# Patient Record
Sex: FEMALE | Race: BLACK OR AFRICAN AMERICAN | NOT HISPANIC OR LATINO | Employment: FULL TIME | URBAN - METROPOLITAN AREA
[De-identification: names, ages, dates, MRNs, and addresses within clinical notes are randomized per-mention and may not be internally consistent; named-entity substitution may affect disease eponyms.]

---

## 2017-04-20 DIAGNOSIS — J40 BRONCHITIS: ICD-10-CM

## 2017-04-21 RX ORDER — CEPHALEXIN 250 MG/1
CAPSULE ORAL
Qty: 60 EACH | Refills: 11 | Status: SHIPPED | OUTPATIENT
Start: 2017-04-21 | End: 2017-09-11

## 2017-09-09 DIAGNOSIS — J40 BRONCHITIS: ICD-10-CM

## 2017-09-11 RX ORDER — CEPHALEXIN 250 MG/1
CAPSULE ORAL
Qty: 60 EACH | Refills: 5 | Status: SHIPPED | OUTPATIENT
Start: 2017-09-11 | End: 2018-11-05 | Stop reason: SDUPTHER

## 2017-09-13 DIAGNOSIS — I10 ESSENTIAL HYPERTENSION: ICD-10-CM

## 2017-09-14 ENCOUNTER — OFFICE VISIT (OUTPATIENT)
Dept: FAMILY MEDICINE | Facility: CLINIC | Age: 57
End: 2017-09-14
Payer: OTHER GOVERNMENT

## 2017-09-14 VITALS
SYSTOLIC BLOOD PRESSURE: 128 MMHG | OXYGEN SATURATION: 97 % | HEART RATE: 84 BPM | HEIGHT: 66 IN | TEMPERATURE: 99 F | DIASTOLIC BLOOD PRESSURE: 70 MMHG | WEIGHT: 266.19 LBS | BODY MASS INDEX: 42.78 KG/M2

## 2017-09-14 DIAGNOSIS — G89.29 CHRONIC PAIN OF LEFT KNEE: ICD-10-CM

## 2017-09-14 DIAGNOSIS — H10.10 ALLERGIC CONJUNCTIVITIS, UNSPECIFIED LATERALITY: ICD-10-CM

## 2017-09-14 DIAGNOSIS — M25.562 CHRONIC PAIN OF LEFT KNEE: ICD-10-CM

## 2017-09-14 DIAGNOSIS — J40 BRONCHITIS: ICD-10-CM

## 2017-09-14 DIAGNOSIS — J45.909 ASTHMA, CHRONIC, UNSPECIFIED ASTHMA SEVERITY, UNCOMPLICATED: ICD-10-CM

## 2017-09-14 DIAGNOSIS — J32.9 SINUSITIS, UNSPECIFIED CHRONICITY, UNSPECIFIED LOCATION: Primary | ICD-10-CM

## 2017-09-14 DIAGNOSIS — I10 ESSENTIAL HYPERTENSION: ICD-10-CM

## 2017-09-14 DIAGNOSIS — J30.9 ALLERGIC RHINITIS, UNSPECIFIED CHRONICITY, UNSPECIFIED SEASONALITY, UNSPECIFIED TRIGGER: ICD-10-CM

## 2017-09-14 DIAGNOSIS — J45.21 MILD INTERMITTENT ASTHMA WITH ACUTE EXACERBATION: Chronic | ICD-10-CM

## 2017-09-14 PROCEDURE — 99213 OFFICE O/P EST LOW 20 MIN: CPT | Mod: PBBFAC,PO | Performed by: NURSE PRACTITIONER

## 2017-09-14 PROCEDURE — 3078F DIAST BP <80 MM HG: CPT | Mod: ,,, | Performed by: NURSE PRACTITIONER

## 2017-09-14 PROCEDURE — 99214 OFFICE O/P EST MOD 30 MIN: CPT | Mod: S$PBB,,, | Performed by: NURSE PRACTITIONER

## 2017-09-14 PROCEDURE — 3008F BODY MASS INDEX DOCD: CPT | Mod: ,,, | Performed by: NURSE PRACTITIONER

## 2017-09-14 PROCEDURE — 99999 PR PBB SHADOW E&M-EST. PATIENT-LVL III: CPT | Mod: PBBFAC,,, | Performed by: NURSE PRACTITIONER

## 2017-09-14 PROCEDURE — 3074F SYST BP LT 130 MM HG: CPT | Mod: ,,, | Performed by: NURSE PRACTITIONER

## 2017-09-14 RX ORDER — LISINOPRIL 20 MG/1
20 TABLET ORAL DAILY
Qty: 90 TABLET | Refills: 1 | Status: SHIPPED | OUTPATIENT
Start: 2017-09-14 | End: 2018-11-05 | Stop reason: SDUPTHER

## 2017-09-14 RX ORDER — IPRATROPIUM BROMIDE AND ALBUTEROL SULFATE 2.5; .5 MG/3ML; MG/3ML
3 SOLUTION RESPIRATORY (INHALATION) EVERY 6 HOURS PRN
Qty: 90 VIAL | Refills: 5 | Status: SHIPPED | OUTPATIENT
Start: 2017-09-14 | End: 2018-06-26 | Stop reason: SDUPTHER

## 2017-09-14 RX ORDER — CELECOXIB 200 MG/1
200 CAPSULE ORAL 2 TIMES DAILY
Qty: 180 CAPSULE | Refills: 1 | Status: SHIPPED | OUTPATIENT
Start: 2017-09-14 | End: 2018-03-23 | Stop reason: SDUPTHER

## 2017-09-14 RX ORDER — SPIRONOLACTONE 50 MG/1
50 TABLET, FILM COATED ORAL 2 TIMES DAILY
Qty: 180 TABLET | Refills: 1 | Status: SHIPPED | OUTPATIENT
Start: 2017-09-14 | End: 2018-11-05 | Stop reason: SDUPTHER

## 2017-09-14 RX ORDER — LEVOCETIRIZINE DIHYDROCHLORIDE 5 MG/1
5 TABLET, FILM COATED ORAL NIGHTLY
Qty: 30 TABLET | Refills: 1 | Status: SHIPPED | OUTPATIENT
Start: 2017-09-14 | End: 2018-07-03 | Stop reason: SDUPTHER

## 2017-09-14 RX ORDER — AMOXICILLIN 875 MG/1
875 TABLET, FILM COATED ORAL 2 TIMES DAILY
Qty: 20 TABLET | Refills: 0 | Status: SHIPPED | OUTPATIENT
Start: 2017-09-14 | End: 2017-09-24

## 2017-09-14 RX ORDER — LISINOPRIL 20 MG/1
TABLET ORAL
Qty: 30 TABLET | Refills: 1 | Status: SHIPPED | OUTPATIENT
Start: 2017-09-14 | End: 2017-09-14 | Stop reason: SDUPTHER

## 2017-09-14 RX ORDER — AMLODIPINE BESYLATE 10 MG/1
10 TABLET ORAL DAILY
Qty: 90 TABLET | Refills: 1 | Status: SHIPPED | OUTPATIENT
Start: 2017-09-14 | End: 2017-12-07

## 2017-09-14 RX ORDER — FLUTICASONE PROPIONATE 50 MCG
1 SPRAY, SUSPENSION (ML) NASAL DAILY
Qty: 3 BOTTLE | Refills: 1 | Status: SHIPPED | OUTPATIENT
Start: 2017-09-14 | End: 2019-07-08 | Stop reason: SDUPTHER

## 2017-09-14 NOTE — TELEPHONE ENCOUNTER
Patient contacted to schedule appointment for BP check. Reports having tchy throat, ear pressure, eyes watering for the last two days.  OV scheduled for today @ 2:00.

## 2017-09-14 NOTE — PROGRESS NOTES
Subjective:       Patient ID: Brandi Freitas is a 56 y.o. female.    Chief Complaint: Hypertension (F/U); Sore Throat (5 days); Nasal Congestion (5 days); and Otalgia (Right)    56-year-old female presents to the clinic today with complaint of slight chills, sore throat, sinus congestion, right ear pain, dry cough, and itchy runny eyes for the past 5 days.  She denies any known fever, shortness of breath, wheezing, abdominal pain, nausea, vomiting, or diarrhea.  She denies any headaches, dizziness, blurred vision.  She denies any cardiac chest pain, heart palpitations, shortness or breath or swelling to lower extremities.  She's been taking Benadryl and using cough drops without much relief.        Past Medical History:   Diagnosis Date    Anxiety     Arthritis     knee    Asthma     Bursitis, shoulder     Dental bridge present     upper right    Dental crown present     Depression     Hypertension     Pes planus     Wears glasses     for reading     Past Surgical History:   Procedure Laterality Date    arthroscopy ankle right      bilateral inguinal hernia repair      left torn rotator cuff repair  09/2014    MYOMECTOMY      rt salphigectomy      SYNOVECTOMY      UMBILICAL HERNIA REPAIR        reports that she has never smoked. She has never used smokeless tobacco. She reports that she drinks alcohol. She reports that she does not use drugs.  Review of Systems   Constitutional: Positive for chills. Negative for fever.   HENT: Positive for congestion, ear pain, sinus pressure and sore throat. Negative for postnasal drip and rhinorrhea.    Eyes: Positive for itching. Negative for pain and discharge.   Respiratory: Positive for cough. Negative for shortness of breath and wheezing.    Cardiovascular: Negative for chest pain, palpitations and leg swelling.   Gastrointestinal: Negative for abdominal pain, diarrhea, nausea and vomiting.   Musculoskeletal: Negative for gait problem.   Neurological:  Negative for dizziness, light-headedness and headaches.       Objective:      Physical Exam   Constitutional: She is oriented to person, place, and time. She appears well-developed and well-nourished. No distress.   HENT:   Head: Normocephalic and atraumatic.   Right Ear: External ear normal.   Left Ear: External ear normal.   Mouth/Throat: Oropharynx is clear and moist. No oropharyngeal exudate.   Both nares red and inflamed with tenderness over both maxillary sinuses    Eyes: Conjunctivae and EOM are normal. Pupils are equal, round, and reactive to light. Right eye exhibits discharge. Left eye exhibits no discharge. No scleral icterus.   Clear drainage noted to both eyes    Neck: Normal range of motion. Neck supple.   Cardiovascular: Normal rate, regular rhythm and normal heart sounds.  Exam reveals no gallop and no friction rub.    No murmur heard.  Pulmonary/Chest: Effort normal and breath sounds normal. No respiratory distress. She has no wheezes. She has no rales.   Abdominal: Soft. Bowel sounds are normal. There is no tenderness.   Musculoskeletal: Normal range of motion. She exhibits no edema.   Lymphadenopathy:     She has cervical adenopathy.   Neurological: She is alert and oriented to person, place, and time.   Skin: Skin is warm and dry. No rash noted. She is not diaphoretic.   Psychiatric: She has a normal mood and affect.       Assessment:       1. Sinusitis, unspecified chronicity, unspecified location    2. Essential hypertension    3. Asthma, chronic, unspecified asthma severity, uncomplicated    4. Bronchitis    5. Allergic conjunctivitis, unspecified laterality    6. Mild intermittent asthma with acute exacerbation    7. Allergic rhinitis, unspecified chronicity, unspecified seasonality, unspecified trigger    8. Chronic pain of left knee        Plan:         Sinusitis, unspecified chronicity, unspecified location  -     amoxicillin (AMOXIL) 875 MG tablet; Take 1 tablet (875 mg total) by mouth 2  (two) times daily.  Dispense: 20 tablet; Refill: 0    Essential hypertension  -     amlodipine (NORVASC) 10 MG tablet; Take 1 tablet (10 mg total) by mouth once daily.  Dispense: 90 tablet; Refill: 1  -     lisinopril (PRINIVIL,ZESTRIL) 20 MG tablet; Take 1 tablet (20 mg total) by mouth once daily.  Dispense: 90 tablet; Refill: 1  -     spironolactone (ALDACTONE) 50 MG tablet; Take 1 tablet (50 mg total) by mouth 2 (two) times daily.  Dispense: 180 tablet; Refill: 1  - The current medical regimen is effective;  continue present plan and medications.    Asthma, chronic, unspecified asthma severity, uncomplicated  -     albuterol-ipratropium 2.5mg-0.5mg/3mL (DUO-NEB) 0.5 mg-3 mg(2.5 mg base)/3 mL nebulizer solution; Take 3 mLs by nebulization every 6 (six) hours as needed for Wheezing or Shortness of Breath.  Dispense: 90 vial; Refill: 5  - The current medical regimen is effective;  continue present plan and medications.    Bronchitis  - Robitussin DM as directed    Allergic conjunctivitis, unspecified laterality  - Zaditor or Alaway as needed     Allergic rhinitis, unspecified chronicity, unspecified seasonality, unspecified trigger  -     fluticasone (FLONASE) 50 mcg/actuation nasal spray; 1 spray by Each Nare route once daily.  Dispense: 3 Bottle; Refill: 1  -     levocetirizine (XYZAL) 5 MG tablet; Take 1 tablet (5 mg total) by mouth every evening.  Dispense: 30 tablet; Refill: 1    Chronic pain of left knee  -     celecoxib (CELEBREX) 200 MG capsule; Take 1 capsule (200 mg total) by mouth 2 (two) times daily.  Dispense: 180 capsule; Refill: 1

## 2017-09-14 NOTE — PATIENT INSTRUCTIONS
Robitussin DM as directed   Zaditor or Alaway for eyes running  Drink lots of water   Use Flonase

## 2017-11-29 ENCOUNTER — LAB VISIT (OUTPATIENT)
Dept: LAB | Facility: HOSPITAL | Age: 57
End: 2017-11-29
Attending: FAMILY MEDICINE
Payer: OTHER GOVERNMENT

## 2017-11-29 ENCOUNTER — OFFICE VISIT (OUTPATIENT)
Dept: FAMILY MEDICINE | Facility: CLINIC | Age: 57
End: 2017-11-29
Payer: OTHER GOVERNMENT

## 2017-11-29 VITALS
SYSTOLIC BLOOD PRESSURE: 148 MMHG | WEIGHT: 268.94 LBS | HEIGHT: 67 IN | HEART RATE: 80 BPM | TEMPERATURE: 99 F | DIASTOLIC BLOOD PRESSURE: 82 MMHG | BODY MASS INDEX: 42.21 KG/M2 | OXYGEN SATURATION: 95 %

## 2017-11-29 DIAGNOSIS — Z11.3 SCREENING EXAMINATION FOR STD (SEXUALLY TRANSMITTED DISEASE): ICD-10-CM

## 2017-11-29 DIAGNOSIS — Z12.31 ENCOUNTER FOR SCREENING MAMMOGRAM FOR BREAST CANCER: ICD-10-CM

## 2017-11-29 DIAGNOSIS — Z12.11 SCREENING FOR COLON CANCER: ICD-10-CM

## 2017-11-29 DIAGNOSIS — E66.01 MORBID OBESITY WITH BMI OF 40.0-44.9, ADULT: ICD-10-CM

## 2017-11-29 DIAGNOSIS — Z00.00 ROUTINE GENERAL MEDICAL EXAMINATION AT A HEALTH CARE FACILITY: ICD-10-CM

## 2017-11-29 DIAGNOSIS — Z00.00 ROUTINE GENERAL MEDICAL EXAMINATION AT A HEALTH CARE FACILITY: Primary | ICD-10-CM

## 2017-11-29 DIAGNOSIS — I10 ESSENTIAL HYPERTENSION: ICD-10-CM

## 2017-11-29 LAB
ALBUMIN SERPL BCP-MCNC: 3.5 G/DL
ALP SERPL-CCNC: 103 U/L
ALT SERPL W/O P-5'-P-CCNC: 16 U/L
ANION GAP SERPL CALC-SCNC: 6 MMOL/L
AST SERPL-CCNC: 19 U/L
BASOPHILS # BLD AUTO: 0.06 K/UL
BASOPHILS NFR BLD: 0.8 %
BILIRUB SERPL-MCNC: 0.3 MG/DL
BUN SERPL-MCNC: 11 MG/DL
CALCIUM SERPL-MCNC: 9.7 MG/DL
CHLORIDE SERPL-SCNC: 107 MMOL/L
CHOLEST SERPL-MCNC: 154 MG/DL
CHOLEST/HDLC SERPL: 3.1 {RATIO}
CO2 SERPL-SCNC: 28 MMOL/L
CREAT SERPL-MCNC: 0.7 MG/DL
DIFFERENTIAL METHOD: ABNORMAL
EOSINOPHIL # BLD AUTO: 0.3 K/UL
EOSINOPHIL NFR BLD: 3.8 %
ERYTHROCYTE [DISTWIDTH] IN BLOOD BY AUTOMATED COUNT: 15.6 %
EST. GFR  (AFRICAN AMERICAN): >60 ML/MIN/1.73 M^2
EST. GFR  (NON AFRICAN AMERICAN): >60 ML/MIN/1.73 M^2
ESTIMATED AVG GLUCOSE: 111 MG/DL
GLUCOSE SERPL-MCNC: 95 MG/DL
HBA1C MFR BLD HPLC: 5.5 %
HCT VFR BLD AUTO: 38.5 %
HDLC SERPL-MCNC: 49 MG/DL
HDLC SERPL: 31.8 %
HGB BLD-MCNC: 11.6 G/DL
IMM GRANULOCYTES # BLD AUTO: 0.02 K/UL
IMM GRANULOCYTES NFR BLD AUTO: 0.3 %
LDLC SERPL CALC-MCNC: 99 MG/DL
LYMPHOCYTES # BLD AUTO: 3 K/UL
LYMPHOCYTES NFR BLD: 42 %
MCH RBC QN AUTO: 25.8 PG
MCHC RBC AUTO-ENTMCNC: 30.1 G/DL
MCV RBC AUTO: 86 FL
MONOCYTES # BLD AUTO: 0.8 K/UL
MONOCYTES NFR BLD: 10.9 %
NEUTROPHILS # BLD AUTO: 3 K/UL
NEUTROPHILS NFR BLD: 42.2 %
NONHDLC SERPL-MCNC: 105 MG/DL
NRBC BLD-RTO: 0 /100 WBC
PLATELET # BLD AUTO: 197 K/UL
PMV BLD AUTO: 13.5 FL
POTASSIUM SERPL-SCNC: 4.3 MMOL/L
PROT SERPL-MCNC: 7.6 G/DL
RBC # BLD AUTO: 4.49 M/UL
SODIUM SERPL-SCNC: 141 MMOL/L
TRIGL SERPL-MCNC: 30 MG/DL
WBC # BLD AUTO: 7.14 K/UL

## 2017-11-29 PROCEDURE — 80061 LIPID PANEL: CPT

## 2017-11-29 PROCEDURE — 87660 TRICHOMONAS VAGIN DIR PROBE: CPT

## 2017-11-29 PROCEDURE — 99999 PR PBB SHADOW E&M-EST. PATIENT-LVL III: CPT | Mod: PBBFAC,,, | Performed by: FAMILY MEDICINE

## 2017-11-29 PROCEDURE — 99213 OFFICE O/P EST LOW 20 MIN: CPT | Mod: PBBFAC,PO | Performed by: FAMILY MEDICINE

## 2017-11-29 PROCEDURE — 36415 COLL VENOUS BLD VENIPUNCTURE: CPT | Mod: PO

## 2017-11-29 PROCEDURE — 83036 HEMOGLOBIN GLYCOSYLATED A1C: CPT

## 2017-11-29 PROCEDURE — 88142 CYTOPATH C/V THIN LAYER: CPT

## 2017-11-29 PROCEDURE — 87480 CANDIDA DNA DIR PROBE: CPT

## 2017-11-29 PROCEDURE — 99396 PREV VISIT EST AGE 40-64: CPT | Mod: S$PBB,,, | Performed by: FAMILY MEDICINE

## 2017-11-29 PROCEDURE — 86592 SYPHILIS TEST NON-TREP QUAL: CPT

## 2017-11-29 PROCEDURE — 80074 ACUTE HEPATITIS PANEL: CPT

## 2017-11-29 PROCEDURE — 87591 N.GONORRHOEAE DNA AMP PROB: CPT

## 2017-11-29 PROCEDURE — 85025 COMPLETE CBC W/AUTO DIFF WBC: CPT

## 2017-11-29 PROCEDURE — 86703 HIV-1/HIV-2 1 RESULT ANTBDY: CPT

## 2017-11-29 PROCEDURE — 80053 COMPREHEN METABOLIC PANEL: CPT

## 2017-11-29 NOTE — PROGRESS NOTES
Chief Complaint   Patient presents with    Annual Exam    Gynecologic Exam       HPI  Brandi Freitas is a 57 y.o. female with multiple medical diagnoses as listed in the medical history and problem list that presents for annual exam . She has asthma but it has been controlled. She has been having some bleeding with hard stools, worse last week. Now she is regular.     PAST MEDICAL HISTORY:  Past Medical History:   Diagnosis Date    Anxiety     Arthritis     knee    Asthma     Bursitis, shoulder     Dental bridge present     upper right    Dental crown present     Depression     Hypertension     Pes planus     Wears glasses     for reading       PAST SURGICAL HISTORY:  Past Surgical History:   Procedure Laterality Date    arthroscopy ankle right      bilateral inguinal hernia repair      left torn rotator cuff repair  09/2014    MYOMECTOMY      rt salphigectomy      SYNOVECTOMY      UMBILICAL HERNIA REPAIR         SOCIAL HISTORY:  Social History     Social History    Marital status:      Spouse name: N/A    Number of children: N/A    Years of education: N/A     Occupational History    Not on file.     Social History Main Topics    Smoking status: Never Smoker    Smokeless tobacco: Never Used    Alcohol use Yes      Comment: social     Drug use: No    Sexual activity: Yes      Comment: occasional     Other Topics Concern    Not on file     Social History Narrative    No narrative on file       FAMILY HISTORY:  Family History   Problem Relation Age of Onset    Cancer Mother      breast    Diabetes Mother     Hypertension Mother     Cancer Brother      liver cancer       ALLERGIES AND MEDICATIONS: updated and reviewed.  Review of patient's allergies indicates:  No Known Allergies  Current Outpatient Prescriptions   Medication Sig Dispense Refill    ADVAIR DISKUS 100-50 mcg/dose diskus inhaler INHALE ONE DOSE BY MOUTH TWICE DAILY 60 each 5    albuterol 90 mcg/actuation  inhaler Inhale 2 puffs into the lungs every 6 (six) hours as needed for Wheezing or Shortness of Breath. 18 g 5    albuterol-ipratropium 2.5mg-0.5mg/3mL (DUO-NEB) 0.5 mg-3 mg(2.5 mg base)/3 mL nebulizer solution Take 3 mLs by nebulization every 6 (six) hours as needed for Wheezing or Shortness of Breath. 90 vial 5    amlodipine (NORVASC) 10 MG tablet Take 1 tablet (10 mg total) by mouth once daily. 90 tablet 1    celecoxib (CELEBREX) 200 MG capsule Take 1 capsule (200 mg total) by mouth 2 (two) times daily. 180 capsule 1    fluticasone (FLONASE) 50 mcg/actuation nasal spray 1 spray by Each Nare route once daily. 3 Bottle 1    levocetirizine (XYZAL) 5 MG tablet Take 1 tablet (5 mg total) by mouth every evening. 30 tablet 1    lisinopril (PRINIVIL,ZESTRIL) 20 MG tablet Take 1 tablet (20 mg total) by mouth once daily. 90 tablet 1    spironolactone (ALDACTONE) 50 MG tablet Take 1 tablet (50 mg total) by mouth 2 (two) times daily. 180 tablet 1     No current facility-administered medications for this visit.        ROS  Review of Systems   Constitutional: Negative for chills, diaphoresis, fatigue, fever and unexpected weight change.   HENT: Negative for rhinorrhea, sinus pressure, sore throat and tinnitus.    Eyes: Negative for photophobia and visual disturbance.   Respiratory: Negative for cough, shortness of breath and wheezing.    Cardiovascular: Negative for chest pain and palpitations.   Gastrointestinal: Negative for abdominal pain, blood in stool, constipation, diarrhea, nausea and vomiting.   Genitourinary: Negative for dysuria, flank pain, frequency and vaginal discharge.   Musculoskeletal: Negative for arthralgias and joint swelling.   Skin: Negative for rash.   Neurological: Negative for speech difficulty, weakness, light-headedness and headaches.   Psychiatric/Behavioral: Negative for behavioral problems and dysphoric mood.       Physical Exam  Vitals:    11/29/17 1421   BP: (!) 148/82   Pulse: 80  "  Temp: 98.6 °F (37 °C)   SpO2: 95%   Weight: 122 kg (268 lb 15.4 oz)   Height: 5' 7" (1.702 m)    Body mass index is 42.13 kg/m².  Weight: 122 kg (268 lb 15.4 oz)   Height: 5' 7" (170.2 cm)     Physical Exam   Constitutional: She is oriented to person, place, and time. She appears well-developed and well-nourished. No distress.   HENT:   Head: Normocephalic and atraumatic.   Right Ear: Tympanic membrane normal.   Left Ear: Tympanic membrane normal.   Nose: Nose normal.   Mouth/Throat: No oropharyngeal exudate.   Eyes: EOM are normal.   Neck: Neck supple. No thyromegaly present.   Cardiovascular: Normal rate and regular rhythm.  Exam reveals no gallop and no friction rub.    No murmur heard.  Pulmonary/Chest: Effort normal and breath sounds normal. No respiratory distress. She has no wheezes. She has no rales.   Abdominal: Soft. Bowel sounds are normal. She exhibits no distension and no mass. There is no tenderness. There is no rebound and no guarding.   Genitourinary:   Genitourinary Comments:   GYN Exam    Medical assistant was present in room and assisted with procedure. Pap smear was performed, specimen collected with cytobrush. No external lesions noted. Normal vaginal mucosa.  Cervix normal appearing cervix without discharge or lesions,no vaginal discharge.    Bimanual exam performed:  no cervical motion tenderness noted, no uterine or ovarian masses palpated.      Lymphadenopathy:     She has no cervical adenopathy.   Neurological: She is alert and oriented to person, place, and time.   Skin: Skin is warm and dry. No rash noted.   Psychiatric: She has a normal mood and affect. Her behavior is normal.   Nursing note and vitals reviewed.      Health Maintenance       Date Due Completion Date    Influenza Vaccine 08/01/2017 11/23/2016 (Declined)    Override on 11/23/2016: Declined    Override on 2/2/2016: Declined    Override on 11/13/2014: Declined    Override on 12/24/2013: Declined    Mammogram 11/22/2017 " 11/22/2016    Colonoscopy 12/14/2017 12/14/2012    Pap Smear with HPV Cotest 11/23/2019 11/23/2016    Lipid Panel 11/23/2021 11/23/2016    TETANUS VACCINE 05/19/2026 5/19/2016            ASSESSMENT     1. Routine general medical examination at a health care facility    2. Essential hypertension    3. Morbid obesity with BMI of 40.0-44.9, adult    4. Screening for colon cancer    5. Encounter for screening mammogram for breast cancer    6. Screening examination for STD (sexually transmitted disease)        PLAN:     Problem List Items Addressed This Visit        Cardiac/Vascular    Hypertension  -Bp recheck, nurse visit if elevated       Endocrine    Morbid obesity with BMI of 40.0-44.9, adult    Relevant Orders    Hemoglobin A1c      Other Visit Diagnoses     Routine general medical examination at a health care facility    -  Primary  --discussed healthy lifestyle modification with exercise and healthy diet, reviewed age appropriate screening and healthy maintenance      Relevant Orders    CBC auto differential    Comprehensive metabolic panel    Lipid panel    Screening for colon cancer        Relevant Orders    Case request GI: COLONOSCOPY (Completed)    Encounter for screening mammogram for breast cancer        Relevant Orders    Mammo Digital Screening Bilateral With CAD    Screening examination for STD (sexually transmitted disease)        Relevant Orders    C. trachomatis/N. gonorrhoeae by AMP DNA Urine    HIV-1 and HIV-2 antibodies    RPR    Hepatitis panel, acute    Liquid-based pap smear, screening    HPV DNA probe, amplified    Vaginosis Screen by DNA Probe    C. trachomatis/N. gonorrhoeae by AMP DNA Vagina            Cherelle Stover MD  11/29/2017 2:31 PM        Return in about 1 year (around 11/29/2018) for annual exam.

## 2017-11-30 LAB
CANDIDA RRNA VAG QL PROBE: NEGATIVE
G VAGINALIS RRNA GENITAL QL PROBE: NEGATIVE
HAV IGM SERPL QL IA: NEGATIVE
HBV CORE IGM SERPL QL IA: NEGATIVE
HBV SURFACE AG SERPL QL IA: NEGATIVE
HCV AB SERPL QL IA: NEGATIVE
HIV 1+2 AB+HIV1 P24 AG SERPL QL IA: NEGATIVE
RPR SER QL: NORMAL
T VAGINALIS RRNA GENITAL QL PROBE: NEGATIVE

## 2017-12-01 LAB
C TRACH DNA SPEC QL NAA+PROBE: NOT DETECTED
N GONORRHOEA DNA SPEC QL NAA+PROBE: NOT DETECTED

## 2017-12-06 ENCOUNTER — HOSPITAL ENCOUNTER (OUTPATIENT)
Dept: RADIOLOGY | Facility: HOSPITAL | Age: 57
Discharge: HOME | End: 2017-12-06
Attending: FAMILY MEDICINE
Payer: OTHER GOVERNMENT

## 2017-12-06 DIAGNOSIS — Z12.31 ENCOUNTER FOR SCREENING MAMMOGRAM FOR BREAST CANCER: ICD-10-CM

## 2017-12-06 PROCEDURE — 77063 BREAST TOMOSYNTHESIS BI: CPT | Mod: 26,,, | Performed by: RADIOLOGY

## 2017-12-06 PROCEDURE — 77067 SCR MAMMO BI INCL CAD: CPT | Mod: 26,,, | Performed by: RADIOLOGY

## 2017-12-06 PROCEDURE — 77067 SCR MAMMO BI INCL CAD: CPT | Mod: TC,PO

## 2017-12-07 DIAGNOSIS — I10 ESSENTIAL HYPERTENSION: ICD-10-CM

## 2017-12-07 RX ORDER — AMLODIPINE BESYLATE 10 MG/1
TABLET ORAL
Qty: 30 TABLET | Refills: 5 | Status: SHIPPED | OUTPATIENT
Start: 2017-12-07 | End: 2018-12-27 | Stop reason: SDUPTHER

## 2017-12-13 ENCOUNTER — CLINICAL SUPPORT (OUTPATIENT)
Dept: FAMILY MEDICINE | Facility: CLINIC | Age: 57
End: 2017-12-13
Payer: OTHER GOVERNMENT

## 2017-12-13 VITALS — SYSTOLIC BLOOD PRESSURE: 138 MMHG | DIASTOLIC BLOOD PRESSURE: 86 MMHG | HEART RATE: 93 BPM | OXYGEN SATURATION: 98 %

## 2017-12-13 DIAGNOSIS — I10 ESSENTIAL HYPERTENSION: Primary | ICD-10-CM

## 2017-12-13 PROCEDURE — 99499 UNLISTED E&M SERVICE: CPT | Mod: S$PBB,,, | Performed by: FAMILY MEDICINE

## 2017-12-13 PROCEDURE — 99213 OFFICE O/P EST LOW 20 MIN: CPT | Mod: PBBFAC,PO

## 2017-12-13 PROCEDURE — 99999 PR PBB SHADOW E&M-EST. PATIENT-LVL III: CPT | Mod: PBBFAC,,,

## 2017-12-13 NOTE — PROGRESS NOTES
Brandi Gray michela 57 y.o. female is here today for Blood Pressure check.   History of HTN yes.    Review of patient's allergies indicates:  No Known Allergies  Creatinine   Date Value Ref Range Status   11/29/2017 0.7 0.5 - 1.4 mg/dL Final     Sodium   Date Value Ref Range Status   11/29/2017 141 136 - 145 mmol/L Final     Potassium   Date Value Ref Range Status   11/29/2017 4.3 3.5 - 5.1 mmol/L Final   ]  Patient verifies taking blood pressure medications on a regular basis at the same time of the day.     Current Outpatient Prescriptions:     ADVAIR DISKUS 100-50 mcg/dose diskus inhaler, INHALE ONE DOSE BY MOUTH TWICE DAILY, Disp: 60 each, Rfl: 5    albuterol 90 mcg/actuation inhaler, Inhale 2 puffs into the lungs every 6 (six) hours as needed for Wheezing or Shortness of Breath., Disp: 18 g, Rfl: 5    albuterol-ipratropium 2.5mg-0.5mg/3mL (DUO-NEB) 0.5 mg-3 mg(2.5 mg base)/3 mL nebulizer solution, Take 3 mLs by nebulization every 6 (six) hours as needed for Wheezing or Shortness of Breath., Disp: 90 vial, Rfl: 5    amLODIPine (NORVASC) 10 MG tablet, TAKE ONE TABLET BY MOUTH ONCE DAILY, Disp: 30 tablet, Rfl: 5    celecoxib (CELEBREX) 200 MG capsule, Take 1 capsule (200 mg total) by mouth 2 (two) times daily., Disp: 180 capsule, Rfl: 1    fluticasone (FLONASE) 50 mcg/actuation nasal spray, 1 spray by Each Nare route once daily., Disp: 3 Bottle, Rfl: 1    levocetirizine (XYZAL) 5 MG tablet, Take 1 tablet (5 mg total) by mouth every evening., Disp: 30 tablet, Rfl: 1    lisinopril (PRINIVIL,ZESTRIL) 20 MG tablet, Take 1 tablet (20 mg total) by mouth once daily., Disp: 90 tablet, Rfl: 1    spironolactone (ALDACTONE) 50 MG tablet, Take 1 tablet (50 mg total) by mouth 2 (two) times daily., Disp: 180 tablet, Rfl: 1  Does patient have record of home blood pressure readings no.   Last dose of blood pressure medication was taken at approx 8:40 pm last night.  Patient is asymptomatic.   Complains of none.    FYI:  Patient had a coughing fit while in room for blood pressure check. She took two puffs of her rescue inhaler but stated that she would be fine once she got out in the fresh air. Cough was dry and hacky.     Vitals:    12/13/17 1520   BP: 138/86   BP Location: Right arm   Patient Position: Sitting   BP Method: Large (Manual)   Pulse: 93   SpO2: 98%         Dr. Stover notified.

## 2018-01-09 DIAGNOSIS — Z12.11 SCREEN FOR COLON CANCER: Primary | ICD-10-CM

## 2018-01-11 ENCOUNTER — ANESTHESIA (OUTPATIENT)
Dept: ENDOSCOPY | Facility: HOSPITAL | Age: 58
End: 2018-01-11
Payer: OTHER GOVERNMENT

## 2018-01-11 ENCOUNTER — HOSPITAL ENCOUNTER (OUTPATIENT)
Facility: HOSPITAL | Age: 58
Discharge: HOME | End: 2018-01-11
Attending: INTERNAL MEDICINE | Admitting: INTERNAL MEDICINE
Payer: OTHER GOVERNMENT

## 2018-01-11 ENCOUNTER — ANESTHESIA EVENT (OUTPATIENT)
Dept: ENDOSCOPY | Facility: HOSPITAL | Age: 58
End: 2018-01-11
Payer: OTHER GOVERNMENT

## 2018-01-11 ENCOUNTER — SURGERY (OUTPATIENT)
Age: 58
End: 2018-01-11

## 2018-01-11 VITALS
HEIGHT: 67 IN | OXYGEN SATURATION: 98 % | TEMPERATURE: 97 F | SYSTOLIC BLOOD PRESSURE: 148 MMHG | DIASTOLIC BLOOD PRESSURE: 72 MMHG | WEIGHT: 268 LBS | BODY MASS INDEX: 42.06 KG/M2 | HEART RATE: 70 BPM | RESPIRATION RATE: 18 BRPM

## 2018-01-11 DIAGNOSIS — Z12.11 COLON CANCER SCREENING: ICD-10-CM

## 2018-01-11 PROCEDURE — 94761 N-INVAS EAR/PLS OXIMETRY MLT: CPT

## 2018-01-11 PROCEDURE — 94640 AIRWAY INHALATION TREATMENT: CPT | Mod: 59

## 2018-01-11 PROCEDURE — 63600175 PHARM REV CODE 636 W HCPCS: Performed by: NURSE ANESTHETIST, CERTIFIED REGISTERED

## 2018-01-11 PROCEDURE — 88305 TISSUE EXAM BY PATHOLOGIST: CPT | Mod: 59

## 2018-01-11 PROCEDURE — 27201012 HC FORCEPS, HOT/COLD, DISP: Performed by: INTERNAL MEDICINE

## 2018-01-11 PROCEDURE — 25000242 PHARM REV CODE 250 ALT 637 W/ HCPCS

## 2018-01-11 PROCEDURE — 37000008 HC ANESTHESIA 1ST 15 MINUTES: Performed by: INTERNAL MEDICINE

## 2018-01-11 PROCEDURE — D9220A PRA ANESTHESIA: Mod: ANES,,, | Performed by: ANESTHESIOLOGY

## 2018-01-11 PROCEDURE — 25000003 PHARM REV CODE 250: Performed by: INTERNAL MEDICINE

## 2018-01-11 PROCEDURE — 27201089 HC SNARE, DISP (ANY): Performed by: INTERNAL MEDICINE

## 2018-01-11 PROCEDURE — 45385 COLONOSCOPY W/LESION REMOVAL: CPT | Performed by: INTERNAL MEDICINE

## 2018-01-11 PROCEDURE — D9220A PRA ANESTHESIA: Mod: CRNA,,, | Performed by: NURSE ANESTHETIST, CERTIFIED REGISTERED

## 2018-01-11 PROCEDURE — 25000003 PHARM REV CODE 250: Performed by: NURSE ANESTHETIST, CERTIFIED REGISTERED

## 2018-01-11 PROCEDURE — 88305 TISSUE EXAM BY PATHOLOGIST: CPT | Mod: 26,,,

## 2018-01-11 PROCEDURE — 25000242 PHARM REV CODE 250 ALT 637 W/ HCPCS: Performed by: NURSE ANESTHETIST, CERTIFIED REGISTERED

## 2018-01-11 PROCEDURE — 45380 COLONOSCOPY AND BIOPSY: CPT | Performed by: INTERNAL MEDICINE

## 2018-01-11 PROCEDURE — 37000009 HC ANESTHESIA EA ADD 15 MINS: Performed by: INTERNAL MEDICINE

## 2018-01-11 RX ORDER — PROPOFOL 10 MG/ML
VIAL (ML) INTRAVENOUS
Status: COMPLETED
Start: 2018-01-11 | End: 2018-01-11

## 2018-01-11 RX ORDER — SODIUM CHLORIDE 9 MG/ML
INJECTION, SOLUTION INTRAVENOUS CONTINUOUS PRN
Status: DISCONTINUED | OUTPATIENT
Start: 2018-01-11 | End: 2018-01-11

## 2018-01-11 RX ORDER — IPRATROPIUM BROMIDE AND ALBUTEROL SULFATE 2.5; .5 MG/3ML; MG/3ML
SOLUTION RESPIRATORY (INHALATION)
Status: COMPLETED
Start: 2018-01-11 | End: 2018-01-11

## 2018-01-11 RX ORDER — LIDOCAINE HYDROCHLORIDE 20 MG/ML
INJECTION, SOLUTION EPIDURAL; INFILTRATION; INTRACAUDAL; PERINEURAL
Status: DISCONTINUED
Start: 2018-01-11 | End: 2018-01-11 | Stop reason: HOSPADM

## 2018-01-11 RX ORDER — PROPOFOL 10 MG/ML
VIAL (ML) INTRAVENOUS CONTINUOUS PRN
Status: DISCONTINUED | OUTPATIENT
Start: 2018-01-11 | End: 2018-01-11

## 2018-01-11 RX ORDER — IPRATROPIUM BROMIDE AND ALBUTEROL SULFATE 2.5; .5 MG/3ML; MG/3ML
3 SOLUTION RESPIRATORY (INHALATION) ONCE
Status: COMPLETED | OUTPATIENT
Start: 2018-01-11 | End: 2018-01-11

## 2018-01-11 RX ORDER — SODIUM CHLORIDE 9 MG/ML
INJECTION, SOLUTION INTRAVENOUS ONCE
Status: COMPLETED | OUTPATIENT
Start: 2018-01-11 | End: 2018-01-11

## 2018-01-11 RX ORDER — LIDOCAINE HCL/PF 100 MG/5ML
SYRINGE (ML) INTRAVENOUS
Status: DISCONTINUED | OUTPATIENT
Start: 2018-01-11 | End: 2018-01-11

## 2018-01-11 RX ORDER — ALBUTEROL SULFATE 90 UG/1
AEROSOL, METERED RESPIRATORY (INHALATION)
Status: DISCONTINUED
Start: 2018-01-11 | End: 2018-01-11 | Stop reason: HOSPADM

## 2018-01-11 RX ORDER — ALBUTEROL SULFATE 90 UG/1
AEROSOL, METERED RESPIRATORY (INHALATION)
Status: DISCONTINUED | OUTPATIENT
Start: 2018-01-11 | End: 2018-01-11

## 2018-01-11 RX ORDER — PROPOFOL 10 MG/ML
VIAL (ML) INTRAVENOUS
Status: DISCONTINUED | OUTPATIENT
Start: 2018-01-11 | End: 2018-01-11

## 2018-01-11 RX ADMIN — IPRATROPIUM BROMIDE AND ALBUTEROL SULFATE 3 ML: .5; 3 SOLUTION RESPIRATORY (INHALATION) at 09:01

## 2018-01-11 RX ADMIN — PROPOFOL 140 MCG/KG/MIN: 10 INJECTION, EMULSION INTRAVENOUS at 08:01

## 2018-01-11 RX ADMIN — IPRATROPIUM BROMIDE AND ALBUTEROL SULFATE 3 ML: 2.5; .5 SOLUTION RESPIRATORY (INHALATION) at 09:01

## 2018-01-11 RX ADMIN — PROPOFOL 60 MG: 10 INJECTION, EMULSION INTRAVENOUS at 08:01

## 2018-01-11 RX ADMIN — SODIUM CHLORIDE: 0.9 INJECTION, SOLUTION INTRAVENOUS at 08:01

## 2018-01-11 RX ADMIN — LIDOCAINE HYDROCHLORIDE 100 MG: 20 INJECTION, SOLUTION INTRAVENOUS at 08:01

## 2018-01-11 RX ADMIN — ALBUTEROL SULFATE 2 PUFF: 90 AEROSOL, METERED RESPIRATORY (INHALATION) at 09:01

## 2018-01-11 NOTE — H&P
Pre-Procedure H&P:  Reason for Procedure: Colon Ca screening    HPI:  Pt is a 57 y.o. female here for colonoscopy.  Pt. Had hyperplastic polyps found on C-scope in 2012.  No current GI complaints.  No known family Hx of Colon Ca.      Past Medical History:   Diagnosis Date    Anxiety     Arthritis     knee    Asthma     Bursitis, shoulder     Dental bridge present     upper right    Dental crown present     Depression     Hypertension     Pes planus     Wears glasses     for reading       Past Surgical History:   Procedure Laterality Date    arthroscopy ankle right      bilateral inguinal hernia repair      left torn rotator cuff repair  09/2014    MYOMECTOMY      rt salphigectomy      SYNOVECTOMY      UMBILICAL HERNIA REPAIR         Family History   Problem Relation Age of Onset    Cancer Mother      breast    Diabetes Mother     Hypertension Mother     Breast cancer Mother     Cancer Brother      liver cancer    Breast cancer Maternal Aunt        Social History     Social History    Marital status:      Spouse name: N/A    Number of children: N/A    Years of education: N/A     Occupational History    Not on file.     Social History Main Topics    Smoking status: Never Smoker    Smokeless tobacco: Never Used    Alcohol use Yes      Comment: social     Drug use: No    Sexual activity: Yes      Comment: occasional     Other Topics Concern    Not on file     Social History Narrative    No narrative on file       Endoscopic History:  C-scope, 2012    Review of patient's allergies indicates:  No Known Allergies    No current facility-administered medications on file prior to encounter.      Current Outpatient Prescriptions on File Prior to Encounter   Medication Sig Dispense Refill    ADVAIR DISKUS 100-50 mcg/dose diskus inhaler INHALE ONE DOSE BY MOUTH TWICE DAILY 60 each 5    albuterol 90 mcg/actuation inhaler Inhale 2 puffs into the lungs every 6 (six) hours as needed for  "Wheezing or Shortness of Breath. 18 g 5    celecoxib (CELEBREX) 200 MG capsule Take 1 capsule (200 mg total) by mouth 2 (two) times daily. 180 capsule 1    levocetirizine (XYZAL) 5 MG tablet Take 1 tablet (5 mg total) by mouth every evening. 30 tablet 1    lisinopril (PRINIVIL,ZESTRIL) 20 MG tablet Take 1 tablet (20 mg total) by mouth once daily. 90 tablet 1    spironolactone (ALDACTONE) 50 MG tablet Take 1 tablet (50 mg total) by mouth 2 (two) times daily. 180 tablet 1    albuterol-ipratropium 2.5mg-0.5mg/3mL (DUO-NEB) 0.5 mg-3 mg(2.5 mg base)/3 mL nebulizer solution Take 3 mLs by nebulization every 6 (six) hours as needed for Wheezing or Shortness of Breath. 90 vial 5    fluticasone (FLONASE) 50 mcg/actuation nasal spray 1 spray by Each Nare route once daily. 3 Bottle 1       Current Facility-Administered Medications:     lidocaine  20 mg/mL (2%) 20 mg/mL (2 %) injection, , , ,     propofol (DIPRIVAN) 10 mg/mL IVP injection, , , ,     ROS: Negative x 10    Patient Vitals for the past 24 hrs:   BP Temp Temp src Pulse Resp SpO2 Height Weight   01/11/18 0759 (!) 164/75 99 °F (37.2 °C) Oral 68 - 100 % 5' 7" (1.702 m) 121.6 kg (268 lb)   01/11/18 0751 (!) 164/75 99.2 °F (37.3 °C) Oral 77 18 - - -       Gen: Well developed, well nourished, no apparent distress  HEENT: Anicteric, PERRLA  CV: S1, S2, no murmers/rubs, non-displaced PMI  Lungs: CTA-B, normal excursion  Abd: Soft, NT, ND, normal BS's, no HSM  Ext: No c/c/e, 1+ DP pulses to BLE's  Neuro: CN II-XII grossly intact, no asterixis.  Skin: No rashes/lesions.  Psych: AA&O x 4    Assessment:  Pt. Is a 57 y.o. female with:  1. Colon Ca Screening    Recommendations:  1. Colonoscopy  2. F/U with PCP      I would like to take this opportunity to thank you for this consult.  If you have any questions or concerns, please do not hesitate to contact me.         "

## 2018-01-11 NOTE — DISCHARGE INSTRUCTIONS

## 2018-01-11 NOTE — TRANSFER OF CARE
"Anesthesia Transfer of Care Note    Patient: Brandi Freitas    Procedure(s) Performed: Procedure(s) (LRB):  COLONOSCOPY (N/A)    Patient location: GI    Anesthesia Type: general    Transport from OR: Transported from OR on room air with adequate spontaneous ventilation    Post pain: adequate analgesia    Post assessment: no apparent anesthetic complications    Post vital signs: stable    Level of consciousness: awake, alert and oriented    Nausea/Vomiting: no nausea/vomiting    Complications: none    Transfer of care protocol was followed      Last vitals:   Visit Vitals  BP (!) 145/91   Pulse 84   Temp 36 °C (96.8 °F)   Resp 16   Ht 5' 7" (1.702 m)   Wt 121.6 kg (268 lb)   LMP 07/18/2012   SpO2 100%   Breastfeeding? No   BMI 41.97 kg/m²     "

## 2018-01-15 NOTE — ANESTHESIA POSTPROCEDURE EVALUATION
"Anesthesia Post Evaluation    Patient: Brandi Freitas    Procedure(s) Performed: Procedure(s) (LRB):  COLONOSCOPY (N/A)    Final Anesthesia Type: general  Patient location during evaluation: GI PACU  Patient participation: Yes- Able to Participate  Level of consciousness: awake and alert and oriented  Post-procedure vital signs: reviewed and stable  Pain management: adequate  Airway patency: patent  PONV status at discharge: No PONV  Anesthetic complications: no      Cardiovascular status: blood pressure returned to baseline and hemodynamically stable  Respiratory status: spontaneous ventilation and unassisted  Follow-up not needed.        Visit Vitals  BP (!) 148/72   Pulse 70   Temp 36 °C (96.8 °F)   Resp 18   Ht 5' 7" (1.702 m)   Wt 121.6 kg (268 lb)   LMP 07/18/2012   SpO2 98%   Breastfeeding? No   BMI 41.97 kg/m²       Pain/Jory Score: No Data Recorded      "

## 2018-01-15 NOTE — ANESTHESIA PREPROCEDURE EVALUATION
01/14/2018  Brandi Freitas is a 57 y.o., female.    Anesthesia Evaluation     I have reviewed the Nursing Notes.      Review of Systems  Anesthesia Hx:  No problems with previous Anesthesia   Social:  Non-Smoker    EENT/Dental:   chronic allergic rhinitis   Cardiovascular:   Denies Pacemaker. Hypertension  Denies Valvular problems/Murmurs.  Denies MI.  Denies CAD.    Denies CABG/stent.  Denies Dysrhythmias.   Denies Angina.             denies PVD no hyperlipidemia    Pulmonary:   Denies Pneumonia Denies COPD. Asthma  Denies Shortness of breath.  Denies Recent URI.    Renal/:  Renal/ Normal     Hepatic/GI:   Bowel Prep. Denies PUD. Denies Hiatal Hernia.  Denies GERD. Denies Liver Disease.  Denies Hepatitis.    Musculoskeletal:   Arthritis     Neurological:  Neurology Normal    Endocrine:  Endocrine Normal    Psych:   Psychiatric History          Physical Exam  General:  Morbid Obesity    Airway/Jaw/Neck:  AIRWAY FINDINGS: Normal           Mental Status:  Mental Status Findings: Normal        Anesthesia Plan  Type of Anesthesia, risks & benefits discussed:  Anesthesia Type:  general  Patient's Preference:   Intra-op Monitoring Plan: standard ASA monitors  Intra-op Monitoring Plan Comments:   Post Op Pain Control Plan:   Post Op Pain Control Plan Comments:   Induction:   IV  Beta Blocker:  Patient is not currently on a Beta-Blocker (No further documentation required).       Informed Consent: Patient understands risks and agrees with Anesthesia plan.  Questions answered. Anesthesia consent signed with patient.  ASA Score: 3     Day of Surgery Review of History & Physical:    H&P update referred to the surgeon.         Ready For Surgery From Anesthesia Perspective.

## 2018-03-23 ENCOUNTER — OFFICE VISIT (OUTPATIENT)
Dept: FAMILY MEDICINE | Facility: CLINIC | Age: 58
End: 2018-03-23
Payer: COMMERCIAL

## 2018-03-23 VITALS
TEMPERATURE: 99 F | HEIGHT: 67 IN | HEART RATE: 73 BPM | SYSTOLIC BLOOD PRESSURE: 126 MMHG | DIASTOLIC BLOOD PRESSURE: 80 MMHG | OXYGEN SATURATION: 99 % | WEIGHT: 260 LBS | BODY MASS INDEX: 40.81 KG/M2

## 2018-03-23 DIAGNOSIS — M25.572 CHRONIC ANKLE PAIN, BILATERAL: ICD-10-CM

## 2018-03-23 DIAGNOSIS — G89.29 CHRONIC PAIN OF BOTH KNEES: Primary | ICD-10-CM

## 2018-03-23 DIAGNOSIS — G89.29 CHRONIC ANKLE PAIN, BILATERAL: ICD-10-CM

## 2018-03-23 DIAGNOSIS — M72.2 PLANTAR FASCIITIS: ICD-10-CM

## 2018-03-23 DIAGNOSIS — M25.562 CHRONIC PAIN OF LEFT KNEE: ICD-10-CM

## 2018-03-23 DIAGNOSIS — M25.571 CHRONIC ANKLE PAIN, BILATERAL: ICD-10-CM

## 2018-03-23 DIAGNOSIS — M25.561 CHRONIC PAIN OF BOTH KNEES: Primary | ICD-10-CM

## 2018-03-23 DIAGNOSIS — M25.562 CHRONIC PAIN OF BOTH KNEES: Primary | ICD-10-CM

## 2018-03-23 DIAGNOSIS — G89.29 CHRONIC PAIN OF LEFT KNEE: ICD-10-CM

## 2018-03-23 PROCEDURE — 99999 PR PBB SHADOW E&M-EST. PATIENT-LVL V: CPT | Mod: PBBFAC,,, | Performed by: NURSE PRACTITIONER

## 2018-03-23 PROCEDURE — 99214 OFFICE O/P EST MOD 30 MIN: CPT | Mod: S$GLB,,, | Performed by: NURSE PRACTITIONER

## 2018-03-23 PROCEDURE — 99215 OFFICE O/P EST HI 40 MIN: CPT | Mod: PBBFAC,PO | Performed by: NURSE PRACTITIONER

## 2018-03-23 RX ORDER — CELECOXIB 200 MG/1
200 CAPSULE ORAL 2 TIMES DAILY
Qty: 180 CAPSULE | Refills: 1 | Status: SHIPPED | OUTPATIENT
Start: 2018-03-23 | End: 2019-01-09

## 2018-03-23 NOTE — PROGRESS NOTES
This dictation has been generated using Dragon Dictation some phonetic errors may occur.     Problem List Items Addressed This Visit     Knee pain, left    Relevant Medications    celecoxib (CELEBREX) 200 MG capsule      Other Visit Diagnoses     Chronic pain of both knees    -  Primary    Relevant Medications    celecoxib (CELEBREX) 200 MG capsule    Other Relevant Orders    Ambulatory Referral to Physical/Occupational Therapy    Chronic ankle pain, bilateral        Relevant Medications    celecoxib (CELEBREX) 200 MG capsule    Other Relevant Orders    Ambulatory Referral to Physical/Occupational Therapy    Ambulatory referral to Podiatry    Plantar fasciitis        Relevant Medications    celecoxib (CELEBREX) 200 MG capsule    Other Relevant Orders    Ambulatory Referral to Physical/Occupational Therapy    Ambulatory referral to Podiatry          Orders Placed This Encounter    Ambulatory Referral to Physical/Occupational Therapy    Ambulatory referral to Podiatry    celecoxib (CELEBREX) 200 MG capsule     Knee pain and ankle pain plantar fasciitis and heel spurs treat with Celebrex as above.  Refer to physical therapy for therapeutic exercises.  Discussed stretches at today's visit and demonstrated to the patient appropriate plantar fasciitis stretching.  Refer to podiatry for evaluation and consideration of injections.  I may consider some oral or intramuscular steroid use given multiple joints involved.  We will see how she responds to the Celebrex.    Follow-up if symptoms worsen or fail to improve.    ________________________________________________________________  ________________________________________________________________      Chief Complaint   Patient presents with    Foot Pain     History of present illness  This 57 y.o. presents today for complaint of foot pain.  Patient notes foot knee and ankle pain.  She has been diagnosed with plantar fasciitis in the past.  Patient notes that she's been  evaluated for knee pain and ankle pain in the past.  She does work in stands on her feet.  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  Patient notes pain with standing.  The first few minutes of walking is painful.  She notes that it feels like she is walking on glass on the heels.  She has had diagnosis of heel spur and plantar fasciitis in the past.  She did have a shot with podiatry previously.  She has 1 the custom orthotics and ankle braces and attempt to help.  States pain is a 10 on a scale of 1-10.  We discussed her weight at today's visit.  This is certainly contributing to the knee and ankle and foot pains.  Discussed utilization of a bike for exercise to avoid knee and heel pounding on the pavement.  Discussed proper stretching.  We also discussed follow-up with physical therapy and the benefits of that.  She needs to lose weight to decrease the strain on the knee and ankle and foot.  Review of systems  No fever or chills  Patient denies a rash  No low back pain  No chest pain or shortness of breath        Past Medical History:   Diagnosis Date    Anxiety     Arthritis     knee    Asthma     Bursitis, shoulder     Dental bridge present     upper right    Dental crown present     Depression     Hypertension     Pes planus     Wears glasses     for reading       Past Surgical History:   Procedure Laterality Date    arthroscopy ankle right      bilateral inguinal hernia repair      COLONOSCOPY N/A 1/11/2018    Procedure: COLONOSCOPY;  Surgeon: Christoph Nolasco MD;  Location: [facility];  Service: Endoscopy;  Laterality: N/A;    left torn rotator cuff repair  09/2014    MYOMECTOMY      rt salphigectomy      SYNOVECTOMY      UMBILICAL HERNIA REPAIR         Family History   Problem Relation Age of Onset    Cancer Mother      breast    Diabetes Mother     Hypertension Mother     Breast cancer Mother     Cancer Brother      liver cancer    Breast cancer Maternal Aunt         Social History     Social History    Marital status:      Spouse name: N/A    Number of children: N/A    Years of education: N/A     Social History Main Topics    Smoking status: Never Smoker    Smokeless tobacco: Never Used    Alcohol use Yes      Comment: social     Drug use: No    Sexual activity: Yes      Comment: occasional     Other Topics Concern    None     Social History Narrative    None       Current Outpatient Prescriptions   Medication Sig Dispense Refill    ADVAIR DISKUS 100-50 mcg/dose diskus inhaler INHALE ONE DOSE BY MOUTH TWICE DAILY 60 each 5    albuterol 90 mcg/actuation inhaler Inhale 2 puffs into the lungs every 6 (six) hours as needed for Wheezing or Shortness of Breath. 18 g 5    albuterol-ipratropium 2.5mg-0.5mg/3mL (DUO-NEB) 0.5 mg-3 mg(2.5 mg base)/3 mL nebulizer solution Take 3 mLs by nebulization every 6 (six) hours as needed for Wheezing or Shortness of Breath. 90 vial 5    amLODIPine (NORVASC) 10 MG tablet TAKE ONE TABLET BY MOUTH ONCE DAILY 30 tablet 5    celecoxib (CELEBREX) 200 MG capsule Take 1 capsule (200 mg total) by mouth 2 (two) times daily. 180 capsule 1    fluticasone (FLONASE) 50 mcg/actuation nasal spray 1 spray by Each Nare route once daily. 3 Bottle 1    levocetirizine (XYZAL) 5 MG tablet Take 1 tablet (5 mg total) by mouth every evening. 30 tablet 1    lisinopril (PRINIVIL,ZESTRIL) 20 MG tablet Take 1 tablet (20 mg total) by mouth once daily. 90 tablet 1    spironolactone (ALDACTONE) 50 MG tablet Take 1 tablet (50 mg total) by mouth 2 (two) times daily. 180 tablet 1     No current facility-administered medications for this visit.        Review of patient's allergies indicates:  No Known Allergies    Physical examination  Vitals Reviewed  Gen. Well-dressed well-nourished no apparent distress  Skin warm dry and intact.  No rashes noted.  Chest.  Respirations are even unlabored.  Lungs are clear to auscultation.  Cardiac regular rate and  rhythm.  No chest wall adenopathy noted.  Neuro. Awake alert oriented x4.  Normal judgment and cognition noted.  Extremities no clubbing cyanosis or edema noted.  Knee exam bilateral is stable.  She does have some swelling noted.  Ankle exam bilateral some tenderness with inversion of the ankle.  Trace swelling noted around the ankle.  No localized warmth or erythematous changes  Bilateral foot exam she does have tenderness at the heel.  Left foot is more painful and tender than the right.  Positive plantar fasciitis signs.    Call or return to clinic prn if these symptoms worsen or fail to improve as anticipated.

## 2018-04-11 ENCOUNTER — OFFICE VISIT (OUTPATIENT)
Dept: PODIATRY | Facility: CLINIC | Age: 58
End: 2018-04-11
Payer: OTHER GOVERNMENT

## 2018-04-11 VITALS
HEART RATE: 72 BPM | BODY MASS INDEX: 39.4 KG/M2 | SYSTOLIC BLOOD PRESSURE: 143 MMHG | WEIGHT: 260 LBS | HEIGHT: 68 IN | DIASTOLIC BLOOD PRESSURE: 73 MMHG

## 2018-04-11 DIAGNOSIS — M72.2 PLANTAR FASCIITIS: ICD-10-CM

## 2018-04-11 DIAGNOSIS — M25.571 CHRONIC PAIN OF RIGHT ANKLE: Primary | ICD-10-CM

## 2018-04-11 DIAGNOSIS — G89.29 CHRONIC PAIN OF RIGHT ANKLE: Primary | ICD-10-CM

## 2018-04-11 PROCEDURE — 99999 PR PBB SHADOW E&M-EST. PATIENT-LVL III: CPT | Mod: PBBFAC,,, | Performed by: PODIATRIST

## 2018-04-11 PROCEDURE — 99213 OFFICE O/P EST LOW 20 MIN: CPT | Mod: PBBFAC | Performed by: PODIATRIST

## 2018-04-11 PROCEDURE — 99203 OFFICE O/P NEW LOW 30 MIN: CPT | Mod: S$PBB,,, | Performed by: PODIATRIST

## 2018-04-11 NOTE — LETTER
April 11, 2018      Ashok Lopez, NP  [address]  [place] [address]           [facility] - Podiatry  [address]  [place] [address]  Phone: [phone]          Patient: Brandi Freitas   MR Number: [id number]   YOB: 1960   Date of Visit: 4/11/2018       Dear Ashok Lopez:    Thank you for referring Brandi Freitas to me for evaluation. Attached you will find relevant portions of my assessment and plan of care.    If you have questions, please do not hesitate to call me. I look forward to following Brandi Freitas along with you.    Sincerely,    Yu Valdez DPM    Enclosure  CC:  No Recipients    If you would like to receive this communication electronically, please contact [email or web address] or [phone] to request more information on [employer] Link access.    For providers and/or their staff who would like to refer a patient to [facility], please contact us through our one-stop-shop provider referral line, [facility], at [phone].    If you feel you have received this communication in error or would no longer like to receive these types of communications, please e-mail [email or web address]

## 2018-04-11 NOTE — PROGRESS NOTES
Chief Complaint   Patient presents with    Foot Pain     ankle pain/ bilateral plantar fasciitis             HPI:   Brandi Freitas is a 57 y.o. female who presents to clinic with complaints of pain to right ankle, present for many years. No acute trauma reported.  She also complains of bilateral plantar fasciitis right > left .  She has history of ankle scope over 10 years ago, right ankle.  She wears a compressive ankle sleeve and boots for work.         Past Medical History:   Diagnosis Date    Anxiety     Arthritis     knee    Asthma     Bursitis, shoulder     Dental bridge present     upper right    Dental crown present     Depression     Hypertension     Pes planus     Wears glasses     for reading         Current Outpatient Prescriptions on File Prior to Visit   Medication Sig Dispense Refill    ADVAIR DISKUS 100-50 mcg/dose diskus inhaler INHALE ONE DOSE BY MOUTH TWICE DAILY 60 each 5    albuterol 90 mcg/actuation inhaler Inhale 2 puffs into the lungs every 6 (six) hours as needed for Wheezing or Shortness of Breath. 18 g 5    albuterol-ipratropium 2.5mg-0.5mg/3mL (DUO-NEB) 0.5 mg-3 mg(2.5 mg base)/3 mL nebulizer solution Take 3 mLs by nebulization every 6 (six) hours as needed for Wheezing or Shortness of Breath. 90 vial 5    amLODIPine (NORVASC) 10 MG tablet TAKE ONE TABLET BY MOUTH ONCE DAILY 30 tablet 5    celecoxib (CELEBREX) 200 MG capsule Take 1 capsule (200 mg total) by mouth 2 (two) times daily. 180 capsule 1    fluticasone (FLONASE) 50 mcg/actuation nasal spray 1 spray by Each Nare route once daily. 3 Bottle 1    levocetirizine (XYZAL) 5 MG tablet Take 1 tablet (5 mg total) by mouth every evening. 30 tablet 1    lisinopril (PRINIVIL,ZESTRIL) 20 MG tablet Take 1 tablet (20 mg total) by mouth once daily. 90 tablet 1    spironolactone (ALDACTONE) 50 MG tablet Take 1 tablet (50 mg total) by mouth 2 (two) times daily. 180 tablet 1     No current facility-administered medications on  "file prior to visit.          ALLG:  Review of patient's allergies indicates:  No Known Allergies      Social History     Social History    Marital status:      Spouse name: N/A    Number of children: N/A    Years of education: N/A     Occupational History    Not on file.     Social History Main Topics    Smoking status: Never Smoker    Smokeless tobacco: Never Used    Alcohol use Yes      Comment: social     Drug use: No    Sexual activity: Yes      Comment: occasional     Other Topics Concern    Not on file     Social History Narrative    No narrative on file             ROS:   General ROS: negative for chills, fatigue or fever  Cardiovascular ROS: no chest pain or dyspnea on exertion  Musculoskeletal ROS: positive for  joint pain or joint stiffness.  Negative for loss of strength  Neuro ROS: Negative for syncope, numbness, or muscle weakness  Skin ROS: Negative for rash, itching or hair changes.          EXAM:  Vitals:    04/11/18 0800   BP: (!) 143/73   Pulse: 72   Weight: 117.9 kg (260 lb)   Height: 5' 8.4" (1.737 m)      General:   Alert and oriented x 3 and in no apparent distress.   Lower extremity exam:   Vasc: Palpable pedal pulses audi with feet appropriately warm to touch.  Neuro: No focal deficits, with epicritic sensation intact.   Derm: No open lesions, macerations, rashes, or other skin changes. Edema to right ankle.  MSK: Pain to palpation right anterior ankle. Pain with ankle joint ROM. No pain to ankle gutters, no pain with midfoot or forefoot joint ROM. No pain to medial malleolus.  +tenderness to medial plantar heel.    Anterior drawer test: negative      MRI pending.         Assessment:    Chronic pain of right ankle  -     MRI Ankle Without Contrast Right; Future; Expected date: 04/11/2018    Plantar fasciitis          Plan:  Rest, ice, compression, elevation (RICE) therapy.  Continue compressive ankle sleeve.  Boots  avoidance of offending activity  Discussed cortisone " injection, patient defers today.  MRI ordered.  follow up after MRI done to discuss results.

## 2018-04-13 ENCOUNTER — HOSPITAL ENCOUNTER (OUTPATIENT)
Dept: RADIOLOGY | Facility: HOSPITAL | Age: 58
Discharge: HOME | End: 2018-04-13
Attending: PODIATRIST
Payer: OTHER GOVERNMENT

## 2018-04-13 DIAGNOSIS — G89.29 CHRONIC PAIN OF RIGHT ANKLE: ICD-10-CM

## 2018-04-13 DIAGNOSIS — M25.571 CHRONIC PAIN OF RIGHT ANKLE: ICD-10-CM

## 2018-04-13 PROCEDURE — 73721 MRI JNT OF LWR EXTRE W/O DYE: CPT | Mod: 26,RT,, | Performed by: RADIOLOGY

## 2018-04-13 PROCEDURE — 73721 MRI JNT OF LWR EXTRE W/O DYE: CPT | Mod: TC,RT

## 2018-04-25 ENCOUNTER — OFFICE VISIT (OUTPATIENT)
Dept: PODIATRY | Facility: CLINIC | Age: 58
End: 2018-04-25
Payer: COMMERCIAL

## 2018-04-25 VITALS
HEIGHT: 68 IN | HEART RATE: 84 BPM | DIASTOLIC BLOOD PRESSURE: 97 MMHG | SYSTOLIC BLOOD PRESSURE: 168 MMHG | WEIGHT: 260 LBS | BODY MASS INDEX: 39.4 KG/M2

## 2018-04-25 DIAGNOSIS — M72.2 PLANTAR FASCIITIS: ICD-10-CM

## 2018-04-25 DIAGNOSIS — M25.571 CHRONIC PAIN OF RIGHT ANKLE: Primary | ICD-10-CM

## 2018-04-25 DIAGNOSIS — G89.29 CHRONIC PAIN OF RIGHT ANKLE: Primary | ICD-10-CM

## 2018-04-25 DIAGNOSIS — M95.8 OSTEOCHONDRAL DEFECT OF ANKLE: ICD-10-CM

## 2018-04-25 PROCEDURE — 3080F DIAST BP >= 90 MM HG: CPT | Mod: CPTII,S$GLB,, | Performed by: PODIATRIST

## 2018-04-25 PROCEDURE — 99213 OFFICE O/P EST LOW 20 MIN: CPT | Mod: PBBFAC | Performed by: PODIATRIST

## 2018-04-25 PROCEDURE — 99213 OFFICE O/P EST LOW 20 MIN: CPT | Mod: 25,S$GLB,, | Performed by: PODIATRIST

## 2018-04-25 PROCEDURE — 20605 DRAIN/INJ JOINT/BURSA W/O US: CPT | Mod: RT,S$GLB,, | Performed by: PODIATRIST

## 2018-04-25 PROCEDURE — 3077F SYST BP >= 140 MM HG: CPT | Mod: CPTII,S$GLB,, | Performed by: PODIATRIST

## 2018-04-25 RX ORDER — DEXAMETHASONE SODIUM PHOSPHATE 4 MG/ML
4 INJECTION, SOLUTION INTRA-ARTICULAR; INTRALESIONAL; INTRAMUSCULAR; INTRAVENOUS; SOFT TISSUE ONCE
Status: COMPLETED | OUTPATIENT
Start: 2018-04-25 | End: 2018-04-25

## 2018-04-25 RX ORDER — TRIAMCINOLONE ACETONIDE 40 MG/ML
40 INJECTION, SUSPENSION INTRA-ARTICULAR; INTRAMUSCULAR ONCE
Status: COMPLETED | OUTPATIENT
Start: 2018-04-25 | End: 2018-04-25

## 2018-04-25 RX ORDER — DICLOFENAC SODIUM 10 MG/G
2 GEL TOPICAL 4 TIMES DAILY PRN
Qty: 500 G | Refills: 5 | Status: SHIPPED | OUTPATIENT
Start: 2018-04-25 | End: 2019-02-06

## 2018-04-25 RX ADMIN — DEXAMETHASONE SODIUM PHOSPHATE 4 MG: 4 INJECTION, SOLUTION INTRA-ARTICULAR; INTRALESIONAL; INTRAMUSCULAR; INTRAVENOUS; SOFT TISSUE at 01:04

## 2018-04-25 RX ADMIN — TRIAMCINOLONE ACETONIDE 40 MG: 40 INJECTION, SUSPENSION INTRA-ARTICULAR; INTRAMUSCULAR at 01:04

## 2018-04-25 NOTE — LETTER
April 25, 2018      Ashok Lopez, NP  [address]  [place] [address]           [facility] - Podiatry  [address]  [place] [address]  Phone: [phone]          Patient: Brandi Freitas   MR Number: [id number]   YOB: 1960   Date of Visit: 4/25/2018       Dear Ashok Lopez:    Thank you for referring Brandi Freitas to me for evaluation. Attached you will find relevant portions of my assessment and plan of care.    If you have questions, please do not hesitate to call me. I look forward to following Brandi Freitas along with you.    Sincerely,    Yu Valdez DPM    Enclosure  CC:  No Recipients    If you would like to receive this communication electronically, please contact [email or web address] or [phone] to request more information on [employer] Link access.    For providers and/or their staff who would like to refer a patient to [facility], please contact us through our one-stop-shop provider referral line, [facility], at [phone].    If you feel you have received this communication in error or would no longer like to receive these types of communications, please e-mail [email or web address]

## 2018-04-25 NOTE — PROGRESS NOTES
Chief Complaint   Patient presents with    Chronic pain of right ankle     right ankle              HPI:   Brandi Frietas is a 57 y.o. female who presents to clinic with complaints of pain to right ankle, present for many years. No acute trauma reported.  She also complains of bilateral plantar fasciitis right > left .  She has history of ankle scope over 10 years ago, right ankle.  She wears a compressive ankle sleeve and boots for work.       4/25/18  follow up right ankle pain.  MRI done.  Here for results.        Past Medical History:   Diagnosis Date    Anxiety     Arthritis     knee    Asthma     Bursitis, shoulder     Dental bridge present     upper right    Dental crown present     Depression     Hypertension     Pes planus     Wears glasses     for reading         Current Outpatient Prescriptions on File Prior to Visit   Medication Sig Dispense Refill    ADVAIR DISKUS 100-50 mcg/dose diskus inhaler INHALE ONE DOSE BY MOUTH TWICE DAILY 60 each 5    albuterol 90 mcg/actuation inhaler Inhale 2 puffs into the lungs every 6 (six) hours as needed for Wheezing or Shortness of Breath. 18 g 5    albuterol-ipratropium 2.5mg-0.5mg/3mL (DUO-NEB) 0.5 mg-3 mg(2.5 mg base)/3 mL nebulizer solution Take 3 mLs by nebulization every 6 (six) hours as needed for Wheezing or Shortness of Breath. 90 vial 5    amLODIPine (NORVASC) 10 MG tablet TAKE ONE TABLET BY MOUTH ONCE DAILY 30 tablet 5    celecoxib (CELEBREX) 200 MG capsule Take 1 capsule (200 mg total) by mouth 2 (two) times daily. 180 capsule 1    fluticasone (FLONASE) 50 mcg/actuation nasal spray 1 spray by Each Nare route once daily. 3 Bottle 1    levocetirizine (XYZAL) 5 MG tablet Take 1 tablet (5 mg total) by mouth every evening. 30 tablet 1    lisinopril (PRINIVIL,ZESTRIL) 20 MG tablet Take 1 tablet (20 mg total) by mouth once daily. 90 tablet 1    spironolactone (ALDACTONE) 50 MG tablet Take 1 tablet (50 mg total) by mouth 2 (two) times daily. 180  "tablet 1     No current facility-administered medications on file prior to visit.          ALLG:  Review of patient's allergies indicates:  No Known Allergies      Social History     Social History    Marital status:      Spouse name: N/A    Number of children: N/A    Years of education: N/A     Occupational History    Not on file.     Social History Main Topics    Smoking status: Never Smoker    Smokeless tobacco: Never Used    Alcohol use Yes      Comment: social     Drug use: No    Sexual activity: Yes      Comment: occasional     Other Topics Concern    Not on file     Social History Narrative    No narrative on file             ROS:   General ROS: negative for chills, fatigue or fever  Cardiovascular ROS: no chest pain or dyspnea on exertion  Musculoskeletal ROS: positive for  joint pain or joint stiffness.  Negative for loss of strength  Neuro ROS: Negative for syncope, numbness, or muscle weakness  Skin ROS: Negative for rash, itching or hair changes.          EXAM:  Vitals:    04/25/18 1319   BP: (!) 168/97   Pulse: 84   Weight: 117.9 kg (260 lb)   Height: 5' 8" (1.727 m)      General:   Alert and oriented x 3 and in no apparent distress.   Lower extremity exam:   Vasc: Palpable pedal pulses audi with feet appropriately warm to touch.  Neuro: No focal deficits, with epicritic sensation intact.   Derm: No open lesions, macerations, rashes, or other skin changes. Edema to right ankle.  MSK: Pain to palpation right anterior ankle. Pain with ankle joint ROM. No pain to ankle gutters, no pain with midfoot or forefoot joint ROM. No pain to medial malleolus.  +tenderness to medial plantar heel.    Anterior drawer test: negative      MRI :    1. Tibiotalar cartilage loss with subchondral cystic changes of the posterior medial tibia.  2. Pes planus deformity of the foot with subtalar and calcaneocuboid cartilage loss/osteoarthritis.  3. Insertional Achilles tendinosis with mild plantar " fasciitis.        Assessment:    Chronic pain of right ankle  -     diclofenac sodium (VOLTAREN) 1 % Gel; Apply 2 g topically 4 (four) times daily as needed. To painful area on foot/ankle  Dispense: 500 g; Refill: 5  -     dexamethasone injection 4 mg; Inject 1 mL (4 mg total) into the muscle once.  -     triamcinolone acetonide injection 40 mg; Inject 1 mL (40 mg total) into the muscle once.    Plantar fasciitis  -     diclofenac sodium (VOLTAREN) 1 % Gel; Apply 2 g topically 4 (four) times daily as needed. To painful area on foot/ankle  Dispense: 500 g; Refill: 5    Osteochondral defect of ankle  -     diclofenac sodium (VOLTAREN) 1 % Gel; Apply 2 g topically 4 (four) times daily as needed. To painful area on foot/ankle  Dispense: 500 g; Refill: 5  -     dexamethasone injection 4 mg; Inject 1 mL (4 mg total) into the muscle once.  -     triamcinolone acetonide injection 40 mg; Inject 1 mL (40 mg total) into the muscle once.          Plan:  Rest, ice, compression, elevation (RICE) therapy.  Continue compressive ankle sleeve.  Boots  avoidance of offending activity  Discussed cortisone injection, patient would like to try today:  With patient's permission,  a cortisone injection was performed at the Right subtalar joint, using 3ccs of mixture of (1cc 1% plain Lidocaine : 1cc 0.5% bupivicaine : 0.5cc kenalog-40 : 0.5cc dexamethasone).  Patient tolerated this without immediate complication.    Call or return to clinic prn if these symptoms worsen or fail to improve as anticipated.

## 2018-05-16 ENCOUNTER — OFFICE VISIT (OUTPATIENT)
Dept: PODIATRY | Facility: CLINIC | Age: 58
End: 2018-05-16
Payer: COMMERCIAL

## 2018-05-16 VITALS
SYSTOLIC BLOOD PRESSURE: 145 MMHG | WEIGHT: 260 LBS | BODY MASS INDEX: 39.4 KG/M2 | HEIGHT: 68 IN | DIASTOLIC BLOOD PRESSURE: 86 MMHG | HEART RATE: 85 BPM

## 2018-05-16 DIAGNOSIS — M95.8 OSTEOCHONDRAL DEFECT OF ANKLE: ICD-10-CM

## 2018-05-16 DIAGNOSIS — M25.571 CHRONIC PAIN OF RIGHT ANKLE: Primary | ICD-10-CM

## 2018-05-16 DIAGNOSIS — G89.29 CHRONIC PAIN OF RIGHT ANKLE: Primary | ICD-10-CM

## 2018-05-16 PROCEDURE — 99999 PR PBB SHADOW E&M-EST. PATIENT-LVL III: CPT | Mod: PBBFAC,,, | Performed by: PODIATRIST

## 2018-05-16 PROCEDURE — 3008F BODY MASS INDEX DOCD: CPT | Mod: CPTII,S$GLB,, | Performed by: PODIATRIST

## 2018-05-16 PROCEDURE — 3077F SYST BP >= 140 MM HG: CPT | Mod: CPTII,S$GLB,, | Performed by: PODIATRIST

## 2018-05-16 PROCEDURE — 99214 OFFICE O/P EST MOD 30 MIN: CPT | Mod: S$GLB,,, | Performed by: PODIATRIST

## 2018-05-16 PROCEDURE — 3079F DIAST BP 80-89 MM HG: CPT | Mod: CPTII,S$GLB,, | Performed by: PODIATRIST

## 2018-05-16 RX ORDER — IBUPROFEN 800 MG/1
800 TABLET ORAL 3 TIMES DAILY
Qty: 60 TABLET | Refills: 1 | Status: SHIPPED | OUTPATIENT
Start: 2018-05-16 | End: 2019-01-09 | Stop reason: SDUPTHER

## 2018-05-16 NOTE — PROGRESS NOTES
Chief Complaint   Patient presents with    Foot Pain     better now              HPI:   Brandi Freitas is a 57 y.o. female who presents to clinic with complaints of pain to right ankle, present for many years. No acute trauma reported.  She also complains of bilateral plantar fasciitis right > left .  She has history of ankle scope over 10 years ago, right ankle.  She wears a compressive ankle sleeve and boots for work.       4/25/18  follow up right ankle pain.  MRI done.  Here for results.    5/16/18  follow up right ankle pain s/p injection with some improvement.         Past Medical History:   Diagnosis Date    Anxiety     Arthritis     knee    Asthma     Bursitis, shoulder     Dental bridge present     upper right    Dental crown present     Depression     Hypertension     Pes planus     Wears glasses     for reading         Current Outpatient Prescriptions on File Prior to Visit   Medication Sig Dispense Refill    ADVAIR DISKUS 100-50 mcg/dose diskus inhaler INHALE ONE DOSE BY MOUTH TWICE DAILY 60 each 5    albuterol 90 mcg/actuation inhaler Inhale 2 puffs into the lungs every 6 (six) hours as needed for Wheezing or Shortness of Breath. 18 g 5    albuterol-ipratropium 2.5mg-0.5mg/3mL (DUO-NEB) 0.5 mg-3 mg(2.5 mg base)/3 mL nebulizer solution Take 3 mLs by nebulization every 6 (six) hours as needed for Wheezing or Shortness of Breath. 90 vial 5    amLODIPine (NORVASC) 10 MG tablet TAKE ONE TABLET BY MOUTH ONCE DAILY 30 tablet 5    celecoxib (CELEBREX) 200 MG capsule Take 1 capsule (200 mg total) by mouth 2 (two) times daily. 180 capsule 1    diclofenac sodium (VOLTAREN) 1 % Gel Apply 2 g topically 4 (four) times daily as needed. To painful area on foot/ankle 500 g 5    fluticasone (FLONASE) 50 mcg/actuation nasal spray 1 spray by Each Nare route once daily. 3 Bottle 1    levocetirizine (XYZAL) 5 MG tablet Take 1 tablet (5 mg total) by mouth every evening. 30 tablet 1    lisinopril  "(PRINIVIL,ZESTRIL) 20 MG tablet Take 1 tablet (20 mg total) by mouth once daily. 90 tablet 1    spironolactone (ALDACTONE) 50 MG tablet Take 1 tablet (50 mg total) by mouth 2 (two) times daily. 180 tablet 1     No current facility-administered medications on file prior to visit.          ALLG:  Review of patient's allergies indicates:  No Known Allergies      Social History     Social History    Marital status:      Spouse name: N/A    Number of children: N/A    Years of education: N/A     Occupational History    Not on file.     Social History Main Topics    Smoking status: Never Smoker    Smokeless tobacco: Never Used    Alcohol use Yes      Comment: social     Drug use: No    Sexual activity: Yes      Comment: occasional     Other Topics Concern    Not on file     Social History Narrative    No narrative on file             ROS:   General ROS: negative for chills, fatigue or fever  Cardiovascular ROS: no chest pain or dyspnea on exertion  Musculoskeletal ROS: positive for  joint pain or joint stiffness.  Negative for loss of strength  Neuro ROS: Negative for syncope, numbness, or muscle weakness  Skin ROS: Negative for rash, itching or hair changes.          EXAM:  Vitals:    05/16/18 1508   BP: (!) 145/86   Pulse: 85   Weight: 117.9 kg (260 lb)   Height: 5' 8" (1.727 m)      General:   Alert and oriented x 3 and in no apparent distress.   Lower extremity exam:   Vasc: Palpable pedal pulses audi with feet appropriately warm to touch.  Neuro: No focal deficits, with epicritic sensation intact.   Derm: No open lesions, macerations, rashes, or other skin changes. Edema to right ankle.  MSK: minimal Pain to palpation right anterior ankle. Pain with ankle joint ROM. No pain to ankle gutters, no pain with midfoot or forefoot joint ROM. No pain to medial malleolus.   Anterior drawer test: negative      MRI :    1. Tibiotalar cartilage loss with subchondral cystic changes of the posterior medial " tibia.  2. Pes planus deformity of the foot with subtalar and calcaneocuboid cartilage loss/osteoarthritis.  3. Insertional Achilles tendinosis with mild plantar fasciitis.        Assessment:    Chronic pain of right ankle  -     ibuprofen (ADVIL,MOTRIN) 800 MG tablet; Take 1 tablet (800 mg total) by mouth 3 (three) times daily. With a meal.  Dispense: 60 tablet; Refill: 1    Osteochondral defect of ankle  -     ibuprofen (ADVIL,MOTRIN) 800 MG tablet; Take 1 tablet (800 mg total) by mouth 3 (three) times daily. With a meal.  Dispense: 60 tablet; Refill: 1          Plan:  Rest, ice, compression, elevation (RICE) therapy.  Continue compressive ankle sleeve.  Boots  avoidance of offending activity  Call or return to clinic prn if these symptoms worsen or fail to improve as anticipated.

## 2018-06-07 ENCOUNTER — OFFICE VISIT (OUTPATIENT)
Dept: FAMILY MEDICINE | Facility: CLINIC | Age: 58
End: 2018-06-07
Payer: COMMERCIAL

## 2018-06-07 VITALS
TEMPERATURE: 99 F | WEIGHT: 271.63 LBS | BODY MASS INDEX: 41.17 KG/M2 | HEIGHT: 68 IN | OXYGEN SATURATION: 99 % | HEART RATE: 76 BPM | DIASTOLIC BLOOD PRESSURE: 88 MMHG | SYSTOLIC BLOOD PRESSURE: 148 MMHG

## 2018-06-07 DIAGNOSIS — M17.12 OSTEOARTHRITIS OF LEFT KNEE, UNSPECIFIED OSTEOARTHRITIS TYPE: Primary | ICD-10-CM

## 2018-06-07 PROCEDURE — 3077F SYST BP >= 140 MM HG: CPT | Mod: CPTII,S$GLB,, | Performed by: FAMILY MEDICINE

## 2018-06-07 PROCEDURE — 99999 PR PBB SHADOW E&M-EST. PATIENT-LVL III: CPT | Mod: PBBFAC,,, | Performed by: FAMILY MEDICINE

## 2018-06-07 PROCEDURE — 3008F BODY MASS INDEX DOCD: CPT | Mod: CPTII,S$GLB,, | Performed by: FAMILY MEDICINE

## 2018-06-07 PROCEDURE — 99214 OFFICE O/P EST MOD 30 MIN: CPT | Mod: 25,S$GLB,, | Performed by: FAMILY MEDICINE

## 2018-06-07 PROCEDURE — 3079F DIAST BP 80-89 MM HG: CPT | Mod: CPTII,S$GLB,, | Performed by: FAMILY MEDICINE

## 2018-06-07 PROCEDURE — 20610 DRAIN/INJ JOINT/BURSA W/O US: CPT | Mod: LT,S$GLB,, | Performed by: FAMILY MEDICINE

## 2018-06-07 RX ORDER — TRIAMCINOLONE ACETONIDE 40 MG/ML
40 INJECTION, SUSPENSION INTRA-ARTICULAR; INTRAMUSCULAR
Status: DISCONTINUED | OUTPATIENT
Start: 2018-06-07 | End: 2018-06-07 | Stop reason: HOSPADM

## 2018-06-07 RX ADMIN — TRIAMCINOLONE ACETONIDE 40 MG: 40 INJECTION, SUSPENSION INTRA-ARTICULAR; INTRAMUSCULAR at 12:06

## 2018-06-07 NOTE — PROGRESS NOTES
[facility]  Progress Note    SUBJECTIVE:     Chief Complaint   Patient presents with    Knee Pain       HPI   Brandi Freitas  is a 57 y.o. female here or c/o left knee pain, this has been going on for weeks. Rates pain as moderate. Hurts worst on walking, standing. Has tried otc meds without relief. No falls/trauma. Still able to ambulate with cane.     Review of patient's allergies indicates:  No Known Allergies    Past Medical History:   Diagnosis Date    Anxiety     Arthritis     knee    Asthma     Bursitis, shoulder     Dental bridge present     upper right    Dental crown present     Depression     Hypertension     Pes planus     Wears glasses     for reading     Past Surgical History:   Procedure Laterality Date    arthroscopy ankle right      bilateral inguinal hernia repair      COLONOSCOPY N/A 1/11/2018    Procedure: COLONOSCOPY;  Surgeon: Christoph Nolasco MD;  Location: [facility];  Service: Endoscopy;  Laterality: N/A;    left torn rotator cuff repair  09/2014    MYOMECTOMY      rt salphigectomy      SYNOVECTOMY      UMBILICAL HERNIA REPAIR       Family History   Problem Relation Age of Onset    Cancer Mother         breast    Diabetes Mother     Hypertension Mother     Breast cancer Mother     Cancer Brother         liver cancer    Breast cancer Maternal Aunt      Social History   Substance Use Topics    Smoking status: Never Smoker    Smokeless tobacco: Never Used    Alcohol use Yes      Comment: social         Review of Systems   Constitutional: Negative for chills, diaphoresis, fever and malaise/fatigue.   Musculoskeletal: Positive for joint pain (L knee). Negative for back pain, falls, myalgias and neck pain.   Skin: Negative.    Neurological: Negative for dizziness, tingling, sensory change, focal weakness, seizures and weakness.   All other systems reviewed and are negative.    OBJECTIVE:     Vitals:    06/07/18 1117   BP: (!) 150/96   Pulse: 76   Temp: 98.5 °F  (36.9 °C)     Body mass index is 41.3 kg/m².    Physical Exam   Constitutional: She appears distressed (mild).   HENT:   Head: Normocephalic and atraumatic.   Musculoskeletal: She exhibits tenderness (to palpation of L medial tibial-femoral compartment, decreased joint space. ACL/PCL intact, negative McMurrays sign).   Skin: No rash noted.       Old records were reviewed. Labs and/or images were independently reviewed.    ASSESSMENT     1. Osteoarthritis of left knee, unspecified osteoarthritis type        PLAN:     Osteoarthritis of left knee, unspecified osteoarthritis type  -     Large Joint Aspiration/Injection  -     triamcinolone acetonide injection 40 mg; Inject 40 mg into the articular space.  -     Patient counseled on good posture and stretching exercises. Continue to place ice packs on affected areas 3-4 times daily. Take medications as prescribed. Instructed patient to call MD if symptoms persist or worsen.  -     Injected today without complications.       RTC PRGENET Valdez MD  06/07/2018 12:49 PM

## 2018-06-07 NOTE — PROCEDURES
Large Joint Aspiration/Injection  Date/Time: 6/7/2018 12:50 PM  Performed by: JUNIOR MCDANIEL  Authorized by: JUNIOR MCDANIEL     Consent Done?:  Yes (Verbal)  Indications:  Pain  Procedure site marked: Yes    Timeout: Prior to procedure the correct patient, procedure, and site was verified      Location:  Knee  Site:  L knee  Needle size:  25 G  Medications:  40 mg triamcinolone acetonide 40 mg/mL  Patient tolerance:  Patient tolerated the procedure well with no immediate complications

## 2018-06-26 ENCOUNTER — OFFICE VISIT (OUTPATIENT)
Dept: FAMILY MEDICINE | Facility: CLINIC | Age: 58
End: 2018-06-26
Payer: COMMERCIAL

## 2018-06-26 VITALS
HEART RATE: 91 BPM | SYSTOLIC BLOOD PRESSURE: 158 MMHG | WEIGHT: 267.44 LBS | HEIGHT: 66 IN | BODY MASS INDEX: 42.98 KG/M2 | DIASTOLIC BLOOD PRESSURE: 92 MMHG | OXYGEN SATURATION: 96 % | TEMPERATURE: 100 F

## 2018-06-26 DIAGNOSIS — J01.00 ACUTE MAXILLARY SINUSITIS, RECURRENCE NOT SPECIFIED: Primary | ICD-10-CM

## 2018-06-26 DIAGNOSIS — J45.909 ASTHMA, CHRONIC, UNSPECIFIED ASTHMA SEVERITY, UNCOMPLICATED: ICD-10-CM

## 2018-06-26 DIAGNOSIS — J45.901 EXACERBATION OF ASTHMA, UNSPECIFIED ASTHMA SEVERITY, UNSPECIFIED WHETHER PERSISTENT: ICD-10-CM

## 2018-06-26 PROCEDURE — 99999 PR PBB SHADOW E&M-EST. PATIENT-LVL III: CPT | Mod: PBBFAC,,, | Performed by: INTERNAL MEDICINE

## 2018-06-26 PROCEDURE — 3077F SYST BP >= 140 MM HG: CPT | Mod: CPTII,S$GLB,, | Performed by: INTERNAL MEDICINE

## 2018-06-26 PROCEDURE — 3080F DIAST BP >= 90 MM HG: CPT | Mod: CPTII,S$GLB,, | Performed by: INTERNAL MEDICINE

## 2018-06-26 PROCEDURE — 99214 OFFICE O/P EST MOD 30 MIN: CPT | Mod: S$GLB,,, | Performed by: INTERNAL MEDICINE

## 2018-06-26 PROCEDURE — 3008F BODY MASS INDEX DOCD: CPT | Mod: CPTII,S$GLB,, | Performed by: INTERNAL MEDICINE

## 2018-06-26 RX ORDER — IPRATROPIUM BROMIDE AND ALBUTEROL SULFATE 2.5; .5 MG/3ML; MG/3ML
3 SOLUTION RESPIRATORY (INHALATION) EVERY 6 HOURS PRN
Qty: 90 VIAL | Refills: 5 | Status: SHIPPED | OUTPATIENT
Start: 2018-06-26 | End: 2020-03-17 | Stop reason: SDUPTHER

## 2018-06-26 RX ORDER — PREDNISONE 20 MG/1
40 TABLET ORAL DAILY
Qty: 10 TABLET | Refills: 0 | Status: SHIPPED | OUTPATIENT
Start: 2018-06-26 | End: 2018-07-01

## 2018-06-26 RX ORDER — AZITHROMYCIN 250 MG/1
TABLET, FILM COATED ORAL
Qty: 6 TABLET | Refills: 0 | Status: SHIPPED | OUTPATIENT
Start: 2018-06-26 | End: 2018-07-03

## 2018-06-26 NOTE — PROGRESS NOTES
Chief complaint: Cough and cold, asthma    57-year-old black female new to me who made an appointment and an opening.  She has a history of asthma .  For 1 week she's had upper respiratory symptoms.  Discussed in dry nonproductive cough worse when she goes to bed.  She does work overnight.  She has asthma and continues on her Advair.  She needs refill of her heavy lives in solution which is over-year-old.  She has albuterol inhaler.  She had some temperature today of 100.1.  She's had chills at home but no documented fevers.  She has some head congestion but more so some pain on the left maxillary sinus.  She has tolerated steroids before.    Review of systems: She's had chills at home but no documented fevers, no sore throat, some pain in the left face, some tearing and drainage from the eyes when she coughs, no shortness of breath, some wheezing              Past Medical History:   Diagnosis Date    Anxiety     Arthritis     knee    Asthma     Bursitis, shoulder     Dental bridge present     upper right    Dental crown present     Depression     Hypertension     Pes planus     Wears glasses     for reading     Past Surgical History:   Procedure Laterality Date    arthroscopy ankle right      bilateral inguinal hernia repair      COLONOSCOPY N/A 1/11/2018    Procedure: COLONOSCOPY;  Surgeon: Christoph Nolasco MD;  Location[facility];  Service: Endoscopy;  Laterality: N/A;    left torn rotator cuff repair  09/2014    MYOMECTOMY      rt salphigectomy      SYNOVECTOMY      UMBILICAL HERNIA REPAIR       Social History     Social History    Marital status:      Spouse name: N/A    Number of children: N/A    Years of education: N/A     Occupational History    Not on file.     Social History Main Topics    Smoking status: Never Smoker    Smokeless tobacco: Never Used    Alcohol use Yes      Comment: social     Drug use: No    Sexual activity: Yes      Comment: occasional     Other Topics  Concern    Not on file     Social History Narrative    No narrative on file     family history includes Breast cancer in her maternal aunt and mother; Cancer in her brother and mother; Diabetes in her mother; Hypertension in her mother.      Gen: no distress, frequent dry coughing  EYES: conjunctiva clear, non-icteric, PERRL  ENT: nose congested, nasal mucosa red, clear tears, oropharynx slightly red and moist, teeth good  NECK:supple, thyroid non-palpable, no cervical lymph nodes  RESP: effort is good, lungs occasional inspiratory wheezing, some rhonchi elevation  CV: heart RRR w/o murmur, gallops or rubs; no carotid bruits, no edema  GI: abdomen soft, non-distended, non-tender  MS: gait normal, no clubbing or cyanosis of the digits  SKIN: no rashes, warm to touch, no sinus pain to touch    Brandi was seen today for sore throat, eye drainage, cough and fever.    Diagnoses and all orders for this visit:    Acute maxillary sinusitis, recurrence not specified, fever and worsening left maxillary pain 1 week into probable URI indicates need to coverage for maxillary sinusitis.  Z-Iliana, prednisone    Exacerbation of asthma, unspecified asthma severity, unspecified whether persistent, continue Advair, medium dose prednisone for a short duration of 5 days, cough is also related to some upper secretions will have her use Chlor-Trimeton before bed and Delsym    Asthma, chronic, unspecified asthma severity, uncomplicated  -     albuterol-ipratropium (DUO-NEB) 2.5 mg-0.5 mg/3 mL nebulizer solution; Take 3 mLs by nebulization every 6 (six) hours as needed for Wheezing or Shortness of Breath.    Other orders  -     azithromycin (ZITHROMAX Z-ILIANA) 250 MG tablet; 2 pills on day 1, then 1 pill a day  -     predniSONE (DELTASONE) 20 MG tablet; Take 2 tablets (40 mg total) by mouth once daily. for 5 days

## 2018-07-03 ENCOUNTER — OFFICE VISIT (OUTPATIENT)
Dept: FAMILY MEDICINE | Facility: CLINIC | Age: 58
End: 2018-07-03
Payer: COMMERCIAL

## 2018-07-03 VITALS
HEART RATE: 90 BPM | SYSTOLIC BLOOD PRESSURE: 128 MMHG | HEIGHT: 66 IN | OXYGEN SATURATION: 97 % | WEIGHT: 257 LBS | BODY MASS INDEX: 41.3 KG/M2 | TEMPERATURE: 99 F | DIASTOLIC BLOOD PRESSURE: 82 MMHG

## 2018-07-03 DIAGNOSIS — J01.90 ACUTE SINUSITIS WITH SYMPTOMS GREATER THAN 10 DAYS: Primary | ICD-10-CM

## 2018-07-03 DIAGNOSIS — J45.21 MILD INTERMITTENT ASTHMA WITH ACUTE EXACERBATION: Chronic | ICD-10-CM

## 2018-07-03 DIAGNOSIS — J30.9 ALLERGIC RHINITIS, UNSPECIFIED SEASONALITY, UNSPECIFIED TRIGGER: ICD-10-CM

## 2018-07-03 PROCEDURE — 3008F BODY MASS INDEX DOCD: CPT | Mod: CPTII,S$GLB,, | Performed by: NURSE PRACTITIONER

## 2018-07-03 PROCEDURE — 3074F SYST BP LT 130 MM HG: CPT | Mod: CPTII,S$GLB,, | Performed by: NURSE PRACTITIONER

## 2018-07-03 PROCEDURE — 99214 OFFICE O/P EST MOD 30 MIN: CPT | Mod: S$GLB,,, | Performed by: NURSE PRACTITIONER

## 2018-07-03 PROCEDURE — 3079F DIAST BP 80-89 MM HG: CPT | Mod: CPTII,S$GLB,, | Performed by: NURSE PRACTITIONER

## 2018-07-03 PROCEDURE — 99999 PR PBB SHADOW E&M-EST. PATIENT-LVL IV: CPT | Mod: PBBFAC,,, | Performed by: NURSE PRACTITIONER

## 2018-07-03 RX ORDER — LEVOCETIRIZINE DIHYDROCHLORIDE 5 MG/1
5 TABLET, FILM COATED ORAL NIGHTLY
Qty: 30 TABLET | Refills: 1 | Status: SHIPPED | OUTPATIENT
Start: 2018-07-03 | End: 2019-02-06

## 2018-07-03 RX ORDER — AMOXICILLIN AND CLAVULANATE POTASSIUM 875; 125 MG/1; MG/1
1 TABLET, FILM COATED ORAL 2 TIMES DAILY
Qty: 20 TABLET | Refills: 0 | Status: SHIPPED | OUTPATIENT
Start: 2018-07-03 | End: 2018-07-13

## 2018-07-03 RX ORDER — PROMETHAZINE HYDROCHLORIDE AND DEXTROMETHORPHAN HYDROBROMIDE 6.25; 15 MG/5ML; MG/5ML
5 SYRUP ORAL NIGHTLY
Qty: 180 ML | Refills: 0 | Status: SHIPPED | OUTPATIENT
Start: 2018-07-03 | End: 2018-07-13

## 2018-07-03 NOTE — PROGRESS NOTES
History of Present Illness   Brandi Freitas is a 57 y.o. woman with medical history as listed below who presents today for evaluation of sinusitis, asthma symptoms for about two weeks. She reports nasal congestion with post nasal drip, hacking cough, and intermittent wheezing. She initially had sinus pressure, ear fullness, and a sore throat that has resolved. She did have fevers but has been fever free recently. She was seen on 6/26/18 for same symptoms and was treated with Azithromycin and Prednisone. She completed these and is still using her nebulizer and rescue inhaler. She reports no relief in symptoms. She has no additional complaints and is otherwise healthy on today's visit.      Past Medical History:   Diagnosis Date    Anxiety     Arthritis     knee    Asthma     Bursitis, shoulder     Dental bridge present     upper right    Dental crown present     Depression     Hypertension     Pes planus     Wears glasses     for reading       Past Surgical History:   Procedure Laterality Date    arthroscopy ankle right      bilateral inguinal hernia repair      COLONOSCOPY N/A 1/11/2018    Procedure: COLONOSCOPY;  Surgeon: Christoph Nolasco MD;  Location: [facility];  Service: Endoscopy;  Laterality: N/A;    left torn rotator cuff repair  09/2014    MYOMECTOMY      rt salphigectomy      SYNOVECTOMY      UMBILICAL HERNIA REPAIR         Social History     Social History    Marital status:      Spouse name: N/A    Number of children: N/A    Years of education: N/A     Social History Main Topics    Smoking status: Never Smoker    Smokeless tobacco: Never Used    Alcohol use Yes      Comment: social     Drug use: No    Sexual activity: Yes      Comment: occasional     Other Topics Concern    None     Social History Narrative    None       Family History   Problem Relation Age of Onset    Cancer Mother         breast    Diabetes Mother     Hypertension Mother     Breast cancer Mother   "   Cancer Brother         liver cancer    Breast cancer Maternal Aunt        Review of Systems  Review of Systems   Constitutional: Positive for malaise/fatigue. Negative for chills and fever.   HENT: Positive for congestion, sinus pain and sore throat. Negative for ear discharge and ear pain.    Eyes: Negative for discharge and redness.   Respiratory: Positive for cough, sputum production and wheezing. Negative for shortness of breath.    Cardiovascular: Negative for chest pain.   Gastrointestinal: Negative for nausea and vomiting.     A complete review of systems was otherwise negative.    Physical Exam  /82 (BP Location: Right arm, Patient Position: Sitting, BP Method: X-Large (Manual))   Pulse 90   Temp 98.6 °F (37 °C) (Oral)   Ht 5' 6" (1.676 m)   Wt 116.6 kg (257 lb)   LMP 07/18/2012   SpO2 97%   BMI 41.48 kg/m²   General appearance: alert, appears stated age, cooperative and no distress  Eyes: negative findings: lids and lashes normal and conjunctivae and sclerae normal  Ears: normal TM's and external ear canals both ears and bilateral middle ear effusion  Nose: green discharge, moderate congestion, turbinates red, swollen, grade 3 out of 4, no sinus tenderness  Throat: lips, mucosa, and tongue normal; teeth and gums normal and moderate oropharyngeal erythema and edema with clear post nasal drainage  Lungs: expiratory wheeze to bilateral upper lobe, normal effort and rate with no accessory muscle use  Heart: regular rate and rhythm, S1, S2 normal, no murmur, click, rub or gallop  Lymph nodes: Cervical, supraclavicular, and axillary nodes normal.  Neurologic: Grossly normal    Assessment/Plan  Acute sinusitis with symptoms greater than 10 days  Resume Xyzal and Flonase daily with PRN nebulizer treatment.  Augmentin, take until complete.  Nighttime cough syrup PRN.  Drink plenty of fluids.  Avoid further steroids if possible, if no improvement in 48 hours consider a second taper.  RTC PRN.  ER " precautions discussed.  -     amoxicillin-clavulanate 875-125mg (AUGMENTIN) 875-125 mg per tablet; Take 1 tablet by mouth 2 (two) times daily. for 10 days  Dispense: 20 tablet; Refill: 0  -     promethazine-dextromethorphan (PROMETHAZINE-DM) 6.25-15 mg/5 mL Syrp; Take 5 mLs by mouth every evening. for 10 days  Dispense: 180 mL; Refill: 0  -     levocetirizine (XYZAL) 5 MG tablet; Take 1 tablet (5 mg total) by mouth every evening.  Dispense: 30 tablet; Refill: 1    Mild intermittent asthma with acute exacerbation  The current medical regimen is effective;  continue present plan and medications.    Allergic rhinitis, unspecified seasonality, unspecified trigger  Resume Xyzal and Flonase daily.    She has verbalized understanding and is in agreement with plan of care.    Follow-up if symptoms worsen or fail to improve.

## 2018-07-03 NOTE — PATIENT INSTRUCTIONS

## 2018-08-10 ENCOUNTER — TELEPHONE (OUTPATIENT)
Dept: FAMILY MEDICINE | Facility: CLINIC | Age: 58
End: 2018-08-10

## 2018-08-10 NOTE — TELEPHONE ENCOUNTER
----- Message from Keke Lindsey sent at 8/10/2018  4:13 PM CDT -----  Contact: pt            Name of Who is Calling: pt      What is the request in detail: pt has right foot knot and it is painful. Trying to get an appt for moncathy 8/13/18. Nothing available until 8/20/18. Call pt      Can the clinic reply by [facility]: no      What Number to Call Back if not in [facility]: [phone]

## 2018-08-21 ENCOUNTER — OFFICE VISIT (OUTPATIENT)
Dept: PODIATRY | Facility: CLINIC | Age: 58
End: 2018-08-21
Payer: COMMERCIAL

## 2018-08-21 VITALS
HEIGHT: 66 IN | BODY MASS INDEX: 44.03 KG/M2 | SYSTOLIC BLOOD PRESSURE: 169 MMHG | WEIGHT: 274 LBS | HEART RATE: 84 BPM | DIASTOLIC BLOOD PRESSURE: 87 MMHG

## 2018-08-21 DIAGNOSIS — M79.671 FOOT PAIN, RIGHT: ICD-10-CM

## 2018-08-21 DIAGNOSIS — Q82.8 POROKERATOSIS: Primary | ICD-10-CM

## 2018-08-21 PROCEDURE — 3079F DIAST BP 80-89 MM HG: CPT | Mod: CPTII,S$GLB,, | Performed by: PODIATRIST

## 2018-08-21 PROCEDURE — 99999 PR PBB SHADOW E&M-EST. PATIENT-LVL III: CPT | Mod: PBBFAC,,, | Performed by: PODIATRIST

## 2018-08-21 PROCEDURE — 3077F SYST BP >= 140 MM HG: CPT | Mod: CPTII,S$GLB,, | Performed by: PODIATRIST

## 2018-08-21 PROCEDURE — 3008F BODY MASS INDEX DOCD: CPT | Mod: CPTII,S$GLB,, | Performed by: PODIATRIST

## 2018-08-21 PROCEDURE — 99213 OFFICE O/P EST LOW 20 MIN: CPT | Mod: S$GLB,,, | Performed by: PODIATRIST

## 2018-08-21 NOTE — PROGRESS NOTES
"Subjective:      Patient ID: Brandi Freitas is a 57 y.o. female.    Chief Complaint: Foot Problem (knot under rt foot)    Brandi is a 57 y.o. female who presents to the podiatry clinic  with complaint of  right foot pain. Onset of the symptoms was several days ago. Precipitating event: none known. Current symptoms include: ability to bear weight, but with some pain and painful spot on the outside bottom of the right foot. . Aggravating factors: any weight bearing and standing. Symptoms have gradually worsened. Patient has had prior foot problems. Evaluation to date: none. Treatment to date: none. Patients rates pain 2/10 on pain scale currently but becomes much much worse when she tries to walk on it.     Vitals:    08/21/18 1421   BP: (!) 169/87   Pulse: 84   Weight: 124.3 kg (274 lb)   Height: 5' 6" (1.676 m)   PainSc:   2     Chief Complaint   Patient presents with    Foot Problem     knot under rt foot       Review of Systems   Constitution: Negative for chills and fever.   Cardiovascular: Positive for leg swelling. Negative for chest pain and claudication.   Respiratory: Negative for cough and shortness of breath.    Skin: Positive for dry skin and suspicious lesions.   Musculoskeletal:        Foot pain R   Gastrointestinal: Negative for nausea and vomiting.   Neurological: Negative for numbness and paresthesias.   Psychiatric/Behavioral: Negative for altered mental status.           Objective:      Physical Exam   Constitutional: She is oriented to person, place, and time. She appears well-developed and well-nourished.   HENT:   Head: Normocephalic.   Cardiovascular: Intact distal pulses.   Pulses:       Dorsalis pedis pulses are 2+ on the right side, and 2+ on the left side.        Posterior tibial pulses are 2+ on the right side, and 2+ on the left side.   CRT < 3 sec to tips of toes. Mild baseline edema noted to b/l LE.     Pulmonary/Chest: No respiratory distress.   Musculoskeletal:   Hypermobility noted to " 1st ray b/l with near complete collapse of medial longitudinal arch b/l with loading.     Severe pain on palpation and lateral squeeze of R plantar lateral skin lesion.    Neurological: She is alert and oriented to person, place, and time. She has normal strength. No sensory deficit.   Light touch, proprioception, and sharp/dull sensation are all intact bilaterally.     Skin: Skin is warm, dry and intact. Lesion noted. No bruising and no ecchymosis noted. No erythema.   No open lesions, lacerations or wounds noted. Nails are normotrophic to R 1-5 and L 1-5. Interdigital spaces clean, dry and intact b/l. No erythema noted to b/l foot. Skin texture normal. Pedal hair normal. Skin temperature normal b/l foot.     Focal pin point hyperkeratotic lesions noted to plantar lateral R midfoot. No spongy white core, no capillary beds, no pinpoint bleeding noted. No fluctuance or signs of underlying abscess, no SOI.    Psychiatric: She has a normal mood and affect. Her behavior is normal. Judgment and thought content normal.   Vitals reviewed.            Assessment:       Encounter Diagnoses   Name Primary?    Porokeratosis Yes    Foot pain, right          Plan:       Brandi was seen today for foot problem.    Diagnoses and all orders for this visit:    Porokeratosis    Foot pain, right      I counseled the patient on her conditions, their implications and medical management.    With patients permission, a local anesthetic block was administered as a V block around the plantar R lateral midfoot skin lesion. Once anesthesia was obtained, a 5mm curette was used to trim the overlying hyperkeratotic skin down to a central deep core. The core was pared to relieve excessive pressure. The patient tolerated the injection and trimming well. Deep porokeratosis, no signs of plantar wart at this time.     Discussed options of routine trimming vs excision of lesion. She would like to return once the lesion starts to bother her again.      Patient is aware that routine trimming of callus/porokeratosi will not be covered service per insurance and she will fall under Proc B if this service is desired. Patient verbalized understanding of this. she will RTC as needed for Proc B or any other pedal complaint.     Discussed regular and routine moisturizer to skin of both feet to help improve dry skin. Advised to apply twice daily until resolution of symptoms. Avoid between toes.     Soft, cushioned shoes, avoid barefoot walking and stiff shoes.     RTC as needed for Proc B, or any other pedal complaint.

## 2018-11-05 DIAGNOSIS — J40 BRONCHITIS: ICD-10-CM

## 2018-11-05 DIAGNOSIS — I10 ESSENTIAL HYPERTENSION: ICD-10-CM

## 2018-11-05 DIAGNOSIS — J45.31 ASTHMA, CHRONIC, MILD PERSISTENT, WITH ACUTE EXACERBATION: ICD-10-CM

## 2018-11-05 RX ORDER — SPIRONOLACTONE 50 MG/1
TABLET, FILM COATED ORAL
Qty: 60 TABLET | Refills: 0 | Status: SHIPPED | OUTPATIENT
Start: 2018-11-05 | End: 2018-12-27 | Stop reason: SDUPTHER

## 2018-11-05 RX ORDER — LISINOPRIL 20 MG/1
TABLET ORAL
Qty: 30 TABLET | Refills: 0 | Status: SHIPPED | OUTPATIENT
Start: 2018-11-05 | End: 2019-01-09 | Stop reason: SDUPTHER

## 2018-11-05 RX ORDER — CEPHALEXIN 250 MG/1
CAPSULE ORAL
Qty: 60 EACH | Refills: 0 | Status: SHIPPED | OUTPATIENT
Start: 2018-11-05 | End: 2021-07-06

## 2018-11-05 RX ORDER — ALBUTEROL SULFATE 90 UG/1
AEROSOL, METERED RESPIRATORY (INHALATION)
Qty: 18 EACH | Refills: 0 | Status: SHIPPED | OUTPATIENT
Start: 2018-11-05 | End: 2019-03-03 | Stop reason: SDUPTHER

## 2018-11-13 DIAGNOSIS — I10 ESSENTIAL HYPERTENSION: ICD-10-CM

## 2018-11-13 RX ORDER — LISINOPRIL 20 MG/1
TABLET ORAL
Qty: 30 TABLET | Refills: 0 | Status: SHIPPED | OUTPATIENT
Start: 2018-11-13 | End: 2018-12-27 | Stop reason: SDUPTHER

## 2018-12-27 DIAGNOSIS — I10 ESSENTIAL HYPERTENSION: ICD-10-CM

## 2018-12-27 RX ORDER — SPIRONOLACTONE 50 MG/1
TABLET, FILM COATED ORAL
Qty: 60 TABLET | Refills: 0 | Status: SHIPPED | OUTPATIENT
Start: 2018-12-27 | End: 2019-07-08 | Stop reason: SDUPTHER

## 2018-12-27 RX ORDER — LISINOPRIL 20 MG/1
TABLET ORAL
Qty: 30 TABLET | Refills: 0 | Status: SHIPPED | OUTPATIENT
Start: 2018-12-27 | End: 2020-12-16

## 2018-12-28 RX ORDER — AMLODIPINE BESYLATE 10 MG/1
TABLET ORAL
Qty: 30 TABLET | Refills: 0 | Status: SHIPPED | OUTPATIENT
Start: 2018-12-28 | End: 2019-03-30 | Stop reason: SDUPTHER

## 2019-01-02 ENCOUNTER — PATIENT OUTREACH (OUTPATIENT)
Dept: ADMINISTRATIVE | Facility: HOSPITAL | Age: 59
End: 2019-01-02

## 2019-01-09 ENCOUNTER — OFFICE VISIT (OUTPATIENT)
Dept: FAMILY MEDICINE | Facility: CLINIC | Age: 59
End: 2019-01-09
Payer: COMMERCIAL

## 2019-01-09 ENCOUNTER — LAB VISIT (OUTPATIENT)
Dept: LAB | Facility: HOSPITAL | Age: 59
End: 2019-01-09
Attending: FAMILY MEDICINE
Payer: COMMERCIAL

## 2019-01-09 VITALS
BODY MASS INDEX: 42.94 KG/M2 | SYSTOLIC BLOOD PRESSURE: 136 MMHG | HEIGHT: 66 IN | DIASTOLIC BLOOD PRESSURE: 74 MMHG | HEART RATE: 87 BPM | TEMPERATURE: 98 F | RESPIRATION RATE: 18 BRPM | WEIGHT: 267.19 LBS | OXYGEN SATURATION: 99 %

## 2019-01-09 DIAGNOSIS — Z00.00 ROUTINE GENERAL MEDICAL EXAMINATION AT A HEALTH CARE FACILITY: Primary | ICD-10-CM

## 2019-01-09 DIAGNOSIS — G89.29 CHRONIC PAIN OF RIGHT ANKLE: ICD-10-CM

## 2019-01-09 DIAGNOSIS — M95.8 OSTEOCHONDRAL DEFECT OF ANKLE: ICD-10-CM

## 2019-01-09 DIAGNOSIS — I10 ESSENTIAL HYPERTENSION: ICD-10-CM

## 2019-01-09 DIAGNOSIS — Z12.31 ENCOUNTER FOR SCREENING MAMMOGRAM FOR BREAST CANCER: ICD-10-CM

## 2019-01-09 DIAGNOSIS — M25.571 CHRONIC PAIN OF RIGHT ANKLE: ICD-10-CM

## 2019-01-09 DIAGNOSIS — E66.01 MORBID OBESITY WITH BMI OF 40.0-44.9, ADULT: ICD-10-CM

## 2019-01-09 DIAGNOSIS — J45.20 MILD INTERMITTENT ASTHMA, UNSPECIFIED WHETHER COMPLICATED: ICD-10-CM

## 2019-01-09 DIAGNOSIS — Z00.00 ROUTINE GENERAL MEDICAL EXAMINATION AT A HEALTH CARE FACILITY: ICD-10-CM

## 2019-01-09 LAB
ALBUMIN SERPL BCP-MCNC: 3.6 G/DL
ALP SERPL-CCNC: 93 U/L
ALT SERPL W/O P-5'-P-CCNC: 22 U/L
ANION GAP SERPL CALC-SCNC: 11 MMOL/L
AST SERPL-CCNC: 24 U/L
BASOPHILS # BLD AUTO: 0.05 K/UL
BASOPHILS NFR BLD: 0.6 %
BILIRUB SERPL-MCNC: 0.3 MG/DL
BUN SERPL-MCNC: 11 MG/DL
CALCIUM SERPL-MCNC: 9.5 MG/DL
CHLORIDE SERPL-SCNC: 104 MMOL/L
CHOLEST SERPL-MCNC: 159 MG/DL
CHOLEST/HDLC SERPL: 3.6 {RATIO}
CO2 SERPL-SCNC: 26 MMOL/L
CREAT SERPL-MCNC: 0.8 MG/DL
DIFFERENTIAL METHOD: ABNORMAL
EOSINOPHIL # BLD AUTO: 0.2 K/UL
EOSINOPHIL NFR BLD: 2.9 %
ERYTHROCYTE [DISTWIDTH] IN BLOOD BY AUTOMATED COUNT: 14.5 %
EST. GFR  (AFRICAN AMERICAN): >60 ML/MIN/1.73 M^2
EST. GFR  (NON AFRICAN AMERICAN): >60 ML/MIN/1.73 M^2
ESTIMATED AVG GLUCOSE: 117 MG/DL
GLUCOSE SERPL-MCNC: 88 MG/DL
HBA1C MFR BLD HPLC: 5.7 %
HCT VFR BLD AUTO: 40.3 %
HDLC SERPL-MCNC: 44 MG/DL
HDLC SERPL: 27.7 %
HGB BLD-MCNC: 12.2 G/DL
IMM GRANULOCYTES # BLD AUTO: 0.01 K/UL
IMM GRANULOCYTES NFR BLD AUTO: 0.1 %
LDLC SERPL CALC-MCNC: 103.6 MG/DL
LYMPHOCYTES # BLD AUTO: 3.2 K/UL
LYMPHOCYTES NFR BLD: 40.8 %
MCH RBC QN AUTO: 26.6 PG
MCHC RBC AUTO-ENTMCNC: 30.3 G/DL
MCV RBC AUTO: 88 FL
MONOCYTES # BLD AUTO: 0.9 K/UL
MONOCYTES NFR BLD: 10.7 %
NEUTROPHILS # BLD AUTO: 3.6 K/UL
NEUTROPHILS NFR BLD: 44.9 %
NONHDLC SERPL-MCNC: 115 MG/DL
NRBC BLD-RTO: 0 /100 WBC
PLATELET # BLD AUTO: 207 K/UL
PMV BLD AUTO: 13.3 FL
POTASSIUM SERPL-SCNC: 3.4 MMOL/L
PROT SERPL-MCNC: 7.5 G/DL
RBC # BLD AUTO: 4.59 M/UL
SODIUM SERPL-SCNC: 141 MMOL/L
TRIGL SERPL-MCNC: 57 MG/DL
WBC # BLD AUTO: 7.94 K/UL

## 2019-01-09 PROCEDURE — 99396 PR PREVENTIVE VISIT,EST,40-64: ICD-10-PCS | Mod: S$GLB,,, | Performed by: FAMILY MEDICINE

## 2019-01-09 PROCEDURE — 99396 PREV VISIT EST AGE 40-64: CPT | Mod: S$GLB,,, | Performed by: FAMILY MEDICINE

## 2019-01-09 PROCEDURE — 88175 CYTOPATH C/V AUTO FLUID REDO: CPT | Performed by: PATHOLOGY

## 2019-01-09 PROCEDURE — 99999 PR PBB SHADOW E&M-EST. PATIENT-LVL IV: CPT | Mod: PBBFAC,,, | Performed by: FAMILY MEDICINE

## 2019-01-09 PROCEDURE — 3075F PR MOST RECENT SYSTOLIC BLOOD PRESS GE 130-139MM HG: ICD-10-PCS | Mod: CPTII,S$GLB,, | Performed by: FAMILY MEDICINE

## 2019-01-09 PROCEDURE — 88141 LIQUID-BASED PAP SMEAR, SCREENING: ICD-10-PCS | Mod: ,,, | Performed by: PATHOLOGY

## 2019-01-09 PROCEDURE — 80053 COMPREHEN METABOLIC PANEL: CPT

## 2019-01-09 PROCEDURE — 83036 HEMOGLOBIN GLYCOSYLATED A1C: CPT

## 2019-01-09 PROCEDURE — 80061 LIPID PANEL: CPT

## 2019-01-09 PROCEDURE — 99999 PR PBB SHADOW E&M-EST. PATIENT-LVL IV: ICD-10-PCS | Mod: PBBFAC,,, | Performed by: FAMILY MEDICINE

## 2019-01-09 PROCEDURE — 3075F SYST BP GE 130 - 139MM HG: CPT | Mod: CPTII,S$GLB,, | Performed by: FAMILY MEDICINE

## 2019-01-09 PROCEDURE — 36415 COLL VENOUS BLD VENIPUNCTURE: CPT | Mod: PO

## 2019-01-09 PROCEDURE — 3078F DIAST BP <80 MM HG: CPT | Mod: CPTII,S$GLB,, | Performed by: FAMILY MEDICINE

## 2019-01-09 PROCEDURE — 85025 COMPLETE CBC W/AUTO DIFF WBC: CPT

## 2019-01-09 PROCEDURE — 3078F PR MOST RECENT DIASTOLIC BLOOD PRESSURE < 80 MM HG: ICD-10-PCS | Mod: CPTII,S$GLB,, | Performed by: FAMILY MEDICINE

## 2019-01-09 PROCEDURE — 88141 CYTOPATH C/V INTERPRET: CPT | Mod: ,,, | Performed by: PATHOLOGY

## 2019-01-09 RX ORDER — IBUPROFEN 800 MG/1
800 TABLET ORAL 3 TIMES DAILY
Qty: 60 TABLET | Refills: 1 | Status: SHIPPED | OUTPATIENT
Start: 2019-01-09 | End: 2019-07-08 | Stop reason: ALTCHOICE

## 2019-01-09 RX ORDER — PHENTERMINE HYDROCHLORIDE 37.5 MG/1
37.5 TABLET ORAL EVERY MORNING
Refills: 0 | COMMUNITY
Start: 2018-12-11 | End: 2019-07-08 | Stop reason: SDUPTHER

## 2019-01-09 NOTE — PROGRESS NOTES
Chief Complaint   Patient presents with    Annual Exam       HPI  Brandi Freitas is a 58 y.o. female with multiple medical diagnoses as listed in the medical history and problem list that presents for annual exam.     She has been having some struggles with depression, she reports that she does not feel she needs medication at this time and is making changes in her life to improve this.    Asthma has been controlled.     PAST MEDICAL HISTORY:  Past Medical History:   Diagnosis Date    Anxiety     Arthritis     knee    Asthma     Bursitis, shoulder     Dental bridge present     upper right    Dental crown present     Depression     Hypertension     Pes planus     Wears glasses     for reading       PAST SURGICAL HISTORY:  Past Surgical History:   Procedure Laterality Date    ACROMIOPLASTY Left 6/25/2014    [facility] at [facility]    arthroscopy ankle right      ARTHROSCOPY-SHOULDER- OPEN REPAIR Left 6/25/2014    [facility] at [facility]    ARTHROTOMY-SHOULDER Left 6/25/2014    [facility] OR    bilateral inguinal hernia repair      COLONOSCOPY N/A 1/11/2018    [facility]formed [facility]co [facility] at [facility] ENDO    COLONOSCOPY N/A 12/14/2012    [facility] by Danilo Telles [facility] at [facility] ENDO    left torn rotator cuff repair  09/2014    MYOMECTOMY      REPAIR-ROTATOR CUFF Left 6/25/2014    [facility] at [facility] OR    rt salphigectomy      SYNOVECTOMY      UMBILICAL HERNIA REPAIR         SOCIAL HISTORY:  Social History     Socioeconomic History    Marital status:      Spouse name: Not on file    Number of children: Not on file    Years of education: Not on file    Highest education level: Not on file   Social Needs    Financial resource strain: Not on file    Food insecurity - worry: Not on file    Food insecurity - inability: Not on file    Transportation needs - medical: Not on file     Transportation needs - non-medical: Not on file   Occupational History    Not on file   Tobacco Use    Smoking status: Never Smoker    Smokeless tobacco: Never Used   Substance and Sexual Activity    Alcohol use: Yes     Comment: social     Drug use: No    Sexual activity: Yes     Comment: occasional   Other Topics Concern    Not on file   Social History Narrative    Not on file       FAMILY HISTORY:  Family History   Problem Relation Age of Onset    Cancer Mother         breast    Diabetes Mother     Hypertension Mother     Breast cancer Mother     Cancer Brother         liver cancer    Breast cancer Maternal Aunt        ALLERGIES AND MEDICATIONS: updated and reviewed.  Review of patient's allergies indicates:  No Known Allergies  Current Outpatient Medications   Medication Sig Dispense Refill    ADVAIR DISKUS 100-50 mcg/dose diskus inhaler INHALE ONE DOSE BY MOUTH TWICE DAILY 60 each 0    albuterol-ipratropium (DUO-NEB) 2.5 mg-0.5 mg/3 mL nebulizer solution Take 3 mLs by nebulization every 6 (six) hours as needed for Wheezing or Shortness of Breath. 90 vial 5    amLODIPine (NORVASC) 10 MG tablet TAKE ONE TABLET BY MOUTH ONCE DAILY 30 tablet 0    diclofenac sodium (VOLTAREN) 1 % Gel Apply 2 g topically 4 (four) times daily as needed. To painful area on foot/ankle 500 g 5    fluticasone (FLONASE) 50 mcg/actuation nasal spray 1 spray by Each Nare route once daily. 3 Bottle 1    ibuprofen (ADVIL,MOTRIN) 800 MG tablet Take 1 tablet (800 mg total) by mouth 3 (three) times daily. With a meal. 60 tablet 1    levocetirizine (XYZAL) 5 MG tablet Take 1 tablet (5 mg total) by mouth every evening. 30 tablet 1    lisinopril (PRINIVIL,ZESTRIL) 20 MG tablet TAKE 1 TABLET BY MOUTH ONCE DAILY 30 tablet 0    spironolactone (ALDACTONE) 50 MG tablet TAKE 1 TABLET BY MOUTH TWICE DAILY 60 tablet 0    VENTOLIN HFA 90 mcg/actuation inhaler INHALE TWO PUFFS BY MOUTH EVERY 6 HOURS AS NEEDED FOR WHEEZING OR   "SHORTNESS  OF  BREATH 18 each 0    phentermine (ADIPEX-P) 37.5 mg tablet Take 37.5 mg by mouth every morning.  0     No current facility-administered medications for this visit.        ROS  Review of Systems   Constitutional: Negative for chills, diaphoresis, fatigue, fever and unexpected weight change.   HENT: Negative for rhinorrhea, sinus pressure, sore throat and tinnitus.    Eyes: Negative for photophobia and visual disturbance.   Respiratory: Negative for cough, shortness of breath and wheezing.    Cardiovascular: Negative for chest pain and palpitations.   Gastrointestinal: Negative for abdominal pain, blood in stool, constipation, diarrhea, nausea and vomiting.   Genitourinary: Negative for dysuria, flank pain, frequency and vaginal discharge.   Musculoskeletal: Negative for arthralgias and joint swelling.   Skin: Negative for rash.   Neurological: Negative for speech difficulty, weakness, light-headedness and headaches.   Psychiatric/Behavioral: Negative for behavioral problems and dysphoric mood.       Physical Exam  Vitals:    01/09/19 0743   BP: 136/74   Pulse: 87   Resp: 18   Temp: 98.2 °F (36.8 °C)   TempSrc: Oral   SpO2: 99%   Weight: 121.2 kg (267 lb 3.2 oz)   Height: 5' 6" (1.676 m)    Body mass index is 43.13 kg/m².  Weight: 121.2 kg (267 lb 3.2 oz)   Height: 5' 6" (167.6 cm)     Physical Exam   Constitutional: She is oriented to person, place, and time. She appears well-developed and well-nourished. No distress.   HENT:   Head: Normocephalic and atraumatic.   Right Ear: Tympanic membrane normal.   Left Ear: Tympanic membrane normal.   Nose: Nose normal.   Mouth/Throat: No oropharyngeal exudate.   Eyes: EOM are normal.   Neck: Neck supple. No thyromegaly present.   Cardiovascular: Normal rate and regular rhythm. Exam reveals no gallop and no friction rub.   No murmur heard.  Pulmonary/Chest: Effort normal and breath sounds normal. No respiratory distress. She has no wheezes. She has no rales. "   Abdominal: Soft. Bowel sounds are normal. She exhibits no distension and no mass. There is no tenderness. There is no rebound and no guarding.   Genitourinary:   Genitourinary Comments:   GYN Exam    Medical assistant was present in room and assisted with procedure. Pap smear was performed, specimen collected with cytobrush. No external lesions noted. Normal vaginal mucosa.  Cervix normal appearing cervix without discharge or lesions,no vaginal discharge.    Bimanual exam performed:  no cervical motion tenderness noted, no uterine or ovarian masses palpated.    Lymphadenopathy:     She has no cervical adenopathy.   Neurological: She is alert and oriented to person, place, and time.   Skin: Skin is warm and dry. No rash noted.   Psychiatric: She has a normal mood and affect. Her behavior is normal.   Nursing note and vitals reviewed.      Health Maintenance       Date Due Completion Date    Influenza Vaccine 08/01/2018 11/23/2016 (Declined)    Override on 11/23/2016: Declined    Override on 2/2/2016: Declined    Override on 11/13/2014: Declined    Override on 12/24/2013: Declined    Mammogram 12/06/2018 12/6/2017    Pap Smear with HPV Cotest 11/29/2020 11/29/2017    Lipid Panel 11/29/2022 11/29/2017    Colonoscopy 01/11/2023 1/11/2018    TETANUS VACCINE 05/19/2026 5/19/2016            ASSESSMENT     1. Routine general medical examination at a health care facility    2. Chronic pain of right ankle    3. Osteochondral defect of ankle    4. Essential hypertension    5. Mild intermittent asthma, unspecified whether complicated    6. Morbid obesity with BMI of 40.0-44.9, adult    7. Encounter for screening mammogram for breast cancer        PLAN:     Problem List Items Addressed This Visit        Pulmonary    Asthma (Chronic)  -stable no signs of acute exacerbation       Cardiac/Vascular    Hypertension  -controlled on current regimen       Endocrine    Morbid obesity with BMI of 40.0-44.9, adult  -she will continue to  work towards reducing this      Other Visit Diagnoses     Routine general medical examination at a health care facility    -  Primary  --discussed healthy lifestyle modification with exercise and healthy diet, reviewed age appropriate screening and healthy maintenance      Relevant Orders    CBC auto differential    Comprehensive metabolic panel    Lipid panel    Hemoglobin A1c    Liquid-based pap smear, screening    Chronic pain of right ankle        Relevant Medications    ibuprofen (ADVIL,MOTRIN) 800 MG tablet    Osteochondral defect of ankle        Relevant Medications    ibuprofen (ADVIL,MOTRIN) 800 MG tablet    Encounter for screening mammogram for breast cancer        Relevant Orders    Mammo Digital Screening Ozzy Stover MD  01/09/2019 8:34 AM        Follow-up in about 1 year (around 1/9/2020) for annual exam.

## 2019-01-21 ENCOUNTER — HOSPITAL ENCOUNTER (OUTPATIENT)
Dept: RADIOLOGY | Facility: HOSPITAL | Age: 59
Discharge: HOME | End: 2019-01-21
Attending: FAMILY MEDICINE
Payer: COMMERCIAL

## 2019-01-21 VITALS — HEIGHT: 66 IN | BODY MASS INDEX: 42.91 KG/M2 | WEIGHT: 267 LBS

## 2019-01-21 DIAGNOSIS — Z12.31 ENCOUNTER FOR SCREENING MAMMOGRAM FOR BREAST CANCER: ICD-10-CM

## 2019-01-21 PROCEDURE — 77067 SCR MAMMO BI INCL CAD: CPT | Mod: TC

## 2019-01-21 PROCEDURE — 77067 SCR MAMMO BI INCL CAD: CPT | Mod: 26,,, | Performed by: RADIOLOGY

## 2019-01-21 PROCEDURE — 77067 MAMMO DIGITAL SCREENING BILAT WITH TOMOSYNTHESIS_CAD: ICD-10-PCS | Mod: 26,,, | Performed by: RADIOLOGY

## 2019-01-21 PROCEDURE — 77063 BREAST TOMOSYNTHESIS BI: CPT | Mod: 26,,, | Performed by: RADIOLOGY

## 2019-01-21 PROCEDURE — 77063 MAMMO DIGITAL SCREENING BILAT WITH TOMOSYNTHESIS_CAD: ICD-10-PCS | Mod: 26,,, | Performed by: RADIOLOGY

## 2019-01-24 ENCOUNTER — PATIENT MESSAGE (OUTPATIENT)
Dept: FAMILY MEDICINE | Facility: CLINIC | Age: 59
End: 2019-01-24

## 2019-02-06 ENCOUNTER — OFFICE VISIT (OUTPATIENT)
Dept: FAMILY MEDICINE | Facility: CLINIC | Age: 59
End: 2019-02-06
Payer: COMMERCIAL

## 2019-02-06 VITALS
BODY MASS INDEX: 42.91 KG/M2 | DIASTOLIC BLOOD PRESSURE: 80 MMHG | HEART RATE: 87 BPM | OXYGEN SATURATION: 98 % | SYSTOLIC BLOOD PRESSURE: 124 MMHG | TEMPERATURE: 98 F | WEIGHT: 267 LBS | HEIGHT: 66 IN

## 2019-02-06 DIAGNOSIS — N30.01 ACUTE CYSTITIS WITH HEMATURIA: Primary | ICD-10-CM

## 2019-02-06 DIAGNOSIS — I10 ESSENTIAL HYPERTENSION: ICD-10-CM

## 2019-02-06 LAB
BILIRUB SERPL-MCNC: NORMAL MG/DL
BLOOD URINE, POC: NORMAL
COLOR, POC UA: NORMAL
GLUCOSE UR QL STRIP: NORMAL
KETONES UR QL STRIP: NORMAL
LEUKOCYTE ESTERASE URINE, POC: NORMAL
NITRITE, POC UA: NORMAL
PH, POC UA: 8
PROTEIN, POC: NORMAL
SPECIFIC GRAVITY, POC UA: 1
UROBILINOGEN, POC UA: NORMAL

## 2019-02-06 PROCEDURE — 99999 PR PBB SHADOW E&M-EST. PATIENT-LVL IV: CPT | Mod: PBBFAC,,, | Performed by: NURSE PRACTITIONER

## 2019-02-06 PROCEDURE — 3079F DIAST BP 80-89 MM HG: CPT | Mod: CPTII,S$GLB,, | Performed by: NURSE PRACTITIONER

## 2019-02-06 PROCEDURE — 87086 URINE CULTURE/COLONY COUNT: CPT

## 2019-02-06 PROCEDURE — 99214 OFFICE O/P EST MOD 30 MIN: CPT | Mod: 25,S$GLB,, | Performed by: NURSE PRACTITIONER

## 2019-02-06 PROCEDURE — 99999 PR PBB SHADOW E&M-EST. PATIENT-LVL IV: ICD-10-PCS | Mod: PBBFAC,,, | Performed by: NURSE PRACTITIONER

## 2019-02-06 PROCEDURE — 3074F SYST BP LT 130 MM HG: CPT | Mod: CPTII,S$GLB,, | Performed by: NURSE PRACTITIONER

## 2019-02-06 PROCEDURE — 81002 POCT URINE DIPSTICK WITHOUT MICROSCOPE: ICD-10-PCS | Mod: S$GLB,,, | Performed by: NURSE PRACTITIONER

## 2019-02-06 PROCEDURE — 3079F PR MOST RECENT DIASTOLIC BLOOD PRESSURE 80-89 MM HG: ICD-10-PCS | Mod: CPTII,S$GLB,, | Performed by: NURSE PRACTITIONER

## 2019-02-06 PROCEDURE — 3008F BODY MASS INDEX DOCD: CPT | Mod: CPTII,S$GLB,, | Performed by: NURSE PRACTITIONER

## 2019-02-06 PROCEDURE — 81002 URINALYSIS NONAUTO W/O SCOPE: CPT | Mod: S$GLB,,, | Performed by: NURSE PRACTITIONER

## 2019-02-06 PROCEDURE — 3074F PR MOST RECENT SYSTOLIC BLOOD PRESSURE < 130 MM HG: ICD-10-PCS | Mod: CPTII,S$GLB,, | Performed by: NURSE PRACTITIONER

## 2019-02-06 PROCEDURE — 99214 PR OFFICE/OUTPT VISIT, EST, LEVL IV, 30-39 MIN: ICD-10-PCS | Mod: 25,S$GLB,, | Performed by: NURSE PRACTITIONER

## 2019-02-06 PROCEDURE — 3008F PR BODY MASS INDEX (BMI) DOCUMENTED: ICD-10-PCS | Mod: CPTII,S$GLB,, | Performed by: NURSE PRACTITIONER

## 2019-02-06 RX ORDER — PHENTERMINE HYDROCHLORIDE 37.5 MG/1
CAPSULE ORAL EVERY MORNING
Refills: 0 | COMMUNITY
Start: 2019-01-17 | End: 2021-06-25 | Stop reason: SDUPTHER

## 2019-02-06 RX ORDER — NITROFURANTOIN 25; 75 MG/1; MG/1
100 CAPSULE ORAL 2 TIMES DAILY
Qty: 10 CAPSULE | Refills: 0 | Status: SHIPPED | OUTPATIENT
Start: 2019-02-06 | End: 2019-02-11

## 2019-02-06 RX ORDER — PHENAZOPYRIDINE HYDROCHLORIDE 100 MG/1
100 TABLET, FILM COATED ORAL
Qty: 30 TABLET | Refills: 0 | Status: SHIPPED | OUTPATIENT
Start: 2019-02-06 | End: 2019-02-16

## 2019-02-06 NOTE — PROGRESS NOTES
History of Present Illness   Brandi Freitas is a 58 y.o. woman with medical history as listed below who presents today for evaluation of possible urinary tract infection x3 days. She reports urinary frequency with strong odor to urine and pain at the end of urination. Today, she noticed bright red blood in her urine. She denies flank pain, nausea, vomiting, abdominal pain, or fevers. She has tried no OTC medication for symptoms. She has no additional complaints and is otherwise healthy on today's visit.    Past Medical History:   Diagnosis Date    Anxiety     Arthritis     knee    Asthma     Bursitis, shoulder     Dental bridge present     upper right    Dental crown present     Depression     Hypertension     Pes planus     Wears glasses     for reading       Past Surgical History:   Procedure Laterality Date    ACROMIOPLASTY Left 6/25/2014    [facility]    arthroscopy ankle right      ARTHROSCOPY-SHOULDER- OPEN REPAIR Left 6/25/2014    [facility]    ARTHROTOMY-SHOULDER Left 6/25/2014    [facility] OR    bilateral inguinal hernia repair      COLONOSCOPY N/A 1/11/2018    [facility] at [facility] ENDO    COLONOSCOPY N/A 12/14/2012    [facility]elles [facility] at [facility] ENDO    left torn rotator cuff repair  09/2014    MYOMECTOMY      REPAIR-ROTATOR CUFF Left 6/25/2014    [facility] OR    rt salphigectomy      SYNOVECTOMY      UMBILICAL HERNIA REPAIR         Social History     Socioeconomic History    Marital status:      Spouse name: None    Number of children: None    Years of education: None    Highest education level: None   Social Needs    Financial resource strain: None    Food insecurity - worry: None    Food insecurity - inability: None    Transportation needs - medical: None    Transportation needs -  "non-medical: None   Occupational History    None   Tobacco Use    Smoking status: Never Smoker    Smokeless tobacco: Never Used   Substance and Sexual Activity    Alcohol use: Yes     Comment: social     Drug use: No    Sexual activity: Yes     Comment: occasional   Other Topics Concern    None   Social History Narrative    None       Family History   Problem Relation Age of Onset    Cancer Mother         breast    Diabetes Mother     Hypertension Mother     Breast cancer Mother     Cancer Brother         liver cancer    Breast cancer Maternal Aunt        Review of Systems  Review of Systems   Constitutional: Negative for chills and fever.   Gastrointestinal: Negative for abdominal pain, nausea and vomiting.   Genitourinary: Positive for dysuria, frequency and urgency. Negative for flank pain and hematuria.     A complete review of systems was otherwise negative.    Physical Exam  /80 (BP Location: Right arm, Patient Position: Sitting, BP Method: X-Large (Manual))   Pulse 87   Temp 98.2 °F (36.8 °C) (Oral)   Ht 5' 6" (1.676 m)   Wt 121.1 kg (267 lb)   LMP 07/18/2012   SpO2 98%   BMI 43.09 kg/m²   General appearance: alert, appears stated age, cooperative and no distress  Back: symmetric, no curvature. ROM normal. No CVA tenderness.  Lungs: clear to auscultation bilaterally  Heart: regular rate and rhythm, S1, S2 normal, no murmur, click, rub or gallop  Abdomen: soft, non-tender; bowel sounds normal; no masses,  no organomegaly  Neurologic: Grossly normal    Assessment/Plan  Acute cystitis with hematuria  Treat with Macrobid BID pending culture report, will change antibiotics if necessary.  Pyridium for pain as needed.  Increase water intake and void regularly.  Handout provided on home management.  ER precautions discussed, no red flags on exam.  RTC PRN.  -     POCT urine dipstick without microscope  -     Urine culture  -     nitrofurantoin, macrocrystal-monohydrate, (MACROBID) 100 MG " capsule; Take 1 capsule (100 mg total) by mouth 2 (two) times daily. for 5 days  Dispense: 10 capsule; Refill: 0  -     phenazopyridine (PYRIDIUM) 100 MG tablet; Take 1 tablet (100 mg total) by mouth 3 (three) times daily with meals. for 10 days  Dispense: 30 tablet; Refill: 0    Essential hypertension  The current medical regimen is effective;  continue present plan and medications.    Patient has verbalized understanding and is in agreement with plan of care.    Follow-up if symptoms worsen or fail to improve.

## 2019-02-06 NOTE — PATIENT INSTRUCTIONS
Anatomy of the Female Urinary Tract  Your urinary tract helps get rid of urine (your bodys liquid waste). The kidneys collect chemicals and water your body doesnt need. This is turned into urine. Urine travels out of the kidneys through the ureters to the bladder. The bladder holds urine until youre ready to release it. The urethra carries urine from the bladder out of the body. The main sphincter muscle circles the mid-urethra.      Front view of female urinary tract.     Date Last Reviewed: 1/1/2017 © 2000-2017 [employer]. [address] [place] [id number]. All rights reserved. This information is not intended as a substitute for professional medical care. Always follow your healthcare professional's instructions.

## 2019-02-09 LAB
BACTERIA UR CULT: NORMAL
BACTERIA UR CULT: NORMAL

## 2019-02-11 ENCOUNTER — OFFICE VISIT (OUTPATIENT)
Dept: FAMILY MEDICINE | Facility: CLINIC | Age: 59
End: 2019-02-11
Payer: COMMERCIAL

## 2019-02-11 VITALS
HEART RATE: 101 BPM | DIASTOLIC BLOOD PRESSURE: 78 MMHG | HEIGHT: 66 IN | BODY MASS INDEX: 42.45 KG/M2 | SYSTOLIC BLOOD PRESSURE: 126 MMHG | OXYGEN SATURATION: 97 % | TEMPERATURE: 100 F | WEIGHT: 264.13 LBS

## 2019-02-11 DIAGNOSIS — I10 ESSENTIAL HYPERTENSION: ICD-10-CM

## 2019-02-11 DIAGNOSIS — R05.9 COUGH: ICD-10-CM

## 2019-02-11 DIAGNOSIS — J45.31 MILD PERSISTENT ASTHMA WITH ACUTE EXACERBATION: Primary | ICD-10-CM

## 2019-02-11 PROCEDURE — 3074F PR MOST RECENT SYSTOLIC BLOOD PRESSURE < 130 MM HG: ICD-10-PCS | Mod: CPTII,S$GLB,, | Performed by: INTERNAL MEDICINE

## 2019-02-11 PROCEDURE — 3008F PR BODY MASS INDEX (BMI) DOCUMENTED: ICD-10-PCS | Mod: CPTII,S$GLB,, | Performed by: INTERNAL MEDICINE

## 2019-02-11 PROCEDURE — 3074F SYST BP LT 130 MM HG: CPT | Mod: CPTII,S$GLB,, | Performed by: INTERNAL MEDICINE

## 2019-02-11 PROCEDURE — 99213 PR OFFICE/OUTPT VISIT, EST, LEVL III, 20-29 MIN: ICD-10-PCS | Mod: S$GLB,,, | Performed by: INTERNAL MEDICINE

## 2019-02-11 PROCEDURE — 3078F DIAST BP <80 MM HG: CPT | Mod: CPTII,S$GLB,, | Performed by: INTERNAL MEDICINE

## 2019-02-11 PROCEDURE — 99999 PR PBB SHADOW E&M-EST. PATIENT-LVL IV: ICD-10-PCS | Mod: PBBFAC,,, | Performed by: INTERNAL MEDICINE

## 2019-02-11 PROCEDURE — 99213 OFFICE O/P EST LOW 20 MIN: CPT | Mod: S$GLB,,, | Performed by: INTERNAL MEDICINE

## 2019-02-11 PROCEDURE — 3008F BODY MASS INDEX DOCD: CPT | Mod: CPTII,S$GLB,, | Performed by: INTERNAL MEDICINE

## 2019-02-11 PROCEDURE — 99999 PR PBB SHADOW E&M-EST. PATIENT-LVL IV: CPT | Mod: PBBFAC,,, | Performed by: INTERNAL MEDICINE

## 2019-02-11 PROCEDURE — 3078F PR MOST RECENT DIASTOLIC BLOOD PRESSURE < 80 MM HG: ICD-10-PCS | Mod: CPTII,S$GLB,, | Performed by: INTERNAL MEDICINE

## 2019-02-11 RX ORDER — PROMETHAZINE HYDROCHLORIDE AND DEXTROMETHORPHAN HYDROBROMIDE 6.25; 15 MG/5ML; MG/5ML
5 SYRUP ORAL NIGHTLY PRN
Qty: 240 ML | Refills: 0 | Status: SHIPPED | OUTPATIENT
Start: 2019-02-11 | End: 2019-02-21

## 2019-02-11 RX ORDER — PREDNISONE 20 MG/1
40 TABLET ORAL DAILY
Qty: 10 TABLET | Refills: 0 | Status: SHIPPED | OUTPATIENT
Start: 2019-02-11 | End: 2019-02-16

## 2019-02-11 NOTE — PROGRESS NOTES
Subjective:     Chief Complaint  Chief Complaint   Patient presents with    Sore Throat    Cough    Fever    Nasal Congestion    Headache       HPI  Brandi Freitas is a 58 y.o. female with medical diagnoses as listed in the medical history and problem list that presents for URI symptoms.  Last clinic visit was 2/6/2019.      Cough/sore throat/fevers/congestion for several days  Has chest congestion/discomfort  Cough is non-productive  Headache is severe   Has tried OTC ibuprofen 800 mg tablets without relief  Not using cough medications/decongestants    Patient Care Team:  Cherelle Stover MD as PCP - General (Internal Medicine)      PAST MEDICAL HISTORY:  Past Medical History:   Diagnosis Date    Anxiety     Arthritis     knee    Asthma     Bursitis, shoulder     Dental bridge present     upper right    Dental crown present     Depression     Hypertension     Pes planus     Wears glasses     for reading       PAST SURGICAL HISTORY:  Past Surgical History:   Procedure Laterality Date    ACROMIOPLASTY Left 6/25/2014    [facility]    arthroscopy ankle right      ARTHROSCOPY-SHOULDER- OPEN REPAIR Left 6/25/2014    Performed [facility] at [facility] OR    ARTHROTOMY-SHOULDER Left 6/25/2014    Performed by [facility] at [facility] OR    bilateral inguinal hernia repair      COLONOSCOPY N/A 1/11/2018    Performed [facility]co [facility] at [facility] ENDO    COLONOSCOPY N/A 12/14/2012    Performed by [facility]ilo Telles [facility] at [facility] ENDO    left torn rotator cuff repair  09/2014    MYOMECTOMY      REPAIR-ROTATOR CUFF Left 6/25/2014    Performed by [facility] at [facility] OR    rt salphigectomy      SYNOVECTOMY      UMBILICAL HERNIA REPAIR         SOCIAL HISTORY:  Social History     Socioeconomic History    Marital status:      Spouse name: Not on file    Number of children: Not on file    Years of education: Not on file     Highest education level: Not on file   Social Needs    Financial resource strain: Not on file    Food insecurity - worry: Not on file    Food insecurity - inability: Not on file    Transportation needs - medical: Not on file    Transportation needs - non-medical: Not on file   Occupational History    Not on file   Tobacco Use    Smoking status: Never Smoker    Smokeless tobacco: Never Used   Substance and Sexual Activity    Alcohol use: Yes     Comment: social     Drug use: No    Sexual activity: Yes     Comment: occasional   Other Topics Concern    Not on file   Social History Narrative    Not on file       FAMILY HISTORY:  Family History   Problem Relation Age of Onset    Cancer Mother         breast    Diabetes Mother     Hypertension Mother     Breast cancer Mother     Cancer Brother         liver cancer    Breast cancer Maternal Aunt        ALLERGIES AND MEDICATIONS: updated and reviewed.  Review of patient's allergies indicates:  No Known Allergies  Current Outpatient Medications   Medication Sig Dispense Refill    ADVAIR DISKUS 100-50 mcg/dose diskus inhaler INHALE ONE DOSE BY MOUTH TWICE DAILY 60 each 0    albuterol-ipratropium (DUO-NEB) 2.5 mg-0.5 mg/3 mL nebulizer solution Take 3 mLs by nebulization every 6 (six) hours as needed for Wheezing or Shortness of Breath. 90 vial 5    amLODIPine (NORVASC) 10 MG tablet TAKE ONE TABLET BY MOUTH ONCE DAILY 30 tablet 0    lisinopril (PRINIVIL,ZESTRIL) 20 MG tablet TAKE 1 TABLET BY MOUTH ONCE DAILY 30 tablet 0    nitrofurantoin, macrocrystal-monohydrate, (MACROBID) 100 MG capsule Take 1 capsule (100 mg total) by mouth 2 (two) times daily. for 5 days 10 capsule 0    phenazopyridine (PYRIDIUM) 100 MG tablet Take 1 tablet (100 mg total) by mouth 3 (three) times daily with meals. for 10 days 30 tablet 0    phentermine (ADIPEX-P) 37.5 mg tablet Take 37.5 mg by mouth every morning.  0    phentermine 37.5 MG capsule Take by mouth every morning.  0  "   spironolactone (ALDACTONE) 50 MG tablet TAKE 1 TABLET BY MOUTH TWICE DAILY 60 tablet 0    VENTOLIN HFA 90 mcg/actuation inhaler INHALE TWO PUFFS BY MOUTH EVERY 6 HOURS AS NEEDED FOR WHEEZING OR  SHORTNESS  OF  BREATH 18 each 0    fluticasone (FLONASE) 50 mcg/actuation nasal spray 1 spray by Each Nare route once daily. 3 Bottle 1    ibuprofen (ADVIL,MOTRIN) 800 MG tablet Take 1 tablet (800 mg total) by mouth 3 (three) times daily. With a meal. 60 tablet 1    predniSONE (DELTASONE) 20 MG tablet Take 2 tablets (40 mg total) by mouth once daily. for 5 days 10 tablet 0    promethazine-dextromethorphan (PROMETHAZINE-DM) 6.25-15 mg/5 mL Syrp Take 5 mLs by mouth nightly as needed. 240 mL 0     No current facility-administered medications for this visit.          ROS:  Review of Systems   Constitutional: Negative for chills, diaphoresis and fever.   HENT: Positive for congestion, rhinorrhea, sinus pressure, sinus pain, sneezing and sore throat. Negative for ear pain.    Eyes: Negative for discharge and redness.   Respiratory: Positive for cough, chest tightness, shortness of breath and wheezing.    Cardiovascular: Negative for chest pain.   Gastrointestinal: Negative for nausea and vomiting.   Musculoskeletal: Negative for arthralgias and myalgias.   Allergic/Immunologic: Negative for environmental allergies.   Neurological: Negative for headaches.       Objective:       Physical Exam  Vitals:    02/11/19 1615   BP: 126/78   Pulse: 101   Temp: 100.3 °F (37.9 °C)   TempSrc: Oral   SpO2: 97%   Weight: 119.8 kg (264 lb 1.8 oz)   Height: 5' 6" (1.676 m)    Body mass index is 42.63 kg/m².  Weight: 119.8 kg (264 lb 1.8 oz)   Height: 5' 6" (167.6 cm)   Physical Exam   Constitutional: She appears well-developed and well-nourished. No distress.   HENT:   Head: Normocephalic and atraumatic.   Mouth/Throat: Oropharynx is clear and moist. No oropharyngeal exudate.   Eyes: EOM are normal. Right eye exhibits no discharge. Left " eye exhibits no discharge.   Neck: Normal range of motion.   Cardiovascular: Normal rate. Exam reveals no gallop and no friction rub.   No murmur heard.  Pulmonary/Chest: Effort normal and breath sounds normal. No stridor. No respiratory distress. She has no wheezes.   Lymphadenopathy:     She has cervical adenopathy.   Neurological: She is alert. No cranial nerve deficit.   Skin: Skin is warm and dry.   Psychiatric: She has a normal mood and affect.           Assessment:     1. Mild persistent asthma with acute exacerbation    2. Essential hypertension    3. Cough      Plan:     Brandi was seen today for sore throat, cough, fever, nasal congestion and headache.    Diagnoses and all orders for this visit:    Mild persistent asthma with acute exacerbation  Cough  Patient experiencing dyspnea, non-productive cough and wheezing  Discussed symptomatology of asthma exacerbation, including education regarding potential triggers, assessment of chronic lung disease control/status, and role of short acting bronchodilator therapy. Rescue steroid prescribed as per orders.   -     predniSONE (DELTASONE) 20 MG tablet; Take 2 tablets (40 mg total) by mouth once daily. for 5 days  -     promethazine-dextromethorphan (PROMETHAZINE-DM) 6.25-15 mg/5 mL Syrp; Take 5 mLs by mouth nightly as needed.    Essential hypertension  BP controlled presently - reviewed anti-hypertensive regimen - continue current therapy          Health Maintenance       Date Due Completion Date    Influenza Vaccine 03/27/2019 (Originally 8/1/2018) 11/23/2016 (Declined)    Override on 11/23/2016: Declined    Override on 2/2/2016: Declined    Override on 11/13/2014: Declined    Override on 12/24/2013: Declined    Mammogram 01/21/2020 1/21/2019    Pap Smear 01/09/2022 1/9/2019    Colonoscopy 01/11/2023 1/11/2018    Lipid Panel 01/09/2024 1/9/2019    TETANUS VACCINE 05/19/2026 5/19/2016            Health Maintenance reviewed and addressed as per orders    No  Follow-up on file.    The patient expressed understanding and no barriers to adherence were identified.     1. The patient indicates understanding of these issues and agrees with the plan. Brief care plan is updated and reviewed with the patient as applicable.     2. The patient is given an After Visit Summary that lists all medications with directions, allergies, orders placed during this encounter and follow-up instructions.     3. I have reviewed the patient's medical information including past medical, family, and social history sections including the medications and allergies.     4. We discussed the patient's current medications. I reconciled the patient's medication list and prepared and supplied needed refills.       Stephen Ruano MD  Internal Medicine-Pediatrics

## 2019-02-11 NOTE — PATIENT INSTRUCTIONS
Asthma Medicine     Get used to using your inhaled corticosteroid medicine before you brush your teeth. That way you will always rinse your mouth afterward.   Medicines play a key role in controlling asthma. Some help control asthma symptoms and prevent flare-ups. Others are used to treat symptoms when they occur. Always take your medicine as prescribed. Know the names of your medicines and how and when to use them. If you have any questions about your medicines, talk with your healthcare provider or pharmacist.  Quick-relief medicine  Quick-relief (also called rescue) medicines work by relaxing the muscles around the airways. This helps ease symptoms such as coughing, wheezing, and shortness of breath. Keep your quick-relief inhaler with you at all times. Quick-relief medicines:  · Are inhaled when needed and only when needed. Use your quick-relief medicine when you first notice your asthma is getting worse.  · Start to open the airways within a few minutes after you use them.  · Can help stop a flare-up once it has begun.  · May be used before exercise as directed by your healthcare provider.  Long-term control medicine  Long-term control (also called maintenance or controller) medicines help reduce swelling and inflammation of the airways. Some keep the muscles around your airways relaxed. This makes the airways less sensitive to triggers and less likely to flare up. Long-term control medicines:  · Are taken on a schedule. For most people, this is every day. They are taken even when you feel fine.  · Help keep asthma under control so youre less likely to have symptoms.  · Will not stop a flare-up once it has begun.     Inhaled corticosteroids  Inhaled corticosteroids are safe for long-term use. They are not the steroids that you hear about athletes abusing. They usually don't cause serious side effects. Thats because theyre inhaled directly into the lungs. The chance of minor side effects can be  lowered even more if you:  · Use a spacer with your inhaler. Ask your healthcare provider or pharmacist about using a spacer if you don't currently use one.  · Rinse your mouth, gargle, and spit out the water after using your inhaled corticosteroid medicine.  · Follow all instructions for cleaning inhalers and spacers.  · Work with your healthcare provider to find the lowest dose that controls your asthma.      Tips for taking medicine  Remembering to take medicine each day can be hard for anyone. It can be even harder to remember when you dont have symptoms. Try these tips:  · Develop a routine. For example, take long-term controllers as part of getting ready for bed, before you brush your teeth in the morning, or both.  · Make sure you understand what long-term controllers do and dont do.  · Refill your prescriptions on time, or even ahead of time, so you dont run out.  · Carry your quick-relief medicine with you. If you can, keep a spare quick-relief inhaler at work, at school, or in your gym bag.  · When you travel, make sure you have enough medicine to last for your entire trip.  · When traveling by air, keep your medicines with you, not packed in your luggage.  · Make sure you know how to tell if your inhaler is empty. Ask your provider or pharmacist, or check the instructions that come with your inhaler.  Working with your healthcare provider  By working with your healthcare provider, you can get the most benefit from your medicine. Talk with your healthcare provider about:  · Getting the right dose. Over time, your healthcare provider may raise or lower the dose of your controller medicine. The goal is to find the amount of medicine to keep asthma in control, without taking more than is needed.  · Finding the right medicines for you. Each person is unique. It may take a few tries to find the right medicine or combination of medicines for you. If one medicine doesnt work well for you, another may work  better.  · Minimizing side effects. If you have side effects, dont just stop taking your medicine. Instead, call your healthcare provider. A new medicine or change in the amount of medicine may solve the problem.  Date Last Reviewed: 10/1/2016  © 6481-4674 [employer]. [address] [place] [id number]. All rights reserved. This information is not intended as a substitute for professional medical care. Always follow your healthcare professional's instructions.

## 2019-02-19 ENCOUNTER — OFFICE VISIT (OUTPATIENT)
Dept: FAMILY MEDICINE | Facility: CLINIC | Age: 59
End: 2019-02-19
Payer: COMMERCIAL

## 2019-02-19 VITALS
SYSTOLIC BLOOD PRESSURE: 110 MMHG | DIASTOLIC BLOOD PRESSURE: 76 MMHG | OXYGEN SATURATION: 98 % | HEIGHT: 66 IN | BODY MASS INDEX: 42.43 KG/M2 | WEIGHT: 264 LBS | HEART RATE: 87 BPM | TEMPERATURE: 99 F

## 2019-02-19 DIAGNOSIS — J45.20 MILD INTERMITTENT ASTHMA, UNSPECIFIED WHETHER COMPLICATED: Chronic | ICD-10-CM

## 2019-02-19 DIAGNOSIS — J98.01 BRONCHOSPASM: ICD-10-CM

## 2019-02-19 DIAGNOSIS — R30.0 DYSURIA: Primary | ICD-10-CM

## 2019-02-19 LAB
BILIRUB SERPL-MCNC: NORMAL MG/DL
BLOOD URINE, POC: NORMAL
COLOR, POC UA: YELLOW
GLUCOSE UR QL STRIP: NORMAL
KETONES UR QL STRIP: NORMAL
LEUKOCYTE ESTERASE URINE, POC: NORMAL
NITRITE, POC UA: NORMAL
PH, POC UA: 6
PROTEIN, POC: NORMAL
SPECIFIC GRAVITY, POC UA: 1.01
UROBILINOGEN, POC UA: NORMAL

## 2019-02-19 PROCEDURE — 81002 URINALYSIS NONAUTO W/O SCOPE: CPT | Mod: S$GLB,,, | Performed by: NURSE PRACTITIONER

## 2019-02-19 PROCEDURE — 3008F BODY MASS INDEX DOCD: CPT | Mod: CPTII,S$GLB,, | Performed by: NURSE PRACTITIONER

## 2019-02-19 PROCEDURE — 3008F PR BODY MASS INDEX (BMI) DOCUMENTED: ICD-10-PCS | Mod: CPTII,S$GLB,, | Performed by: NURSE PRACTITIONER

## 2019-02-19 PROCEDURE — 99214 OFFICE O/P EST MOD 30 MIN: CPT | Mod: 25,S$GLB,, | Performed by: NURSE PRACTITIONER

## 2019-02-19 PROCEDURE — 3074F SYST BP LT 130 MM HG: CPT | Mod: CPTII,S$GLB,, | Performed by: NURSE PRACTITIONER

## 2019-02-19 PROCEDURE — 99999 PR PBB SHADOW E&M-EST. PATIENT-LVL IV: ICD-10-PCS | Mod: PBBFAC,,, | Performed by: NURSE PRACTITIONER

## 2019-02-19 PROCEDURE — 3078F PR MOST RECENT DIASTOLIC BLOOD PRESSURE < 80 MM HG: ICD-10-PCS | Mod: CPTII,S$GLB,, | Performed by: NURSE PRACTITIONER

## 2019-02-19 PROCEDURE — 3078F DIAST BP <80 MM HG: CPT | Mod: CPTII,S$GLB,, | Performed by: NURSE PRACTITIONER

## 2019-02-19 PROCEDURE — 3074F PR MOST RECENT SYSTOLIC BLOOD PRESSURE < 130 MM HG: ICD-10-PCS | Mod: CPTII,S$GLB,, | Performed by: NURSE PRACTITIONER

## 2019-02-19 PROCEDURE — 99999 PR PBB SHADOW E&M-EST. PATIENT-LVL IV: CPT | Mod: PBBFAC,,, | Performed by: NURSE PRACTITIONER

## 2019-02-19 PROCEDURE — 81002 POCT URINE DIPSTICK WITHOUT MICROSCOPE: ICD-10-PCS | Mod: S$GLB,,, | Performed by: NURSE PRACTITIONER

## 2019-02-19 PROCEDURE — 99214 PR OFFICE/OUTPT VISIT, EST, LEVL IV, 30-39 MIN: ICD-10-PCS | Mod: 25,S$GLB,, | Performed by: NURSE PRACTITIONER

## 2019-02-19 RX ORDER — PHENAZOPYRIDINE HYDROCHLORIDE 200 MG/1
200 TABLET, FILM COATED ORAL 3 TIMES DAILY PRN
Qty: 30 TABLET | Refills: 0 | Status: SHIPPED | OUTPATIENT
Start: 2019-02-19 | End: 2019-03-01

## 2019-02-19 RX ORDER — BENZONATATE 200 MG/1
200 CAPSULE ORAL 3 TIMES DAILY PRN
Qty: 30 CAPSULE | Refills: 0 | Status: SHIPPED | OUTPATIENT
Start: 2019-02-19 | End: 2019-03-01

## 2019-02-19 NOTE — PROGRESS NOTES
History of Present Illness   Brandi Freitas is a 58 y.o. woman with medical history as listed below who presents today for evaluation of persistent UTI symptoms. She was seen by myself on 2/6/19 and was treated with Macrobid. Urine culture showed multiple organisms, none in predominance. She states she completed her antibiotics, but she is still experiencing burning at the end of urination and odor to urine. She denies urinary frequency, urgency, or flank pain. She believes she noticed some hematuria today. She denies vaginal discharge, vaginal itching, or other complaints. She is drinking plenty of water.    Of note, she was also seen on 2/11/19 for acute asthma exacerbation. She was treated with oral prednisone and PRN cough medication, rescue inhaler. She completed the prednisone. She reports improvement in wheezing and shortness of breath, but she is still having a persistent cough that is dry and hacking. She has no fevers or chills.    She has no additional complaints and is otherwise healthy on today's visit.      Past Medical History:   Diagnosis Date    Anxiety     Arthritis     knee    Asthma     Bursitis, shoulder     Dental bridge present     upper right    Dental crown present     Depression     Hypertension     Pes planus     Wears glasses     for reading       Past Surgical History:   Procedure Laterality Date    ACROMIOPLASTY Left 6/25/2014    [facility]    arthroscopy ankle right      ARTHROSCOPY-SHOULDER- OPEN REPAIR Left 6/25/2014    [facility]    ARTHROTOMY-SHOULDER Left 6/25/2014    [facility]    bilateral inguinal hernia repair      COLONOSCOPY N/A 1/11/2018    [facility]    COLONOSCOPY N/A 12/14/2012    [facility]    left torn rotator cuff repair  09/2014    MYOMECTOMY       "REPAIR-ROTATOR CUFF Left 6/25/2014    [facility]    rt salphigectomy      SYNOVECTOMY      UMBILICAL HERNIA REPAIR         Social History     Socioeconomic History    Marital status:      Spouse name: None    Number of children: None    Years of education: None    Highest education level: None   Social Needs    Financial resource strain: None    Food insecurity - worry: None    Food insecurity - inability: None    Transportation needs - medical: None    Transportation needs - non-medical: None   Occupational History    None   Tobacco Use    Smoking status: Never Smoker    Smokeless tobacco: Never Used   Substance and Sexual Activity    Alcohol use: Yes     Comment: social     Drug use: No    Sexual activity: Yes     Comment: occasional   Other Topics Concern    None   Social History Narrative    None       Family History   Problem Relation Age of Onset    Cancer Mother         breast    Diabetes Mother     Hypertension Mother     Breast cancer Mother     Cancer Brother         liver cancer    Breast cancer Maternal Aunt        Review of Systems  Review of Systems   Constitutional: Negative for chills and fever.   HENT: Positive for congestion. Negative for ear pain, sinus pain and sore throat.    Respiratory: Positive for cough. Negative for shortness of breath and wheezing.    Gastrointestinal: Negative for abdominal pain, nausea and vomiting.   Genitourinary: Positive for dysuria. Negative for flank pain, frequency, hematuria and urgency.     A complete review of systems was otherwise negative.    Physical Exam  /76 (BP Location: Right arm, Patient Position: Sitting, BP Method: X-Large (Manual))   Pulse 87   Temp 98.8 °F (37.1 °C) (Oral)   Ht 5' 6" (1.676 m)   Wt 119.7 kg (264 lb)   LMP 07/18/2012   SpO2 98%   BMI 42.61 kg/m²   General appearance: alert, appears stated age, cooperative and no distress  Eyes: negative findings: lids " and lashes normal and conjunctivae and sclerae normal  Ears: normal TM's and external ear canals both ears  Nose: clear discharge, mild congestion, turbinates red, swollen, no sinus tenderness  Throat: lips, mucosa, and tongue normal; teeth and gums normal  Back: symmetric, no curvature. ROM normal. No CVA tenderness.  Lungs: clear to auscultation bilaterally  Heart: regular rate and rhythm, S1, S2 normal, no murmur, click, rub or gallop  Extremities: extremities normal, atraumatic, no cyanosis or edema  Pulses: 2+ and symmetric  Skin: Skin color, texture, turgor normal. No rashes or lesions  Lymph nodes: Cervical, supraclavicular, and axillary nodes normal.  Neurologic: Grossly normal    Assessment/Plan  Dysuria  Urine dipstick with no convincing evidence of UTI. We will repeat urine culture given recent culture result with multiple organisms present, none in predominance. We discussed importance of a clean catch specimen. Pyridium for burning with plenty of water. ER Precautions discussed, no red flags on exam. RTC PRN. If symptoms persist with negative culture, consider referral to urology.  -     POCT urine dipstick without microscope  -     Urine culture; Future; Expected date: 02/19/2019  -     phenazopyridine (PYRIDIUM) 200 MG tablet; Take 1 tablet (200 mg total) by mouth 3 (three) times daily as needed for Pain.  Dispense: 30 tablet; Refill: 0    Bronchospasm  Likely contributing to persistent cough.  Overall, she is feeling much better.  Tessalon for daytime cough with nighttime cough PRN.  Continue rescue inhaler with nebulizer as needed.   -     benzonatate (TESSALON) 200 MG capsule; Take 1 capsule (200 mg total) by mouth 3 (three) times daily as needed.  Dispense: 30 capsule; Refill: 0    Mild intermittent asthma, unspecified whether complicated  As above.    Patient has verbalized understanding and is in agreement with plan of care.    Follow-up if symptoms worsen or fail to improve.

## 2019-02-19 NOTE — PATIENT INSTRUCTIONS
"  Dysuria     Painful urination (dysuria) is often caused by a problem in the urinary tract.   Dysuria is pain felt during urination. It is often described as a burning. Learn more about this problem and how it can be treated.  What causes dysuria?  Possible causes include:  · Infection with a bacteria or virus such as a urinary tract infection (UTI or a sexually transmitted infection (STI)  · Sensitivity or allergy to chemicals such as those found in lotions and other products  · Prostate or bladder problems  · Radiation therapy to the pelvic area  How is dysuria diagnosed?  Your healthcare provider will examine you. He or she will ask about your symptoms and health. After talking with you and doing a physical exam, your healthcare provider may know what is causing your dysuria. He or she will usually request  a sample of your urine. Tests of your urine, or a "urinalysis," are done. A urinalysis may include:  · Looking at the urine sample (visual exam)  · Checking for substances (chemical exam)  · Looking at a small amount under a microscope (microscopic exam)  Some parts of the urinalysis may be done in the provider's office and some in a lab. And, the urine sample may be checked for bacteria and yeast (urine culture). Your healthcare provider will tell you more about these tests if they are needed.  How is dysuria treated?  Treatment depends on the cause. If you have a bacterial infection, you may need antibiotics. You may be given medicines to make it easier for you to urinate and help relieve pain. Your healthcare provider can tell you more about your treatment options. Untreated, symptoms may get worse.  When to call your healthcare provider  Call the healthcare provider right away if you have any of the following:  · Fever of 100.4°F (38°C) or higher   · No improvement after three days of treatment  · Trouble urinating because of pain  · New or increased discharge from the vagina or penis  · Rash or joint " pain  · Increased back or abdominal pain  · Enlarged painful lymph nodes (lumps) in the groin   Date Last Reviewed: 1/1/2017  © 0088-7526 [employer] [address] [place] [address], [place] [address]. All rights reserved. This information is not intended as a substitute for professional medical care. Always follow your healthcare professional's instructions.

## 2019-02-20 ENCOUNTER — LAB VISIT (OUTPATIENT)
Dept: LAB | Facility: HOSPITAL | Age: 59
End: 2019-02-20
Attending: NURSE PRACTITIONER
Payer: COMMERCIAL

## 2019-02-20 DIAGNOSIS — R30.0 DYSURIA: ICD-10-CM

## 2019-02-20 PROCEDURE — 87088 URINE BACTERIA CULTURE: CPT

## 2019-02-20 PROCEDURE — 87077 CULTURE AEROBIC IDENTIFY: CPT

## 2019-02-20 PROCEDURE — 87086 URINE CULTURE/COLONY COUNT: CPT

## 2019-02-20 PROCEDURE — 87186 SC STD MICRODIL/AGAR DIL: CPT

## 2019-02-23 LAB — BACTERIA UR CULT: NORMAL

## 2019-02-25 ENCOUNTER — TELEPHONE (OUTPATIENT)
Dept: FAMILY MEDICINE | Facility: CLINIC | Age: 59
End: 2019-02-25

## 2019-02-25 DIAGNOSIS — N30.01 ACUTE CYSTITIS WITH HEMATURIA: Primary | ICD-10-CM

## 2019-02-25 RX ORDER — SULFAMETHOXAZOLE AND TRIMETHOPRIM 400; 80 MG/1; MG/1
1 TABLET ORAL 2 TIMES DAILY
Qty: 14 TABLET | Refills: 0 | Status: SHIPPED | OUTPATIENT
Start: 2019-02-25 | End: 2019-03-04

## 2019-03-03 DIAGNOSIS — J45.31 ASTHMA, CHRONIC, MILD PERSISTENT, WITH ACUTE EXACERBATION: ICD-10-CM

## 2019-03-04 RX ORDER — ALBUTEROL SULFATE 90 UG/1
AEROSOL, METERED RESPIRATORY (INHALATION)
Qty: 18 EACH | Refills: 0 | Status: SHIPPED | OUTPATIENT
Start: 2019-03-04 | End: 2019-04-05 | Stop reason: SDUPTHER

## 2019-03-30 DIAGNOSIS — I10 ESSENTIAL HYPERTENSION: ICD-10-CM

## 2019-03-31 RX ORDER — AMLODIPINE BESYLATE 10 MG/1
TABLET ORAL
Qty: 30 TABLET | Refills: 0 | Status: SHIPPED | OUTPATIENT
Start: 2019-03-31 | End: 2019-07-08 | Stop reason: SDUPTHER

## 2019-03-31 RX ORDER — AMLODIPINE BESYLATE 10 MG/1
TABLET ORAL
Qty: 30 TABLET | Refills: 0 | Status: SHIPPED | OUTPATIENT
Start: 2019-03-31 | End: 2019-12-27

## 2019-04-01 RX ORDER — SPIRONOLACTONE 50 MG/1
TABLET, FILM COATED ORAL
Qty: 60 TABLET | Refills: 7 | Status: SHIPPED | OUTPATIENT
Start: 2019-04-01 | End: 2020-05-06

## 2019-04-05 DIAGNOSIS — J45.31 ASTHMA, CHRONIC, MILD PERSISTENT, WITH ACUTE EXACERBATION: ICD-10-CM

## 2019-04-08 RX ORDER — ALBUTEROL SULFATE 90 UG/1
AEROSOL, METERED RESPIRATORY (INHALATION)
Qty: 18 EACH | Refills: 0 | Status: SHIPPED | OUTPATIENT
Start: 2019-04-08 | End: 2019-10-03 | Stop reason: SDUPTHER

## 2019-07-08 ENCOUNTER — OFFICE VISIT (OUTPATIENT)
Dept: FAMILY MEDICINE | Facility: CLINIC | Age: 59
End: 2019-07-08
Payer: COMMERCIAL

## 2019-07-08 VITALS
BODY MASS INDEX: 41.29 KG/M2 | DIASTOLIC BLOOD PRESSURE: 60 MMHG | HEIGHT: 66 IN | RESPIRATION RATE: 16 BRPM | SYSTOLIC BLOOD PRESSURE: 128 MMHG | WEIGHT: 256.94 LBS | OXYGEN SATURATION: 98 % | TEMPERATURE: 99 F | HEART RATE: 107 BPM

## 2019-07-08 DIAGNOSIS — M25.571 CHRONIC ANKLE PAIN, BILATERAL: ICD-10-CM

## 2019-07-08 DIAGNOSIS — L98.491 CALLOUS ULCER, LIMITED TO BREAKDOWN OF SKIN: ICD-10-CM

## 2019-07-08 DIAGNOSIS — M25.572 CHRONIC ANKLE PAIN, BILATERAL: ICD-10-CM

## 2019-07-08 DIAGNOSIS — G89.29 CHRONIC ANKLE PAIN, BILATERAL: ICD-10-CM

## 2019-07-08 DIAGNOSIS — M25.531 RIGHT WRIST PAIN: Primary | ICD-10-CM

## 2019-07-08 DIAGNOSIS — M79.671 RIGHT FOOT PAIN: ICD-10-CM

## 2019-07-08 PROCEDURE — 3074F SYST BP LT 130 MM HG: CPT | Mod: CPTII,S$GLB,, | Performed by: NURSE PRACTITIONER

## 2019-07-08 PROCEDURE — 99999 PR PBB SHADOW E&M-EST. PATIENT-LVL IV: ICD-10-PCS | Mod: PBBFAC,,, | Performed by: NURSE PRACTITIONER

## 2019-07-08 PROCEDURE — 3008F PR BODY MASS INDEX (BMI) DOCUMENTED: ICD-10-PCS | Mod: CPTII,S$GLB,, | Performed by: NURSE PRACTITIONER

## 2019-07-08 PROCEDURE — 99214 PR OFFICE/OUTPT VISIT, EST, LEVL IV, 30-39 MIN: ICD-10-PCS | Mod: S$GLB,,, | Performed by: NURSE PRACTITIONER

## 2019-07-08 PROCEDURE — 3008F BODY MASS INDEX DOCD: CPT | Mod: CPTII,S$GLB,, | Performed by: NURSE PRACTITIONER

## 2019-07-08 PROCEDURE — 99999 PR PBB SHADOW E&M-EST. PATIENT-LVL IV: CPT | Mod: PBBFAC,,, | Performed by: NURSE PRACTITIONER

## 2019-07-08 PROCEDURE — 3078F DIAST BP <80 MM HG: CPT | Mod: CPTII,S$GLB,, | Performed by: NURSE PRACTITIONER

## 2019-07-08 PROCEDURE — 99214 OFFICE O/P EST MOD 30 MIN: CPT | Mod: S$GLB,,, | Performed by: NURSE PRACTITIONER

## 2019-07-08 PROCEDURE — 3074F PR MOST RECENT SYSTOLIC BLOOD PRESSURE < 130 MM HG: ICD-10-PCS | Mod: CPTII,S$GLB,, | Performed by: NURSE PRACTITIONER

## 2019-07-08 PROCEDURE — 3078F PR MOST RECENT DIASTOLIC BLOOD PRESSURE < 80 MM HG: ICD-10-PCS | Mod: CPTII,S$GLB,, | Performed by: NURSE PRACTITIONER

## 2019-07-08 RX ORDER — TIZANIDINE 2 MG/1
4 TABLET ORAL NIGHTLY PRN
Qty: 30 TABLET | Refills: 0 | Status: SHIPPED | OUTPATIENT
Start: 2019-07-08 | End: 2019-07-18

## 2019-07-08 RX ORDER — MELOXICAM 7.5 MG/1
7.5 TABLET ORAL DAILY
Qty: 30 TABLET | Refills: 0 | Status: SHIPPED | OUTPATIENT
Start: 2019-07-08 | End: 2019-08-01

## 2019-07-08 NOTE — PROGRESS NOTES
Subjective:        Chief Complaint  Chief Complaint   Patient presents with    Hand Pain     Started about a week ago after feeling like something shocked or perked her at work     Foot Pain       HPI  Brandi Freitas is a 58 y.o. female with multiple medical diagnoses as listed in the medical history and problem list that presents for right hand pain.  Patient is new to me. The patient works at the [employer]. One week ago pulled heavy door at work and it shocked her, right wrist pain since. She wasn't sure where the shock came from, if it was just static or a prank. She denies any numbness or tingling to the hand, loss of strength or function. Additionally, she is having right foot pain, she had a callus there and as it has healed, she is still experiencing pain to the site. She reports regular swelling in both of her ankles due to standing for long periods of time at work and arthritis.           PAST MEDICAL HISTORY:  Past Medical History:   Diagnosis Date    Anxiety     Arthritis     knee    Asthma     Bursitis, shoulder     Dental bridge present     upper right    Dental crown present     Depression     Hypertension     Pes planus     Wears glasses     for reading       PAST SURGICAL HISTORY:  Past Surgical History:   Procedure Laterality Date    ACROMIOPLASTY Left 6/25/2014    [facility]    arthroscopy ankle right      ARTHROSCOPY-SHOULDER- OPEN REPAIR Left 6/25/2014    [facility]    ARTHROTOMY-SHOULDER Left 6/25/2014    [facility]    bilateral inguinal hernia repair      COLONOSCOPY N/A 1/11/2018    [facility]    COLONOSCOPY N/A 12/14/2012    [facility]    left torn rotator cuff repair  09/2014    MYOMECTOMY      REPAIR-ROTATOR CUFF Left 6/25/2014    [facility]     rt salphigectomy      SYNOVECTOMY      UMBILICAL HERNIA REPAIR         SOCIAL HISTORY:  Social History     Socioeconomic History    Marital status:      Spouse name: Not on file    Number of children: Not on file    Years of education: Not on file    Highest education level: Not on file   Occupational History    Not on file   Social Needs    Financial resource strain: Not on file    Food insecurity:     Worry: Not on file     Inability: Not on file    Transportation needs:     Medical: Not on file     Non-medical: Not on file   Tobacco Use    Smoking status: Never Smoker    Smokeless tobacco: Never Used   Substance and Sexual Activity    Alcohol use: Yes     Comment: social     Drug use: No    Sexual activity: Yes     Comment: occasional   Lifestyle    Physical activity:     Days per week: Not on file     Minutes per session: Not on file    Stress: Not on file   Relationships    Social connections:     Talks on phone: Not on file     Gets together: Not on file     Attends Faith service: Not on file     Active member of club or organization: Not on file     Attends meetings of clubs or organizations: Not on file     Relationship status: Not on file   Other Topics Concern    Not on file   Social History Narrative    Not on file       FAMILY HISTORY:  Family History   Problem Relation Age of Onset    Cancer Mother         breast    Diabetes Mother     Hypertension Mother     Breast cancer Mother     Cancer Brother         liver cancer    Breast cancer Maternal Aunt        ALLERGIES AND MEDICATIONS: updated and reviewed.  Review of patient's allergies indicates:  No Known Allergies  Current Outpatient Medications   Medication Sig Dispense Refill    ADVAIR DISKUS 100-50 mcg/dose diskus inhaler INHALE ONE DOSE BY MOUTH TWICE DAILY 60 each 0    albuterol-ipratropium (DUO-NEB) 2.5 mg-0.5 mg/3 mL nebulizer solution Take 3 mLs by nebulization every 6 (six) hours as needed for Wheezing or  "Shortness of Breath. 90 vial 5    amLODIPine (NORVASC) 10 MG tablet TAKE 1 TABLET BY MOUTH ONCE DAILY 30 tablet 0    lisinopril (PRINIVIL,ZESTRIL) 20 MG tablet TAKE 1 TABLET BY MOUTH ONCE DAILY 30 tablet 0    phentermine 37.5 MG capsule Take by mouth every morning.  0    spironolactone (ALDACTONE) 50 MG tablet TAKE 1 TABLET BY MOUTH TWICE DAILY 60 tablet 7    VENTOLIN HFA 90 mcg/actuation inhaler INHALE 2 PUFFS BY MOUTH EVERY 6 HOURS AS NEEDED FOR WHEEZING AND FOR SHORTNESS OF BREATH 18 each 0    meloxicam (MOBIC) 7.5 MG tablet Take 1 tablet (7.5 mg total) by mouth once daily. 30 tablet 0    tiZANidine (ZANAFLEX) 2 MG tablet Take 2 tablets (4 mg total) by mouth nightly as needed. 30 tablet 0     No current facility-administered medications for this visit.          ROS  Review of Systems   Constitutional: Positive for activity change. Negative for chills and fever.   Respiratory: Negative for shortness of breath.    Cardiovascular: Negative for chest pain.   Gastrointestinal: Negative for constipation, diarrhea, nausea and vomiting.   Musculoskeletal: Positive for arthralgias (right wrist). Negative for back pain, gait problem, joint swelling, myalgias, neck pain and neck stiffness.   Neurological: Negative for dizziness, seizures, numbness and headaches.   Psychiatric/Behavioral: Negative for behavioral problems and confusion.         Objective:     Physical Exam  Vitals:    07/08/19 1706 07/08/19 1743   BP: (!) 148/54 128/60   BP Location: Right arm Right arm   Patient Position: Sitting Sitting   BP Method: Thigh Cuff (Manual) Thigh Cuff (Manual)   Pulse: 107    Resp: 16    Temp: 98.6 °F (37 °C)    TempSrc: Oral    SpO2: 98%    Weight: 116.6 kg (256 lb 15.1 oz)    Height: 5' 6" (1.676 m)     Body mass index is 41.47 kg/m².  Weight: 116.6 kg (256 lb 15.1 oz)   Height: 5' 6" (167.6 cm)   Physical Exam   Constitutional: She appears well-developed. No distress.   HENT:   Head: Normocephalic and atraumatic. "   Eyes: Conjunctivae and EOM are normal.   Neck: Normal range of motion.   Cardiovascular: Normal rate.   Pulmonary/Chest: Effort normal.   Musculoskeletal: She exhibits tenderness (right wrist, bottom of right foot). She exhibits no edema or deformity.        Right wrist: She exhibits tenderness. She exhibits normal range of motion, no bony tenderness, no swelling, no crepitus and no deformity.        Right foot: There is tenderness. There is normal range of motion, no swelling, normal capillary refill and no deformity.        Feet:    Neurological: She is alert. No cranial nerve deficit.   Skin: Skin is warm and dry.   Psychiatric: She has a normal mood and affect. Her behavior is normal.   Vitals reviewed.      Assessment:     1. Right wrist pain    2. Chronic ankle pain, bilateral    3. Right foot pain    4. Callous ulcer, limited to breakdown of skin      Plan:     Brandi was seen today for hand pain and foot pain.    Diagnoses and all orders for this visit:    Right wrist pain  -     meloxicam (MOBIC) 7.5 MG tablet; Take 1 tablet (7.5 mg total) by mouth once daily.  -     tiZANidine (ZANAFLEX) 2 MG tablet; Take 2 tablets (4 mg total) by mouth nightly as needed.  -     Discussed bracing the right wrist and using ice as needed for discomfort    Chronic ankle pain, bilateral  Right foot pain  Callous ulcer, limited to breakdown of skin  -     meloxicam (MOBIC) 7.5 MG tablet; Take 1 tablet (7.5 mg total) by mouth once daily.  -     Ambulatory referral to Podiatry  -     Encouraged patient to keep feet elevated as possible  -     Encouraged gentle stretching, ice, topical pain relief gel      Health Maintenance       Date Due Completion Date    Shingles Vaccine (1 of 2) 10/10/2012 ---    Influenza Vaccine 08/01/2019 11/23/2016 (Declined)    Override on 11/23/2016: Declined    Override on 2/2/2016: Declined    Override on 11/13/2014: Declined    Override on 12/24/2013: Declined    Lipid Panel 01/09/2020 1/9/2019     Mammogram 01/21/2020 1/21/2019    Pap Smear 01/09/2022 1/9/2019    Colonoscopy 01/11/2023 1/11/2018    TETANUS VACCINE 05/19/2026 5/19/2016            Health Maintenance reviewed, addressed as per orders    Follow-up: Follow up in about 1 week (around 7/15/2019), or if symptoms worsen or fail to improve.    The patient expressed understanding and no barriers to adherence were identified.     1. The patient indicates understanding of these issues and agrees with the plan. Brief care plan is updated and reviewed with the patient as applicable.     2. The patient is given an After Visit Summary that lists all medications with directions, allergies, orders placed during this encounter and follow-up instructions.     3. I have reviewed the patient's medical information including past medical, family, and social history sections including the medications and allergies.     4. We discussed the patient's current medications. I reconciled the patient's medication list and prepared and supplied needed refills.       Franny Whitfield, DNP, APRN, FNP-c  Family Medicine    My collaborating physicians are Dr. Ed Turner and Dr. Stephen Ruano

## 2019-08-01 ENCOUNTER — OFFICE VISIT (OUTPATIENT)
Dept: PODIATRY | Facility: CLINIC | Age: 59
End: 2019-08-01
Payer: COMMERCIAL

## 2019-08-01 ENCOUNTER — HOSPITAL ENCOUNTER (OUTPATIENT)
Dept: RADIOLOGY | Facility: HOSPITAL | Age: 59
Discharge: HOME | End: 2019-08-01
Attending: PODIATRIST
Payer: COMMERCIAL

## 2019-08-01 VITALS
HEART RATE: 87 BPM | HEIGHT: 66 IN | DIASTOLIC BLOOD PRESSURE: 70 MMHG | SYSTOLIC BLOOD PRESSURE: 110 MMHG | BODY MASS INDEX: 41.14 KG/M2 | WEIGHT: 256 LBS

## 2019-08-01 DIAGNOSIS — Q82.8 POROKERATOSIS: Primary | ICD-10-CM

## 2019-08-01 DIAGNOSIS — Q82.8 POROKERATOSIS: ICD-10-CM

## 2019-08-01 DIAGNOSIS — M79.671 FOOT PAIN, BILATERAL: ICD-10-CM

## 2019-08-01 DIAGNOSIS — M24.573 EQUINUS CONTRACTURE OF ANKLE: ICD-10-CM

## 2019-08-01 DIAGNOSIS — M35.7 HYPERMOBILE JOINT SYNDROME OF FOOT: ICD-10-CM

## 2019-08-01 DIAGNOSIS — M77.9 CAPSULITIS: ICD-10-CM

## 2019-08-01 DIAGNOSIS — M79.672 FOOT PAIN, BILATERAL: ICD-10-CM

## 2019-08-01 DIAGNOSIS — M19.079 ARTHRITIS OF FOOT: ICD-10-CM

## 2019-08-01 PROCEDURE — 3074F SYST BP LT 130 MM HG: CPT | Mod: CPTII,S$GLB,, | Performed by: PODIATRIST

## 2019-08-01 PROCEDURE — 99214 OFFICE O/P EST MOD 30 MIN: CPT | Mod: 25,S$GLB,, | Performed by: PODIATRIST

## 2019-08-01 PROCEDURE — 3078F DIAST BP <80 MM HG: CPT | Mod: CPTII,S$GLB,, | Performed by: PODIATRIST

## 2019-08-01 PROCEDURE — 3008F PR BODY MASS INDEX (BMI) DOCUMENTED: ICD-10-PCS | Mod: CPTII,S$GLB,, | Performed by: PODIATRIST

## 2019-08-01 PROCEDURE — 99999 PR PBB SHADOW E&M-EST. PATIENT-LVL III: CPT | Mod: PBBFAC,,, | Performed by: PODIATRIST

## 2019-08-01 PROCEDURE — 3008F BODY MASS INDEX DOCD: CPT | Mod: CPTII,S$GLB,, | Performed by: PODIATRIST

## 2019-08-01 PROCEDURE — 29540 PR STRAPPING; ANKLE &/OR FOOT: ICD-10-PCS | Mod: 50,S$GLB,, | Performed by: PODIATRIST

## 2019-08-01 PROCEDURE — 73630 XR FOOT COMPLETE 3 VIEW BILATERAL: ICD-10-PCS | Mod: 26,,, | Performed by: RADIOLOGY

## 2019-08-01 PROCEDURE — 99214 PR OFFICE/OUTPT VISIT, EST, LEVL IV, 30-39 MIN: ICD-10-PCS | Mod: 25,S$GLB,, | Performed by: PODIATRIST

## 2019-08-01 PROCEDURE — 3074F PR MOST RECENT SYSTOLIC BLOOD PRESSURE < 130 MM HG: ICD-10-PCS | Mod: CPTII,S$GLB,, | Performed by: PODIATRIST

## 2019-08-01 PROCEDURE — 3078F PR MOST RECENT DIASTOLIC BLOOD PRESSURE < 80 MM HG: ICD-10-PCS | Mod: CPTII,S$GLB,, | Performed by: PODIATRIST

## 2019-08-01 PROCEDURE — 29540 STRAPPING ANKLE &/FOOT: CPT | Mod: 50,S$GLB,, | Performed by: PODIATRIST

## 2019-08-01 PROCEDURE — 99999 PR PBB SHADOW E&M-EST. PATIENT-LVL III: ICD-10-PCS | Mod: PBBFAC,,, | Performed by: PODIATRIST

## 2019-08-01 PROCEDURE — 73630 X-RAY EXAM OF FOOT: CPT | Mod: 50,TC

## 2019-08-01 PROCEDURE — 73630 X-RAY EXAM OF FOOT: CPT | Mod: 26,,, | Performed by: RADIOLOGY

## 2019-08-01 RX ORDER — AMMONIUM LACTATE 12 G/100G
CREAM TOPICAL
Qty: 140 G | Refills: 11 | Status: SHIPPED | OUTPATIENT
Start: 2019-08-01 | End: 2021-06-25

## 2019-08-01 RX ORDER — MELOXICAM 15 MG/1
15 TABLET ORAL DAILY
Qty: 30 TABLET | Refills: 0 | Status: SHIPPED | OUTPATIENT
Start: 2019-08-01 | End: 2021-06-03

## 2019-08-01 NOTE — PROGRESS NOTES
Subjective:      Patient ID: Brandi Freitas is a 58 y.o. female.    Chief Complaint: Foot Pain (b/l)    Pain right foot/ankle deep aching.  Gradual onset, not improving over past 10 years, aggravated by increased weight bearing, shoe gear, pressure.  No previous medical treatment.  OTC pain med not helping.    CC2 thick hard callus with pain right foot.  Gradual onset, worsening over past several weeks, aggravated by increased weight bearing, shoe gear, pressure.  No previous medical treatment.  OTC pain med not helping.     CC3 pain aching with some occasional numbness bottom left forefoot.  Gradual onset, worsening over past several weeks, aggravated by increased weight bearing, shoe gear, pressure.  No previous medical treatment.  OTC pain med not helping.     Denies trauma, surgery both feet.     Review of Systems   Constitution: Negative for chills, diaphoresis, fever, malaise/fatigue and night sweats.   Cardiovascular: Negative for claudication, cyanosis, leg swelling and syncope.   Skin: Positive for suspicious lesions. Negative for color change, dry skin, nail changes, rash and unusual hair distribution.   Musculoskeletal: Positive for joint pain. Negative for falls, joint swelling, muscle cramps, muscle weakness and stiffness.   Gastrointestinal: Negative for constipation, diarrhea, nausea and vomiting.   Neurological: Negative for brief paralysis, disturbances in coordination, focal weakness, numbness, paresthesias, sensory change and tremors.           Objective:      Physical Exam   Constitutional: She is oriented to person, place, and time. She appears well-developed and well-nourished. She is cooperative. No distress.   Cardiovascular:   Pulses:       Popliteal pulses are 2+ on the right side, and 2+ on the left side.        Dorsalis pedis pulses are 2+ on the right side, and 2+ on the left side.        Posterior tibial pulses are 2+ on the right side, and 2+ on the left side.   Capillary refill  3 seconds all toes/distal feet, all toes/both feet warm to touch.      Negative lymphadenopathy bilateral popliteal fossa and tarsal tunnel.      Negavie lower extremity edema bilateral.     Musculoskeletal:        Right ankle: She exhibits normal range of motion, no swelling, no ecchymosis, no deformity, no laceration and normal pulse. Achilles tendon normal. Achilles tendon exhibits no pain, no defect and normal Galeas's test results.   Deep pain to end range of motion right STJ and palpation of sinus tarsi right foot without deformity or loss of function.    Pain to palpation inferior mtpj 4,5 left without evidence of trauma or infection.    Ankle dorsiflexion decreased at <10 degrees bilateral with moderate increase with knee flexion bilateral.    Hypermobile MTJ, STJ bilateral.    Otherwise, Normal angle, base, station of gait. All ten toes without clubbing, cyanosis, or signs of ischemia.  No pain to palpation bilateral lower extremities.  Range of motion, stability, muscle strength, and muscle tone normal bilateral feet and legs.    Lymphadenopathy: No inguinal adenopathy noted on the right or left side.   Negative lymphadenopathy bilateral popliteal fossa and tarsal tunnel.    Negative lymphangitic streaking bilateral feet/ankles/legs.   Neurological: She is alert and oriented to person, place, and time. She has normal strength. She displays no atrophy and no tremor. No sensory deficit. She exhibits normal muscle tone. Gait normal.   Reflex Scores:       Patellar reflexes are 2+ on the right side and 2+ on the left side.       Achilles reflexes are 2+ on the right side and 2+ on the left side.  Negative tinel sign to percussion sural, superficial peroneal, deep peroneal, saphenous, and posterior tibial nerves right and left ankles and feet.        Negative moulder sign/click bilateral all intermetatarsal spaces.     Skin: Skin is warm, dry and intact. Capillary refill takes 2 to 3 seconds. No abrasion, no  bruising, no burn, no ecchymosis, no laceration, no lesion and no rash noted. She is not diaphoretic. No cyanosis or erythema. No pallor. Nails show no clubbing.   Focal hyperkeratotic lesion consisting entirely of hyperkeratotic tissue with deep central core without open skin, drainage, pus, fluctuance, malodor, or signs of infection plantar lateral midfoot right.    Otherwise, Skin is normal age and health appropriate color, turgor, texture, and temperature bilateral lower extremities without ulceration, hyperpigmentation, discoloration, masses nodules or cords palpated.  No ecchymosis, erythema, edema, or cardinal signs of infection bilateral lower extremities.      Psychiatric: She has a normal mood and affect.             Assessment:       Encounter Diagnoses   Name Primary?    Porokeratosis Yes    Hypermobile joint syndrome of foot     Equinus contracture of ankle     Capsulitis     Arthritis of foot     Foot pain, bilateral          Plan:       Brandi was seen today for foot pain.    Diagnoses and all orders for this visit:    Porokeratosis  -     X-Ray Foot Complete Bilateral; Future    Hypermobile joint syndrome of foot  -     X-Ray Foot Complete Bilateral; Future    Equinus contracture of ankle  -     X-Ray Foot Complete Bilateral; Future    Capsulitis    Arthritis of foot    Foot pain, bilateral    Other orders  -     meloxicam (MOBIC) 15 MG tablet; Take 1 tablet (15 mg total) by mouth once daily.      I counseled the patient on her conditions, their implications and medical management.        Patient will stretch the tendo achilles complex three times daily as demonstrated in the office.  Literature was dispensed illustrating proper stretching technique.    I applied a plantar rest strapping to the patient's right and left foot to offload symptomatic area, support the arch, and relieve pain.    Patient will obtain over the counter arch supports and wear them in shoes whenever possible.  Athletic  shoes intended for walking or running are usually best.    The patient was advised that NSAID-type medications have two very important potential side effects: gastrointestinal irritation including hemorrhage and renal injuries. She was asked to take the medication with food and to stop if she experiences any GI upset. I asked her to call for vomiting, abdominal pain or black/bloody stools. The patient expresses understanding of these issues and questions were answered.    Discussed conservative treatment with shoes of adequate dimensions, material, and style to alleviate symptoms and delay or prevent surgical intervention.    Rx mobic, xrays, lac hydrin          Follow up in about 1 month (around 9/1/2019).

## 2019-08-01 NOTE — LETTER
August 1, 2019      Franny Whitfield, [facility]  [address] [address]           [facility] - Podiatry  [address]  [place] [address]  Phone: [phone]          Patient: Brandi Freitas   MR Number: [id number]   YOB: 1960   Date of Visit: 8/1/2019       Dear Franny Whitfield:    Thank you for referring Brandi Freitas to me for evaluation. Attached you will find relevant portions of my assessment and plan of care.    If you have questions, please do not hesitate to call me. I look forward to following Brandi Freitas along with you.    Sincerely,    Manuel Disla, DINA    Enclosure  CC:  No Recipients    If you would like to receive this communication electronically, please contact [email or web address] or [phone] to request more information on [place] Link access.    For providers and/or their staff who would like to refer a patient to [facility], please contact us through our one-stop-shop provider referral line, [facility], at [phone].    If you feel you have received this communication in error or would no longer like to receive these types of communications, please e-mail [email or web address]

## 2019-08-08 DIAGNOSIS — I10 ESSENTIAL HYPERTENSION: ICD-10-CM

## 2019-08-08 RX ORDER — LISINOPRIL 20 MG/1
TABLET ORAL
Qty: 30 TABLET | Refills: 0 | Status: SHIPPED | OUTPATIENT
Start: 2019-08-08 | End: 2019-12-27

## 2019-09-20 ENCOUNTER — TELEPHONE (OUTPATIENT)
Dept: FAMILY MEDICINE | Facility: CLINIC | Age: 59
End: 2019-09-20

## 2019-09-20 DIAGNOSIS — J45.31 ASTHMA, CHRONIC, MILD PERSISTENT, WITH ACUTE EXACERBATION: ICD-10-CM

## 2019-09-20 RX ORDER — ALBUTEROL SULFATE 90 UG/1
AEROSOL, METERED RESPIRATORY (INHALATION)
Qty: 18 EACH | Refills: 0 | Status: CANCELLED | OUTPATIENT
Start: 2019-09-20

## 2019-09-20 NOTE — TELEPHONE ENCOUNTER
Voicemail message left for patient to call this office.  She needs to confirm her preferred pharmacy.

## 2019-10-03 DIAGNOSIS — J45.31 ASTHMA, CHRONIC, MILD PERSISTENT, WITH ACUTE EXACERBATION: ICD-10-CM

## 2019-10-03 RX ORDER — ALBUTEROL SULFATE 90 UG/1
AEROSOL, METERED RESPIRATORY (INHALATION)
Qty: 18 EACH | Refills: 5 | Status: SHIPPED | OUTPATIENT
Start: 2019-10-03 | End: 2020-03-17 | Stop reason: SDUPTHER

## 2019-10-03 NOTE — TELEPHONE ENCOUNTER
----- Message from Keke César sent at 10/3/2019 12:28 PM CDT -----  Contact: [employer] [phone]  Type: RX Refill Request    Who Called: [employer] [phone]    Have you contacted your pharmacy:    Refill or New Rx:VENTOLIN HFA 90 mcg/actuation inhaler - needs verbal. Call pharmacy    RX Name and Strength:    How is the patient currently taking it? (ex. 1XDay):    Is this a 30 day or 90 day RX:    Preferred Pharmacy with phone number:    Local or Mail Order:    Ordering Provider:    Would the patient rather a call back or a response via [facility]?     Best Call Back Number:    Additional Information:

## 2019-12-26 DIAGNOSIS — I10 ESSENTIAL HYPERTENSION: ICD-10-CM

## 2019-12-27 RX ORDER — LISINOPRIL 20 MG/1
TABLET ORAL
Qty: 30 TABLET | Refills: 1 | Status: SHIPPED | OUTPATIENT
Start: 2019-12-27 | End: 2020-06-19

## 2019-12-27 RX ORDER — AMLODIPINE BESYLATE 10 MG/1
TABLET ORAL
Qty: 30 TABLET | Refills: 0 | Status: SHIPPED | OUTPATIENT
Start: 2019-12-27 | End: 2020-05-07

## 2020-02-14 DIAGNOSIS — I10 HYPERTENSION: ICD-10-CM

## 2020-03-17 DIAGNOSIS — J45.909 ASTHMA, CHRONIC, UNSPECIFIED ASTHMA SEVERITY, UNCOMPLICATED: ICD-10-CM

## 2020-03-17 DIAGNOSIS — J45.31 ASTHMA, CHRONIC, MILD PERSISTENT, WITH ACUTE EXACERBATION: ICD-10-CM

## 2020-03-17 RX ORDER — IPRATROPIUM BROMIDE AND ALBUTEROL SULFATE 2.5; .5 MG/3ML; MG/3ML
3 SOLUTION RESPIRATORY (INHALATION) EVERY 6 HOURS PRN
Qty: 90 VIAL | Refills: 5 | Status: SHIPPED | OUTPATIENT
Start: 2020-03-17 | End: 2020-12-17 | Stop reason: SDUPTHER

## 2020-03-17 RX ORDER — ALBUTEROL SULFATE 90 UG/1
AEROSOL, METERED RESPIRATORY (INHALATION)
Qty: 18 G | Refills: 1 | Status: SHIPPED | OUTPATIENT
Start: 2020-03-17 | End: 2020-12-21 | Stop reason: SDUPTHER

## 2020-03-17 NOTE — TELEPHONE ENCOUNTER
----- Message from Romy Morrell sent at 3/17/2020 11:00 AM CDT -----  Contact: TYSHAWN VERGARA [[id number]]  Can the clinic reply in [facility]: no      Please refill the medication(s) listed below. Please call the patient when the prescription(s) is ready for  at this phone number [phone]        Medication #1 VENTOLIN HFA 90 mcg/actuation inhaler    Medication #2       Preferred Pharmacy: [facility]

## 2020-03-17 NOTE — TELEPHONE ENCOUNTER
----- Message from Alfred Mcknight sent at 3/17/2020  4:42 PM CDT -----  Contact: Patient  Type: Patient Call Back    Who called: Patient    What is the request in detail: Patient is requesting albuterol-ipratropium (DUO-NEB) 2.5 mg-0.5 mg/3 mL nebulizer solution sent to   [facility] [address] [place] [address] ([place], [place] - [address]  [address] ([place] [address]  Phone: [phone] Fax: [phone]    Can the clinic reply by [facility]? No    Would the patient rather a call back or a response via [facility]? Call    Best call back number:[phone]    Additional Information: n/a

## 2020-04-24 ENCOUNTER — PATIENT MESSAGE (OUTPATIENT)
Dept: FAMILY MEDICINE | Facility: CLINIC | Age: 60
End: 2020-04-24

## 2020-04-24 ENCOUNTER — OFFICE VISIT (OUTPATIENT)
Dept: INTERNAL MEDICINE | Facility: CLINIC | Age: 60
End: 2020-04-24
Payer: COMMERCIAL

## 2020-04-24 DIAGNOSIS — J30.2 SEASONAL ALLERGIC RHINITIS, UNSPECIFIED TRIGGER: ICD-10-CM

## 2020-04-24 DIAGNOSIS — J45.20 MILD INTERMITTENT ASTHMA, UNSPECIFIED WHETHER COMPLICATED: ICD-10-CM

## 2020-04-24 DIAGNOSIS — R05.9 COUGH: Primary | ICD-10-CM

## 2020-04-24 PROCEDURE — 99213 PR OFFICE/OUTPT VISIT, EST, LEVL III, 20-29 MIN: ICD-10-PCS | Mod: 95,,, | Performed by: INTERNAL MEDICINE

## 2020-04-24 PROCEDURE — 99213 OFFICE O/P EST LOW 20 MIN: CPT | Mod: 95,,, | Performed by: INTERNAL MEDICINE

## 2020-04-24 NOTE — PROGRESS NOTES
TELEMEDICINE PRIMARY CARE CLINIC VIST  Established Patient - Audio Only Telehealth Visit     The patient location is: Home in [place], [place]   The chief complaint leading to consultation is: Upper respiratory symptoms  Visit type: Virtual visit with audio only (telephone)  Total time spent with patient: 20 minutes    The reason for the audio only service rather than synchronous audio and video virtual visit was related to technical difficulties or patient preference/necessity.     Each patient to whom I provide medical services by telemedicine is:  (1) informed of the relationship between the physician and patient and the respective role of any other health care provider with respect to management of the patient; and (2) notified that they may decline to receive medical services by telemedicine and may withdraw from such care at any time. Patient verbally consented to receive this service via voice-only telephone call.     This service was not originating from a related E/M service provided within the previous 7 days nor will  to an E/M service or procedure within the next 24 hours or my soonest available appointment.  Prevailing standard of care was able to be met in this audio-only visit.        Subjective     Chief Complaint: Upper respiratory illness    History of Present Illness:  Ms. Brandi Gray ishaan is a 59 y.o. female with mild intermittent asthma, HTN, obesity, OA, allergic rhinitis, hx recurrent sinusitis, anxiety and depression who is presenting to the clinic for an audio-only virtual visit due to a cough, eye redness, and headache. Patient has a chronic nonproductive cough which has been slightly more frequent recently which she attributes to the seasons changing. Eye redness and itchiness and head throbbing started on 4/22 which does not usually occur with the seasonal change. Headache is generalized and throbbing, has not used any medications for this. Denies sinus point tenderness in  her frontal or maxillary sinuses. She also endorses skin itching and mild nasal congestion that is mostly post-nasal. She used to zyrtec, benadryl and flonase for these episodes but has not started yet. She denies fever and she has her temperature checked twice per day at work. Denies shortness of breath, chest pain, sore throat, rashes, changes in ability to taste or smell, hematuria, BRBPR or melena, nausea, vomiting or diarrhea, lightheadedness, or LOC. Has not needed to use her rescue inhaler recently. 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 Lives at home alone with no other sick contacts and has been practicing social distancing and sheltering in place other than when she goes to work.    Review of Systems   Constitutional: Positive for malaise/fatigue. Negative for chills, fever and weight loss.   HENT: Positive for congestion. Negative for sore throat.    Eyes: Negative for blurred vision and double vision.   Respiratory: Positive for cough. Negative for shortness of breath.    Cardiovascular: Negative for chest pain, palpitations and leg swelling.   Gastrointestinal: Negative for abdominal pain, constipation, diarrhea, nausea and vomiting.   Genitourinary: Negative for dysuria.   Musculoskeletal: Negative for back pain, joint pain and neck pain.   Skin: Negative for rash.   Neurological: Positive for headaches. Negative for dizziness, loss of consciousness and weakness.   Psychiatric/Behavioral: Negative for depression. The patient is not nervous/anxious.        PAST HISTORY:     Past Medical History:   Diagnosis Date    Anxiety     Arthritis     knee    Asthma     Bursitis, shoulder     Dental bridge present     upper right    Dental crown present     Depression     Hypertension     Pes planus     Wears glasses     for reading       Past Surgical History:   Procedure Laterality Date    arthroscopy  ankle right      bilateral inguinal hernia repair      COLONOSCOPY N/A 1/11/2018    Procedure: COLONOSCOPY;  Surgeon: Christoph Nolasco MD;  [facility];  Service: Endoscopy;  Laterality: N/A;    left torn rotator cuff repair  09/2014    MYOMECTOMY      rt salphigectomy      SYNOVECTOMY      UMBILICAL HERNIA REPAIR         Family History   Problem Relation Age of Onset    Cancer Mother         breast    Diabetes Mother     Hypertension Mother     Breast cancer Mother     Cancer Brother         liver cancer    Breast cancer Maternal Aunt        Social History     Socioeconomic History    Marital status:      Spouse name: Not on file    Number of children: Not on file    Years of education: Not on file    Highest education level: Not on file   Occupational History    Not on file   Social Needs    Financial resource strain: Not on file    Food insecurity:     Worry: Not on file     Inability: Not on file    Transportation needs:     Medical: Not on file     Non-medical: Not on file   Tobacco Use    Smoking status: Never Smoker    Smokeless tobacco: Never Used   Substance and Sexual Activity    Alcohol use: Yes     Comment: social     Drug use: No    Sexual activity: Yes     Comment: occasional   Lifestyle    Physical activity:     Days per week: Not on file     Minutes per session: Not on file    Stress: Not on file   Relationships    Social connections:     Talks on phone: Not on file     Gets together: Not on file     Attends [employer] service: Not on file     Active member of club or organization: Not on file     Attends meetings of clubs or organizations: Not on file     Relationship status: Not on file   Other Topics Concern    Not on file   Social History Narrative    Not on file       MEDICATIONS & ALLERGIES:     Current Outpatient Medications on File Prior to Visit   Medication Sig    ADVAIR DISKUS 100-50 mcg/dose diskus inhaler INHALE ONE DOSE BY MOUTH TWICE DAILY     albuterol-ipratropium (DUO-NEB) 2.5 mg-0.5 mg/3 mL nebulizer solution Take 3 mLs by nebulization every 6 (six) hours as needed for Wheezing or Shortness of Breath.    amLODIPine (NORVASC) 10 MG tablet TAKE 1 TABLET BY MOUTH ONCE DAILY    ammonium lactate 12 % Crea Apply twice daily to affected parts both feet as needed.    lisinopril (PRINIVIL,ZESTRIL) 20 MG tablet TAKE 1 TABLET BY MOUTH ONCE DAILY    lisinopril (PRINIVIL,ZESTRIL) 20 MG tablet TAKE 1 TABLET BY MOUTH ONCE DAILY    meloxicam (MOBIC) 15 MG tablet Take 1 tablet (15 mg total) by mouth once daily.    phentermine 37.5 MG capsule Take by mouth every morning.    spironolactone (ALDACTONE) 50 MG tablet TAKE 1 TABLET BY MOUTH TWICE DAILY    VENTOLIN HFA 90 mcg/actuation inhaler INHALE 2 PUFFS BY MOUTH EVERY 6 HOURS AS NEEDED FOR WHEEZING AND FOR SHORTNESS OF BREATH     No current facility-administered medications on file prior to visit.        Review of patient's allergies indicates:  No Known Allergies    OBJECTIVE:     Vital Signs:  From Jeronimo Coronel RN on 4/23/20: 168/86, , O2 99%  There were no vitals filed for this visit.  There is no height or weight on file to calculate BMI.     Physical Exam was not performed as this was an audio-only virtual visit.    Laboratory  Lab Results   Component Value Date    WBC 7.94 01/09/2019    HGB 12.2 01/09/2019    HCT 40.3 01/09/2019    MCV 88 01/09/2019     01/09/2019     BMP  Lab Results   Component Value Date     01/09/2019    K 3.4 (L) 01/09/2019     01/09/2019    CO2 26 01/09/2019    BUN 11 01/09/2019    CREATININE 0.8 01/09/2019    CALCIUM 9.5 01/09/2019    ANIONGAP 11 01/09/2019    ESTGFRAFRICA >60.0 01/09/2019    EGFRNONAA >60.0 01/09/2019     No results found for: INR, PROTIME  Lab Results   Component Value Date    HGBA1C 5.7 (H) 01/09/2019     No results for input(s): POCTGLUCOSE in the last 72 hours.    Diagnostic Results:  Labs: Reviewed    Health Maintenance Due   Topic  Date Due    Lipid Panel  01/09/2020    Mammogram  01/21/2020         ASSESSMENT & PLAN:   Ms. Brandi Freitas is a 59 y.o. female with mild intermittent asthma, HTN, obesity, OA, allergic rhinitis, hx recurrent sinusitis, anxiety and depression presenting with upper respiratory symptoms.    Cough  Seasonal allergic rhinitis, unspecified trigger  Mild intermittent asthma, unspecified whether complicated        -     I do believe the patient's presentation of cough (slightly more than baseline chronic cough) without wheezing, new nasal congestion, headache, itchy and red eyes, and itchy skin is more consistent with seasonal allergic rhinitis. However due to the patient's multiple exposures and close contacts with COVID-19-positive patients at work, I do believe she should be tested for this. I instructed her to stay home from work tonight until her test results and she was amenable to this. For her symptoms, I instructed her to use a combination of saline nasal spray, fluticasone nasal spray, 2nd generation antihistamines, dextromethorphan, hydration, and rest.        -     Return instructions were given to the patient for worsening symptoms, new shortness of breath, chest pain, LOC or other concerning symptoms and was told to go to the ED for these if she is not able to reach the clinic.  -     COVID-19 Routine Screening      RTC PRN with PCP.    Discussed with Dr. Buckley  - staff attestation to follow      Luis El DO  Internal Medicine PGY1  [facility]  [address] [place] [id number]  [phone]

## 2020-04-24 NOTE — PATIENT INSTRUCTIONS
For your symptoms (sinusitis, congestion, cough):  1. Saline nasal spray ([employer]) or nasal rinse ([employer]) at least 2-3 times/day.  2. Flonase (fluticasone) or other steroid nasal spray - twice a day for 1 week, then once a day thereafter. Use the saline spray right before the steroid spray, as the saline spray will clear the mucous membranes and allow for the steroid spray to be more effective. Do not use steroid nasal spray if you start to have nosebleeds.  3. Claritin (loratadine), Zyrtec (cetirizine), or Allegra (fexofenadine) once a day to help with congestion. Skip this if you take codeine-promethazine.  4. Dextromethorphan can be used to suppress your cough. Do not take this for more than 2 weeks.  5. Hot soup, hot tea with honey and lemon.  6. Plenty of sleep!  7. DRINK LOTS OF WATER!    Contact us if you develop worsening fever, chills, night sweats, acute worsening of your respiratory symptoms, chest pain, or shortness of breath. If you are unable to contact the clinic at [phone], and you are concerned about your symptoms, then you should go to the emergency department.

## 2020-04-25 ENCOUNTER — LAB VISIT (OUTPATIENT)
Dept: INTERNAL MEDICINE | Facility: CLINIC | Age: 60
End: 2020-04-25
Payer: COMMERCIAL

## 2020-04-25 DIAGNOSIS — R05.9 COUGH: ICD-10-CM

## 2020-04-25 PROCEDURE — U0002 COVID-19 LAB TEST NON-CDC: HCPCS

## 2020-04-26 LAB — SARS-COV-2 RNA RESP QL NAA+PROBE: NOT DETECTED

## 2020-05-06 DIAGNOSIS — I10 ESSENTIAL HYPERTENSION: ICD-10-CM

## 2020-05-06 RX ORDER — SPIRONOLACTONE 50 MG/1
TABLET, FILM COATED ORAL
Qty: 60 TABLET | Refills: 0 | Status: SHIPPED | OUTPATIENT
Start: 2020-05-06 | End: 2020-12-16

## 2020-05-07 RX ORDER — AMLODIPINE BESYLATE 10 MG/1
TABLET ORAL
Qty: 30 TABLET | Refills: 0 | Status: SHIPPED | OUTPATIENT
Start: 2020-05-07 | End: 2020-12-17

## 2020-05-12 ENCOUNTER — PATIENT MESSAGE (OUTPATIENT)
Dept: ADMINISTRATIVE | Facility: HOSPITAL | Age: 60
End: 2020-05-12

## 2020-06-19 ENCOUNTER — OFFICE VISIT (OUTPATIENT)
Dept: FAMILY MEDICINE | Facility: CLINIC | Age: 60
End: 2020-06-19
Payer: COMMERCIAL

## 2020-06-19 ENCOUNTER — HOSPITAL ENCOUNTER (OUTPATIENT)
Dept: RADIOLOGY | Facility: HOSPITAL | Age: 60
Discharge: HOME | End: 2020-06-19
Attending: FAMILY MEDICINE
Payer: COMMERCIAL

## 2020-06-19 VITALS
DIASTOLIC BLOOD PRESSURE: 80 MMHG | SYSTOLIC BLOOD PRESSURE: 136 MMHG | HEIGHT: 66 IN | OXYGEN SATURATION: 98 % | HEART RATE: 68 BPM | BODY MASS INDEX: 40.62 KG/M2 | WEIGHT: 252.75 LBS | TEMPERATURE: 98 F

## 2020-06-19 DIAGNOSIS — Z12.31 ENCOUNTER FOR SCREENING MAMMOGRAM FOR BREAST CANCER: ICD-10-CM

## 2020-06-19 DIAGNOSIS — I10 ESSENTIAL HYPERTENSION: ICD-10-CM

## 2020-06-19 DIAGNOSIS — Z00.00 ROUTINE GENERAL MEDICAL EXAMINATION AT A HEALTH CARE FACILITY: Primary | ICD-10-CM

## 2020-06-19 DIAGNOSIS — Z23 NEED FOR PNEUMOCOCCAL VACCINATION: ICD-10-CM

## 2020-06-19 DIAGNOSIS — G89.29 CHRONIC PAIN OF LEFT KNEE: ICD-10-CM

## 2020-06-19 DIAGNOSIS — M25.562 CHRONIC PAIN OF LEFT KNEE: ICD-10-CM

## 2020-06-19 PROCEDURE — 99396 PREV VISIT EST AGE 40-64: CPT | Mod: S$GLB,,, | Performed by: FAMILY MEDICINE

## 2020-06-19 PROCEDURE — 99999 PR PBB SHADOW E&M-EST. PATIENT-LVL IV: ICD-10-PCS | Mod: PBBFAC,,, | Performed by: FAMILY MEDICINE

## 2020-06-19 PROCEDURE — 77067 MAMMO DIGITAL SCREENING BILAT WITH TOMOSYNTHESIS_CAD: ICD-10-PCS | Mod: 26,,, | Performed by: RADIOLOGY

## 2020-06-19 PROCEDURE — 3075F PR MOST RECENT SYSTOLIC BLOOD PRESS GE 130-139MM HG: ICD-10-PCS | Mod: CPTII,S$GLB,, | Performed by: FAMILY MEDICINE

## 2020-06-19 PROCEDURE — 99999 PR PBB SHADOW E&M-EST. PATIENT-LVL IV: CPT | Mod: PBBFAC,,, | Performed by: FAMILY MEDICINE

## 2020-06-19 PROCEDURE — 77067 SCR MAMMO BI INCL CAD: CPT | Mod: 26,,, | Performed by: RADIOLOGY

## 2020-06-19 PROCEDURE — 99396 PR PREVENTIVE VISIT,EST,40-64: ICD-10-PCS | Mod: S$GLB,,, | Performed by: FAMILY MEDICINE

## 2020-06-19 PROCEDURE — 88175 CYTOPATH C/V AUTO FLUID REDO: CPT

## 2020-06-19 PROCEDURE — 3079F PR MOST RECENT DIASTOLIC BLOOD PRESSURE 80-89 MM HG: ICD-10-PCS | Mod: CPTII,S$GLB,, | Performed by: FAMILY MEDICINE

## 2020-06-19 PROCEDURE — 87624 HPV HI-RISK TYP POOLED RSLT: CPT

## 2020-06-19 PROCEDURE — 77067 SCR MAMMO BI INCL CAD: CPT | Mod: TC

## 2020-06-19 PROCEDURE — 77063 MAMMO DIGITAL SCREENING BILAT WITH TOMOSYNTHESIS_CAD: ICD-10-PCS | Mod: 26,,, | Performed by: RADIOLOGY

## 2020-06-19 PROCEDURE — 77063 BREAST TOMOSYNTHESIS BI: CPT | Mod: 26,,, | Performed by: RADIOLOGY

## 2020-06-19 PROCEDURE — 3079F DIAST BP 80-89 MM HG: CPT | Mod: CPTII,S$GLB,, | Performed by: FAMILY MEDICINE

## 2020-06-19 PROCEDURE — 3075F SYST BP GE 130 - 139MM HG: CPT | Mod: CPTII,S$GLB,, | Performed by: FAMILY MEDICINE

## 2020-06-19 RX ORDER — PHENTERMINE HYDROCHLORIDE 37.5 MG/1
37.5 TABLET ORAL EVERY MORNING
COMMUNITY
Start: 2020-05-16 | End: 2022-05-11

## 2020-06-19 NOTE — PROGRESS NOTES
Chief Complaint   Patient presents with    Annual Exam       HPI  Brandi Freitas is a 59 y.o. female with multiple medical diagnoses as listed in the medical history and problem list that presents for annual exam .    She is being seen by workman's compensation for a left knee injury that occurred while at her job. Otherwise no acute complaints. Her asthma has been stable    HPI    Patient Active Problem List   Diagnosis    Hypertension    Depression    Peroneal tendinitis    Knee pain, left    Asthma    Complete rupture of rotator cuff    Morbid obesity with BMI of 40.0-44.9, adult    Dermatitis    Supraspinatus tendon tear    Allergic rhinitis         ROS  Review of Systems   Constitutional: Negative for activity change, chills, diaphoresis, fatigue, fever and unexpected weight change.   HENT: Negative for hearing loss, rhinorrhea, sinus pressure, sore throat, tinnitus and trouble swallowing.    Eyes: Negative for photophobia, discharge and visual disturbance.   Respiratory: Negative for cough, chest tightness, shortness of breath and wheezing.    Cardiovascular: Negative for chest pain and palpitations.   Gastrointestinal: Negative for abdominal pain, blood in stool, constipation, diarrhea, nausea and vomiting.   Genitourinary: Negative for difficulty urinating, dysuria, flank pain, frequency, hematuria and vaginal discharge.   Musculoskeletal: Negative for arthralgias and joint swelling.   Skin: Negative for rash.   Neurological: Negative for speech difficulty, weakness, light-headedness and headaches.   Psychiatric/Behavioral: Negative for behavioral problems and dysphoric mood.       Physical Exam  Vitals:    06/19/20 0726 06/19/20 0811   BP: (!) 140/78 136/80   BP Location: Right arm Right arm   Patient Position: Sitting Sitting   BP Method: Large (Manual) Large (Manual)   Pulse: 68    Temp: 97.9 °F (36.6 °C)    TempSrc: Temporal    SpO2: 98%    Weight: 114.7 kg (252 lb 12.1 oz)    Height: 5'  "6" (1.676 m)     Body mass index is 40.8 kg/m².  Weight: 114.7 kg (252 lb 12.1 oz)   Height: 5' 6" (167.6 cm)     Physical Exam  Vitals signs and nursing note reviewed.   Constitutional:       General: She is not in acute distress.     Appearance: She is well-developed.   HENT:      Head: Normocephalic and atraumatic.      Right Ear: Tympanic membrane normal.      Left Ear: Tympanic membrane normal.      Nose: Nose normal.      Mouth/Throat:      Pharynx: No oropharyngeal exudate.   Neck:      Musculoskeletal: Neck supple.      Thyroid: No thyromegaly.   Cardiovascular:      Rate and Rhythm: Normal rate and regular rhythm.      Heart sounds: No murmur. No friction rub. No gallop.    Pulmonary:      Effort: Pulmonary effort is normal. No respiratory distress.      Breath sounds: Normal breath sounds. No wheezing or rales.   Abdominal:      General: Bowel sounds are normal. There is no distension.      Palpations: Abdomen is soft. There is no mass.      Tenderness: There is no abdominal tenderness. There is no guarding or rebound.   Genitourinary:     Comments:   GYN Exam    Medical assistant was present in room and assisted with procedure. Pap smear was performed, specimen collected with cytobrush. No external lesions noted. Normal vaginal mucosa.  Cervix normal appearing cervix without discharge or lesions,no vaginal discharge.    Bimanual exam performed:  no cervical motion tenderness noted, no uterine or ovarian masses palpated.     Lymphadenopathy:      Cervical: No cervical adenopathy.   Skin:     General: Skin is warm and dry.      Findings: No rash.   Neurological:      Mental Status: She is alert and oriented to person, place, and time.   Psychiatric:         Behavior: Behavior normal.         ALLERGIES AND MEDICATIONS: updated and reviewed.  Review of patient's allergies indicates:  No Known Allergies  Medication List with Changes/Refills   Current Medications    ADVAIR DISKUS 100-50 MCG/DOSE DISKUS INHALER  "   INHALE ONE DOSE BY MOUTH TWICE DAILY    ALBUTEROL-IPRATROPIUM (DUO-NEB) 2.5 MG-0.5 MG/3 ML NEBULIZER SOLUTION    Take 3 mLs by nebulization every 6 (six) hours as needed for Wheezing or Shortness of Breath.    AMLODIPINE (NORVASC) 10 MG TABLET    Take 1 tablet by mouth once daily    AMMONIUM LACTATE 12 % CREA    Apply twice daily to affected parts both feet as needed.    LISINOPRIL (PRINIVIL,ZESTRIL) 20 MG TABLET    TAKE 1 TABLET BY MOUTH ONCE DAILY    MELOXICAM (MOBIC) 15 MG TABLET    Take 1 tablet (15 mg total) by mouth once daily.    PHENTERMINE (ADIPEX-P) 37.5 MG TABLET    Take 37.5 mg by mouth every morning.    PHENTERMINE 37.5 MG CAPSULE    Take by mouth every morning.    SPIRONOLACTONE (ALDACTONE) 50 MG TABLET    Take 1 tablet by mouth twice daily    VENTOLIN HFA 90 MCG/ACTUATION INHALER    INHALE 2 PUFFS BY MOUTH EVERY 6 HOURS AS NEEDED FOR WHEEZING AND FOR SHORTNESS OF BREATH   Discontinued Medications    LISINOPRIL (PRINIVIL,ZESTRIL) 20 MG TABLET    TAKE 1 TABLET BY MOUTH ONCE DAILY       Assessment & Plan  1. Routine general medical examination at a health care facility    2. Essential hypertension    3. Chronic pain of left knee    4. Encounter for screening mammogram for breast cancer    5. Need for pneumococcal vaccination        Problem List Items Addressed This Visit        Cardiac/Vascular    Hypertension  -recheck improved       Orthopedic    Knee pain, left  -she is following with workman's comp for this injury      Other Visit Diagnoses     Routine general medical examination at a health care facility    -  Primary  --discussed healthy lifestyle modification with exercise and healthy diet, reviewed age appropriate screening and healthy maintenance      Relevant Orders    CBC auto differential    Comprehensive metabolic panel    Lipid Panel    Hemoglobin A1C    TSH    Liquid-Based Pap Smear, Screening    HPV High Risk Genotypes, PCR    Encounter for screening mammogram for breast cancer      -as  ordered       Relevant Orders    Mammo Digital Screening Bilat    Need for pneumococcal vaccination     -as ordered        Relevant Orders    Pneumococcal Polysaccharide Vaccine (23 Valent) (SQ/IM)          Follow up in about 1 year (around 6/19/2021) for annual exam.    Other Orders Placed This Visit:  Orders Placed This Encounter   Procedures    HPV High Risk Genotypes, PCR    Mammo Digital Screening Bilat    Pneumococcal Polysaccharide Vaccine (23 Valent) (SQ/IM)    CBC auto differential    Comprehensive metabolic panel    Lipid Panel    Hemoglobin A1C    TSH

## 2020-06-23 ENCOUNTER — LAB VISIT (OUTPATIENT)
Dept: LAB | Facility: HOSPITAL | Age: 60
End: 2020-06-23
Attending: FAMILY MEDICINE
Payer: COMMERCIAL

## 2020-06-23 DIAGNOSIS — Z00.00 ROUTINE GENERAL MEDICAL EXAMINATION AT A HEALTH CARE FACILITY: ICD-10-CM

## 2020-06-23 LAB
ALBUMIN SERPL BCP-MCNC: 3.4 G/DL (ref 3.5–5.2)
ALP SERPL-CCNC: 85 U/L (ref 55–135)
ALT SERPL W/O P-5'-P-CCNC: 22 U/L (ref 10–44)
ANION GAP SERPL CALC-SCNC: 7 MMOL/L (ref 8–16)
AST SERPL-CCNC: 20 U/L (ref 10–40)
BASOPHILS # BLD AUTO: 0.05 K/UL (ref 0–0.2)
BASOPHILS NFR BLD: 0.8 % (ref 0–1.9)
BILIRUB SERPL-MCNC: 0.3 MG/DL (ref 0.1–1)
BUN SERPL-MCNC: 9 MG/DL (ref 6–20)
CALCIUM SERPL-MCNC: 9.4 MG/DL (ref 8.7–10.5)
CHLORIDE SERPL-SCNC: 109 MMOL/L (ref 95–110)
CHOLEST SERPL-MCNC: 159 MG/DL (ref 120–199)
CHOLEST/HDLC SERPL: 2.9 {RATIO} (ref 2–5)
CO2 SERPL-SCNC: 26 MMOL/L (ref 23–29)
CREAT SERPL-MCNC: 0.7 MG/DL (ref 0.5–1.4)
DIFFERENTIAL METHOD: ABNORMAL
EOSINOPHIL # BLD AUTO: 0.2 K/UL (ref 0–0.5)
EOSINOPHIL NFR BLD: 3.3 % (ref 0–8)
ERYTHROCYTE [DISTWIDTH] IN BLOOD BY AUTOMATED COUNT: 15.4 % (ref 11.5–14.5)
EST. GFR  (AFRICAN AMERICAN): >60 ML/MIN/1.73 M^2
EST. GFR  (NON AFRICAN AMERICAN): >60 ML/MIN/1.73 M^2
ESTIMATED AVG GLUCOSE: 111 MG/DL (ref 68–131)
GLUCOSE SERPL-MCNC: 94 MG/DL (ref 70–110)
HBA1C MFR BLD HPLC: 5.5 % (ref 4–5.6)
HCT VFR BLD AUTO: 42.8 % (ref 37–48.5)
HDLC SERPL-MCNC: 54 MG/DL (ref 40–75)
HDLC SERPL: 34 % (ref 20–50)
HGB BLD-MCNC: 12.1 G/DL (ref 12–16)
IMM GRANULOCYTES # BLD AUTO: 0.02 K/UL (ref 0–0.04)
IMM GRANULOCYTES NFR BLD AUTO: 0.3 % (ref 0–0.5)
LDLC SERPL CALC-MCNC: 97.4 MG/DL (ref 63–159)
LYMPHOCYTES # BLD AUTO: 3 K/UL (ref 1–4.8)
LYMPHOCYTES NFR BLD: 47 % (ref 18–48)
MCH RBC QN AUTO: 25.9 PG (ref 27–31)
MCHC RBC AUTO-ENTMCNC: 28.3 G/DL (ref 32–36)
MCV RBC AUTO: 92 FL (ref 82–98)
MONOCYTES # BLD AUTO: 0.7 K/UL (ref 0.3–1)
MONOCYTES NFR BLD: 10.5 % (ref 4–15)
NEUTROPHILS # BLD AUTO: 2.4 K/UL (ref 1.8–7.7)
NEUTROPHILS NFR BLD: 38.1 % (ref 38–73)
NONHDLC SERPL-MCNC: 105 MG/DL
NRBC BLD-RTO: 0 /100 WBC
PLATELET # BLD AUTO: 200 K/UL (ref 150–350)
PMV BLD AUTO: 12.7 FL (ref 9.2–12.9)
POTASSIUM SERPL-SCNC: 4.4 MMOL/L (ref 3.5–5.1)
PROT SERPL-MCNC: 7.4 G/DL (ref 6–8.4)
RBC # BLD AUTO: 4.67 M/UL (ref 4–5.4)
SODIUM SERPL-SCNC: 142 MMOL/L (ref 136–145)
TRIGL SERPL-MCNC: 38 MG/DL (ref 30–150)
TSH SERPL DL<=0.005 MIU/L-ACNC: 0.92 UIU/ML (ref 0.4–4)
WBC # BLD AUTO: 6.36 K/UL (ref 3.9–12.7)

## 2020-06-23 PROCEDURE — 85025 COMPLETE CBC W/AUTO DIFF WBC: CPT

## 2020-06-23 PROCEDURE — 36415 COLL VENOUS BLD VENIPUNCTURE: CPT | Mod: PO

## 2020-06-23 PROCEDURE — 80061 LIPID PANEL: CPT

## 2020-06-23 PROCEDURE — 80053 COMPREHEN METABOLIC PANEL: CPT

## 2020-06-23 PROCEDURE — 83036 HEMOGLOBIN GLYCOSYLATED A1C: CPT

## 2020-06-23 PROCEDURE — 84443 ASSAY THYROID STIM HORMONE: CPT

## 2020-06-23 NOTE — PROGRESS NOTES
Your mammogram is normal. However due to your increased risk for breast cancer you may benefit from additional screening in the form of a breast MRI. Please let me know if you would like a referral to a breast surgeon to speak more about this    Cherelle Stover MD

## 2020-06-25 LAB
FINAL PATHOLOGIC DIAGNOSIS: NORMAL
Lab: NORMAL

## 2020-06-26 LAB
HPV HR 12 DNA SPEC QL NAA+PROBE: NEGATIVE
HPV16 AG SPEC QL: NEGATIVE
HPV18 DNA SPEC QL NAA+PROBE: NEGATIVE

## 2020-06-30 NOTE — PROGRESS NOTES
1. Your blood count does not show anemia but I recommend taking a multivitamin daily if you are not already.  2. Your diabetes test is negative for diabetes or prediabetes.  3.  Normal thyroid level.  4.  Normal sodium, potassium, liver and kidney function.  5. Cholesterol is mildly elevated but medication is not needed at this time. Stick to a low fat diet and increase your exercise to improve this.    Cherelle Stover MD

## 2020-08-06 ENCOUNTER — OFFICE VISIT (OUTPATIENT)
Dept: FAMILY MEDICINE | Facility: CLINIC | Age: 60
End: 2020-08-06
Payer: COMMERCIAL

## 2020-08-06 ENCOUNTER — HOSPITAL ENCOUNTER (OUTPATIENT)
Dept: RADIOLOGY | Facility: HOSPITAL | Age: 60
Discharge: HOME | End: 2020-08-06
Attending: NURSE PRACTITIONER
Payer: COMMERCIAL

## 2020-08-06 VITALS
HEIGHT: 66 IN | TEMPERATURE: 99 F | HEART RATE: 92 BPM | SYSTOLIC BLOOD PRESSURE: 156 MMHG | OXYGEN SATURATION: 98 % | BODY MASS INDEX: 40.8 KG/M2 | DIASTOLIC BLOOD PRESSURE: 70 MMHG | WEIGHT: 253.88 LBS

## 2020-08-06 DIAGNOSIS — M25.562 ACUTE PAIN OF LEFT KNEE: ICD-10-CM

## 2020-08-06 DIAGNOSIS — I10 ESSENTIAL HYPERTENSION: ICD-10-CM

## 2020-08-06 DIAGNOSIS — M25.562 ACUTE PAIN OF LEFT KNEE: Primary | ICD-10-CM

## 2020-08-06 PROCEDURE — 3008F BODY MASS INDEX DOCD: CPT | Mod: CPTII,S$GLB,, | Performed by: NURSE PRACTITIONER

## 2020-08-06 PROCEDURE — 73560 X-RAY EXAM OF KNEE 1 OR 2: CPT | Mod: TC,FY,PO,LT

## 2020-08-06 PROCEDURE — 3077F PR MOST RECENT SYSTOLIC BLOOD PRESSURE >= 140 MM HG: ICD-10-PCS | Mod: CPTII,S$GLB,, | Performed by: NURSE PRACTITIONER

## 2020-08-06 PROCEDURE — 99214 OFFICE O/P EST MOD 30 MIN: CPT | Mod: S$GLB,,, | Performed by: NURSE PRACTITIONER

## 2020-08-06 PROCEDURE — 99999 PR PBB SHADOW E&M-EST. PATIENT-LVL IV: ICD-10-PCS | Mod: PBBFAC,,, | Performed by: NURSE PRACTITIONER

## 2020-08-06 PROCEDURE — 99999 PR PBB SHADOW E&M-EST. PATIENT-LVL IV: CPT | Mod: PBBFAC,,, | Performed by: NURSE PRACTITIONER

## 2020-08-06 PROCEDURE — 73560 X-RAY EXAM OF KNEE 1 OR 2: CPT | Mod: 26,LT,, | Performed by: RADIOLOGY

## 2020-08-06 PROCEDURE — 3077F SYST BP >= 140 MM HG: CPT | Mod: CPTII,S$GLB,, | Performed by: NURSE PRACTITIONER

## 2020-08-06 PROCEDURE — 73560 XR KNEE 1 OR 2 VIEW LEFT: ICD-10-PCS | Mod: 26,LT,, | Performed by: RADIOLOGY

## 2020-08-06 PROCEDURE — 99214 PR OFFICE/OUTPT VISIT, EST, LEVL IV, 30-39 MIN: ICD-10-PCS | Mod: S$GLB,,, | Performed by: NURSE PRACTITIONER

## 2020-08-06 PROCEDURE — 3078F DIAST BP <80 MM HG: CPT | Mod: CPTII,S$GLB,, | Performed by: NURSE PRACTITIONER

## 2020-08-06 PROCEDURE — 3078F PR MOST RECENT DIASTOLIC BLOOD PRESSURE < 80 MM HG: ICD-10-PCS | Mod: CPTII,S$GLB,, | Performed by: NURSE PRACTITIONER

## 2020-08-06 PROCEDURE — 3008F PR BODY MASS INDEX (BMI) DOCUMENTED: ICD-10-PCS | Mod: CPTII,S$GLB,, | Performed by: NURSE PRACTITIONER

## 2020-08-06 RX ORDER — MELOXICAM 15 MG/1
15 TABLET ORAL DAILY
Qty: 30 TABLET | Refills: 0 | Status: SHIPPED | OUTPATIENT
Start: 2020-08-06 | End: 2021-03-31 | Stop reason: SDUPTHER
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
## 2020-08-06 NOTE — PATIENT INSTRUCTIONS
Meloxicam 15 mg daily with food   X-ray of left knee will call results   Notify me in 10 days if not better will refer to orthopedics

## 2020-08-07 ENCOUNTER — TELEPHONE (OUTPATIENT)
Dept: FAMILY MEDICINE | Facility: CLINIC | Age: 60
End: 2020-08-07

## 2020-08-07 NOTE — TELEPHONE ENCOUNTER
I left a message on her voice mail that her knee x-ray showed osteoarthritis but no fracture. She needs to take the Mobic for 10 days and if not better please let me know and I will refer you to orthopedics.

## 2020-12-16 DIAGNOSIS — I10 ESSENTIAL HYPERTENSION: ICD-10-CM

## 2020-12-17 DIAGNOSIS — J45.909 ASTHMA, CHRONIC, UNSPECIFIED ASTHMA SEVERITY, UNCOMPLICATED: ICD-10-CM

## 2020-12-17 RX ORDER — IPRATROPIUM BROMIDE AND ALBUTEROL SULFATE 2.5; .5 MG/3ML; MG/3ML
3 SOLUTION RESPIRATORY (INHALATION) EVERY 6 HOURS PRN
Qty: 90 VIAL | Refills: 5 | Status: SHIPPED | OUTPATIENT
Start: 2020-12-17 | End: 2022-09-29 | Stop reason: SDUPTHER

## 2020-12-17 RX ORDER — AMLODIPINE BESYLATE 10 MG/1
TABLET ORAL
Qty: 30 TABLET | Refills: 5 | Status: SHIPPED | OUTPATIENT
Start: 2020-12-17 | End: 2021-07-19 | Stop reason: SDUPTHER

## 2020-12-17 NOTE — TELEPHONE ENCOUNTER
----- Message from Blossom Becker sent at 12/17/2020 11:21 AM CST -----  Regarding: refill  Contact: pt  Type: RX Refill Request    Who Called: PT     Have you contacted your pharmacy:No     Refill or New Rx: Refill     RX Name and Strength: albuterol-ipratropium (DUO-NEB) 2.5 mg-0.5 mg/3 mL nebulizer solution    How is the patient currently taking it?  3 mLs by nebulization every 6 (six) hours as needed for Wheezing or Shortness of Breath    Is this a 30 day or 90 day RX: 90 vial    Preferred Pharmacy with phone number: [facility] - [place], [place] - [address] [phone] (Phone)  [phone] (Fax)    Would the patient rather a call back or a response via [facility]? Callback     Best Call Back Number: [phone] (home)      Additional Information:

## 2020-12-21 DIAGNOSIS — J45.31 ASTHMA, CHRONIC, MILD PERSISTENT, WITH ACUTE EXACERBATION: ICD-10-CM

## 2020-12-21 RX ORDER — ALBUTEROL SULFATE 90 UG/1
AEROSOL, METERED RESPIRATORY (INHALATION)
Qty: 18 G | Refills: 1 | Status: SHIPPED | OUTPATIENT
Start: 2020-12-21 | End: 2022-08-29

## 2020-12-21 NOTE — TELEPHONE ENCOUNTER
----- Message from Bethany Barajas sent at 12/21/2020  3:39 PM CST -----  Regarding: self [phone]  .Type: RX Refill Request    Who Called:  self     Have you contacted your pharmacy: no     Refill or New Rx: refill     RX Name and Strength: VENTOLIN HFA 90 mcg/actuation inhaler, lisinopriL (PRINIVIL,ZESTRIL) 20 MG tablet and spironolactone (ALDACTONE) 50 MG tablet    Is this a 30 day or 90 day RX: 90 day     Preferred Pharmacy with phone number:   [facility] [place] - [address]  [address] [place] [address]  Phone: [phone] Fax: [phone]      Local or Mail Order: local     Would the patient rather a call back or a response via [facility]? Call back     Best Call Back Number: [phone]

## 2021-03-09 ENCOUNTER — PATIENT MESSAGE (OUTPATIENT)
Dept: FAMILY MEDICINE | Facility: CLINIC | Age: 61
End: 2021-03-09

## 2021-03-19 ENCOUNTER — IMMUNIZATION (OUTPATIENT)
Dept: PHARMACY | Facility: CLINIC | Age: 61
End: 2021-03-19
Payer: COMMERCIAL

## 2021-03-19 DIAGNOSIS — I10 ESSENTIAL HYPERTENSION: ICD-10-CM

## 2021-03-19 DIAGNOSIS — Z23 NEED FOR VACCINATION: Primary | ICD-10-CM

## 2021-03-22 RX ORDER — LISINOPRIL 20 MG/1
20 TABLET ORAL DAILY
Qty: 30 TABLET | Refills: 0 | Status: SHIPPED | OUTPATIENT
Start: 2021-03-22 | End: 2021-07-06

## 2021-03-31 DIAGNOSIS — M25.562 ACUTE PAIN OF LEFT KNEE: ICD-10-CM

## 2021-03-31 DIAGNOSIS — I10 ESSENTIAL HYPERTENSION: ICD-10-CM

## 2021-03-31 RX ORDER — MELOXICAM 15 MG/1
15 TABLET ORAL DAILY
Qty: 30 TABLET | Refills: 0 | Status: SHIPPED | OUTPATIENT
Start: 2021-03-31 | End: 2021-06-03

## 2021-03-31 RX ORDER — SPIRONOLACTONE 50 MG/1
50 TABLET, FILM COATED ORAL 2 TIMES DAILY
Qty: 60 TABLET | Refills: 5 | Status: SHIPPED | OUTPATIENT
Start: 2021-03-31 | End: 2022-08-29 | Stop reason: SDUPTHER

## 2021-04-16 ENCOUNTER — IMMUNIZATION (OUTPATIENT)
Dept: PHARMACY | Facility: CLINIC | Age: 61
End: 2021-04-16
Payer: COMMERCIAL

## 2021-04-16 DIAGNOSIS — Z23 NEED FOR VACCINATION: Primary | ICD-10-CM

## 2021-06-03 ENCOUNTER — OFFICE VISIT (OUTPATIENT)
Dept: FAMILY MEDICINE | Facility: CLINIC | Age: 61
End: 2021-06-03
Payer: COMMERCIAL

## 2021-06-03 VITALS
OXYGEN SATURATION: 99 % | HEIGHT: 66 IN | DIASTOLIC BLOOD PRESSURE: 90 MMHG | RESPIRATION RATE: 18 BRPM | WEIGHT: 258.38 LBS | SYSTOLIC BLOOD PRESSURE: 144 MMHG | BODY MASS INDEX: 41.52 KG/M2 | HEART RATE: 94 BPM | TEMPERATURE: 98 F

## 2021-06-03 DIAGNOSIS — E66.01 MORBID OBESITY WITH BMI OF 40.0-44.9, ADULT: ICD-10-CM

## 2021-06-03 DIAGNOSIS — M17.12 ARTHRITIS OF LEFT KNEE: Primary | ICD-10-CM

## 2021-06-03 PROCEDURE — 99999 PR PBB SHADOW E&M-EST. PATIENT-LVL IV: CPT | Mod: PBBFAC,,, | Performed by: PHYSICIAN ASSISTANT

## 2021-06-03 PROCEDURE — 3008F BODY MASS INDEX DOCD: CPT | Mod: CPTII,S$GLB,, | Performed by: PHYSICIAN ASSISTANT

## 2021-06-03 PROCEDURE — 3008F PR BODY MASS INDEX (BMI) DOCUMENTED: ICD-10-PCS | Mod: CPTII,S$GLB,, | Performed by: PHYSICIAN ASSISTANT

## 2021-06-03 PROCEDURE — 99213 PR OFFICE/OUTPT VISIT, EST, LEVL III, 20-29 MIN: ICD-10-PCS | Mod: S$GLB,,, | Performed by: PHYSICIAN ASSISTANT

## 2021-06-03 PROCEDURE — 99999 PR PBB SHADOW E&M-EST. PATIENT-LVL IV: ICD-10-PCS | Mod: PBBFAC,,, | Performed by: PHYSICIAN ASSISTANT

## 2021-06-03 PROCEDURE — 1125F AMNT PAIN NOTED PAIN PRSNT: CPT | Mod: S$GLB,,, | Performed by: PHYSICIAN ASSISTANT

## 2021-06-03 PROCEDURE — 1125F PR PAIN SEVERITY QUANTIFIED, PAIN PRESENT: ICD-10-PCS | Mod: S$GLB,,, | Performed by: PHYSICIAN ASSISTANT

## 2021-06-03 PROCEDURE — 99213 OFFICE O/P EST LOW 20 MIN: CPT | Mod: S$GLB,,, | Performed by: PHYSICIAN ASSISTANT

## 2021-06-03 RX ORDER — DICLOFENAC SODIUM 75 MG/1
75 TABLET, DELAYED RELEASE ORAL 2 TIMES DAILY PRN
Qty: 30 TABLET | Refills: 0 | Status: SHIPPED | OUTPATIENT
Start: 2021-06-03 | End: 2021-06-25

## 2021-06-07 ENCOUNTER — TELEPHONE (OUTPATIENT)
Dept: FAMILY MEDICINE | Facility: CLINIC | Age: 61
End: 2021-06-07

## 2021-06-20 ENCOUNTER — HOSPITAL ENCOUNTER (EMERGENCY)
Facility: HOSPITAL | Age: 61
Discharge: HOME | End: 2021-06-20
Attending: EMERGENCY MEDICINE
Payer: COMMERCIAL

## 2021-06-20 VITALS
WEIGHT: 250 LBS | RESPIRATION RATE: 20 BRPM | HEIGHT: 66 IN | HEART RATE: 81 BPM | DIASTOLIC BLOOD PRESSURE: 67 MMHG | BODY MASS INDEX: 40.18 KG/M2 | OXYGEN SATURATION: 100 % | SYSTOLIC BLOOD PRESSURE: 147 MMHG | TEMPERATURE: 98 F

## 2021-06-20 DIAGNOSIS — R00.0 TACHYCARDIA: ICD-10-CM

## 2021-06-20 DIAGNOSIS — T78.40XA ALLERGIC REACTION, INITIAL ENCOUNTER: ICD-10-CM

## 2021-06-20 DIAGNOSIS — L50.9 URTICARIA: Primary | ICD-10-CM

## 2021-06-20 PROCEDURE — 96374 THER/PROPH/DIAG INJ IV PUSH: CPT | Mod: ER

## 2021-06-20 PROCEDURE — 93010 ELECTROCARDIOGRAM REPORT: CPT | Mod: ,,, | Performed by: INTERNAL MEDICINE

## 2021-06-20 PROCEDURE — 93010 EKG 12-LEAD: ICD-10-PCS | Mod: ,,, | Performed by: INTERNAL MEDICINE

## 2021-06-20 PROCEDURE — 63600175 PHARM REV CODE 636 W HCPCS: Mod: ER | Performed by: EMERGENCY MEDICINE

## 2021-06-20 PROCEDURE — 25000003 PHARM REV CODE 250: Mod: ER | Performed by: EMERGENCY MEDICINE

## 2021-06-20 PROCEDURE — 96375 TX/PRO/DX INJ NEW DRUG ADDON: CPT | Mod: ER

## 2021-06-20 PROCEDURE — 99284 EMERGENCY DEPT VISIT MOD MDM: CPT | Mod: 25,ER

## 2021-06-20 PROCEDURE — 94640 AIRWAY INHALATION TREATMENT: CPT | Mod: ER

## 2021-06-20 PROCEDURE — 25000242 PHARM REV CODE 250 ALT 637 W/ HCPCS: Mod: ER | Performed by: EMERGENCY MEDICINE

## 2021-06-20 PROCEDURE — 93005 ELECTROCARDIOGRAM TRACING: CPT | Mod: ER

## 2021-06-20 RX ORDER — DIPHENHYDRAMINE HYDROCHLORIDE 50 MG/ML
25 INJECTION INTRAMUSCULAR; INTRAVENOUS
Status: COMPLETED | OUTPATIENT
Start: 2021-06-20 | End: 2021-06-20

## 2021-06-20 RX ORDER — PREDNISONE 20 MG/1
40 TABLET ORAL DAILY
Qty: 6 TABLET | Refills: 0 | Status: SHIPPED | OUTPATIENT
Start: 2021-06-20 | End: 2021-06-23

## 2021-06-20 RX ORDER — FAMOTIDINE 10 MG/ML
20 INJECTION INTRAVENOUS
Status: COMPLETED | OUTPATIENT
Start: 2021-06-20 | End: 2021-06-20

## 2021-06-20 RX ORDER — IPRATROPIUM BROMIDE AND ALBUTEROL SULFATE 2.5; .5 MG/3ML; MG/3ML
3 SOLUTION RESPIRATORY (INHALATION)
Status: COMPLETED | OUTPATIENT
Start: 2021-06-20 | End: 2021-06-20

## 2021-06-20 RX ORDER — METHYLPREDNISOLONE SOD SUCC 125 MG
125 VIAL (EA) INJECTION
Status: COMPLETED | OUTPATIENT
Start: 2021-06-20 | End: 2021-06-20

## 2021-06-20 RX ORDER — DIPHENHYDRAMINE HCL 25 MG
25 CAPSULE ORAL EVERY 6 HOURS PRN
Qty: 20 CAPSULE | Refills: 0 | Status: SHIPPED | OUTPATIENT
Start: 2021-06-20 | End: 2021-06-23

## 2021-06-20 RX ADMIN — METHYLPREDNISOLONE SODIUM SUCCINATE 125 MG: 125 INJECTION, POWDER, FOR SOLUTION INTRAMUSCULAR; INTRAVENOUS at 05:06

## 2021-06-20 RX ADMIN — IPRATROPIUM BROMIDE AND ALBUTEROL SULFATE 3 ML: .5; 3 SOLUTION RESPIRATORY (INHALATION) at 06:06

## 2021-06-20 RX ADMIN — DIPHENHYDRAMINE HYDROCHLORIDE 25 MG: 50 INJECTION INTRAMUSCULAR; INTRAVENOUS at 05:06

## 2021-06-20 RX ADMIN — FAMOTIDINE 20 MG: 10 INJECTION INTRAVENOUS at 05:06

## 2021-06-23 DIAGNOSIS — Z12.31 OTHER SCREENING MAMMOGRAM: ICD-10-CM

## 2021-06-25 ENCOUNTER — OFFICE VISIT (OUTPATIENT)
Dept: ALLERGY | Facility: CLINIC | Age: 61
End: 2021-06-25
Payer: COMMERCIAL

## 2021-06-25 ENCOUNTER — OFFICE VISIT (OUTPATIENT)
Dept: FAMILY MEDICINE | Facility: CLINIC | Age: 61
End: 2021-06-25
Payer: COMMERCIAL

## 2021-06-25 ENCOUNTER — LAB VISIT (OUTPATIENT)
Dept: LAB | Facility: HOSPITAL | Age: 61
End: 2021-06-25
Attending: FAMILY MEDICINE
Payer: COMMERCIAL

## 2021-06-25 VITALS — WEIGHT: 255.5 LBS | HEIGHT: 66 IN | BODY MASS INDEX: 41.06 KG/M2

## 2021-06-25 VITALS
OXYGEN SATURATION: 97 % | TEMPERATURE: 99 F | HEART RATE: 97 BPM | DIASTOLIC BLOOD PRESSURE: 60 MMHG | HEIGHT: 66 IN | SYSTOLIC BLOOD PRESSURE: 112 MMHG | WEIGHT: 254.63 LBS | BODY MASS INDEX: 40.92 KG/M2

## 2021-06-25 DIAGNOSIS — J45.20 ASTHMA IN ADULT, MILD INTERMITTENT, UNCOMPLICATED: ICD-10-CM

## 2021-06-25 DIAGNOSIS — Z00.00 ROUTINE GENERAL MEDICAL EXAMINATION AT A HEALTH CARE FACILITY: Primary | ICD-10-CM

## 2021-06-25 DIAGNOSIS — H61.21 IMPACTED CERUMEN OF RIGHT EAR: ICD-10-CM

## 2021-06-25 DIAGNOSIS — L50.9 URTICARIA: ICD-10-CM

## 2021-06-25 DIAGNOSIS — J31.0 CHRONIC RHINITIS: ICD-10-CM

## 2021-06-25 DIAGNOSIS — T78.3XXA ANGIOEDEMA, INITIAL ENCOUNTER: Primary | ICD-10-CM

## 2021-06-25 DIAGNOSIS — Z00.00 ROUTINE GENERAL MEDICAL EXAMINATION AT A HEALTH CARE FACILITY: ICD-10-CM

## 2021-06-25 DIAGNOSIS — I10 ESSENTIAL HYPERTENSION: ICD-10-CM

## 2021-06-25 LAB
ALBUMIN SERPL BCP-MCNC: 3.6 G/DL (ref 3.5–5.2)
ALP SERPL-CCNC: 99 U/L (ref 55–135)
ALT SERPL W/O P-5'-P-CCNC: 35 U/L (ref 10–44)
ANION GAP SERPL CALC-SCNC: 10 MMOL/L (ref 8–16)
AST SERPL-CCNC: 21 U/L (ref 10–40)
BASOPHILS # BLD AUTO: 0.05 K/UL (ref 0–0.2)
BASOPHILS NFR BLD: 0.6 % (ref 0–1.9)
BILIRUB SERPL-MCNC: 0.3 MG/DL (ref 0.1–1)
BUN SERPL-MCNC: 14 MG/DL (ref 6–20)
CALCIUM SERPL-MCNC: 9.5 MG/DL (ref 8.7–10.5)
CHLORIDE SERPL-SCNC: 105 MMOL/L (ref 95–110)
CHOLEST SERPL-MCNC: 177 MG/DL (ref 120–199)
CHOLEST/HDLC SERPL: 3.2 {RATIO} (ref 2–5)
CO2 SERPL-SCNC: 26 MMOL/L (ref 23–29)
CREAT SERPL-MCNC: 0.8 MG/DL (ref 0.5–1.4)
DIFFERENTIAL METHOD: ABNORMAL
EOSINOPHIL # BLD AUTO: 0.4 K/UL (ref 0–0.5)
EOSINOPHIL NFR BLD: 4.6 % (ref 0–8)
ERYTHROCYTE [DISTWIDTH] IN BLOOD BY AUTOMATED COUNT: 14.7 % (ref 11.5–14.5)
EST. GFR  (AFRICAN AMERICAN): >60 ML/MIN/1.73 M^2
EST. GFR  (NON AFRICAN AMERICAN): >60 ML/MIN/1.73 M^2
ESTIMATED AVG GLUCOSE: 114 MG/DL (ref 68–131)
GLUCOSE SERPL-MCNC: 98 MG/DL (ref 70–110)
HBA1C MFR BLD: 5.6 % (ref 4–5.6)
HCT VFR BLD AUTO: 43.7 % (ref 37–48.5)
HDLC SERPL-MCNC: 55 MG/DL (ref 40–75)
HDLC SERPL: 31.1 % (ref 20–50)
HGB BLD-MCNC: 12.8 G/DL (ref 12–16)
IMM GRANULOCYTES # BLD AUTO: 0.02 K/UL (ref 0–0.04)
IMM GRANULOCYTES NFR BLD AUTO: 0.2 % (ref 0–0.5)
LDLC SERPL CALC-MCNC: 105.8 MG/DL (ref 63–159)
LYMPHOCYTES # BLD AUTO: 4.3 K/UL (ref 1–4.8)
LYMPHOCYTES NFR BLD: 47.1 % (ref 18–48)
MCH RBC QN AUTO: 25.7 PG (ref 27–31)
MCHC RBC AUTO-ENTMCNC: 29.3 G/DL (ref 32–36)
MCV RBC AUTO: 88 FL (ref 82–98)
MONOCYTES # BLD AUTO: 0.8 K/UL (ref 0.3–1)
MONOCYTES NFR BLD: 9 % (ref 4–15)
NEUTROPHILS # BLD AUTO: 3.5 K/UL (ref 1.8–7.7)
NEUTROPHILS NFR BLD: 38.5 % (ref 38–73)
NONHDLC SERPL-MCNC: 122 MG/DL
NRBC BLD-RTO: 0 /100 WBC
PLATELET # BLD AUTO: 190 K/UL (ref 150–450)
PMV BLD AUTO: 13.4 FL (ref 9.2–12.9)
POTASSIUM SERPL-SCNC: 3.4 MMOL/L (ref 3.5–5.1)
PROT SERPL-MCNC: 7.5 G/DL (ref 6–8.4)
RBC # BLD AUTO: 4.98 M/UL (ref 4–5.4)
SODIUM SERPL-SCNC: 141 MMOL/L (ref 136–145)
TRIGL SERPL-MCNC: 81 MG/DL (ref 30–150)
TSH SERPL DL<=0.005 MIU/L-ACNC: 1.33 UIU/ML (ref 0.4–4)
WBC # BLD AUTO: 9.04 K/UL (ref 3.9–12.7)

## 2021-06-25 PROCEDURE — 99999 PR PBB SHADOW E&M-EST. PATIENT-LVL III: ICD-10-PCS | Mod: PBBFAC,,, | Performed by: ALLERGY & IMMUNOLOGY

## 2021-06-25 PROCEDURE — 85025 COMPLETE CBC W/AUTO DIFF WBC: CPT | Performed by: FAMILY MEDICINE

## 2021-06-25 PROCEDURE — 1126F PR PAIN SEVERITY QUANTIFIED, NO PAIN PRESENT: ICD-10-PCS | Mod: S$GLB,,, | Performed by: ALLERGY & IMMUNOLOGY

## 2021-06-25 PROCEDURE — 3008F PR BODY MASS INDEX (BMI) DOCUMENTED: ICD-10-PCS | Mod: CPTII,S$GLB,, | Performed by: ALLERGY & IMMUNOLOGY

## 2021-06-25 PROCEDURE — 99204 OFFICE O/P NEW MOD 45 MIN: CPT | Mod: S$GLB,,, | Performed by: ALLERGY & IMMUNOLOGY

## 2021-06-25 PROCEDURE — 99999 PR PBB SHADOW E&M-EST. PATIENT-LVL IV: CPT | Mod: PBBFAC,,, | Performed by: FAMILY MEDICINE

## 2021-06-25 PROCEDURE — 1126F AMNT PAIN NOTED NONE PRSNT: CPT | Mod: S$GLB,,, | Performed by: FAMILY MEDICINE

## 2021-06-25 PROCEDURE — 3008F BODY MASS INDEX DOCD: CPT | Mod: CPTII,S$GLB,, | Performed by: ALLERGY & IMMUNOLOGY

## 2021-06-25 PROCEDURE — 84443 ASSAY THYROID STIM HORMONE: CPT | Performed by: FAMILY MEDICINE

## 2021-06-25 PROCEDURE — 96372 PR INJECTION,THERAP/PROPH/DIAG2ST, IM OR SUBCUT: ICD-10-PCS | Mod: S$GLB,,, | Performed by: FAMILY MEDICINE

## 2021-06-25 PROCEDURE — 1126F PR PAIN SEVERITY QUANTIFIED, NO PAIN PRESENT: ICD-10-PCS | Mod: S$GLB,,, | Performed by: FAMILY MEDICINE

## 2021-06-25 PROCEDURE — 83036 HEMOGLOBIN GLYCOSYLATED A1C: CPT | Performed by: FAMILY MEDICINE

## 2021-06-25 PROCEDURE — 96372 THER/PROPH/DIAG INJ SC/IM: CPT | Mod: S$GLB,,, | Performed by: FAMILY MEDICINE

## 2021-06-25 PROCEDURE — 99396 PREV VISIT EST AGE 40-64: CPT | Mod: 25,S$GLB,, | Performed by: FAMILY MEDICINE

## 2021-06-25 PROCEDURE — 80053 COMPREHEN METABOLIC PANEL: CPT | Performed by: FAMILY MEDICINE

## 2021-06-25 PROCEDURE — 3008F PR BODY MASS INDEX (BMI) DOCUMENTED: ICD-10-PCS | Mod: CPTII,S$GLB,, | Performed by: FAMILY MEDICINE

## 2021-06-25 PROCEDURE — 99999 PR PBB SHADOW E&M-EST. PATIENT-LVL III: CPT | Mod: PBBFAC,,, | Performed by: ALLERGY & IMMUNOLOGY

## 2021-06-25 PROCEDURE — 99204 PR OFFICE/OUTPT VISIT, NEW, LEVL IV, 45-59 MIN: ICD-10-PCS | Mod: S$GLB,,, | Performed by: ALLERGY & IMMUNOLOGY

## 2021-06-25 PROCEDURE — 3008F BODY MASS INDEX DOCD: CPT | Mod: CPTII,S$GLB,, | Performed by: FAMILY MEDICINE

## 2021-06-25 PROCEDURE — 99999 PR PBB SHADOW E&M-EST. PATIENT-LVL IV: ICD-10-PCS | Mod: PBBFAC,,, | Performed by: FAMILY MEDICINE

## 2021-06-25 PROCEDURE — 99396 PR PREVENTIVE VISIT,EST,40-64: ICD-10-PCS | Mod: 25,S$GLB,, | Performed by: FAMILY MEDICINE

## 2021-06-25 PROCEDURE — 36415 COLL VENOUS BLD VENIPUNCTURE: CPT | Mod: PO | Performed by: FAMILY MEDICINE

## 2021-06-25 PROCEDURE — 1126F AMNT PAIN NOTED NONE PRSNT: CPT | Mod: S$GLB,,, | Performed by: ALLERGY & IMMUNOLOGY

## 2021-06-25 PROCEDURE — 80061 LIPID PANEL: CPT | Performed by: FAMILY MEDICINE

## 2021-06-25 RX ORDER — METHYLPREDNISOLONE 4 MG/1
TABLET ORAL
Qty: 1 PACKAGE | Refills: 0 | Status: SHIPPED | OUTPATIENT
Start: 2021-06-25 | End: 2021-07-06 | Stop reason: ALTCHOICE

## 2021-06-30 ENCOUNTER — PATIENT MESSAGE (OUTPATIENT)
Dept: ADMINISTRATIVE | Facility: OTHER | Age: 61
End: 2021-06-30

## 2021-06-30 ENCOUNTER — PATIENT OUTREACH (OUTPATIENT)
Dept: ADMINISTRATIVE | Facility: OTHER | Age: 61
End: 2021-06-30

## 2021-06-30 DIAGNOSIS — Z12.31 BREAST CANCER SCREENING BY MAMMOGRAM: Primary | ICD-10-CM

## 2021-07-04 PROCEDURE — 99284 EMERGENCY DEPT VISIT MOD MDM: CPT

## 2021-07-05 ENCOUNTER — HOSPITAL ENCOUNTER (EMERGENCY)
Facility: HOSPITAL | Age: 61
Discharge: HOME | End: 2021-07-05
Attending: EMERGENCY MEDICINE
Payer: COMMERCIAL

## 2021-07-05 VITALS
HEIGHT: 66 IN | OXYGEN SATURATION: 100 % | TEMPERATURE: 99 F | SYSTOLIC BLOOD PRESSURE: 132 MMHG | HEART RATE: 81 BPM | DIASTOLIC BLOOD PRESSURE: 59 MMHG | BODY MASS INDEX: 40.98 KG/M2 | WEIGHT: 255 LBS | RESPIRATION RATE: 18 BRPM

## 2021-07-05 DIAGNOSIS — T78.40XA ALLERGIC REACTION, INITIAL ENCOUNTER: Primary | ICD-10-CM

## 2021-07-05 PROCEDURE — 63600175 PHARM REV CODE 636 W HCPCS: Performed by: EMERGENCY MEDICINE

## 2021-07-05 RX ORDER — PREDNISONE 10 MG/1
10 TABLET ORAL DAILY
Qty: 21 TABLET | Refills: 0 | Status: SHIPPED | OUTPATIENT
Start: 2021-07-05 | End: 2021-07-19

## 2021-07-05 RX ORDER — ALBUTEROL SULFATE 2.5 MG/.5ML
2.5 SOLUTION RESPIRATORY (INHALATION) EVERY 4 HOURS PRN
Qty: 100 EACH | Refills: 0 | Status: SHIPPED | OUTPATIENT
Start: 2021-07-05 | End: 2022-07-05

## 2021-07-05 RX ADMIN — PREDNISONE 50 MG: 20 TABLET ORAL at 12:07

## 2021-07-06 ENCOUNTER — PATIENT MESSAGE (OUTPATIENT)
Dept: ADMINISTRATIVE | Facility: HOSPITAL | Age: 61
End: 2021-07-06

## 2021-07-06 ENCOUNTER — OFFICE VISIT (OUTPATIENT)
Dept: OTOLARYNGOLOGY | Facility: CLINIC | Age: 61
End: 2021-07-06
Payer: COMMERCIAL

## 2021-07-06 VITALS
WEIGHT: 250.69 LBS | HEIGHT: 66 IN | BODY MASS INDEX: 40.29 KG/M2 | SYSTOLIC BLOOD PRESSURE: 128 MMHG | DIASTOLIC BLOOD PRESSURE: 80 MMHG

## 2021-07-06 DIAGNOSIS — H61.21 IMPACTED CERUMEN OF RIGHT EAR: Primary | ICD-10-CM

## 2021-07-06 PROCEDURE — 99499 NO LOS: ICD-10-PCS | Mod: S$GLB,,, | Performed by: NURSE PRACTITIONER

## 2021-07-06 PROCEDURE — 3008F PR BODY MASS INDEX (BMI) DOCUMENTED: ICD-10-PCS | Mod: CPTII,S$GLB,, | Performed by: NURSE PRACTITIONER

## 2021-07-06 PROCEDURE — 1125F PR PAIN SEVERITY QUANTIFIED, PAIN PRESENT: ICD-10-PCS | Mod: S$GLB,,, | Performed by: NURSE PRACTITIONER

## 2021-07-06 PROCEDURE — 1125F AMNT PAIN NOTED PAIN PRSNT: CPT | Mod: S$GLB,,, | Performed by: NURSE PRACTITIONER

## 2021-07-06 PROCEDURE — 99499 UNLISTED E&M SERVICE: CPT | Mod: S$GLB,,, | Performed by: NURSE PRACTITIONER

## 2021-07-06 PROCEDURE — 3008F BODY MASS INDEX DOCD: CPT | Mod: CPTII,S$GLB,, | Performed by: NURSE PRACTITIONER

## 2021-07-07 ENCOUNTER — HOSPITAL ENCOUNTER (OUTPATIENT)
Dept: RADIOLOGY | Facility: HOSPITAL | Age: 61
Discharge: HOME | End: 2021-07-07
Attending: FAMILY MEDICINE
Payer: COMMERCIAL

## 2021-07-07 DIAGNOSIS — Z12.31 OTHER SCREENING MAMMOGRAM: ICD-10-CM

## 2021-07-07 PROCEDURE — 77063 BREAST TOMOSYNTHESIS BI: CPT | Mod: 26,,, | Performed by: RADIOLOGY

## 2021-07-07 PROCEDURE — 77067 SCR MAMMO BI INCL CAD: CPT | Mod: TC

## 2021-07-07 PROCEDURE — 77067 SCR MAMMO BI INCL CAD: CPT | Mod: 26,,, | Performed by: RADIOLOGY

## 2021-07-07 PROCEDURE — 77063 MAMMO DIGITAL SCREENING BILAT WITH TOMO: ICD-10-PCS | Mod: 26,,, | Performed by: RADIOLOGY

## 2021-07-07 PROCEDURE — 77067 MAMMO DIGITAL SCREENING BILAT WITH TOMO: ICD-10-PCS | Mod: 26,,, | Performed by: RADIOLOGY

## 2021-07-19 ENCOUNTER — OFFICE VISIT (OUTPATIENT)
Dept: FAMILY MEDICINE | Facility: CLINIC | Age: 61
End: 2021-07-19
Payer: COMMERCIAL

## 2021-07-19 VITALS
HEIGHT: 66 IN | SYSTOLIC BLOOD PRESSURE: 132 MMHG | OXYGEN SATURATION: 99 % | WEIGHT: 253.44 LBS | DIASTOLIC BLOOD PRESSURE: 78 MMHG | HEART RATE: 90 BPM | TEMPERATURE: 98 F | BODY MASS INDEX: 40.73 KG/M2

## 2021-07-19 DIAGNOSIS — Z23 NEED FOR PROPHYLACTIC VACCINATION WITH STREPTOCOCCUS PNEUMONIAE (PNEUMOCOCCUS) AND INFLUENZA VACCINES: ICD-10-CM

## 2021-07-19 DIAGNOSIS — J45.20 MILD INTERMITTENT ASTHMA, UNSPECIFIED WHETHER COMPLICATED: Chronic | ICD-10-CM

## 2021-07-19 DIAGNOSIS — I10 ESSENTIAL HYPERTENSION: ICD-10-CM

## 2021-07-19 DIAGNOSIS — Z12.4 ENCOUNTER FOR PAP SMEAR OF CERVIX WITH HPV DNA COTESTING: Primary | ICD-10-CM

## 2021-07-19 PROCEDURE — 90471 IMMUNIZATION ADMIN: CPT | Mod: S$GLB,,, | Performed by: FAMILY MEDICINE

## 2021-07-19 PROCEDURE — 1126F PR PAIN SEVERITY QUANTIFIED, NO PAIN PRESENT: ICD-10-PCS | Mod: CPTII,S$GLB,, | Performed by: FAMILY MEDICINE

## 2021-07-19 PROCEDURE — 90471 PNEUMOCOCCAL POLYSACCHARIDE VACCINE 23-VALENT =>2YO SQ IM: ICD-10-PCS | Mod: S$GLB,,, | Performed by: FAMILY MEDICINE

## 2021-07-19 PROCEDURE — 90732 PNEUMOCOCCAL POLYSACCHARIDE VACCINE 23-VALENT =>2YO SQ IM: ICD-10-PCS | Mod: S$GLB,,, | Performed by: FAMILY MEDICINE

## 2021-07-19 PROCEDURE — 99396 PR PREVENTIVE VISIT,EST,40-64: ICD-10-PCS | Mod: 25,S$GLB,, | Performed by: FAMILY MEDICINE

## 2021-07-19 PROCEDURE — 3008F BODY MASS INDEX DOCD: CPT | Mod: CPTII,S$GLB,, | Performed by: FAMILY MEDICINE

## 2021-07-19 PROCEDURE — 1126F AMNT PAIN NOTED NONE PRSNT: CPT | Mod: CPTII,S$GLB,, | Performed by: FAMILY MEDICINE

## 2021-07-19 PROCEDURE — 3075F SYST BP GE 130 - 139MM HG: CPT | Mod: CPTII,S$GLB,, | Performed by: FAMILY MEDICINE

## 2021-07-19 PROCEDURE — 3008F PR BODY MASS INDEX (BMI) DOCUMENTED: ICD-10-PCS | Mod: CPTII,S$GLB,, | Performed by: FAMILY MEDICINE

## 2021-07-19 PROCEDURE — 90732 PPSV23 VACC 2 YRS+ SUBQ/IM: CPT | Mod: S$GLB,,, | Performed by: FAMILY MEDICINE

## 2021-07-19 PROCEDURE — 3078F PR MOST RECENT DIASTOLIC BLOOD PRESSURE < 80 MM HG: ICD-10-PCS | Mod: CPTII,S$GLB,, | Performed by: FAMILY MEDICINE

## 2021-07-19 PROCEDURE — 87624 HPV HI-RISK TYP POOLED RSLT: CPT | Performed by: FAMILY MEDICINE

## 2021-07-19 PROCEDURE — 99999 PR PBB SHADOW E&M-EST. PATIENT-LVL III: CPT | Mod: PBBFAC,,, | Performed by: FAMILY MEDICINE

## 2021-07-19 PROCEDURE — 88175 CYTOPATH C/V AUTO FLUID REDO: CPT | Performed by: FAMILY MEDICINE

## 2021-07-19 PROCEDURE — 3075F PR MOST RECENT SYSTOLIC BLOOD PRESS GE 130-139MM HG: ICD-10-PCS | Mod: CPTII,S$GLB,, | Performed by: FAMILY MEDICINE

## 2021-07-19 PROCEDURE — 3078F DIAST BP <80 MM HG: CPT | Mod: CPTII,S$GLB,, | Performed by: FAMILY MEDICINE

## 2021-07-19 PROCEDURE — 99396 PREV VISIT EST AGE 40-64: CPT | Mod: 25,S$GLB,, | Performed by: FAMILY MEDICINE

## 2021-07-19 PROCEDURE — 99999 PR PBB SHADOW E&M-EST. PATIENT-LVL III: ICD-10-PCS | Mod: PBBFAC,,, | Performed by: FAMILY MEDICINE

## 2021-07-19 RX ORDER — AMLODIPINE BESYLATE 10 MG/1
10 TABLET ORAL DAILY
Qty: 30 TABLET | Refills: 5 | Status: SHIPPED | OUTPATIENT
Start: 2021-07-19 | End: 2021-12-22

## 2021-07-23 LAB
CLINICAL INFO: NORMAL
CYTO CVX: NORMAL
CYTOLOGIST CVX/VAG CYTO: NORMAL
CYTOLOGY CMNT CVX/VAG CYTO-IMP: NORMAL
CYTOLOGY PAP THIN PREP EXPLANATION: NORMAL
DATE OF PREVIOUS PAP: NORMAL
DATE PREVIOUS BX: NO
HPV I/H RISK 4 DNA CVX QL NAA+PROBE: NOT DETECTED
LMP START DATE: NORMAL
SPECIMEN SOURCE CVX/VAG CYTO: NORMAL
STAT OF ADQ CVX/VAG CYTO-IMP: NORMAL

## 2021-10-12 DIAGNOSIS — M17.12 ARTHRITIS OF LEFT KNEE: ICD-10-CM

## 2021-10-12 RX ORDER — DICLOFENAC SODIUM 75 MG/1
TABLET, DELAYED RELEASE ORAL
Qty: 60 TABLET | Refills: 5 | Status: SHIPPED | OUTPATIENT
Start: 2021-10-12 | End: 2022-05-18

## 2021-10-28 ENCOUNTER — OFFICE VISIT (OUTPATIENT)
Dept: FAMILY MEDICINE | Facility: CLINIC | Age: 61
End: 2021-10-28
Payer: COMMERCIAL

## 2021-10-28 VITALS
BODY MASS INDEX: 40.8 KG/M2 | DIASTOLIC BLOOD PRESSURE: 82 MMHG | SYSTOLIC BLOOD PRESSURE: 134 MMHG | TEMPERATURE: 99 F | HEART RATE: 94 BPM | OXYGEN SATURATION: 99 % | WEIGHT: 252.75 LBS

## 2021-10-28 DIAGNOSIS — S40.862A INSECT BITE OF LEFT UPPER EXTREMITY, INITIAL ENCOUNTER: ICD-10-CM
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
## 2021-10-28 DIAGNOSIS — Z23 NEED FOR SHINGLES VACCINE: Primary | ICD-10-CM

## 2021-10-28 PROCEDURE — 3075F PR MOST RECENT SYSTOLIC BLOOD PRESS GE 130-139MM HG: ICD-10-PCS | Mod: CPTII,S$GLB,, | Performed by: STUDENT IN AN ORGANIZED HEALTH CARE EDUCATION/TRAINING PROGRAM

## 2021-10-28 PROCEDURE — 3079F DIAST BP 80-89 MM HG: CPT | Mod: CPTII,S$GLB,, | Performed by: STUDENT IN AN ORGANIZED HEALTH CARE EDUCATION/TRAINING PROGRAM

## 2021-10-28 PROCEDURE — 1159F PR MEDICATION LIST DOCUMENTED IN MEDICAL RECORD: ICD-10-PCS | Mod: CPTII,S$GLB,, | Performed by: STUDENT IN AN ORGANIZED HEALTH CARE EDUCATION/TRAINING PROGRAM

## 2021-10-28 PROCEDURE — 4010F ACE/ARB THERAPY RXD/TAKEN: CPT | Mod: CPTII,S$GLB,, | Performed by: STUDENT IN AN ORGANIZED HEALTH CARE EDUCATION/TRAINING PROGRAM

## 2021-10-28 PROCEDURE — 3044F HG A1C LEVEL LT 7.0%: CPT | Mod: CPTII,S$GLB,, | Performed by: STUDENT IN AN ORGANIZED HEALTH CARE EDUCATION/TRAINING PROGRAM

## 2021-10-28 PROCEDURE — 90471 IMMUNIZATION ADMIN: CPT | Mod: S$GLB,,, | Performed by: STUDENT IN AN ORGANIZED HEALTH CARE EDUCATION/TRAINING PROGRAM

## 2021-10-28 PROCEDURE — 3044F PR MOST RECENT HEMOGLOBIN A1C LEVEL <7.0%: ICD-10-PCS | Mod: CPTII,S$GLB,, | Performed by: STUDENT IN AN ORGANIZED HEALTH CARE EDUCATION/TRAINING PROGRAM

## 2021-10-28 PROCEDURE — 3008F BODY MASS INDEX DOCD: CPT | Mod: CPTII,S$GLB,, | Performed by: STUDENT IN AN ORGANIZED HEALTH CARE EDUCATION/TRAINING PROGRAM

## 2021-10-28 PROCEDURE — 3008F PR BODY MASS INDEX (BMI) DOCUMENTED: ICD-10-PCS | Mod: CPTII,S$GLB,, | Performed by: STUDENT IN AN ORGANIZED HEALTH CARE EDUCATION/TRAINING PROGRAM

## 2021-10-28 PROCEDURE — 4010F PR ACE/ARB THEARPY RXD/TAKEN: ICD-10-PCS | Mod: CPTII,S$GLB,, | Performed by: STUDENT IN AN ORGANIZED HEALTH CARE EDUCATION/TRAINING PROGRAM

## 2021-10-28 PROCEDURE — 3079F PR MOST RECENT DIASTOLIC BLOOD PRESSURE 80-89 MM HG: ICD-10-PCS | Mod: CPTII,S$GLB,, | Performed by: STUDENT IN AN ORGANIZED HEALTH CARE EDUCATION/TRAINING PROGRAM

## 2021-10-28 PROCEDURE — 90750 HZV VACC RECOMBINANT IM: CPT | Mod: S$GLB,,, | Performed by: STUDENT IN AN ORGANIZED HEALTH CARE EDUCATION/TRAINING PROGRAM

## 2021-10-28 PROCEDURE — 90471 ZOSTER RECOMBINANT VACCINE: ICD-10-PCS | Mod: S$GLB,,, | Performed by: STUDENT IN AN ORGANIZED HEALTH CARE EDUCATION/TRAINING PROGRAM

## 2021-10-28 PROCEDURE — 99999 PR PBB SHADOW E&M-EST. PATIENT-LVL III: ICD-10-PCS | Mod: PBBFAC,,, | Performed by: STUDENT IN AN ORGANIZED HEALTH CARE EDUCATION/TRAINING PROGRAM

## 2021-10-28 PROCEDURE — 99999 PR PBB SHADOW E&M-EST. PATIENT-LVL III: CPT | Mod: PBBFAC,,, | Performed by: STUDENT IN AN ORGANIZED HEALTH CARE EDUCATION/TRAINING PROGRAM

## 2021-10-28 PROCEDURE — 99214 PR OFFICE/OUTPT VISIT, EST, LEVL IV, 30-39 MIN: ICD-10-PCS | Mod: 25,S$GLB,, | Performed by: STUDENT IN AN ORGANIZED HEALTH CARE EDUCATION/TRAINING PROGRAM

## 2021-10-28 PROCEDURE — 99214 OFFICE O/P EST MOD 30 MIN: CPT | Mod: 25,S$GLB,, | Performed by: STUDENT IN AN ORGANIZED HEALTH CARE EDUCATION/TRAINING PROGRAM

## 2021-10-28 PROCEDURE — 3075F SYST BP GE 130 - 139MM HG: CPT | Mod: CPTII,S$GLB,, | Performed by: STUDENT IN AN ORGANIZED HEALTH CARE EDUCATION/TRAINING PROGRAM

## 2021-10-28 PROCEDURE — 90750 ZOSTER RECOMBINANT VACCINE: ICD-10-PCS | Mod: S$GLB,,, | Performed by: STUDENT IN AN ORGANIZED HEALTH CARE EDUCATION/TRAINING PROGRAM

## 2021-10-28 PROCEDURE — 1159F MED LIST DOCD IN RCRD: CPT | Mod: CPTII,S$GLB,, | Performed by: STUDENT IN AN ORGANIZED HEALTH CARE EDUCATION/TRAINING PROGRAM

## 2021-10-28 RX ORDER — FAMOTIDINE 40 MG/1
40 TABLET, FILM COATED ORAL DAILY
COMMUNITY
Start: 2021-10-25 | End: 2021-11-29

## 2021-10-28 RX ORDER — TRIAMCINOLONE ACETONIDE 1 MG/G
CREAM TOPICAL 2 TIMES DAILY
Qty: 15 G | Refills: 0 | Status: SHIPPED | OUTPATIENT
Start: 2021-10-28 | End: 2022-08-29

## 2021-10-28 RX ORDER — PREDNISONE 20 MG/1
60 TABLET ORAL DAILY
COMMUNITY
Start: 2021-10-25 | End: 2021-11-08

## 2021-10-28 RX ORDER — CEPHALEXIN 500 MG/1
500 CAPSULE ORAL EVERY 12 HOURS
Qty: 10 CAPSULE | Refills: 0 | Status: SHIPPED | OUTPATIENT
Start: 2021-10-28 | End: 2021-11-02

## 2021-10-28 RX ORDER — CETIRIZINE HYDROCHLORIDE 10 MG/1
10 TABLET ORAL DAILY
COMMUNITY
Start: 2021-10-25 | End: 2023-07-14 | Stop reason: ALTCHOICE

## 2021-11-08 ENCOUNTER — OFFICE VISIT (OUTPATIENT)
Dept: FAMILY MEDICINE | Facility: CLINIC | Age: 61
End: 2021-11-08
Payer: COMMERCIAL

## 2021-11-08 VITALS
OXYGEN SATURATION: 96 % | SYSTOLIC BLOOD PRESSURE: 136 MMHG | DIASTOLIC BLOOD PRESSURE: 82 MMHG | HEIGHT: 66 IN | HEART RATE: 91 BPM | WEIGHT: 252.88 LBS | BODY MASS INDEX: 40.64 KG/M2 | TEMPERATURE: 98 F

## 2021-11-08 DIAGNOSIS — M54.41 RIGHT-SIDED LOW BACK PAIN WITH RIGHT-SIDED SCIATICA, UNSPECIFIED CHRONICITY: Primary | ICD-10-CM

## 2021-11-08 DIAGNOSIS — I10 ESSENTIAL HYPERTENSION: ICD-10-CM

## 2021-11-08 DIAGNOSIS — E66.01 MORBID OBESITY WITH BMI OF 40.0-44.9, ADULT: ICD-10-CM

## 2021-11-08 PROCEDURE — 4010F ACE/ARB THERAPY RXD/TAKEN: CPT | Mod: CPTII,S$GLB,, | Performed by: NURSE PRACTITIONER

## 2021-11-08 PROCEDURE — 1159F PR MEDICATION LIST DOCUMENTED IN MEDICAL RECORD: ICD-10-PCS | Mod: CPTII,S$GLB,, | Performed by: NURSE PRACTITIONER

## 2021-11-08 PROCEDURE — 99214 OFFICE O/P EST MOD 30 MIN: CPT | Mod: 25,S$GLB,, | Performed by: NURSE PRACTITIONER

## 2021-11-08 PROCEDURE — 3044F PR MOST RECENT HEMOGLOBIN A1C LEVEL <7.0%: ICD-10-PCS | Mod: CPTII,S$GLB,, | Performed by: NURSE PRACTITIONER

## 2021-11-08 PROCEDURE — 96372 PR INJECTION,THERAP/PROPH/DIAG2ST, IM OR SUBCUT: ICD-10-PCS | Mod: S$GLB,,, | Performed by: NURSE PRACTITIONER

## 2021-11-08 PROCEDURE — 3079F PR MOST RECENT DIASTOLIC BLOOD PRESSURE 80-89 MM HG: ICD-10-PCS | Mod: CPTII,S$GLB,, | Performed by: NURSE PRACTITIONER

## 2021-11-08 PROCEDURE — 3008F PR BODY MASS INDEX (BMI) DOCUMENTED: ICD-10-PCS | Mod: CPTII,S$GLB,, | Performed by: NURSE PRACTITIONER

## 2021-11-08 PROCEDURE — 3075F PR MOST RECENT SYSTOLIC BLOOD PRESS GE 130-139MM HG: ICD-10-PCS | Mod: CPTII,S$GLB,, | Performed by: NURSE PRACTITIONER

## 2021-11-08 PROCEDURE — 4010F PR ACE/ARB THEARPY RXD/TAKEN: ICD-10-PCS | Mod: CPTII,S$GLB,, | Performed by: NURSE PRACTITIONER

## 2021-11-08 PROCEDURE — 3079F DIAST BP 80-89 MM HG: CPT | Mod: CPTII,S$GLB,, | Performed by: NURSE PRACTITIONER

## 2021-11-08 PROCEDURE — 1159F MED LIST DOCD IN RCRD: CPT | Mod: CPTII,S$GLB,, | Performed by: NURSE PRACTITIONER

## 2021-11-08 PROCEDURE — 99999 PR PBB SHADOW E&M-EST. PATIENT-LVL IV: CPT | Mod: PBBFAC,,, | Performed by: NURSE PRACTITIONER

## 2021-11-08 PROCEDURE — 1160F PR REVIEW ALL MEDS BY PRESCRIBER/CLIN PHARMACIST DOCUMENTED: ICD-10-PCS | Mod: CPTII,S$GLB,, | Performed by: NURSE PRACTITIONER

## 2021-11-08 PROCEDURE — 96372 THER/PROPH/DIAG INJ SC/IM: CPT | Mod: S$GLB,,, | Performed by: NURSE PRACTITIONER

## 2021-11-08 PROCEDURE — 3075F SYST BP GE 130 - 139MM HG: CPT | Mod: CPTII,S$GLB,, | Performed by: NURSE PRACTITIONER

## 2021-11-08 PROCEDURE — 1160F RVW MEDS BY RX/DR IN RCRD: CPT | Mod: CPTII,S$GLB,, | Performed by: NURSE PRACTITIONER

## 2021-11-08 PROCEDURE — 3008F BODY MASS INDEX DOCD: CPT | Mod: CPTII,S$GLB,, | Performed by: NURSE PRACTITIONER

## 2021-11-08 PROCEDURE — 99999 PR PBB SHADOW E&M-EST. PATIENT-LVL IV: ICD-10-PCS | Mod: PBBFAC,,, | Performed by: NURSE PRACTITIONER

## 2021-11-08 PROCEDURE — 3044F HG A1C LEVEL LT 7.0%: CPT | Mod: CPTII,S$GLB,, | Performed by: NURSE PRACTITIONER

## 2021-11-08 PROCEDURE — 99214 PR OFFICE/OUTPT VISIT, EST, LEVL IV, 30-39 MIN: ICD-10-PCS | Mod: 25,S$GLB,, | Performed by: NURSE PRACTITIONER

## 2021-11-08 RX ORDER — TIZANIDINE 4 MG/1
4 TABLET ORAL EVERY 8 HOURS
Qty: 30 TABLET | Refills: 0 | Status: SHIPPED | OUTPATIENT
Start: 2021-11-08 | End: 2021-11-18

## 2021-11-08 RX ORDER — KETOROLAC TROMETHAMINE 30 MG/ML
30 INJECTION, SOLUTION INTRAMUSCULAR; INTRAVENOUS
Status: COMPLETED | OUTPATIENT
Start: 2021-11-08 | End: 2021-11-08

## 2021-11-08 RX ADMIN — KETOROLAC TROMETHAMINE 30 MG: 30 INJECTION, SOLUTION INTRAMUSCULAR; INTRAVENOUS at 12:11

## 2021-11-10 ENCOUNTER — OFFICE VISIT (OUTPATIENT)
Dept: FAMILY MEDICINE | Facility: CLINIC | Age: 61
End: 2021-11-10
Payer: COMMERCIAL

## 2021-11-10 ENCOUNTER — PATIENT MESSAGE (OUTPATIENT)
Dept: FAMILY MEDICINE | Facility: CLINIC | Age: 61
End: 2021-11-10

## 2021-11-10 ENCOUNTER — HOSPITAL ENCOUNTER (OUTPATIENT)
Dept: RADIOLOGY | Facility: HOSPITAL | Age: 61
Discharge: HOME | End: 2021-11-10
Attending: NURSE PRACTITIONER
Payer: COMMERCIAL

## 2021-11-10 VITALS
BODY MASS INDEX: 40.64 KG/M2 | TEMPERATURE: 98 F | WEIGHT: 252.88 LBS | HEART RATE: 95 BPM | SYSTOLIC BLOOD PRESSURE: 150 MMHG | OXYGEN SATURATION: 98 % | HEIGHT: 66 IN | DIASTOLIC BLOOD PRESSURE: 94 MMHG

## 2021-11-10 DIAGNOSIS — E66.01 MORBID OBESITY WITH BMI OF 40.0-44.9, ADULT: ICD-10-CM

## 2021-11-10 DIAGNOSIS — M25.561 ACUTE PAIN OF RIGHT KNEE: ICD-10-CM

## 2021-11-10 DIAGNOSIS — M54.41 ACUTE BILATERAL LOW BACK PAIN WITH RIGHT-SIDED SCIATICA: ICD-10-CM

## 2021-11-10 DIAGNOSIS — M54.41 ACUTE BILATERAL LOW BACK PAIN WITH RIGHT-SIDED SCIATICA: Primary | ICD-10-CM

## 2021-11-10 PROCEDURE — 3077F SYST BP >= 140 MM HG: CPT | Mod: CPTII,S$GLB,, | Performed by: NURSE PRACTITIONER

## 2021-11-10 PROCEDURE — 73560 X-RAY EXAM OF KNEE 1 OR 2: CPT | Mod: TC,FY,PO,RT

## 2021-11-10 PROCEDURE — 3008F PR BODY MASS INDEX (BMI) DOCUMENTED: ICD-10-PCS | Mod: CPTII,S$GLB,, | Performed by: NURSE PRACTITIONER

## 2021-11-10 PROCEDURE — 3008F BODY MASS INDEX DOCD: CPT | Mod: CPTII,S$GLB,, | Performed by: NURSE PRACTITIONER

## 2021-11-10 PROCEDURE — 72110 X-RAY EXAM L-2 SPINE 4/>VWS: CPT | Mod: 26,,, | Performed by: RADIOLOGY

## 2021-11-10 PROCEDURE — 1159F MED LIST DOCD IN RCRD: CPT | Mod: CPTII,S$GLB,, | Performed by: NURSE PRACTITIONER

## 2021-11-10 PROCEDURE — 4010F ACE/ARB THERAPY RXD/TAKEN: CPT | Mod: CPTII,S$GLB,, | Performed by: NURSE PRACTITIONER

## 2021-11-10 PROCEDURE — 3044F HG A1C LEVEL LT 7.0%: CPT | Mod: CPTII,S$GLB,, | Performed by: NURSE PRACTITIONER

## 2021-11-10 PROCEDURE — 99213 PR OFFICE/OUTPT VISIT, EST, LEVL III, 20-29 MIN: ICD-10-PCS | Mod: 25,S$GLB,, | Performed by: NURSE PRACTITIONER

## 2021-11-10 PROCEDURE — 72110 XR LUMBAR SPINE AP AND LAT WITH FLEX/EXT: ICD-10-PCS | Mod: 26,,, | Performed by: RADIOLOGY

## 2021-11-10 PROCEDURE — 3077F PR MOST RECENT SYSTOLIC BLOOD PRESSURE >= 140 MM HG: ICD-10-PCS | Mod: CPTII,S$GLB,, | Performed by: NURSE PRACTITIONER

## 2021-11-10 PROCEDURE — 1159F PR MEDICATION LIST DOCUMENTED IN MEDICAL RECORD: ICD-10-PCS | Mod: CPTII,S$GLB,, | Performed by: NURSE PRACTITIONER

## 2021-11-10 PROCEDURE — 3080F PR MOST RECENT DIASTOLIC BLOOD PRESSURE >= 90 MM HG: ICD-10-PCS | Mod: CPTII,S$GLB,, | Performed by: NURSE PRACTITIONER

## 2021-11-10 PROCEDURE — 99213 OFFICE O/P EST LOW 20 MIN: CPT | Mod: 25,S$GLB,, | Performed by: NURSE PRACTITIONER

## 2021-11-10 PROCEDURE — 4010F PR ACE/ARB THEARPY RXD/TAKEN: ICD-10-PCS | Mod: CPTII,S$GLB,, | Performed by: NURSE PRACTITIONER

## 2021-11-10 PROCEDURE — 99999 PR PBB SHADOW E&M-EST. PATIENT-LVL IV: CPT | Mod: PBBFAC,,, | Performed by: NURSE PRACTITIONER

## 2021-11-10 PROCEDURE — 73560 X-RAY EXAM OF KNEE 1 OR 2: CPT | Mod: 26,RT,, | Performed by: RADIOLOGY

## 2021-11-10 PROCEDURE — 73560 XR KNEE 1 OR 2 VIEW RIGHT: ICD-10-PCS | Mod: 26,RT,, | Performed by: RADIOLOGY

## 2021-11-10 PROCEDURE — 72110 X-RAY EXAM L-2 SPINE 4/>VWS: CPT | Mod: TC,FY,PO

## 2021-11-10 PROCEDURE — 3044F PR MOST RECENT HEMOGLOBIN A1C LEVEL <7.0%: ICD-10-PCS | Mod: CPTII,S$GLB,, | Performed by: NURSE PRACTITIONER

## 2021-11-10 PROCEDURE — 99999 PR PBB SHADOW E&M-EST. PATIENT-LVL IV: ICD-10-PCS | Mod: PBBFAC,,, | Performed by: NURSE PRACTITIONER

## 2021-11-10 PROCEDURE — 3080F DIAST BP >= 90 MM HG: CPT | Mod: CPTII,S$GLB,, | Performed by: NURSE PRACTITIONER

## 2021-11-10 PROCEDURE — 96372 PR INJECTION,THERAP/PROPH/DIAG2ST, IM OR SUBCUT: ICD-10-PCS | Mod: S$GLB,,, | Performed by: NURSE PRACTITIONER

## 2021-11-10 PROCEDURE — 96372 THER/PROPH/DIAG INJ SC/IM: CPT | Mod: S$GLB,,, | Performed by: NURSE PRACTITIONER

## 2021-11-10 RX ORDER — IBUPROFEN 800 MG/1
800 TABLET ORAL 3 TIMES DAILY
Qty: 30 TABLET | Refills: 0 | Status: SHIPPED | OUTPATIENT
Start: 2021-11-10 | End: 2021-11-20

## 2021-11-10 RX ORDER — DEXAMETHASONE SODIUM PHOSPHATE 4 MG/ML
8 INJECTION, SOLUTION INTRA-ARTICULAR; INTRALESIONAL; INTRAMUSCULAR; INTRAVENOUS; SOFT TISSUE
Status: COMPLETED | OUTPATIENT
Start: 2021-11-10 | End: 2021-11-10

## 2021-11-10 RX ADMIN — DEXAMETHASONE SODIUM PHOSPHATE 8 MG: 4 INJECTION, SOLUTION INTRA-ARTICULAR; INTRALESIONAL; INTRAMUSCULAR; INTRAVENOUS; SOFT TISSUE at 03:11

## 2021-11-11 ENCOUNTER — PATIENT MESSAGE (OUTPATIENT)
Dept: FAMILY MEDICINE | Facility: CLINIC | Age: 61
End: 2021-11-11
Payer: COMMERCIAL

## 2021-11-11 ENCOUNTER — TELEPHONE (OUTPATIENT)
Dept: FAMILY MEDICINE | Facility: CLINIC | Age: 61
End: 2021-11-11
Payer: COMMERCIAL

## 2021-11-12 ENCOUNTER — TELEPHONE (OUTPATIENT)
Dept: FAMILY MEDICINE | Facility: CLINIC | Age: 61
End: 2021-11-12
Payer: COMMERCIAL

## 2021-11-12 ENCOUNTER — PATIENT OUTREACH (OUTPATIENT)
Dept: ADMINISTRATIVE | Facility: OTHER | Age: 61
End: 2021-11-12
Payer: COMMERCIAL

## 2021-11-13 ENCOUNTER — OFFICE VISIT (OUTPATIENT)
Dept: ORTHOPEDICS | Facility: CLINIC | Age: 61
End: 2021-11-13
Payer: COMMERCIAL

## 2021-11-13 VITALS — HEIGHT: 66 IN | WEIGHT: 253.5 LBS | BODY MASS INDEX: 40.74 KG/M2

## 2021-11-13 DIAGNOSIS — M54.16 LUMBAR RADICULOPATHY, CHRONIC: ICD-10-CM

## 2021-11-13 DIAGNOSIS — M54.31 SCIATICA OF RIGHT SIDE: Primary | ICD-10-CM

## 2021-11-13 PROCEDURE — 4010F ACE/ARB THERAPY RXD/TAKEN: CPT | Mod: CPTII,S$GLB,, | Performed by: ORTHOPAEDIC SURGERY

## 2021-11-13 PROCEDURE — 4010F PR ACE/ARB THEARPY RXD/TAKEN: ICD-10-PCS | Mod: CPTII,S$GLB,, | Performed by: ORTHOPAEDIC SURGERY

## 2021-11-13 PROCEDURE — 99999 PR PBB SHADOW E&M-EST. PATIENT-LVL III: ICD-10-PCS | Mod: PBBFAC,,, | Performed by: ORTHOPAEDIC SURGERY

## 2021-11-13 PROCEDURE — 1159F PR MEDICATION LIST DOCUMENTED IN MEDICAL RECORD: ICD-10-PCS | Mod: CPTII,S$GLB,, | Performed by: ORTHOPAEDIC SURGERY

## 2021-11-13 PROCEDURE — 3044F PR MOST RECENT HEMOGLOBIN A1C LEVEL <7.0%: ICD-10-PCS | Mod: CPTII,S$GLB,, | Performed by: ORTHOPAEDIC SURGERY

## 2021-11-13 PROCEDURE — 3044F HG A1C LEVEL LT 7.0%: CPT | Mod: CPTII,S$GLB,, | Performed by: ORTHOPAEDIC SURGERY

## 2021-11-13 PROCEDURE — 1160F PR REVIEW ALL MEDS BY PRESCRIBER/CLIN PHARMACIST DOCUMENTED: ICD-10-PCS | Mod: CPTII,S$GLB,, | Performed by: ORTHOPAEDIC SURGERY

## 2021-11-13 PROCEDURE — 3008F PR BODY MASS INDEX (BMI) DOCUMENTED: ICD-10-PCS | Mod: CPTII,S$GLB,, | Performed by: ORTHOPAEDIC SURGERY

## 2021-11-13 PROCEDURE — 1160F RVW MEDS BY RX/DR IN RCRD: CPT | Mod: CPTII,S$GLB,, | Performed by: ORTHOPAEDIC SURGERY

## 2021-11-13 PROCEDURE — 1159F MED LIST DOCD IN RCRD: CPT | Mod: CPTII,S$GLB,, | Performed by: ORTHOPAEDIC SURGERY

## 2021-11-13 PROCEDURE — 99999 PR PBB SHADOW E&M-EST. PATIENT-LVL III: CPT | Mod: PBBFAC,,, | Performed by: ORTHOPAEDIC SURGERY

## 2021-11-13 PROCEDURE — 99203 OFFICE O/P NEW LOW 30 MIN: CPT | Mod: S$GLB,,, | Performed by: ORTHOPAEDIC SURGERY

## 2021-11-13 PROCEDURE — 3008F BODY MASS INDEX DOCD: CPT | Mod: CPTII,S$GLB,, | Performed by: ORTHOPAEDIC SURGERY

## 2021-11-13 PROCEDURE — 99203 PR OFFICE/OUTPT VISIT, NEW, LEVL III, 30-44 MIN: ICD-10-PCS | Mod: S$GLB,,, | Performed by: ORTHOPAEDIC SURGERY

## 2021-11-13 RX ORDER — METHYLPREDNISOLONE 4 MG/1
TABLET ORAL
Qty: 21 EACH | Refills: 0 | Status: SHIPPED | OUTPATIENT
Start: 2021-11-13 | End: 2021-11-29

## 2021-11-16 ENCOUNTER — OFFICE VISIT (OUTPATIENT)
Dept: ORTHOPEDICS | Facility: CLINIC | Age: 61
End: 2021-11-16
Payer: COMMERCIAL

## 2021-11-16 VITALS — BODY MASS INDEX: 41.88 KG/M2 | WEIGHT: 260.56 LBS | HEIGHT: 66 IN

## 2021-11-16 DIAGNOSIS — M43.10 DEGENERATIVE SPONDYLOLISTHESIS: Primary | ICD-10-CM

## 2021-11-16 DIAGNOSIS — M51.36 DDD (DEGENERATIVE DISC DISEASE), LUMBAR: ICD-10-CM

## 2021-11-16 DIAGNOSIS — M54.31 SCIATICA OF RIGHT SIDE: ICD-10-CM

## 2021-11-16 PROCEDURE — 99999 PR PBB SHADOW E&M-EST. PATIENT-LVL IV: ICD-10-PCS | Mod: PBBFAC,,, | Performed by: ORTHOPAEDIC SURGERY

## 2021-11-16 PROCEDURE — 3044F PR MOST RECENT HEMOGLOBIN A1C LEVEL <7.0%: ICD-10-PCS | Mod: CPTII,S$GLB,, | Performed by: ORTHOPAEDIC SURGERY

## 2021-11-16 PROCEDURE — 99214 OFFICE O/P EST MOD 30 MIN: CPT | Mod: S$GLB,,, | Performed by: ORTHOPAEDIC SURGERY

## 2021-11-16 PROCEDURE — 99214 PR OFFICE/OUTPT VISIT, EST, LEVL IV, 30-39 MIN: ICD-10-PCS | Mod: S$GLB,,, | Performed by: ORTHOPAEDIC SURGERY

## 2021-11-16 PROCEDURE — 99999 PR PBB SHADOW E&M-EST. PATIENT-LVL IV: CPT | Mod: PBBFAC,,, | Performed by: ORTHOPAEDIC SURGERY

## 2021-11-16 PROCEDURE — 1160F RVW MEDS BY RX/DR IN RCRD: CPT | Mod: CPTII,S$GLB,, | Performed by: ORTHOPAEDIC SURGERY

## 2021-11-16 PROCEDURE — 3044F HG A1C LEVEL LT 7.0%: CPT | Mod: CPTII,S$GLB,, | Performed by: ORTHOPAEDIC SURGERY

## 2021-11-16 PROCEDURE — 1159F PR MEDICATION LIST DOCUMENTED IN MEDICAL RECORD: ICD-10-PCS | Mod: CPTII,S$GLB,, | Performed by: ORTHOPAEDIC SURGERY

## 2021-11-16 PROCEDURE — 4010F ACE/ARB THERAPY RXD/TAKEN: CPT | Mod: CPTII,S$GLB,, | Performed by: ORTHOPAEDIC SURGERY

## 2021-11-16 PROCEDURE — 4010F PR ACE/ARB THEARPY RXD/TAKEN: ICD-10-PCS | Mod: CPTII,S$GLB,, | Performed by: ORTHOPAEDIC SURGERY

## 2021-11-16 PROCEDURE — 1160F PR REVIEW ALL MEDS BY PRESCRIBER/CLIN PHARMACIST DOCUMENTED: ICD-10-PCS | Mod: CPTII,S$GLB,, | Performed by: ORTHOPAEDIC SURGERY

## 2021-11-16 PROCEDURE — 1159F MED LIST DOCD IN RCRD: CPT | Mod: CPTII,S$GLB,, | Performed by: ORTHOPAEDIC SURGERY

## 2021-11-16 PROCEDURE — 3008F BODY MASS INDEX DOCD: CPT | Mod: CPTII,S$GLB,, | Performed by: ORTHOPAEDIC SURGERY

## 2021-11-16 PROCEDURE — 3008F PR BODY MASS INDEX (BMI) DOCUMENTED: ICD-10-PCS | Mod: CPTII,S$GLB,, | Performed by: ORTHOPAEDIC SURGERY

## 2021-11-16 RX ORDER — GABAPENTIN 300 MG/1
300 CAPSULE ORAL 3 TIMES DAILY
Qty: 90 CAPSULE | Refills: 11 | Status: SHIPPED | OUTPATIENT
Start: 2021-11-16 | End: 2022-08-29 | Stop reason: SDUPTHER

## 2021-11-20 ENCOUNTER — HOSPITAL ENCOUNTER (OUTPATIENT)
Dept: RADIOLOGY | Facility: HOSPITAL | Age: 61
Discharge: HOME | End: 2021-11-20
Attending: ORTHOPAEDIC SURGERY
Payer: COMMERCIAL

## 2021-11-20 DIAGNOSIS — M54.31 SCIATICA OF RIGHT SIDE: ICD-10-CM

## 2021-11-20 PROCEDURE — 72148 MRI LUMBAR SPINE WITHOUT CONTRAST: ICD-10-PCS | Mod: 26,,, | Performed by: STUDENT IN AN ORGANIZED HEALTH CARE EDUCATION/TRAINING PROGRAM

## 2021-11-20 PROCEDURE — 72148 MRI LUMBAR SPINE W/O DYE: CPT | Mod: 26,,, | Performed by: STUDENT IN AN ORGANIZED HEALTH CARE EDUCATION/TRAINING PROGRAM

## 2021-11-20 PROCEDURE — 72148 MRI LUMBAR SPINE W/O DYE: CPT | Mod: TC

## 2021-11-29 ENCOUNTER — OFFICE VISIT (OUTPATIENT)
Dept: ORTHOPEDICS | Facility: CLINIC | Age: 61
End: 2021-11-29
Payer: COMMERCIAL

## 2021-11-29 VITALS — WEIGHT: 270.06 LBS | BODY MASS INDEX: 43.4 KG/M2 | HEIGHT: 66 IN

## 2021-11-29 DIAGNOSIS — M43.10 DEGENERATIVE SPONDYLOLISTHESIS: Primary | ICD-10-CM

## 2021-11-29 DIAGNOSIS — M54.16 LUMBAR RADICULOPATHY, CHRONIC: ICD-10-CM

## 2021-11-29 DIAGNOSIS — M51.36 DDD (DEGENERATIVE DISC DISEASE), LUMBAR: ICD-10-CM

## 2021-11-29 PROCEDURE — 99214 PR OFFICE/OUTPT VISIT, EST, LEVL IV, 30-39 MIN: ICD-10-PCS | Mod: S$GLB,,, | Performed by: ORTHOPAEDIC SURGERY

## 2021-11-29 PROCEDURE — 4010F PR ACE/ARB THEARPY RXD/TAKEN: ICD-10-PCS | Mod: CPTII,S$GLB,, | Performed by: ORTHOPAEDIC SURGERY

## 2021-11-29 PROCEDURE — 4010F ACE/ARB THERAPY RXD/TAKEN: CPT | Mod: CPTII,S$GLB,, | Performed by: ORTHOPAEDIC SURGERY

## 2021-11-29 PROCEDURE — 99999 PR PBB SHADOW E&M-EST. PATIENT-LVL III: CPT | Mod: PBBFAC,,, | Performed by: ORTHOPAEDIC SURGERY

## 2021-11-29 PROCEDURE — 99999 PR PBB SHADOW E&M-EST. PATIENT-LVL III: ICD-10-PCS | Mod: PBBFAC,,, | Performed by: ORTHOPAEDIC SURGERY

## 2021-11-29 PROCEDURE — 99214 OFFICE O/P EST MOD 30 MIN: CPT | Mod: S$GLB,,, | Performed by: ORTHOPAEDIC SURGERY

## 2021-12-10 ENCOUNTER — CLINICAL SUPPORT (OUTPATIENT)
Dept: REHABILITATION | Facility: HOSPITAL | Age: 61
End: 2021-12-10
Attending: ORTHOPAEDIC SURGERY
Payer: COMMERCIAL

## 2021-12-10 DIAGNOSIS — R26.2 DIFFICULTY WALKING: ICD-10-CM

## 2021-12-10 DIAGNOSIS — M53.86 DECREASED RANGE OF MOTION OF LUMBAR SPINE: ICD-10-CM

## 2021-12-10 DIAGNOSIS — M43.10 DEGENERATIVE SPONDYLOLISTHESIS: ICD-10-CM

## 2021-12-10 PROCEDURE — 97161 PT EVAL LOW COMPLEX 20 MIN: CPT | Mod: PN

## 2021-12-15 ENCOUNTER — PATIENT MESSAGE (OUTPATIENT)
Dept: ADMINISTRATIVE | Facility: HOSPITAL | Age: 61
End: 2021-12-15
Payer: COMMERCIAL

## 2021-12-29 ENCOUNTER — CLINICAL SUPPORT (OUTPATIENT)
Dept: REHABILITATION | Facility: HOSPITAL | Age: 61
End: 2021-12-29
Attending: ORTHOPAEDIC SURGERY
Payer: COMMERCIAL

## 2021-12-29 ENCOUNTER — TELEPHONE (OUTPATIENT)
Dept: FAMILY MEDICINE | Facility: CLINIC | Age: 61
End: 2021-12-29

## 2021-12-29 ENCOUNTER — CLINICAL SUPPORT (OUTPATIENT)
Dept: FAMILY MEDICINE | Facility: CLINIC | Age: 61
End: 2021-12-29
Payer: COMMERCIAL

## 2021-12-29 DIAGNOSIS — Z23 NEED FOR SHINGLES VACCINE: Primary | ICD-10-CM

## 2021-12-29 DIAGNOSIS — M53.86 DECREASED RANGE OF MOTION OF LUMBAR SPINE: ICD-10-CM

## 2021-12-29 DIAGNOSIS — R26.2 DIFFICULTY WALKING: ICD-10-CM

## 2021-12-29 PROCEDURE — 90750 ZOSTER RECOMBINANT VACCINE: ICD-10-PCS | Mod: S$GLB,,, | Performed by: FAMILY MEDICINE

## 2021-12-29 PROCEDURE — 90750 HZV VACC RECOMBINANT IM: CPT | Mod: S$GLB,,, | Performed by: FAMILY MEDICINE

## 2021-12-29 PROCEDURE — 97110 THERAPEUTIC EXERCISES: CPT | Mod: PN

## 2021-12-29 PROCEDURE — 99499 UNLISTED E&M SERVICE: CPT | Mod: S$GLB,,, | Performed by: FAMILY MEDICINE

## 2021-12-29 PROCEDURE — 90471 IMMUNIZATION ADMIN: CPT | Mod: S$GLB,,, | Performed by: FAMILY MEDICINE

## 2021-12-29 PROCEDURE — 90471 ZOSTER RECOMBINANT VACCINE: ICD-10-PCS | Mod: S$GLB,,, | Performed by: FAMILY MEDICINE

## 2021-12-29 PROCEDURE — 99499 NO LOS: ICD-10-PCS | Mod: S$GLB,,, | Performed by: FAMILY MEDICINE

## 2022-01-04 ENCOUNTER — HOSPITAL ENCOUNTER (OUTPATIENT)
Dept: RADIOLOGY | Facility: HOSPITAL | Age: 62
Discharge: HOME | End: 2022-01-04
Attending: ORTHOPAEDIC SURGERY
Payer: COMMERCIAL

## 2022-01-04 ENCOUNTER — OFFICE VISIT (OUTPATIENT)
Dept: ORTHOPEDICS | Facility: CLINIC | Age: 62
End: 2022-01-04
Payer: COMMERCIAL

## 2022-01-04 VITALS — WEIGHT: 270.31 LBS | HEIGHT: 66 IN | BODY MASS INDEX: 43.44 KG/M2

## 2022-01-04 DIAGNOSIS — M54.16 LUMBAR RADICULOPATHY, CHRONIC: ICD-10-CM

## 2022-01-04 DIAGNOSIS — M54.16 LUMBAR RADICULOPATHY, CHRONIC: Primary | ICD-10-CM

## 2022-01-04 PROCEDURE — 3008F PR BODY MASS INDEX (BMI) DOCUMENTED: ICD-10-PCS | Mod: CPTII,S$GLB,, | Performed by: ORTHOPAEDIC SURGERY

## 2022-01-04 PROCEDURE — 99999 PR PBB SHADOW E&M-EST. PATIENT-LVL III: CPT | Mod: PBBFAC,,, | Performed by: ORTHOPAEDIC SURGERY

## 2022-01-04 PROCEDURE — 72110 X-RAY EXAM L-2 SPINE 4/>VWS: CPT | Mod: TC

## 2022-01-04 PROCEDURE — 1159F PR MEDICATION LIST DOCUMENTED IN MEDICAL RECORD: ICD-10-PCS | Mod: CPTII,S$GLB,, | Performed by: ORTHOPAEDIC SURGERY

## 2022-01-04 PROCEDURE — 99214 PR OFFICE/OUTPT VISIT, EST, LEVL IV, 30-39 MIN: ICD-10-PCS | Mod: S$GLB,,, | Performed by: ORTHOPAEDIC SURGERY

## 2022-01-04 PROCEDURE — 72110 X-RAY EXAM L-2 SPINE 4/>VWS: CPT | Mod: 26,,, | Performed by: RADIOLOGY

## 2022-01-04 PROCEDURE — 99214 OFFICE O/P EST MOD 30 MIN: CPT | Mod: S$GLB,,, | Performed by: ORTHOPAEDIC SURGERY

## 2022-01-04 PROCEDURE — 1159F MED LIST DOCD IN RCRD: CPT | Mod: CPTII,S$GLB,, | Performed by: ORTHOPAEDIC SURGERY

## 2022-01-04 PROCEDURE — 72110 XR LUMBAR SPINE AP AND LAT WITH FLEX/EXT: ICD-10-PCS | Mod: 26,,, | Performed by: RADIOLOGY

## 2022-01-04 PROCEDURE — 99999 PR PBB SHADOW E&M-EST. PATIENT-LVL III: ICD-10-PCS | Mod: PBBFAC,,, | Performed by: ORTHOPAEDIC SURGERY

## 2022-01-04 PROCEDURE — 3008F BODY MASS INDEX DOCD: CPT | Mod: CPTII,S$GLB,, | Performed by: ORTHOPAEDIC SURGERY

## 2022-01-04 NOTE — PROGRESS NOTES
DATE: 1/4/2022  PATIENT: Brandi Freitas    Attending Physician: German Cummings M.D.    HISTORY:  Brandi Freitas is a 61 y.o. female who returns to me today for LBP FU. Patient complains of RLE pain and numbness. Patient recently sustained a fall from standing.    PMH/PSH/FamHx/SocHx:  Unchanged from prior visit    ROS:  Positive for LBP and RLE pain  Denies perineal paresthesias, bowel or bladder incontinence    EXAM:  LMP 07/18/2012     My physical examination was notable for the following findings: motor 5/5 BLE; SILT    IMAGING:  Previous MRI lumbar showed L5-S1 DDD with R moderate foraminal stenosis. No central stenosis.     Lumbar XRs showed L5-S1 DDD with anterolisthesis measuring 7mm.    ASSESSMENT/PLAN:    Patient has L5-S1 degen spondy and DDD with R foraminal stenosis. She will continue to take her pain meds. I gave her an updated work note. I ordered R L5-S1 TFESI. Patient will FU with me in 6 weeks.    German Cummings MD  Orthopaedic Spine Surgeon  Department of Orthopaedic Surgery  [phone]

## 2022-01-04 NOTE — LETTER
January 4, 2022      [id number]  [address]  [place] [address]  Phone: [phone]       Patient: Brandi Freitas   YOB: 1960  Date of Visit: 01/04/2022    To Whom It May Concern:    Maury Freitas  was at [facility] on 01/04/2022. The patient may return to work on 2/1/2022 with restrictions. Ms. Freitas may return to work with the following restrictions;    No heavy Lifting.  No prolonged standing.  No extended periods of walking.   Patient requires the use of a metal cane.  No more than 12 hours of work in one day.    Will re-evaluate after 4 weeks. If you have any questions or concerns, or if I can be of further assistance, please do not hesitate to contact me.    Sincerely,      German Cummings MD

## 2022-01-05 ENCOUNTER — CLINICAL SUPPORT (OUTPATIENT)
Dept: REHABILITATION | Facility: HOSPITAL | Age: 62
End: 2022-01-05
Attending: ORTHOPAEDIC SURGERY
Payer: COMMERCIAL

## 2022-01-05 ENCOUNTER — TELEPHONE (OUTPATIENT)
Dept: PAIN MEDICINE | Facility: OTHER | Age: 62
End: 2022-01-05
Payer: COMMERCIAL

## 2022-01-05 ENCOUNTER — PATIENT MESSAGE (OUTPATIENT)
Dept: PAIN MEDICINE | Facility: OTHER | Age: 62
End: 2022-01-05
Payer: COMMERCIAL

## 2022-01-05 DIAGNOSIS — M53.86 DECREASED RANGE OF MOTION OF LUMBAR SPINE: ICD-10-CM

## 2022-01-05 DIAGNOSIS — R26.2 DIFFICULTY WALKING: ICD-10-CM

## 2022-01-05 DIAGNOSIS — M54.16 LUMBAR RADICULOPATHY, CHRONIC: Primary | ICD-10-CM

## 2022-01-05 PROCEDURE — 97110 THERAPEUTIC EXERCISES: CPT | Mod: PN,CQ
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
## 2022-01-06 ENCOUNTER — TELEPHONE (OUTPATIENT)
Dept: PAIN MEDICINE | Facility: CLINIC | Age: 62
End: 2022-01-06
Payer: COMMERCIAL

## 2022-01-06 NOTE — TELEPHONE ENCOUNTER
----- Message from Kermit South sent at 1/6/2022  9:12 AM CST -----  Regarding: Surgery time  Contact: TYSHAWN VERGARA [[id number]]  Name of Who is Calling: TYSHAWN VERGARA [[id number]]      What is the request in detail: Would like to speak with staff in regards to knowing if she can get in earlier on 11th for her procedure. Please advise, please do not cancel.       Can the clinic reply by [facility]: yes      What Number to Call Back if not in [facility]: [phone]

## 2022-01-07 ENCOUNTER — TELEPHONE (OUTPATIENT)
Dept: PAIN MEDICINE | Facility: CLINIC | Age: 62
End: 2022-01-07
Payer: COMMERCIAL

## 2022-01-07 NOTE — TELEPHONE ENCOUNTER
----- Message from Franki Gordon MA sent at 1/7/2022 11:57 AM CST -----  Regarding: RE: Pending  reschedule  ----- Message -----  From: Ingrid Gray  Sent: 1/7/2022  11:56 AM CST  To: Pauline Villarreal Staff  Subject: Pending                                          Surgery is not cleared for _1/11________.  In the event the procedure is not been cleared, is the surgery deemed Medically Urgent (the MD will carry on with the surgery without the authorization)?    If the surgery is deemed Non Medically Urgent will the MD cancel or reschedule the surgery until authorization has been obtained?     Case is pending the nurse review with the health plan.  Auth turn around could be up to 15 days.

## 2022-01-12 ENCOUNTER — CLINICAL SUPPORT (OUTPATIENT)
Dept: REHABILITATION | Facility: HOSPITAL | Age: 62
End: 2022-01-12
Attending: ORTHOPAEDIC SURGERY
Payer: COMMERCIAL

## 2022-01-12 DIAGNOSIS — R26.2 DIFFICULTY WALKING: ICD-10-CM

## 2022-01-12 DIAGNOSIS — M53.86 DECREASED RANGE OF MOTION OF LUMBAR SPINE: ICD-10-CM

## 2022-01-12 PROCEDURE — 97110 THERAPEUTIC EXERCISES: CPT | Mod: PN

## 2022-01-12 NOTE — PROGRESS NOTES
"[facility]   Physical Therapy Treatment Note     Name: [facility]  Clinic Number: [id number]    Therapy Diagnosis:   Encounter Diagnoses   Name Primary?    Difficulty walking     Decreased range of motion of lumbar spine      Physician: German Cummings MD    Visit Date: 1/12/2022    Physician Orders: PT Eval and Treat   Medical Diagnosis from Referral: M43.10 (ICD-10-CM) - Degenerative spondylolisthesis  Evaluation Date: 12/10/2021  Plan of Care Expiration: 3/10/22     Authorization Period Expiration: 12/31/20  Visit # / Visits authorized: 3/ 20    PTA Visit #: 1/5      Time In: 700  Time Out: 745  Total Billable Time:  40 minutes    SUBJECTIVE     Pt reports: the back continues to be sore.     She was fairly compliant with home exercise program.  Response to previous treatment: no adverse reactions noted   Functional change: ongoing    Pain: 5/10  Location: right low back    OBJECTIVE     Objective Measures updated at progress report unless specified.     Treatment     Conchita received the treatments listed below:      therapeutic exercises to develop strength, endurance, ROM, flexibility and core stabilization for 40 minutes including:    Nu-step x7  LTR 2 x 10   piriformis stretch2 x 30"  PPT 2x10  Supine with breathing   TrA recruitment 2 x 10  Anti rotation with 7plate, free motion x 20  DKTC with T-ball x 20  Resisted trunk rotation in supine x 20  gastroc stretch on wedge 30 sec x 3  Quad table top holds 5x2"  Quad kickbacks 2x5 ea  Quad fire hydrant 2x5 ea    manual therapy techniques: Joint mobilizations and Soft tissue Mobilization were applied to the: low back for 5 minutes, including:  postlation with belt in short sitting    Patient Education and Home Exercises     Home Exercises Provided and Patient Education Provided     Education provided:   - Rationale for POC and progression    Written Home Exercises Provided: yes. Exercises were reviewed and Conchita was " able to demonstrate them prior to the end of the session.  Conchita demonstrated good  understanding of the education provided. See EMR under Patient Instructions for exercises provided during therapy sessions    ASSESSMENT     Pt demonstrates difficulty transitioning from sit to stand due to low back pain.  Progressed therex today to including standing lumbar stabilization with good performance.  Requires min cueing with pelvic mobility and abdominal response. Conchita Is progressing well towards her goals.     Pt prognosis is Good.     Pt will continue to benefit from skilled outpatient physical therapy to address the deficits listed in the problem list box on initial evaluation, provide pt/family education and to maximize pt's level of independence in the home and community environment.     Pt's spiritual, cultural and educational needs considered and pt agreeable to plan of care and goals.     Anticipated barriers to physical therapy: none     Goals:   Short Term Goals (4 Weeks):      1.Pt to increase strength by a 1/2 grade of muscles test to allow for improvement in functional activities such as performing chores. -progressing  2.Pt to improve range of motion by 25% to allow for improved functional mobility to allow for improvement in IADLs.  -progressing  3.Pt to report compliance with HEP and demonstrate proper exercise technique to PT to show competence with self management of condition. -progressing  4.Decrease pain by 25% during functional activities. -progressing     Long Term Goals (12 Weeks):      1. Increase ROM to allow improved joint biomechanics during functional activities.  -progressing  2.Increase trunk and lower extremity strength to within normal limits during functional activities.  -progressing  3. Independent with home exercise program.  -progressing  4. Full return to functional activities with manageable complaints. -progressing  5. Patient to demonstrate improved posture and body  mechanics. -progressing  6. Decrease pain by 75% during functional activities -progressing    PLAN     Plan of care Certification: 12/10/2021 to 3/10/22.    Continue with established POC, progressing with trunk flexibility and stability training as tolerated.    Les Melgoza, PT   1/12/2022

## 2022-01-13 ENCOUNTER — TELEPHONE (OUTPATIENT)
Dept: PAIN MEDICINE | Facility: CLINIC | Age: 62
End: 2022-01-13
Payer: COMMERCIAL

## 2022-01-13 NOTE — TELEPHONE ENCOUNTER
----- Message from Genna Hernandez MA sent at 1/12/2022  3:01 PM CST -----  Regarding: FW: Pt returning Call  Patient returning your call.     Genna   ----- Message -----  From: Brooklyn Marrufo  Sent: 1/12/2022   2:13 PM CST  To: [facility] Staff  Subject: Pt returning Call                                Who Called:TYSHAWN VERGARA [[id number]]        Who Left Message for Patient:Yolanda RAVI        Does the patient know what this is regarding?Yes        Best Call Back Number:[phone]        Additional Information:

## 2022-01-14 ENCOUNTER — PATIENT MESSAGE (OUTPATIENT)
Dept: PAIN MEDICINE | Facility: OTHER | Age: 62
End: 2022-01-14
Payer: COMMERCIAL

## 2022-01-17 ENCOUNTER — PATIENT MESSAGE (OUTPATIENT)
Dept: PAIN MEDICINE | Facility: OTHER | Age: 62
End: 2022-01-17
Payer: COMMERCIAL

## 2022-01-18 ENCOUNTER — HOSPITAL ENCOUNTER (OUTPATIENT)
Facility: OTHER | Age: 62
Discharge: HOME | End: 2022-01-18
Attending: ANESTHESIOLOGY | Admitting: ANESTHESIOLOGY
Payer: COMMERCIAL

## 2022-01-18 VITALS
HEIGHT: 66 IN | WEIGHT: 270 LBS | RESPIRATION RATE: 16 BRPM | DIASTOLIC BLOOD PRESSURE: 60 MMHG | BODY MASS INDEX: 43.39 KG/M2 | TEMPERATURE: 99 F | HEART RATE: 84 BPM | SYSTOLIC BLOOD PRESSURE: 137 MMHG | OXYGEN SATURATION: 100 %

## 2022-01-18 DIAGNOSIS — G89.29 CHRONIC PAIN: ICD-10-CM

## 2022-01-18 DIAGNOSIS — M51.36 DDD (DEGENERATIVE DISC DISEASE), LUMBAR: Primary | ICD-10-CM

## 2022-01-18 DIAGNOSIS — M54.16 LUMBAR RADICULOPATHY: ICD-10-CM

## 2022-01-18 PROCEDURE — 63600175 PHARM REV CODE 636 W HCPCS: Performed by: ANESTHESIOLOGY

## 2022-01-18 PROCEDURE — 64483 NJX AA&/STRD TFRM EPI L/S 1: CPT | Mod: RT | Performed by: ANESTHESIOLOGY

## 2022-01-18 PROCEDURE — 25500020 PHARM REV CODE 255: Performed by: ANESTHESIOLOGY

## 2022-01-18 PROCEDURE — 25000003 PHARM REV CODE 250: Performed by: STUDENT IN AN ORGANIZED HEALTH CARE EDUCATION/TRAINING PROGRAM

## 2022-01-18 PROCEDURE — 25000003 PHARM REV CODE 250: Performed by: ANESTHESIOLOGY

## 2022-01-18 PROCEDURE — 64483 PR EPIDURAL INJ, ANES/STEROID, TRANSFORAMINAL, LUMB/SACR, SNGL LEVL: ICD-10-PCS | Mod: RT,,, | Performed by: ANESTHESIOLOGY

## 2022-01-18 PROCEDURE — 64483 NJX AA&/STRD TFRM EPI L/S 1: CPT | Mod: RT,,, | Performed by: ANESTHESIOLOGY

## 2022-01-18 RX ORDER — SODIUM CHLORIDE 9 MG/ML
INJECTION, SOLUTION INTRAVENOUS CONTINUOUS
Status: DISCONTINUED | OUTPATIENT
Start: 2022-01-18 | End: 2022-01-18 | Stop reason: HOSPADM

## 2022-01-18 RX ORDER — DEXAMETHASONE SODIUM PHOSPHATE 10 MG/ML
INJECTION INTRAMUSCULAR; INTRAVENOUS
Status: DISCONTINUED | OUTPATIENT
Start: 2022-01-18 | End: 2022-01-18 | Stop reason: HOSPADM

## 2022-01-18 RX ORDER — LIDOCAINE HYDROCHLORIDE 20 MG/ML
INJECTION, SOLUTION INFILTRATION; PERINEURAL
Status: DISCONTINUED | OUTPATIENT
Start: 2022-01-18 | End: 2022-01-18 | Stop reason: HOSPADM

## 2022-01-18 RX ORDER — MIDAZOLAM HYDROCHLORIDE 1 MG/ML
INJECTION INTRAMUSCULAR; INTRAVENOUS
Status: DISCONTINUED | OUTPATIENT
Start: 2022-01-18 | End: 2022-01-18 | Stop reason: HOSPADM

## 2022-01-18 RX ORDER — FENTANYL CITRATE 50 UG/ML
INJECTION, SOLUTION INTRAMUSCULAR; INTRAVENOUS
Status: DISCONTINUED | OUTPATIENT
Start: 2022-01-18 | End: 2022-01-18 | Stop reason: HOSPADM

## 2022-01-18 RX ORDER — LIDOCAINE HYDROCHLORIDE 10 MG/ML
INJECTION, SOLUTION EPIDURAL; INFILTRATION; INTRACAUDAL; PERINEURAL
Status: DISCONTINUED | OUTPATIENT
Start: 2022-01-18 | End: 2022-01-18 | Stop reason: HOSPADM

## 2022-01-18 RX ADMIN — SODIUM CHLORIDE: 0.9 INJECTION, SOLUTION INTRAVENOUS at 01:01

## 2022-01-18 NOTE — OP NOTE
Lumbar Transforaminal Epidural Steroid Injection under Fluoroscopic Guidance    The procedure, risks, benefits, and options were discussed with the patient. There are no contraindications to the procedure. The patent expressed understanding and agreed to the procedure. Informed written consent was obtained prior to the start of the procedure and can be found in the patient's chart.    PATIENT NAME: Brandi Freitas   MRN: [id number]     DATE OF PROCEDURE: 01/18/2022    PROCEDURE:  Right  L5/S1 Lumbar Transforaminal Epidural Steroid Injection under Fluoroscopic Guidance    PRE-OP DIAGNOSIS: Lumbar radiculopathy, chronic [M54.16]    POST-OP DIAGNOSIS: Same    PHYSICIAN: Cherelle Carpenter MD    ASSISTANTS: Dalila Pierre DO     MEDICATIONS INJECTED: Preservative-free Decadron 10mg with 5cc of Lidocaine 1% MPF     LOCAL ANESTHETIC INJECTED: Xylocaine 2%     SEDATION:   Versed 2mg and Fentanyl 50mcg                                                                                                                                                                                     Conscious sedation ordered by M.D. Patient re-evaluation prior to administration of conscious sedation. No changes noted in patient's status from initial evaluation. The patient's vital signs were monitored by RN and patient remained hemodynamically stable throughout the procedure.    Event Time In   Sedation Start 1334       ESTIMATED BLOOD LOSS: None    COMPLICATIONS: None    TECHNIQUE: Time-out was performed to identify the patient and procedure to be performed. With the patient laying in a prone position, the surgical area was prepped and draped in the usual sterile fashion using ChloraPrep and a fenestrated drape.The levels were determined under fluoroscopy guidance. Skin anesthesia was achieved by injecting Lidocaine 2% over the injection sites. The transforaminal spaces were then approached with a 22 gauge, 5 inch spinal quinke needle that was  introduced under fluoroscopic guidance in the AP and Lateral views. Once the needle tip was in the area of the transforaminal space, and there was no blood, CSF or paraesthesias, contrast dye Omnipaque (300mg/ml) was injected to confirm placement and there was no vascular runoff. Fluoroscopic imaging in the AP and lateral views revealed a clear outline of the spinal nerve with proximal spread of agent through the neural foramen into the epidural space. 3 mL of the medication mixture listed above was injected slowly at each site. Displacement of the radio opaque contrast after injection of the medication confirmed that the medication went into the area of the transforaminal spaces. The needles were removed and bleeding was nil. A sterile dressing was applied. No specimens collected. The patient tolerated the procedure well.       The patient was monitored after the procedure in the recovery area. They were given post-procedure and discharge instructions to follow at home. The patient was discharged in a stable condition.    Dalila Pierre DO

## 2022-01-18 NOTE — DISCHARGE INSTRUCTIONS
Thank you for allowing us to care for you today. You may receive a survey about the care we provided. Your feedback is valuable and helps us provide excellent care throughout the community.     Home Care Instructions for Pain Management:    1. DIET:   You may resume your normal diet today.   2. BATHING:   You may shower with luke warm water. No tub baths or anything that will soak injection sites under water for the next 24 hours.  3. DRESSING:   You may remove your bandage today.   4. ACTIVITY LEVEL:   You may resume your normal activities 24 hrs after your procedure. Nothing strenuous today.  5. MEDICATIONS:   You may resume your normal medications today. To restart blood thinners, ask your doctor.  6. DRIVING    If you have received any sedatives by mouth today, you may not drive for 12 hours.    If you have received any sedation through your IV, you may not drive for 24 hrs.   7. SPECIAL INSTRUCTIONS:   No heat to the injection site for 24 hrs including, hot bath or shower, heating pad, moist heat, or hot tubs.    Use ice pack to injection site for any pain or discomfort.  Apply ice packs for 20 minute intervals as needed.    IF you have diabetes, be sure to monitor your blood sugar more closely. IF your injection contained steroids your blood sugar levels may become higher than normal.    If you are still having pain upon discharge:  Your pain may improve over the next 48 hours. The anesthetic (numbing medication) works immediately to 48 hours. IF your injection contained a steroid (anti-inflammatory medication), it takes approximately 3 days to start feeling relief and 7-10 days to see your greatest results from the medication. It is possible you may need subsequent injections. This would be discussed at your follow up appointment with pain management or your referring doctor.    Please call the PAIN MANAGEMENT office at [phone] or ON CALL pager at [phone] if you experienced any:   -Weakness or  loss of sensation  -Fever > 101.5  -Pain uncontrolled with oral medications   -Persistent nausea, vomiting, or diarrhea  -Redness or drainage from the injection sites, or any other worrisome concerns.   If physician on call was not reached or could not communicate with our office for any reason please go to the nearest emergency department.  Patient Education       Moderate and Deep Sedation in Adults   About this topic   Sedation changes a persons level of consciousness or being awake. Doctors give moderate or deep sedation to help you become more comfortable when they need to do a procedure. The staff watch you closely when you are given sedation. With sedation, you may not remember the procedure when it is over. The level of your sedation may be moderate or deep.  With moderate or conscious sedation, you:  · Will be relaxed and calm.  · May seem to sleep.  · May feel only slight pain or no pain at all.  · May talk and answer questions.  · Breathe on your own.  With deep sedation, you:  · Will be relaxed and calm.  · May seem to sleep.  · May feel only slight pain or no pain at all.  · Are not able talk or answer questions.  · May need help keeping your airway open or breathing.  Sedation is given by someone with special training. This is someone like a:  · Certified registered nurse anesthetist (CRNA)  · Anesthesiologist  · Doctor  · Dentist  · Oral surgeon  Why is this procedure done?   Sedation is most often given for procedures such as:  · Minor surgeries or procedures, like taking a tissue sample or fixing a broken bone  · Colonoscopy  · Vasectomy  · Dental surgery, like an implant or taking out an impacted tooth  · Endoscopy  · Bronchoscopy  · Plastic cosmetic surgery  · Endotracheal procedure  What happens before the procedure?   · Your doctor will take your history and do an exam. Tell the doctor if you have any drug allergy. Talk to the doctor about:  ? All the drugs you are taking. Be sure to include  all prescription, over-the-counter, vitamins, and herbal supplements. Bring a list of drugs you take with you.  ? Any bleeding problems. Be sure to tell your doctor if you are taking any drugs that may cause bleeding. Some of these are warfarin, rivaroxaban, apixaban, ticagrelor, clopidogrel, ibuprofen, naproxen, or aspirin. Certain vitamins and herbs, such as garlic and fish oil, may also add to the risk for bleeding. You may need to stop these drugs as well. Talk to your doctor about them.  ? Any previous problems with sedation or anesthesia.  · Talk with the doctor about when you last ate or drank anything.  · You will not be allowed to drive after the procedure. Plan another way to get home.  What happens after the procedure?   You may feel these side effects:  · Headache  · Upset stomach or throwing up  · Hangover feeling  · Short period of no memory or cloudy memory  · Bad memories  · Confusion or hallucinations  What care is needed at home?   · Do not make major decisions or sign important papers for at least 24 hours. You may not be thinking clearly.  · Do not drive or operate machinery for 24 hours or until OK'd by your doctor.  What problems could happen?   · Low blood pressure  · Breathing problems  · Heart problems  · Allergic reactions  Last Reviewed Date   2021-05-06  Consumer Information Use and Disclaimer   This information is not specific medical advice and does not replace information you receive from your health care provider. This is only a brief summary of general information. It does NOT include all information about conditions, illnesses, injuries, tests, procedures, treatments, therapies, discharge instructions or life-style choices that may apply to you. You must talk with your health care provider for complete information about your health and treatment options. This information should not be used to decide whether or not to accept your health care providers advice, instructions or  recommendations. Only your health care provider has the knowledge and training to provide advice that is right for you.  Copyright   Copyright © 2021 [employer] and its affiliates and/or licensors. All rights reserved.

## 2022-01-18 NOTE — H&P
"HPI: Patient presents today for scheduled right L5/S1 TFESI      Past Medical History:   Diagnosis Date    Anxiety     Arthritis     knee    Asthma     Bursitis, shoulder     Colon polyps     Dental bridge present     upper right    Dental crown present     Depression     Hypertension     Pes planus     Wears glasses     for reading     Past Surgical History:   Procedure Laterality Date    arthroscopy ankle right      bilateral inguinal hernia repair      COLONOSCOPY N/A 1/11/2018    Procedure: COLONOSCOPY;  Surgeon: Christoph Nolasco MD;  Location: [facility];  Service: Endoscopy;  Laterality: N/A;    left torn rotator cuff repair  09/2014    MYOMECTOMY      rt salphigectomy      SYNOVECTOMY      UMBILICAL HERNIA REPAIR       Review of patient's allergies indicates:   Allergen Reactions    Lisinopril Hives, Itching, Rash, Shortness Of Breath and Swelling        PMHx, PSHx, Allergies, Medications reviewed in epic      ROS negative except pain complaints in HPI    OBJECTIVE:    BP (!) 179/81   Pulse 89   Temp 99 °F (37.2 °C) (Oral)   Resp 18   Ht 5' 6" (1.676 m)   Wt 122.5 kg (270 lb)   LMP 07/18/2012   SpO2 97%   BMI 43.58 kg/m²     PHYSICAL EXAMINATION:    GENERAL: Well appearing, in no acute distress, alert and oriented x3.  PSYCH:  Mood and affect appropriate.  SKIN: Skin color, texture, turgor normal, no rashes or lesions.  CV: RRR with palpation of the radial artery.  PULM: No evidence of respiratory difficulty, symmetric chest rise. Clear to auscultation.  NEURO: Cranial nerves grossly intact.    Plan:    Proceed with procedure as planned    Dalila Pierre  01/18/2022    "

## 2022-01-18 NOTE — DISCHARGE SUMMARY
[facility] - Pain Management ([facility])  Discharge Note  Short Stay    Procedure(s) (LRB):  LUMBAR TRANSFORAMINAL RIGHT L5/S1 DIRECT REFERRAL (Right)    OUTCOME: Patient tolerated treatment/procedure well without complication and is now ready for discharge.    DISPOSITION: Home or Self Care    FINAL DIAGNOSIS:  <principal problem not specified>    FOLLOWUP: In clinic    DISCHARGE INSTRUCTIONS:  No discharge procedures on file.     TIME SPENT ON DISCHARGE: 2 minutes

## 2022-01-19 ENCOUNTER — CLINICAL SUPPORT (OUTPATIENT)
Dept: REHABILITATION | Facility: HOSPITAL | Age: 62
End: 2022-01-19
Attending: ORTHOPAEDIC SURGERY
Payer: COMMERCIAL

## 2022-01-19 DIAGNOSIS — M53.86 DECREASED RANGE OF MOTION OF LUMBAR SPINE: ICD-10-CM

## 2022-01-19 DIAGNOSIS — R26.2 DIFFICULTY WALKING: ICD-10-CM

## 2022-01-19 PROCEDURE — 97110 THERAPEUTIC EXERCISES: CPT | Mod: PN

## 2022-01-19 NOTE — PROGRESS NOTES
"[facility]   Physical Therapy Treatment Note     Name: Brand[facility]  Clinic Number: [id number]    Therapy Diagnosis:   Encounter Diagnoses   Name Primary?    Difficulty walking     Decreased range of motion of lumbar spine      Physician: German Cummings MD    Visit Date: 1/19/2022    Physician Orders: PT Eval and Treat   Medical Diagnosis from Referral: M43.10 (ICD-10-CM) - Degenerative spondylolisthesis  Evaluation Date: 12/10/2021  Plan of Care Expiration: 3/10/22     Authorization Period Expiration: 12/31/20  Visit # / Visits authorized: 3/ 20    PTA Visit #: 1/5      Time In: 0800  Time Out: 0840    Total Billable Time:  40 minutes    SUBJECTIVE     Pt reports: the back is doing a little better.  States she got an injection yesterday.     She was fairly compliant with home exercise program.  Response to previous treatment: no adverse reactions noted   Functional change: ongoing    Pain: 5/10  Location: right low back    OBJECTIVE     Objective Measures updated at progress report unless specified.     Treatment     Conchita received the treatments listed below:      therapeutic exercises to develop strength, endurance, ROM, flexibility and core stabilization for 40 minutes including:    Nu-step x7  LTR 2 x 10   piriformis stretch2 x 30"  PPT 2x10  Supine with breathing   TrA recruitment 2 x 10  Anti rotation with 7plate, free motion x 20  DKTC with T-ball x 20  Resisted trunk rotation in supine x 20  gastroc stretch on wedge 30 sec x 3  Quad table top holds 5x2"  Quad kickbacks 2x5 ea  Quad fire hydrant 2x5 ea    manual therapy techniques: Joint mobilizations and Soft tissue Mobilization were applied to the: low back for 5 minutes, including:  postlation with belt in short sitting    Patient Education and Home Exercises     Home Exercises Provided and Patient Education Provided     Education provided:   - Rationale for POC and progression    Written Home Exercises Provided: " yes. Exercises were reviewed and Conchita was able to demonstrate them prior to the end of the session.  Conchita demonstrated good  understanding of the education provided. See EMR under Patient Instructions for exercises provided during therapy sessions    ASSESSMENT     Pt demonstrates improvements transitioning from sit to stand due to low back pain.  Modified therex today due to recent injection.  Requires decreased cueing with pelvic mobility and abdominal response. Conchita Is progressing well towards her goals.     Pt prognosis is Good.     Pt will continue to benefit from skilled outpatient physical therapy to address the deficits listed in the problem list box on initial evaluation, provide pt/family education and to maximize pt's level of independence in the home and community environment.     Pt's spiritual, cultural and educational needs considered and pt agreeable to plan of care and goals.     Anticipated barriers to physical therapy: none     Goals:   Short Term Goals (4 Weeks):      1.Pt to increase strength by a 1/2 grade of muscles test to allow for improvement in functional activities such as performing chores. -progressing  2.Pt to improve range of motion by 25% to allow for improved functional mobility to allow for improvement in IADLs.  -progressing  3.Pt to report compliance with HEP and demonstrate proper exercise technique to PT to show competence with self management of condition. -progressing  4.Decrease pain by 25% during functional activities. -progressing     Long Term Goals (12 Weeks):      1. Increase ROM to allow improved joint biomechanics during functional activities.  -progressing  2.Increase trunk and lower extremity strength to within normal limits during functional activities.  -progressing  3. Independent with home exercise program.  -progressing  4. Full return to functional activities with manageable complaints. -progressing  5. Patient to demonstrate improved  posture and body mechanics. -progressing  6. Decrease pain by 75% during functional activities -progressing    PLAN     Plan of care Certification: 12/10/2021 to 3/10/22.    Continue with established POC, progressing with trunk flexibility and stability training as tolerated.    Les Melgoza, PT   1/19/2022

## 2022-01-26 ENCOUNTER — CLINICAL SUPPORT (OUTPATIENT)
Dept: REHABILITATION | Facility: HOSPITAL | Age: 62
End: 2022-01-26
Attending: ORTHOPAEDIC SURGERY
Payer: COMMERCIAL

## 2022-01-26 DIAGNOSIS — R26.2 DIFFICULTY WALKING: ICD-10-CM

## 2022-01-26 DIAGNOSIS — M53.86 DECREASED RANGE OF MOTION OF LUMBAR SPINE: ICD-10-CM

## 2022-01-26 PROCEDURE — 97110 THERAPEUTIC EXERCISES: CPT | Mod: PN

## 2022-01-26 NOTE — PROGRESS NOTES
"[facility]   Physical Therapy Progress Note     Name: Brandi Gray [facility]  Clinic Number: [id number]    Therapy Diagnosis:   Encounter Diagnoses   Name Primary?    Difficulty walking     Decreased range of motion of lumbar spine      Physician: German Cummings MD    Visit Date: 1/26/2022    Physician Orders: PT Eval and Treat   Medical Diagnosis from Referral: M43.10 (ICD-10-CM) - Degenerative spondylolisthesis  Evaluation Date: 12/10/2021  Plan of Care Expiration: 3/10/22     Authorization Period Expiration: 12/31/20  Visit # / Visits authorized: 3/ 20    PTA Visit #: 1/5      Time In: 0700  Time Out: 0745    Total Billable Time:  40 minutes    SUBJECTIVE     Pt reports: the back continues to be sore.  States the left knee painful today.     She was fairly compliant with home exercise program.  Response to previous treatment: good  Functional change: none    Pain: 5/10  Location: right low back    OBJECTIVE     Objective Measures updated at progress report unless specified.       Palpation: mild tenderness L3-S1 interspinous space, right QL        Range of Motion/Strength:       Lumbar AROM: Degrees   Flexion 45   Extension 5   Right side bending 12   Left side bending 12   Lumbar quadrant reveals: increase in pain with flexion     AROM: Bilateral LE: Grossly WFL  MMT:  Right LE: 4-   Left LE: 4-  Abdominal Strength: 4-/5      Treatment     Conchita received the treatments listed below:      therapeutic exercises to develop strength, endurance, ROM, flexibility and core stabilization for 40 minutes including:    Nu-step x7  LTR 2 x 10   piriformis stretch2 x 30"  PPT 2x10  TrA recruitment 2 x 10  Anti rotation with 7plate, free motion x 20  DKTC with T-ball x 20  Resisted trunk rotation in supine x 20  gastroc stretch on wedge 30 sec x 3  Modified prayer with t-ball x 20  Quad set 2 x 10  SLR 2 x 10  sidelying hip abd 2 x 10      manual therapy techniques: Joint mobilizations and Soft " tissue Mobilization were applied to the: low back for 5 minutes, including:  NP  postlation with belt in short sitting    Patient Education and Home Exercises     Home Exercises Provided and Patient Education Provided     Education provided:   - Rationale for POC and progression    Written Home Exercises Provided: yes. Exercises were reviewed and Conchita was able to demonstrate them prior to the end of the session.  Conchita demonstrated good  understanding of the education provided. See EMR under Patient Instructions for exercises provided during therapy sessions    ASSESSMENT     Presents today ambulating with a SPC, decreased stance on left LE.  No c/o increased discomfort with prescribed activities. Modified therex today due to c/o left knee pain.  Good performance with pelvic mobility and abdominal response. Conchita Is progressing well towards her goals.     Pt prognosis is Good.     Pt will continue to benefit from skilled outpatient physical therapy to address the deficits listed in the problem list box on initial evaluation, provide pt/family education and to maximize pt's level of independence in the home and community environment.     Pt's spiritual, cultural and educational needs considered and pt agreeable to plan of care and goals.     Anticipated barriers to physical therapy: none     Goals:   Short Term Goals (4 Weeks):  Updated 1/26/22  Partially MET, in progress     1.Pt to increase strength by a 1/2 grade of muscles test to allow for improvement in functional activities such as performing chores. -progressing  2.Pt to improve range of motion by 25% to allow for improved functional mobility to allow for improvement in IADLs.  -progressing  3.Pt to report compliance with HEP and demonstrate proper exercise technique to PT to show competence with self management of condition. MET  4.Decrease pain by 25% during functional activities. -progressing     Long Term Goals (12 Weeks):      1.  Increase ROM to allow improved joint biomechanics during functional activities.  -progressing  2.Increase trunk and lower extremity strength to within normal limits during functional activities.  -progressing  3. Independent with home exercise program.  -progressing  4. Full return to functional activities with manageable complaints. -progressing  5. Patient to demonstrate improved posture and body mechanics. -progressing  6. Decrease pain by 75% during functional activities -progressing    PLAN     Plan of care Certification: 12/10/2021 to 3/10/22.    Continue with established POC, progressing with trunk flexibility and stability training as tolerated.    Les Melgoza, PT   1/26/2022

## 2022-02-02 ENCOUNTER — CLINICAL SUPPORT (OUTPATIENT)
Dept: REHABILITATION | Facility: HOSPITAL | Age: 62
End: 2022-02-02
Attending: ORTHOPAEDIC SURGERY
Payer: COMMERCIAL

## 2022-02-02 DIAGNOSIS — M53.86 DECREASED RANGE OF MOTION OF LUMBAR SPINE: ICD-10-CM

## 2022-02-02 DIAGNOSIS — R26.2 DIFFICULTY WALKING: ICD-10-CM

## 2022-02-02 PROCEDURE — 97110 THERAPEUTIC EXERCISES: CPT | Mod: PN

## 2022-02-02 NOTE — PROGRESS NOTES
"[facility]   Physical Therapy Daily Treatment Note     Name: Brandi Gray [facility]  Clinic Number: [id number]    Therapy Diagnosis:   Encounter Diagnoses   Name Primary?    Difficulty walking     Decreased range of motion of lumbar spine      Physician: German Cummings MD    Visit Date: 2/2/2022    Physician Orders: PT Eval and Treat   Medical Diagnosis from Referral: M43.10 (ICD-10-CM) - Degenerative spondylolisthesis  Evaluation Date: 12/10/2021  Plan of Care Expiration: 3/10/22     Authorization Period Expiration: 12/31/20  Visit # / Visits authorized: 4/ 20    PTA Visit #: 1/5      Time In: 1045  Time Out: 1130    Total Billable Time:  40 minutes    SUBJECTIVE     Pt reports: the back is doing a little better. States the left knee continues to give her problems.     She was fairly compliant with home exercise program.  Response to previous treatment: good  Functional change: none    Pain: 5/10  Location: right low back    OBJECTIVE     Objective Measures updated at progress report unless specified.       Treatment     Conchita received the treatments listed below:      therapeutic exercises to develop strength, endurance, ROM, flexibility and core stabilization for 40 minutes including:    Nu-step x7  LTR 2 x 10   piriformis stretch2 x 30"  PPT 2x10  TrA recruitment 2 x 10   TrA recruitment 2 x 10 with hip flexion  TrA recruitment 2 x 10 with hip ext  Anti rotation with 7plate, free motion x 20  DKTC with T-ball x 20  Resisted trunk rotation in supine x 20  gastroc stretch on wedge 30 sec x 3  Modified prayer with t-ball x 20  Quad set 2 x 10  SLR 2 x 10  sidelying hip abd 2 x 10      manual therapy techniques: Joint mobilizations and Soft tissue Mobilization were applied to the: low back for 5 minutes, including:  NP  postlation with belt in short sitting    Patient Education and Home Exercises     Home Exercises Provided and Patient Education Provided     Education provided:   - " Rationale for POC and progression    Written Home Exercises Provided: yes. Exercises were reviewed and Conchita was able to demonstrate them prior to the end of the session.  Conchita demonstrated good  understanding of the education provided. See EMR under Patient Instructions for exercises provided during therapy sessions    ASSESSMENT     Presents today ambulating today without an AD.  Requires decreased cueing with pelvic mobility and abdominal response with stabilization therex.  No c/o increased discomfort with prescribed activities. Good performance with pelvic mobility and abdominal response. Conchita Is progressing well towards her goals.     Pt prognosis is Good.     Pt will continue to benefit from skilled outpatient physical therapy to address the deficits listed in the problem list box on initial evaluation, provide pt/family education and to maximize pt's level of independence in the home and community environment.     Pt's spiritual, cultural and educational needs considered and pt agreeable to plan of care and goals.     Anticipated barriers to physical therapy: none     Goals:   Short Term Goals (4 Weeks):  Updated 1/26/22  Partially MET, in progress     1.Pt to increase strength by a 1/2 grade of muscles test to allow for improvement in functional activities such as performing chores. -progressing  2.Pt to improve range of motion by 25% to allow for improved functional mobility to allow for improvement in IADLs.  -progressing  3.Pt to report compliance with HEP and demonstrate proper exercise technique to PT to show competence with self management of condition. MET  4.Decrease pain by 25% during functional activities. -progressing     Long Term Goals (12 Weeks):      1. Increase ROM to allow improved joint biomechanics during functional activities.  -progressing  2.Increase trunk and lower extremity strength to within normal limits during functional activities.  -progressing  3. Independent  with home exercise program.  -progressing  4. Full return to functional activities with manageable complaints. -progressing  5. Patient to demonstrate improved posture and body mechanics. -progressing  6. Decrease pain by 75% during functional activities -progressing    PLAN     Plan of care Certification: 12/10/2021 to 3/10/22.    Continue with established POC, progressing with trunk flexibility and stability training as tolerated.    Les Melgoza, PT   2/2/2022

## 2022-02-22 ENCOUNTER — OFFICE VISIT (OUTPATIENT)
Dept: ORTHOPEDICS | Facility: CLINIC | Age: 62
End: 2022-02-22
Payer: COMMERCIAL

## 2022-02-22 VITALS — BODY MASS INDEX: 43.59 KG/M2 | WEIGHT: 270.06 LBS

## 2022-02-22 DIAGNOSIS — M54.16 LUMBAR RADICULOPATHY, CHRONIC: ICD-10-CM

## 2022-02-22 DIAGNOSIS — M51.36 DDD (DEGENERATIVE DISC DISEASE), LUMBAR: ICD-10-CM

## 2022-02-22 DIAGNOSIS — M43.10 DEGENERATIVE SPONDYLOLISTHESIS: Primary | ICD-10-CM

## 2022-02-22 PROCEDURE — 3008F BODY MASS INDEX DOCD: CPT | Mod: CPTII,S$GLB,, | Performed by: ORTHOPAEDIC SURGERY

## 2022-02-22 PROCEDURE — 99214 OFFICE O/P EST MOD 30 MIN: CPT | Mod: S$GLB,,, | Performed by: ORTHOPAEDIC SURGERY

## 2022-02-22 PROCEDURE — 99999 PR PBB SHADOW E&M-EST. PATIENT-LVL III: ICD-10-PCS | Mod: PBBFAC,,, | Performed by: ORTHOPAEDIC SURGERY

## 2022-02-22 PROCEDURE — 99999 PR PBB SHADOW E&M-EST. PATIENT-LVL III: CPT | Mod: PBBFAC,,, | Performed by: ORTHOPAEDIC SURGERY

## 2022-02-22 PROCEDURE — 1160F RVW MEDS BY RX/DR IN RCRD: CPT | Mod: CPTII,S$GLB,, | Performed by: ORTHOPAEDIC SURGERY

## 2022-02-22 PROCEDURE — 99214 PR OFFICE/OUTPT VISIT, EST, LEVL IV, 30-39 MIN: ICD-10-PCS | Mod: S$GLB,,, | Performed by: ORTHOPAEDIC SURGERY

## 2022-02-22 PROCEDURE — 3008F PR BODY MASS INDEX (BMI) DOCUMENTED: ICD-10-PCS | Mod: CPTII,S$GLB,, | Performed by: ORTHOPAEDIC SURGERY

## 2022-02-22 PROCEDURE — 1159F PR MEDICATION LIST DOCUMENTED IN MEDICAL RECORD: ICD-10-PCS | Mod: CPTII,S$GLB,, | Performed by: ORTHOPAEDIC SURGERY

## 2022-02-22 PROCEDURE — 1160F PR REVIEW ALL MEDS BY PRESCRIBER/CLIN PHARMACIST DOCUMENTED: ICD-10-PCS | Mod: CPTII,S$GLB,, | Performed by: ORTHOPAEDIC SURGERY

## 2022-02-22 PROCEDURE — 1159F MED LIST DOCD IN RCRD: CPT | Mod: CPTII,S$GLB,, | Performed by: ORTHOPAEDIC SURGERY

## 2022-02-22 NOTE — PROGRESS NOTES
DATE: 2/22/2022  PATIENT: Brandi Freitas    Attending Physician: Germna Cummings M.D.    HISTORY:  Brandi Freitas is a 61 y.o. female who returns to me today for clinical FU after L5-S1 R TFESI. Patient's RLE pain and numbness have improved from the injection. She would like to go back to work.    PMH/PSH/FamHx/SocHx:  Unchanged from prior visit    ROS:  Positive for LBP and RLE pain  Denies perineal paresthesias, bowel or bladder incontinence    EXAM:  Wt 122.5 kg (270 lb 1 oz)   LMP 07/18/2012   BMI 43.59 kg/m²     My physical examination was notable for the following findings: motor 5/5 BLE; SILT    IMAGING:  Previous MRI lumbar showed L5-S1 DDD with R moderate foraminal stenosis. No central stenosis.     Lumbar XRs showed L5-S1 DDD with anterolisthesis measuring 7mm.    ASSESSMENT/PLAN:    Patient has L5-S1 degen spondy and DDD with R foraminal stenosis. She can wean off her meds as she feels better. I gave her an updated work note to go back to work. I educated the patient on the importance of core/back strengthening, correct posture, bending/lifting ergonomics, and low-impact aerobic exercises (walking, elliptical, and aquatherapy). Surgical option would be L5-S1 ALIF (w/ or w/o PLDF) if she were to get worse, but she would need to lose weight. Patient will FU PRN.    German Cummings MD  Orthopaedic Spine Surgeon  Department of Orthopaedic Surgery  [phone]

## 2022-02-23 ENCOUNTER — HOSPITAL ENCOUNTER (EMERGENCY)
Facility: HOSPITAL | Age: 62
Discharge: HOME | End: 2022-02-24
Attending: EMERGENCY MEDICINE
Payer: COMMERCIAL

## 2022-02-23 DIAGNOSIS — T78.40XA ALLERGIC REACTION, INITIAL ENCOUNTER: Primary | ICD-10-CM

## 2022-02-23 DIAGNOSIS — T78.3XXA ANGIOEDEMA, INITIAL ENCOUNTER: ICD-10-CM

## 2022-02-23 PROCEDURE — 25000003 PHARM REV CODE 250: Mod: ER | Performed by: PHYSICIAN ASSISTANT

## 2022-02-23 PROCEDURE — 63600175 PHARM REV CODE 636 W HCPCS: Mod: ER | Performed by: PHYSICIAN ASSISTANT

## 2022-02-23 PROCEDURE — 99284 EMERGENCY DEPT VISIT MOD MDM: CPT | Mod: 25,ER

## 2022-02-23 PROCEDURE — 96374 THER/PROPH/DIAG INJ IV PUSH: CPT | Mod: ER

## 2022-02-23 PROCEDURE — 96372 THER/PROPH/DIAG INJ SC/IM: CPT | Mod: ER

## 2022-02-23 PROCEDURE — 96375 TX/PRO/DX INJ NEW DRUG ADDON: CPT | Mod: ER

## 2022-02-23 RX ORDER — DIPHENHYDRAMINE HCL 25 MG
50 CAPSULE ORAL EVERY 6 HOURS PRN
Qty: 30 CAPSULE | Refills: 0 | Status: SHIPPED | OUTPATIENT
Start: 2022-02-23

## 2022-02-23 RX ORDER — METHYLPREDNISOLONE SOD SUCC 125 MG
125 VIAL (EA) INJECTION
Status: COMPLETED | OUTPATIENT
Start: 2022-02-23 | End: 2022-02-23

## 2022-02-23 RX ORDER — PREDNISONE 10 MG/1
10 TABLET ORAL DAILY
Qty: 21 TABLET | Refills: 0 | Status: SHIPPED | OUTPATIENT
Start: 2022-02-23 | End: 2022-05-18

## 2022-02-23 RX ORDER — EPINEPHRINE 0.3 MG/.3ML
0.3 INJECTION SUBCUTANEOUS
Status: COMPLETED | OUTPATIENT
Start: 2022-02-23 | End: 2022-02-23

## 2022-02-23 RX ORDER — DIPHENHYDRAMINE HYDROCHLORIDE 50 MG/ML
50 INJECTION INTRAMUSCULAR; INTRAVENOUS
Status: COMPLETED | OUTPATIENT
Start: 2022-02-23 | End: 2022-02-23

## 2022-02-23 RX ORDER — FAMOTIDINE 20 MG/1
20 TABLET, FILM COATED ORAL 2 TIMES DAILY
Qty: 20 TABLET | Refills: 0 | Status: SHIPPED | OUTPATIENT
Start: 2022-02-23 | End: 2022-08-29

## 2022-02-23 RX ORDER — EPINEPHRINE 0.3 MG/.3ML
1 INJECTION SUBCUTANEOUS
Qty: 2 EACH | Refills: 0 | Status: SHIPPED | OUTPATIENT
Start: 2022-02-23 | End: 2023-02-23

## 2022-02-23 RX ORDER — FAMOTIDINE 10 MG/ML
20 INJECTION INTRAVENOUS
Status: COMPLETED | OUTPATIENT
Start: 2022-02-23 | End: 2022-02-23

## 2022-02-23 RX ADMIN — DIPHENHYDRAMINE HYDROCHLORIDE 50 MG: 50 INJECTION INTRAMUSCULAR; INTRAVENOUS at 09:02

## 2022-02-23 RX ADMIN — EPINEPHRINE 0.3 MG: 0.3 INJECTION INTRAMUSCULAR at 09:02

## 2022-02-23 RX ADMIN — METHYLPREDNISOLONE SODIUM SUCCINATE 125 MG: 125 INJECTION, POWDER, FOR SOLUTION INTRAMUSCULAR; INTRAVENOUS at 09:02

## 2022-02-23 RX ADMIN — FAMOTIDINE 20 MG: 10 INJECTION INTRAVENOUS at 09:02

## 2022-02-24 VITALS
SYSTOLIC BLOOD PRESSURE: 151 MMHG | OXYGEN SATURATION: 100 % | RESPIRATION RATE: 17 BRPM | WEIGHT: 260 LBS | BODY MASS INDEX: 41.78 KG/M2 | HEART RATE: 72 BPM | DIASTOLIC BLOOD PRESSURE: 78 MMHG | TEMPERATURE: 98 F | HEIGHT: 66 IN

## 2022-02-24 NOTE — ED PROVIDER NOTES
Encounter Date: 2/23/2022       History     Chief Complaint   Patient presents with    Allergic Reaction     Reports waking up less than an hour ago with hives, itching, SOB and lip and tongue swelling. Reports having an allergic Rxn to lisinopril in the past. Denies dhest pain. Denies nausea. Reports eating sushi at about 1600 today. Hives visible to entire body and patient noted scratching multiple areas. Reports taking benadryl and (2) prednisone she had left over PTA.     Patient is a 61-year-old female with history of anxiety, asthma, hypertension who presents to the emergency department with an allergic reaction.  Patient states she has previously had an allergic reaction to lisinopril.  She states she discontinued that in 2021. She reports she ate sushi at 4:00 p.m..  She states she laid down for a nap.  She states she awoke and with a rash from head to toe.  She reports extreme pruritus.  She reports swelling to her mouth and tongue.  She reports itching and burning sensation in the back of her throat.  She reports she feels as if she is breathing fast.  She denies chest pain or shortness of breath.  She denies nausea or vomiting.      The history is provided by the patient.     Review of patient's allergies indicates:   Allergen Reactions    Lisinopril Hives, Itching, Rash, Shortness Of Breath and Swelling     Past Medical History:   Diagnosis Date    Anxiety     Arthritis     knee    Asthma     Bursitis, shoulder     Colon polyps     Dental bridge present     upper right    Dental crown present     Depression     Hypertension     Pes planus     Wears glasses     for reading     Past Surgical History:   Procedure Laterality Date    arthroscopy ankle right      bilateral inguinal hernia repair      COLONOSCOPY N/A 1/11/2018    Procedure: COLONOSCOPY;  Surgeon: Christoph Nolasco MD;  Location: [facility];  Service: Endoscopy;  Laterality: N/A;    left torn rotator cuff repair  09/2014     MYOMECTOMY      rt salphigectomy      SYNOVECTOMY      TRANSFORAMINAL EPIDURAL INJECTION OF STEROID Right 1/18/2022    Procedure: LUMBAR TRANSFORAMINAL RIGHT L5/S1 DIRECT REFERRAL;  Surgeon: Cherelle Carpenter MD;  Location: [facility];  Service: Pain Management;  Laterality: Right;    UMBILICAL HERNIA REPAIR       Family History   Problem Relation Age of Onset    Cancer Mother         breast    Diabetes Mother     Hypertension Mother     Breast cancer Mother     Cancer Brother         liver cancer    Breast cancer Maternal Aunt      Social History     Tobacco Use    Smoking status: Never Smoker    Smokeless tobacco: Never Used   Substance Use Topics    Alcohol use: Yes     Comment: social     Drug use: No     Review of Systems   Constitutional: Negative for activity change, appetite change, chills, fatigue and fever.   HENT: Positive for facial swelling and trouble swallowing. Negative for congestion, ear discharge, ear pain, postnasal drip, rhinorrhea and sore throat.    Respiratory: Negative for cough and shortness of breath.    Cardiovascular: Negative for chest pain.   Gastrointestinal: Negative for abdominal pain, nausea and vomiting.   Genitourinary: Negative for dysuria, flank pain and hematuria.   Musculoskeletal: Negative for back pain, neck pain and neck stiffness.   Skin: Positive for rash. Negative for wound.   Neurological: Negative for dizziness, light-headedness and headaches.       Physical Exam     Initial Vitals [02/23/22 2133]   BP Pulse Resp Temp SpO2   134/68 (!) 126 (!) 22 98.4 °F (36.9 °C) 97 %      MAP       --         Physical Exam    Nursing note and vitals reviewed.  Constitutional: She appears well-developed and well-nourished. She is not diaphoretic.  Non-toxic appearance. She appears distressed.   HENT:   Head: Normocephalic.   Right Ear: Hearing and external ear normal.   Left Ear: Hearing and external ear normal.   Nose: Nose normal.   Bottom lip swelling; tongue  swelling   Eyes: Conjunctivae are normal. Pupils are equal, round, and reactive to light.   Neck:   Normal range of motion.  Cardiovascular: Regular rhythm.   tachycardic   Pulmonary/Chest: Breath sounds normal.   Abdominal: Abdomen is soft. Bowel sounds are normal. There is no abdominal tenderness.   Musculoskeletal:      Cervical back: Normal range of motion.     Neurological: She is oriented to person, place, and time.   Skin: Skin is warm and dry. Capillary refill takes less than 2 seconds.   Diffuse urticaria   Psychiatric: She has a normal mood and affect.         ED Course   Critical Care    Date/Time: 2/23/2022 10:03 PM  Performed by: Lis Weathers PA-C  Authorized by: Lori South MD   Total critical care time (exclusive of procedural time) : 0 minutes  Critical care was necessary to treat or prevent imminent or life-threatening deterioration of the following conditions: respiratory failure.  Critical care was time spent personally by me on the following activities: development of treatment plan with patient or surrogate, evaluation of patient's response to treatment, examination of patient, obtaining history from patient or surrogate, ordering and performing treatments and interventions, pulse oximetry and re-evaluation of patient's condition.  Subsequent provider of critical care: I assumed direction of critical care for this patient from another provider of my specialty.        Labs Reviewed - No data to display       Imaging Results    None          Medications   methylPREDNISolone sodium succinate injection 125 mg (125 mg Intravenous Given 2/23/22 2139)   diphenhydrAMINE injection 50 mg (50 mg Intravenous Given 2/23/22 2139)   EPINEPHrine (EPIPEN) 0.3 mg/0.3 mL pen injection 0.3 mg (0.3 mg Intramuscular Given 2/23/22 2131)   famotidine (PF) injection 20 mg (20 mg Intravenous Given 2/23/22 2141)     Medical Decision Making:   Initial Assessment:   Urgent evaluation of a 61-year-old  female with history asthma, arthritis, hypertension who presents to the emergency department with allergic reaction.  Patient is tachycardic and tachypneic on initial exam.  She has diffuse urticaria from head to toe.  She has lower lip swelling and tongue swelling.  Will give epi, solumedrol, pepcid, and benadryl.  ED Management:  10:07 PM  Pt is much more comfortable and resting at this time.  Pt will be observed for four hours to ensure symptoms do not represent.  My supervising physician will be taking over this case at this time.  Other:   I have discussed this case with another health care provider.                      Clinical Impression:   Final diagnoses:  [T78.40XA] Allergic reaction, initial encounter (Primary)                 Lis Weathers PA-C  02/23/22 0547

## 2022-03-16 ENCOUNTER — OFFICE VISIT (OUTPATIENT)
Dept: ALLERGY | Facility: CLINIC | Age: 62
End: 2022-03-16
Payer: COMMERCIAL

## 2022-03-16 VITALS
DIASTOLIC BLOOD PRESSURE: 78 MMHG | SYSTOLIC BLOOD PRESSURE: 138 MMHG | BODY MASS INDEX: 44.61 KG/M2 | WEIGHT: 277.56 LBS | HEIGHT: 66 IN

## 2022-03-16 DIAGNOSIS — J31.0 CHRONIC RHINITIS: ICD-10-CM

## 2022-03-16 DIAGNOSIS — J45.20 MILD INTERMITTENT ASTHMA, UNSPECIFIED WHETHER COMPLICATED: Chronic | ICD-10-CM

## 2022-03-16 DIAGNOSIS — T78.3XXD ANGIOEDEMA, SUBSEQUENT ENCOUNTER: ICD-10-CM

## 2022-03-16 DIAGNOSIS — L50.1 IDIOPATHIC URTICARIA: Primary | ICD-10-CM

## 2022-03-16 DIAGNOSIS — I10 PRIMARY HYPERTENSION: ICD-10-CM

## 2022-03-16 PROCEDURE — 3008F BODY MASS INDEX DOCD: CPT | Mod: CPTII,S$GLB,, | Performed by: ALLERGY & IMMUNOLOGY

## 2022-03-16 PROCEDURE — 1160F PR REVIEW ALL MEDS BY PRESCRIBER/CLIN PHARMACIST DOCUMENTED: ICD-10-PCS | Mod: CPTII,S$GLB,, | Performed by: ALLERGY & IMMUNOLOGY

## 2022-03-16 PROCEDURE — 1159F PR MEDICATION LIST DOCUMENTED IN MEDICAL RECORD: ICD-10-PCS | Mod: CPTII,S$GLB,, | Performed by: ALLERGY & IMMUNOLOGY

## 2022-03-16 PROCEDURE — 1160F RVW MEDS BY RX/DR IN RCRD: CPT | Mod: CPTII,S$GLB,, | Performed by: ALLERGY & IMMUNOLOGY

## 2022-03-16 PROCEDURE — 1159F MED LIST DOCD IN RCRD: CPT | Mod: CPTII,S$GLB,, | Performed by: ALLERGY & IMMUNOLOGY

## 2022-03-16 PROCEDURE — 3075F SYST BP GE 130 - 139MM HG: CPT | Mod: CPTII,S$GLB,, | Performed by: ALLERGY & IMMUNOLOGY

## 2022-03-16 PROCEDURE — 99999 PR PBB SHADOW E&M-EST. PATIENT-LVL III: CPT | Mod: PBBFAC,,, | Performed by: ALLERGY & IMMUNOLOGY

## 2022-03-16 PROCEDURE — 99214 OFFICE O/P EST MOD 30 MIN: CPT | Mod: S$GLB,,, | Performed by: ALLERGY & IMMUNOLOGY

## 2022-03-16 PROCEDURE — 3075F PR MOST RECENT SYSTOLIC BLOOD PRESS GE 130-139MM HG: ICD-10-PCS | Mod: CPTII,S$GLB,, | Performed by: ALLERGY & IMMUNOLOGY

## 2022-03-16 PROCEDURE — 3008F PR BODY MASS INDEX (BMI) DOCUMENTED: ICD-10-PCS | Mod: CPTII,S$GLB,, | Performed by: ALLERGY & IMMUNOLOGY

## 2022-03-16 PROCEDURE — 99214 PR OFFICE/OUTPT VISIT, EST, LEVL IV, 30-39 MIN: ICD-10-PCS | Mod: S$GLB,,, | Performed by: ALLERGY & IMMUNOLOGY

## 2022-03-16 PROCEDURE — 3078F PR MOST RECENT DIASTOLIC BLOOD PRESSURE < 80 MM HG: ICD-10-PCS | Mod: CPTII,S$GLB,, | Performed by: ALLERGY & IMMUNOLOGY

## 2022-03-16 PROCEDURE — 99999 PR PBB SHADOW E&M-EST. PATIENT-LVL III: ICD-10-PCS | Mod: PBBFAC,,, | Performed by: ALLERGY & IMMUNOLOGY

## 2022-03-16 PROCEDURE — 3078F DIAST BP <80 MM HG: CPT | Mod: CPTII,S$GLB,, | Performed by: ALLERGY & IMMUNOLOGY

## 2022-03-16 NOTE — PROGRESS NOTES
Janina Freitas returns to clinic today for continued evaluation chronic recurrent urticaria and angioedema. She is here alone. She was last seen June 25, 2021. She works nights as a [employer] at the [employer].     She was having urticaria and angioedema at her last visit. She was on lisinopril at the time and this was discontinued.    She had another episode of generalized itching, urticaria, and shortness of breath. She did not have any angioedema. She was seen in the emergency department and given albuterol and prednisone. She was discharged with a prednisone taper.    Her symptoms resolved and she did not have any further urticaria or angioedema until February 23, 2022. Around 4:00 p.m. she ate a [employer] cookie and sushi that was surimi, cucumber, and avocado.    She went to sleep and woke up with generalized itching and urticaria. She had swelling of her mouth and tongue. She was seen in the emergency department and given epinephrine, Benadryl, famotidine, and prednisone. Her symptoms resolved.    She has not had any urticaria or angioedema since then.    There is no association with any food, contact, or ingestion except as above.    She denies any thyroid disease.    She has been vitamin-D deficient in the past. She is not on replacement.    She does continue to have chronic rhinitis and intermittent shortness of breath. She does have an albuterol inhaler.    She takes spironolactone and amlodipine for hypertension.    [facility] ALLERGY QUESTIONNAIRE SHORT 7/6/2021   Hearing loss? Yes   Trouble swallowing? -   Eye discharge? -   Cough? Yes   Wheezing? -   Shortness of breath? Yes   Chest tightness? -   Rash? Yes     Physical Examination:  General: Well-developed, well-nourished, no acute distress.  Skin: Good turgor.  No urticaria or angioedema.  Neuro/Psych: Oriented x 3.    Laboratory 06/25/2021:  IgE level: Less than 35.  ImmunoCAP: Negative.  TSH: 1.335.  Thyroid  peroxidase antibody level: Less than 6.0.  Thyroglobulin antibody level: Less than 4.0.  Vitamin-D level: 25.  Serum tryptase: 5.5.  SPEP: Normal.  CBC: Hypochromic indices.  CMP: Potassium 3.4.    Assessment:  1.  Chronic recurrent urticaria angioedema, doubt food allergy.  2.  Chronic rhinitis, consider allergic.  3.  Asthma, consider allergic component.  4.  Consider LPR.  5.  Hypertension on spironolactone, lisinopril, and amlodipine.    Recommendations:  1. Take pictures of any further lesions.  2. Return to clinic in one week.  3. Food skin test on return to clinic.  4. Consider inhalant skin tests.  5. Consider additional laboratory.  6. Return to clinic in one week.  7. Discuss mechanisms on return to clinic.

## 2022-03-23 ENCOUNTER — OFFICE VISIT (OUTPATIENT)
Dept: ALLERGY | Facility: CLINIC | Age: 62
End: 2022-03-23
Payer: COMMERCIAL

## 2022-03-23 VITALS — OXYGEN SATURATION: 100 % | HEIGHT: 72 IN | HEART RATE: 95 BPM | BODY MASS INDEX: 37.12 KG/M2 | WEIGHT: 274.06 LBS

## 2022-03-23 DIAGNOSIS — J45.20 MILD INTERMITTENT ASTHMA, UNSPECIFIED WHETHER COMPLICATED: Chronic | ICD-10-CM

## 2022-03-23 DIAGNOSIS — I10 PRIMARY HYPERTENSION: ICD-10-CM

## 2022-03-23 DIAGNOSIS — L50.1 IDIOPATHIC URTICARIA: ICD-10-CM

## 2022-03-23 DIAGNOSIS — T78.3XXD ANGIOEDEMA, SUBSEQUENT ENCOUNTER: Primary | ICD-10-CM

## 2022-03-23 DIAGNOSIS — J31.0 CHRONIC RHINITIS: ICD-10-CM

## 2022-03-23 PROCEDURE — 99999 PR PBB SHADOW E&M-EST. PATIENT-LVL IV: CPT | Mod: PBBFAC,,, | Performed by: ALLERGY & IMMUNOLOGY

## 2022-03-23 PROCEDURE — 95004 PERQ TESTS W/ALRGNC XTRCS: CPT | Mod: S$GLB,,, | Performed by: ALLERGY & IMMUNOLOGY

## 2022-03-23 PROCEDURE — 1160F RVW MEDS BY RX/DR IN RCRD: CPT | Mod: CPTII,S$GLB,, | Performed by: ALLERGY & IMMUNOLOGY

## 2022-03-23 PROCEDURE — 1159F PR MEDICATION LIST DOCUMENTED IN MEDICAL RECORD: ICD-10-PCS | Mod: CPTII,S$GLB,, | Performed by: ALLERGY & IMMUNOLOGY

## 2022-03-23 PROCEDURE — 95004 PR ALLERGY SKIN TESTS,ALLERGENS: ICD-10-PCS | Mod: S$GLB,,, | Performed by: ALLERGY & IMMUNOLOGY

## 2022-03-23 PROCEDURE — 1160F PR REVIEW ALL MEDS BY PRESCRIBER/CLIN PHARMACIST DOCUMENTED: ICD-10-PCS | Mod: CPTII,S$GLB,, | Performed by: ALLERGY & IMMUNOLOGY

## 2022-03-23 PROCEDURE — 99999 PR PBB SHADOW E&M-EST. PATIENT-LVL IV: ICD-10-PCS | Mod: PBBFAC,,, | Performed by: ALLERGY & IMMUNOLOGY

## 2022-03-23 PROCEDURE — 1159F MED LIST DOCD IN RCRD: CPT | Mod: CPTII,S$GLB,, | Performed by: ALLERGY & IMMUNOLOGY

## 2022-03-23 PROCEDURE — 99214 OFFICE O/P EST MOD 30 MIN: CPT | Mod: 25,S$GLB,, | Performed by: ALLERGY & IMMUNOLOGY

## 2022-03-23 PROCEDURE — 3008F PR BODY MASS INDEX (BMI) DOCUMENTED: ICD-10-PCS | Mod: CPTII,S$GLB,, | Performed by: ALLERGY & IMMUNOLOGY

## 2022-03-23 PROCEDURE — 3008F BODY MASS INDEX DOCD: CPT | Mod: CPTII,S$GLB,, | Performed by: ALLERGY & IMMUNOLOGY

## 2022-03-23 PROCEDURE — 99214 PR OFFICE/OUTPT VISIT, EST, LEVL IV, 30-39 MIN: ICD-10-PCS | Mod: 25,S$GLB,, | Performed by: ALLERGY & IMMUNOLOGY

## 2022-03-23 RX ORDER — ERGOCALCIFEROL 1.25 MG/1
50000 CAPSULE ORAL
Qty: 12 CAPSULE | Refills: 1 | Status: SHIPPED | OUTPATIENT
Start: 2022-03-23 | End: 2022-11-14 | Stop reason: SDUPTHER

## 2022-03-23 NOTE — PROGRESS NOTES
Janina Freitas returns to clinic today for continued evaluation chronic recurrent urticaria and angioedema. She is here alone. She was last seen March 16, 2022.    Since her last visit, she has not had any further urticaria or angioedema. She has attributed her symptoms to several different foods.    She had urticaria and angioedema in June 2021. At the time she was on lisinopril and this was discontinued. She thought that the lesions were due just to the lisinopril.     Her rhinitis has been controlled. She has not had any shortness of breath or asthma.    She has been vitamin-D deficient in the past. She has not on any replacement.    [facility] PEQ ALLERGY QUESTIONNAIRE SHORT 3/23/2022   Facial swelling? Yes   Sinus pain? No   Sinus pressure? No   Ear discharge? No   Ear pain? No   Hearing loss? No   Nosebleeds? No   Postnasal drip? No   Sneezing? No   Runny nose? No   Congestion? No   Sore throat? No   Trouble swallowing? Yes   Voice change? Yes   Eye itching? No   Eye redness? Yes   Eye discharge? No   Eye pain?  No   Light sensitivity / light hurts the eyes? No   Cough? No   Wheezing? No   Shortness of breath? No   Apnea? No   Choking? No   Chest tightness? No   Rash? Yes   Color change of skin? Yes     Physical Examination:  General: Well-developed, well-nourished, no acute distress.  Head: No sinus tenderness.  Eyes: Conjunctivae:  No bulbar or palpebral conjunctival injection.  Ears: EAC's clear.  TM's clear.  No pre-auricular nodes.  Nose: Nasal Mucosa:  Pink.  Septum: No apparent deviation.  Turbinates:  No significant edema.  Polyps/Mass:  None visible.  Teeth/Gums:  No bleeding noted.  Oropharynx: No exudates.  Neck: Supple without thyromegaly. No cervical lymphadenopathy.    Respiratory/Chest: Effort: Good.  Auscultation:  Clear bilaterally.  Skin: Good turgor.  No urticaria or angioedema.  Neuro/Psych: Oriented x 3.    Pictures are reviewed on her cell phone on March 23, 2022 and are consistent with urticaria  and angioedema.    Laboratory 06/25/2021:  IgE level: Less than 35.  ImmunoCAP: Negative.  TSH: 1.335.  Thyroid peroxidase antibody level: Less than 6.0.  Thyroglobulin antibody level: Less than 4.0.  Vitamin-D level: 25.  Serum tryptase: 5.5.  SPEP: Normal.  CBC: Hypochromic indices.  CMP: Potassium 3.4.    Food skin test 03/23/2024:  3+ histamine control. All tests were negative.    Assessment:  1.  Chronic recurrent urticaria and angioedema, doubt food allergy.  2.  Chronic rhinitis, consider allergic.  3.  Asthma, consider allergic component.  4.  Consider LPR.  5.  Hypertension on spironolactone and amlodipine.  6.  Vitamin-D insufficiency.    Recommendations:  1. Start Vitamin D replacement.  2. Zyrtec 10 mg a day.  3. She may take up to four a day.  4. Consider inhalant skin tests.  5. Consider additional lab work.  6. Return to clinic in 4 to 5 weeks or sooner if needed.    Patient education March 23, 2022:  Allergic mechanisms and treatment options were reviewed in detail. Urticaria and angioedema were reviewed. Handout was given.

## 2022-05-11 ENCOUNTER — LAB VISIT (OUTPATIENT)
Dept: LAB | Facility: HOSPITAL | Age: 62
End: 2022-05-11
Payer: COMMERCIAL

## 2022-05-11 ENCOUNTER — OFFICE VISIT (OUTPATIENT)
Dept: INTERNAL MEDICINE | Facility: CLINIC | Age: 62
End: 2022-05-11
Payer: COMMERCIAL

## 2022-05-11 VITALS
SYSTOLIC BLOOD PRESSURE: 146 MMHG | OXYGEN SATURATION: 98 % | WEIGHT: 277.31 LBS | HEIGHT: 66 IN | BODY MASS INDEX: 44.57 KG/M2 | DIASTOLIC BLOOD PRESSURE: 89 MMHG | HEART RATE: 78 BPM

## 2022-05-11 DIAGNOSIS — R60.0 BILATERAL LOWER EXTREMITY EDEMA: ICD-10-CM

## 2022-05-11 DIAGNOSIS — R06.02 SHORTNESS OF BREATH: ICD-10-CM

## 2022-05-11 DIAGNOSIS — R06.02 SHORTNESS OF BREATH: Primary | ICD-10-CM

## 2022-05-11 LAB
ALBUMIN SERPL BCP-MCNC: 3.9 G/DL (ref 3.5–5.2)
ALP SERPL-CCNC: 102 U/L (ref 55–135)
ALT SERPL W/O P-5'-P-CCNC: 26 U/L (ref 10–44)
ANION GAP SERPL CALC-SCNC: 11 MMOL/L (ref 8–16)
AST SERPL-CCNC: 21 U/L (ref 10–40)
BILIRUB SERPL-MCNC: 0.3 MG/DL (ref 0.1–1)
BNP SERPL-MCNC: 11 PG/ML (ref 0–99)
BUN SERPL-MCNC: 14 MG/DL (ref 6–20)
CALCIUM SERPL-MCNC: 10.3 MG/DL (ref 8.7–10.5)
CHLORIDE SERPL-SCNC: 104 MMOL/L (ref 95–110)
CO2 SERPL-SCNC: 26 MMOL/L (ref 23–29)
CREAT SERPL-MCNC: 0.7 MG/DL (ref 0.5–1.4)
EST. GFR  (AFRICAN AMERICAN): >60 ML/MIN/1.73 M^2
EST. GFR  (NON AFRICAN AMERICAN): >60 ML/MIN/1.73 M^2
GLUCOSE SERPL-MCNC: 86 MG/DL (ref 70–110)
POTASSIUM SERPL-SCNC: 4.7 MMOL/L (ref 3.5–5.1)
PROT SERPL-MCNC: 8.2 G/DL (ref 6–8.4)
SODIUM SERPL-SCNC: 141 MMOL/L (ref 136–145)
TSH SERPL DL<=0.005 MIU/L-ACNC: 1.3 UIU/ML (ref 0.4–4)

## 2022-05-11 PROCEDURE — 99999 PR PBB SHADOW E&M-EST. PATIENT-LVL V: ICD-10-PCS | Mod: PBBFAC,,, | Performed by: STUDENT IN AN ORGANIZED HEALTH CARE EDUCATION/TRAINING PROGRAM

## 2022-05-11 PROCEDURE — 99999 PR PBB SHADOW E&M-EST. PATIENT-LVL V: CPT | Mod: PBBFAC,,, | Performed by: STUDENT IN AN ORGANIZED HEALTH CARE EDUCATION/TRAINING PROGRAM

## 2022-05-11 PROCEDURE — 36415 COLL VENOUS BLD VENIPUNCTURE: CPT | Performed by: STUDENT IN AN ORGANIZED HEALTH CARE EDUCATION/TRAINING PROGRAM

## 2022-05-11 PROCEDURE — 99213 PR OFFICE/OUTPT VISIT, EST, LEVL III, 20-29 MIN: ICD-10-PCS | Mod: S$GLB,,, | Performed by: STUDENT IN AN ORGANIZED HEALTH CARE EDUCATION/TRAINING PROGRAM

## 2022-05-11 PROCEDURE — 99213 OFFICE O/P EST LOW 20 MIN: CPT | Mod: S$GLB,,, | Performed by: STUDENT IN AN ORGANIZED HEALTH CARE EDUCATION/TRAINING PROGRAM

## 2022-05-11 PROCEDURE — 84443 ASSAY THYROID STIM HORMONE: CPT | Performed by: STUDENT IN AN ORGANIZED HEALTH CARE EDUCATION/TRAINING PROGRAM

## 2022-05-11 PROCEDURE — 83880 ASSAY OF NATRIURETIC PEPTIDE: CPT | Performed by: STUDENT IN AN ORGANIZED HEALTH CARE EDUCATION/TRAINING PROGRAM

## 2022-05-11 PROCEDURE — 93010 EKG 12-LEAD: ICD-10-PCS | Mod: S$GLB,,, | Performed by: INTERNAL MEDICINE

## 2022-05-11 PROCEDURE — 93005 ELECTROCARDIOGRAM TRACING: CPT | Mod: S$GLB,,, | Performed by: STUDENT IN AN ORGANIZED HEALTH CARE EDUCATION/TRAINING PROGRAM

## 2022-05-11 PROCEDURE — 80053 COMPREHEN METABOLIC PANEL: CPT | Performed by: STUDENT IN AN ORGANIZED HEALTH CARE EDUCATION/TRAINING PROGRAM

## 2022-05-11 PROCEDURE — 93005 EKG 12-LEAD: ICD-10-PCS | Mod: S$GLB,,, | Performed by: STUDENT IN AN ORGANIZED HEALTH CARE EDUCATION/TRAINING PROGRAM

## 2022-05-11 PROCEDURE — 93010 ELECTROCARDIOGRAM REPORT: CPT | Mod: S$GLB,,, | Performed by: INTERNAL MEDICINE

## 2022-05-11 NOTE — PROGRESS NOTES
"Internal Medicine Clinic Note    Subjective     Chief Complaint: "right knee pain and water retention"    History of Present Illness:  Ms. Brandi Freitas is a 61 y.o. female with mild intermittent asthma, HTN, obesity, OA, allergic rhinitis, hx recurrent sinusitis, hx urticaria & angioedema (2/2 lisinopril), vitamin D insufficiency, anxiety and depression who presents to clinic for "right knee pain" and "water retention". Patient has chronic low back and right lower extremity pain that she follows with orthopedics for. Was last seen in the ortho clinic in February 2022. Steroid injections in her lumbar spine helped with the pain. For her BP she takes amlodipine and spironolactone, doses have not changed recently. She has no history of heart failure or liver disease. She has had issues with R>L leg swelling for years. Over the past 2 weeks her swelling in both legs have worsened. She has had some recent falls in March and April which were mechanical. She also recently started taking an herbal medication which was described to her as a "diuretic" and prescribed by the [place] [facility] (private weight loss clinic). Admits to SOB especially while walking for the past 2 weeks which she attributed to her back pain. She can now only walk a block without stopping to catch her breath (used to be a mile). Also admits to blurry vision bilaterally which started a week ago and abdominal swelling. Denies chest pain or orthopnea.      Review of Systems   Constitutional: Negative for chills, fever and weight loss.   HENT: Negative for congestion and sore throat.    Eyes: Negative for blurred vision and double vision.   Respiratory: Positive for shortness of breath. Negative for cough.    Cardiovascular: Positive for leg swelling. Negative for chest pain and palpitations.   Gastrointestinal: Negative for abdominal pain, constipation, diarrhea, nausea and vomiting.   Genitourinary: Negative for dysuria.   Musculoskeletal: Positive " for back pain and joint pain. Negative for neck pain.   Skin: Negative for rash.   Neurological: Negative for dizziness, loss of consciousness, weakness and headaches.   Psychiatric/Behavioral: Negative for depression. The patient is not nervous/anxious.        PAST HISTORY:     Past Medical History:   Diagnosis Date    Anxiety     Arthritis     knee    Asthma     Bursitis, shoulder     Colon polyps     Dental bridge present     upper right    Dental crown present     Depression     Hypertension     Pes planus     Wears glasses     for reading       Past Surgical History:   Procedure Laterality Date    arthroscopy ankle right      bilateral inguinal hernia repair      COLONOSCOPY N/A 1/11/2018    Procedure: COLONOSCOPY;  Surgeon: Christoph Nolasco MD;  Location: [facility] ENDO;  Service: Endoscopy;  Laterality: N/A;    left torn rotator cuff repair  09/2014    MYOMECTOMY      rt salphigectomy      SYNOVECTOMY      TRANSFORAMINAL EPIDURAL INJECTION OF STEROID Right 1/18/2022    Procedure: LUMBAR TRANSFORAMINAL RIGHT L5/S1 DIRECT REFERRAL;  Surgeon: Cherelle Carpenter MD;  Location: [facility] PAIN MGT;  Service: Pain Management;  Laterality: Right;    UMBILICAL HERNIA REPAIR         Family History   Problem Relation Age of Onset    Cancer Mother         breast    Diabetes Mother     Hypertension Mother     Breast cancer Mother     Cancer Brother         liver cancer    Breast cancer Maternal Aunt        Social History     Socioeconomic History    Marital status: Legally    Tobacco Use    Smoking status: Never Smoker    Smokeless tobacco: Never Used   Substance and Sexual Activity    Alcohol use: Yes     Comment: social     Drug use: No    Sexual activity: Yes     Comment: occasional       MEDICATIONS & ALLERGIES:     Current Outpatient Medications on File Prior to Visit   Medication Sig    albuterol sulfate 2.5 mg/0.5 mL Nebu Take 2.5 mg by nebulization every 4 (four) hours as needed.  Rescue    albuterol-ipratropium (DUO-NEB) 2.5 mg-0.5 mg/3 mL nebulizer solution Take 3 mLs by nebulization every 6 (six) hours as needed for Wheezing or Shortness of Breath.    amLODIPine (NORVASC) 10 MG tablet Take 1 tablet by mouth once daily    cetirizine (ZYRTEC) 10 MG tablet Take 10 mg by mouth once daily.    diclofenac (VOLTAREN) 75 MG EC tablet TAKE 1 TABLET BY MOUTH TWICE DAILY AS NEEDED FOR  KNEE  PAIN    diphenhydrAMINE (BENADRYL) 25 mg capsule Take 2 capsules (50 mg total) by mouth every 6 (six) hours as needed for Itching or Allergies.    EPINEPHrine (EPIPEN) 0.3 mg/0.3 mL AtIn Inject 0.3 mLs (0.3 mg total) into the muscle as needed.    ergocalciferol (ERGOCALCIFEROL) 50,000 unit Cap Take 1 capsule (50,000 Units total) by mouth every 7 days.    famotidine (PEPCID) 20 MG tablet Take 1 tablet (20 mg total) by mouth 2 (two) times daily.    gabapentin (NEURONTIN) 300 MG capsule Take 1 capsule (300 mg total) by mouth 3 (three) times daily.    phentermine (ADIPEX-P) 37.5 mg tablet Take 37.5 mg by mouth every morning.    predniSONE (DELTASONE) 10 MG tablet Take 1 tablet (10 mg total) by mouth once daily. Take 4 tabs x 3 days, then  Take 2 tabs x 3 days, then   Take 1 tab x 3 days.    spironolactone (ALDACTONE) 50 MG tablet Take 1 tablet (50 mg total) by mouth 2 (two) times daily.    triamcinolone acetonide 0.1% (KENALOG) 0.1 % cream Apply topically 2 (two) times daily.    VENTOLIN HFA 90 mcg/actuation inhaler INHALE 2 PUFFS BY MOUTH EVERY 6 HOURS AS NEEDED FOR WHEEZING AND FOR SHORTNESS OF BREATH     No current facility-administered medications on file prior to visit.       Review of patient's allergies indicates:   Allergen Reactions    Lisinopril Hives, Itching, Rash, Shortness Of Breath and Swelling       OBJECTIVE:     Vital Signs:  Vitals:    05/11/22 1429   BP: (!) 146/89   BP Location: Left arm   Patient Position: Sitting   BP Method: Large (Automatic)   Pulse: 78   SpO2: 98%   Weight:  "125.8 kg (277 lb 5.4 oz)   Height: 5' 6" (1.676 m)       Body mass index is 44.76 kg/m².     Physical Exam  Vitals reviewed.   Constitutional:       General: She is not in acute distress.     Appearance: She is obese. She is not diaphoretic.   HENT:      Head: Normocephalic and atraumatic.   Eyes:      General: No scleral icterus.        Right eye: No discharge.         Left eye: No discharge.   Neck:      Trachea: No tracheal deviation.   Cardiovascular:      Rate and Rhythm: Rhythm irregular.      Heart sounds: Murmur (holosystolic) heard.   Pulmonary:      Effort: Pulmonary effort is normal. No respiratory distress.      Breath sounds: Normal breath sounds. No wheezing or rales.   Abdominal:      General: Bowel sounds are normal. There is no distension.      Palpations: Abdomen is soft.      Tenderness: There is no abdominal tenderness.   Musculoskeletal:         General: Swelling (bilateral 2+ pitting edema up to knees) present. No deformity. Normal range of motion.      Cervical back: Normal range of motion and neck supple.   Skin:     General: Skin is warm and dry.      Findings: No erythema or rash.   Neurological:      Mental Status: She is alert and oriented to person, place, and time.      Cranial Nerves: No cranial nerve deficit.      Motor: No abnormal muscle tone.      Coordination: Coordination normal.      Gait: Gait is intact.   Psychiatric:         Mood and Affect: Mood and affect normal.         Laboratory  Lab Results   Component Value Date    WBC 9.04 06/25/2021    HGB 12.8 06/25/2021    HCT 43.7 06/25/2021    MCV 88 06/25/2021     06/25/2021     BMP  Lab Results   Component Value Date     06/25/2021    K 3.4 (L) 06/25/2021     06/25/2021    CO2 26 06/25/2021    BUN 14 06/25/2021    CREATININE 0.8 06/25/2021    CALCIUM 9.5 06/25/2021    ANIONGAP 10 06/25/2021    ESTGFRAFRICA >60.0 06/25/2021    EGFRNONAA >60.0 06/25/2021     No results found for: INR, PROTIME  Lab Results "   Component Value Date    HGBA1C 5.6 06/25/2021       Diagnostic Results:  Labs: Reviewed    Health Maintenance Due   Topic Date Due    COVID-19 Vaccine (3 - Booster for Moderna series) 09/16/2021    Mammogram  07/07/2022         ASSESSMENT & PLAN:   Ms. Brandi Freitas is a 61 y.o. female who presented with lower extremity edema.    Shortness of breath  Bilateral lower extremity edema        -     Dyspnea on exertion, abdominal distention and lower extremity edema concerning for a cardiogenic cause. Irregular rhythm was concerning for Afib as the cause however EKG in clinic showed sinus rhythm with very frequent PACs. CMP, BNP and TSH ordered to be done today. CXR deferred as patient is saturating well and no abnormalities on lung exam. If any labs are concerning for HF, will order TTE and refer to cardiology.  -     IN OFFICE EKG 12-LEAD (to Muse)  -     Comprehensive Metabolic Panel; Future; Expected date: 05/11/2022  -     BNP; Future; Expected date: 05/11/2022  -     TSH; Future; Expected date: 05/11/2022      Discussed with Dr. Espinoza - staff attestation to follow        Luis El DO  Internal Medicine, PGY-III  [facility]  [address], [place] [id number] [phone]

## 2022-05-12 ENCOUNTER — PATIENT MESSAGE (OUTPATIENT)
Dept: INTERNAL MEDICINE | Facility: CLINIC | Age: 62
End: 2022-05-12
Payer: COMMERCIAL

## 2022-05-18 ENCOUNTER — OFFICE VISIT (OUTPATIENT)
Dept: FAMILY MEDICINE | Facility: CLINIC | Age: 62
End: 2022-05-18
Payer: COMMERCIAL

## 2022-05-18 ENCOUNTER — HOSPITAL ENCOUNTER (OUTPATIENT)
Dept: RADIOLOGY | Facility: HOSPITAL | Age: 62
Discharge: HOME | End: 2022-05-18
Attending: NURSE PRACTITIONER
Payer: COMMERCIAL

## 2022-05-18 VITALS
SYSTOLIC BLOOD PRESSURE: 134 MMHG | DIASTOLIC BLOOD PRESSURE: 70 MMHG | TEMPERATURE: 98 F | HEART RATE: 93 BPM | HEIGHT: 66 IN | BODY MASS INDEX: 44.57 KG/M2 | OXYGEN SATURATION: 99 % | WEIGHT: 277.31 LBS

## 2022-05-18 DIAGNOSIS — E66.01 MORBID OBESITY WITH BMI OF 40.0-44.9, ADULT: ICD-10-CM

## 2022-05-18 DIAGNOSIS — R26.2 DIFFICULTY WALKING: ICD-10-CM

## 2022-05-18 DIAGNOSIS — M53.86 DECREASED RANGE OF MOTION OF LUMBAR SPINE: ICD-10-CM

## 2022-05-18 DIAGNOSIS — M25.562 ACUTE PAIN OF LEFT KNEE: ICD-10-CM

## 2022-05-18 DIAGNOSIS — M25.562 ACUTE PAIN OF LEFT KNEE: Primary | ICD-10-CM

## 2022-05-18 DIAGNOSIS — M54.41 ACUTE BILATERAL LOW BACK PAIN WITH RIGHT-SIDED SCIATICA: ICD-10-CM

## 2022-05-18 PROCEDURE — 73560 X-RAY EXAM OF KNEE 1 OR 2: CPT | Mod: 26,LT,, | Performed by: RADIOLOGY

## 2022-05-18 PROCEDURE — 73560 X-RAY EXAM OF KNEE 1 OR 2: CPT | Mod: TC,FY,LT

## 2022-05-18 PROCEDURE — 3078F DIAST BP <80 MM HG: CPT | Mod: CPTII,S$GLB,, | Performed by: NURSE PRACTITIONER

## 2022-05-18 PROCEDURE — 3075F SYST BP GE 130 - 139MM HG: CPT | Mod: CPTII,S$GLB,, | Performed by: NURSE PRACTITIONER

## 2022-05-18 PROCEDURE — 3078F PR MOST RECENT DIASTOLIC BLOOD PRESSURE < 80 MM HG: ICD-10-PCS | Mod: CPTII,S$GLB,, | Performed by: NURSE PRACTITIONER

## 2022-05-18 PROCEDURE — 3008F PR BODY MASS INDEX (BMI) DOCUMENTED: ICD-10-PCS | Mod: CPTII,S$GLB,, | Performed by: NURSE PRACTITIONER

## 2022-05-18 PROCEDURE — 99214 OFFICE O/P EST MOD 30 MIN: CPT | Mod: 25,S$GLB,, | Performed by: NURSE PRACTITIONER

## 2022-05-18 PROCEDURE — 3075F PR MOST RECENT SYSTOLIC BLOOD PRESS GE 130-139MM HG: ICD-10-PCS | Mod: CPTII,S$GLB,, | Performed by: NURSE PRACTITIONER

## 2022-05-18 PROCEDURE — 96372 PR INJECTION,THERAP/PROPH/DIAG2ST, IM OR SUBCUT: ICD-10-PCS | Mod: S$GLB,,, | Performed by: NURSE PRACTITIONER

## 2022-05-18 PROCEDURE — 96372 THER/PROPH/DIAG INJ SC/IM: CPT | Mod: S$GLB,,, | Performed by: NURSE PRACTITIONER

## 2022-05-18 PROCEDURE — 1159F MED LIST DOCD IN RCRD: CPT | Mod: CPTII,S$GLB,, | Performed by: NURSE PRACTITIONER

## 2022-05-18 PROCEDURE — 99999 PR PBB SHADOW E&M-EST. PATIENT-LVL IV: ICD-10-PCS | Mod: PBBFAC,,, | Performed by: NURSE PRACTITIONER

## 2022-05-18 PROCEDURE — 3008F BODY MASS INDEX DOCD: CPT | Mod: CPTII,S$GLB,, | Performed by: NURSE PRACTITIONER

## 2022-05-18 PROCEDURE — 1159F PR MEDICATION LIST DOCUMENTED IN MEDICAL RECORD: ICD-10-PCS | Mod: CPTII,S$GLB,, | Performed by: NURSE PRACTITIONER

## 2022-05-18 PROCEDURE — 73560 XR KNEE 1 OR 2 VIEW LEFT: ICD-10-PCS | Mod: 26,LT,, | Performed by: RADIOLOGY

## 2022-05-18 PROCEDURE — 99214 PR OFFICE/OUTPT VISIT, EST, LEVL IV, 30-39 MIN: ICD-10-PCS | Mod: 25,S$GLB,, | Performed by: NURSE PRACTITIONER

## 2022-05-18 PROCEDURE — 99999 PR PBB SHADOW E&M-EST. PATIENT-LVL IV: CPT | Mod: PBBFAC,,, | Performed by: NURSE PRACTITIONER

## 2022-05-18 RX ORDER — KETOROLAC TROMETHAMINE 30 MG/ML
30 INJECTION, SOLUTION INTRAMUSCULAR; INTRAVENOUS
Status: COMPLETED | OUTPATIENT
Start: 2022-05-18 | End: 2022-05-18

## 2022-05-18 RX ORDER — MELOXICAM 15 MG/1
15 TABLET ORAL DAILY
Qty: 30 TABLET | Refills: 0 | Status: SHIPPED | OUTPATIENT
Start: 2022-05-18 | End: 2022-06-17

## 2022-05-18 RX ADMIN — KETOROLAC TROMETHAMINE 30 MG: 30 INJECTION, SOLUTION INTRAMUSCULAR; INTRAVENOUS at 04:05

## 2022-05-18 NOTE — PROGRESS NOTES
"Chief Complaint  Chief Complaint   Patient presents with    Knee Pain     Left knee, 2 or 3 weeks. Does not know "if arthritis has spread"       HPI    HPI   Ms. Bradni Freitas is a 61 y.o. female with medical problems as listed below. The patient presents to clinic with c/o LEFT knee pain. The patient has been to orthopedics and pain management for various orthopedic issues.     The patient was seen on 5/11/2022 for RIGHT sided knee pain and lower extremity edema    HPI as follows:  History of Present Illness:  Ms. Brandi Freitas is a 61 y.o. female with mild intermittent asthma, HTN, obesity, OA, allergic rhinitis, hx recurrent sinusitis, hx urticaria & angioedema (2/2 lisinopril), vitamin D insufficiency, anxiety and depression who presents to clinic for "right knee pain" and "water retention". Patient has chronic low back and right lower extremity pain that she follows with orthopedics for. Was last seen in the ortho clinic in February 2022. Steroid injections in her lumbar spine helped with the pain. For her BP she takes amlodipine and spironolactone, doses have not changed recently. She has no history of heart failure or liver disease. She has had issues with R>L leg swelling for years. Over the past 2 weeks her swelling in both legs have worsened. She has had some recent falls in March and April which were mechanical. She also recently started taking an herbal medication which was described to her as a "diuretic" and prescribed by the [place] [facility] (private weight loss clinic). Admits to SOB especially while walking for the past 2 weeks which she attributed to her back pain. She can now only walk a block without stopping to catch her breath (used to be a mile). Also admits to blurry vision bilaterally which started a week ago and abdominal swelling. Denies chest pain or orthopnea.    Shortness of breath  Bilateral lower extremity edema        -     Dyspnea on exertion, abdominal distention and " "lower extremity edema concerning for a cardiogenic cause. Irregular rhythm was concerning for Afib as the cause however EKG in clinic showed sinus rhythm with very frequent PACs. CMP, BNP and TSH ordered to be done today. CXR deferred as patient is saturating well and no abnormalities on lung exam. If any labs are concerning for HF, will order TTE and refer to cardiology.  -     IN OFFICE EKG 12-LEAD (to Muse)  -     Comprehensive Metabolic Panel; Future; Expected date: 05/11/2022  -     BNP; Future; Expected date: 05/11/2022  -     TSH; Future; Expected date: 05/11/2022    Once blood work came back, Dr. El tried to contact the patient with the following message:    I tried to call a couple times but only got your voicemail. Just wanted to tell you that your labs look good, nothing that shouts out heart failure. Your heart rhythm on the ECG we did yesterday wasn't the one I was concerned about, however it does have frequent premature heartbeats which could be a sign that your heart is trying to compensate for something.     1. Can you look on the bottle of the "diuretic" pill the diet clinic gave you and message me back with the name? I know they have prescribed you phentermine before. If it is a medication like phentermine, this could be the cause of your premature heartbeats and all of these symptoms. Whatever the medication is, I want you to stop taking it since it's the only relatively new thing you've started around the times your symptoms started.     2. For now, try to avoid any heavily salted foods to try to help with the swelling. You can also elevate your legs like you've done before. You can also purchase compression stockings from any pharmacy for when you are walking around to help the fluid from accumulating more.     3. If your shortness of breath worsens or you develop any coughing, shortness of breath while sleeping, chest pain, or any other concerning symptoms then please let the clinic " know. In that case we may need to do some more tests.     Cezar,  Dr. HUGGINS    The patient since the visit:    The patient has been LEFT knee pain. The patient states that she has been been having the pain for about 2-3 weeks. The patient states that they did not give her anything for the pain and that the pain has been getting progressively worse. The patient had had an x-ray of her RIGHT knee that showed degenerative changes. The patient has also been seen by orthopedics and pain management for other orthopedic conditions. Denies any current numbness of tingling.      PAST MEDICAL HISTORY:  Past Medical History:   Diagnosis Date    Anxiety     Arthritis     knee    Asthma     Bursitis, shoulder     Colon polyps     Dental bridge present     upper right    Dental crown present     Depression     Hypertension     Pes planus     Wears glasses     for reading       PAST SURGICAL HISTORY:  Past Surgical History:   Procedure Laterality Date    arthroscopy ankle right      bilateral inguinal hernia repair      COLONOSCOPY N/A 1/11/2018    Procedure: COLONOSCOPY;  Surgeon: Christoph Nolasco MD;  Location: [facility] ENDO;  Service: Endoscopy;  Laterality: N/A;    left torn rotator cuff repair  09/2014    MYOMECTOMY      rt salphigectomy      SYNOVECTOMY      TRANSFORAMINAL EPIDURAL INJECTION OF STEROID Right 1/18/2022    Procedure: LUMBAR TRANSFORAMINAL RIGHT L5/S1 DIRECT REFERRAL;  Surgeon: Cherelle Carpenter MD;  Location: [facility] [place] PAIN MGT;  Service: Pain Management;  Laterality: Right;    UMBILICAL HERNIA REPAIR         SOCIAL HISTORY:  Social History     Socioeconomic History    Marital status: Legally    Tobacco Use    Smoking status: Never Smoker    Smokeless tobacco: Never Used   Substance and Sexual Activity    Alcohol use: Yes     Comment: social     Drug use: No    Sexual activity: Yes     Comment: occasional       FAMILY HISTORY:  Family History   Problem Relation Age of Onset    Cancer  Mother         breast    Diabetes Mother     Hypertension Mother     Breast cancer Mother     Cancer Brother         liver cancer    Breast cancer Maternal Aunt        ALLERGIES AND MEDICATIONS: updated and reviewed.  Review of patient's allergies indicates:   Allergen Reactions    Lisinopril Hives, Itching, Rash, Shortness Of Breath and Swelling     Current Outpatient Medications   Medication Sig Dispense Refill    albuterol sulfate 2.5 mg/0.5 mL Nebu Take 2.5 mg by nebulization every 4 (four) hours as needed. Rescue 100 each 0    amLODIPine (NORVASC) 10 MG tablet Take 1 tablet by mouth once daily 30 tablet 0    cetirizine (ZYRTEC) 10 MG tablet Take 10 mg by mouth once daily.      diphenhydrAMINE (BENADRYL) 25 mg capsule Take 2 capsules (50 mg total) by mouth every 6 (six) hours as needed for Itching or Allergies. 30 capsule 0    ergocalciferol (ERGOCALCIFEROL) 50,000 unit Cap Take 1 capsule (50,000 Units total) by mouth every 7 days. 12 capsule 1    spironolactone (ALDACTONE) 50 MG tablet Take 1 tablet (50 mg total) by mouth 2 (two) times daily. 60 tablet 5    VENTOLIN HFA 90 mcg/actuation inhaler INHALE 2 PUFFS BY MOUTH EVERY 6 HOURS AS NEEDED FOR WHEEZING AND FOR SHORTNESS OF BREATH 18 g 1    albuterol-ipratropium (DUO-NEB) 2.5 mg-0.5 mg/3 mL nebulizer solution Take 3 mLs by nebulization every 6 (six) hours as needed for Wheezing or Shortness of Breath. (Patient not taking: Reported on 5/18/2022) 90 vial 5    EPINEPHrine (EPIPEN) 0.3 mg/0.3 mL AtIn Inject 0.3 mLs (0.3 mg total) into the muscle as needed. (Patient not taking: Reported on 5/18/2022) 2 each 0    famotidine (PEPCID) 20 MG tablet Take 1 tablet (20 mg total) by mouth 2 (two) times daily. (Patient not taking: Reported on 5/18/2022) 20 tablet 0    gabapentin (NEURONTIN) 300 MG capsule Take 1 capsule (300 mg total) by mouth 3 (three) times daily. (Patient not taking: Reported on 5/18/2022) 90 capsule 11    meloxicam (MOBIC) 15 MG tablet  "Take 1 tablet (15 mg total) by mouth once daily. 30 tablet 0    triamcinolone acetonide 0.1% (KENALOG) 0.1 % cream Apply topically 2 (two) times daily. (Patient not taking: Reported on 5/18/2022) 15 g 0     No current facility-administered medications for this visit.       Patient Care Team:  Cherelle Stover MD as PCP - General (Internal Medicine)  Issac Sparks MA as Care Coordinator    ROS  Review of Systems   Constitutional: Negative for chills, fatigue, fever and unexpected weight change.   HENT: Negative for congestion, ear discharge, ear pain and postnasal drip.    Eyes: Negative for photophobia, pain and visual disturbance.   Respiratory: Negative for cough, shortness of breath and wheezing.    Cardiovascular: Negative for chest pain, palpitations and leg swelling.   Gastrointestinal: Negative for abdominal pain, constipation, diarrhea, nausea and vomiting.   Genitourinary: Negative for dysuria, frequency, urgency and vaginal discharge.   Musculoskeletal: Positive for arthralgias and joint swelling. Negative for back pain and neck stiffness.   Skin: Negative for rash.   Neurological: Negative for weakness and headaches.   Psychiatric/Behavioral: Negative for dysphoric mood and sleep disturbance. The patient is not nervous/anxious.            Physical Exam  Vitals:    05/18/22 1615   BP: 134/70   Pulse: 93   Temp: 98.2 °F (36.8 °C)   TempSrc: Oral   SpO2: 99%   Weight: 125.8 kg (277 lb 5.4 oz)   Height: 5' 6" (1.676 m)    Body mass index is 44.76 kg/m².  Weight: 125.8 kg (277 lb 5.4 oz)   Height: 5' 6" (167.6 cm)     Physical Exam  Constitutional:       Appearance: She is well-developed.   HENT:      Head: Normocephalic and atraumatic.      Right Ear: External ear normal.      Left Ear: External ear normal.      Nose: Nose normal.   Eyes:      Conjunctiva/sclera: Conjunctivae normal.      Pupils: Pupils are equal, round, and reactive to light.   Neck:      Thyroid: No thyromegaly.   Cardiovascular:      " Rate and Rhythm: Normal rate.   Pulmonary:      Effort: Pulmonary effort is normal.   Abdominal:      Palpations: There is no hepatomegaly or splenomegaly.   Genitourinary:     Vagina: Normal.   Musculoskeletal:         General: Normal range of motion.      Cervical back: Normal range of motion.      Right knee: Normal.      Left knee: Swelling and bony tenderness present. Normal range of motion. Tenderness present.   Skin:     General: Skin is warm and dry.   Neurological:      Mental Status: She is alert and oriented to person, place, and time.      Cranial Nerves: No cranial nerve deficit.   Psychiatric:         Behavior: Behavior normal.         Thought Content: Thought content normal.         Judgment: Judgment normal.         Health Maintenance       Date Due Completion Date    COVID-19 Vaccine (3 - Booster for Moderna series) 09/16/2021 4/16/2021    Mammogram 07/07/2022 7/7/2021    Influenza Vaccine (Season Ended) 09/01/2022 ---    Colorectal Cancer Screening 01/11/2023 1/11/2018    TETANUS VACCINE 05/19/2026 5/19/2016    Lipid Panel 06/25/2026 6/25/2021    Cervical Cancer Screening 07/19/2026 7/19/2021      Health maintenance reviewed at this time.     Assessment & Plan  Acute pain of left knee  -     X-Ray Knee 1 or 2 View Left; Future; Expected date: 05/18/2022  -     ketorolac injection 30 mg  -     meloxicam (MOBIC) 15 MG tablet; Take 1 tablet (15 mg total) by mouth once daily.  Dispense: 30 tablet; Refill: 0    RICE therapy discussed.  The patient already followed by orthopedics and pain management    Acute bilateral low back pain with right-sided sciatica/Decreased range of motion of lumbar spine  Stable. Monitor.   Followed by orthopedics    Morbid obesity with BMI of 40.0-44.9, adult  The patient is asked to make an attempt to improve diet and exercise patterns to aid in medical management of this problem.    Difficulty walking  Ambulates with cane when pain is bad. Fall precautions discussed.           Follow-up: Follow up if symptoms worsen or fail to improve.

## 2022-05-19 NOTE — PROGRESS NOTES
Please call patient with their attached x-ray results.    There was no abnormalities on the x-ray. No osteoarthritis.      Thanks   Jud Sanchez

## 2022-05-28 ENCOUNTER — OFFICE VISIT (OUTPATIENT)
Dept: URGENT CARE | Facility: CLINIC | Age: 62
End: 2022-05-28
Payer: COMMERCIAL

## 2022-05-28 VITALS
TEMPERATURE: 99 F | HEART RATE: 87 BPM | RESPIRATION RATE: 20 BRPM | SYSTOLIC BLOOD PRESSURE: 137 MMHG | OXYGEN SATURATION: 98 % | BODY MASS INDEX: 44.52 KG/M2 | WEIGHT: 277 LBS | HEIGHT: 66 IN | DIASTOLIC BLOOD PRESSURE: 83 MMHG

## 2022-05-28 DIAGNOSIS — R05.9 COUGH: ICD-10-CM

## 2022-05-28 DIAGNOSIS — U07.1 COVID: ICD-10-CM

## 2022-05-28 DIAGNOSIS — U07.1 COVID-19 VIRUS INFECTION: Primary | ICD-10-CM

## 2022-05-28 LAB
CTP QC/QA: YES
SARS-COV-2 RDRP RESP QL NAA+PROBE: POSITIVE

## 2022-05-28 PROCEDURE — 3008F PR BODY MASS INDEX (BMI) DOCUMENTED: ICD-10-PCS | Mod: CPTII,S$GLB,, | Performed by: NURSE PRACTITIONER

## 2022-05-28 PROCEDURE — 3079F PR MOST RECENT DIASTOLIC BLOOD PRESSURE 80-89 MM HG: ICD-10-PCS | Mod: CPTII,S$GLB,, | Performed by: NURSE PRACTITIONER

## 2022-05-28 PROCEDURE — 99203 PR OFFICE/OUTPT VISIT, NEW, LEVL III, 30-44 MIN: ICD-10-PCS | Mod: S$GLB,,, | Performed by: NURSE PRACTITIONER

## 2022-05-28 PROCEDURE — 3008F BODY MASS INDEX DOCD: CPT | Mod: CPTII,S$GLB,, | Performed by: NURSE PRACTITIONER

## 2022-05-28 PROCEDURE — 1160F RVW MEDS BY RX/DR IN RCRD: CPT | Mod: CPTII,S$GLB,, | Performed by: NURSE PRACTITIONER

## 2022-05-28 PROCEDURE — 1159F MED LIST DOCD IN RCRD: CPT | Mod: CPTII,S$GLB,, | Performed by: NURSE PRACTITIONER

## 2022-05-28 PROCEDURE — 3075F PR MOST RECENT SYSTOLIC BLOOD PRESS GE 130-139MM HG: ICD-10-PCS | Mod: CPTII,S$GLB,, | Performed by: NURSE PRACTITIONER

## 2022-05-28 PROCEDURE — 1160F PR REVIEW ALL MEDS BY PRESCRIBER/CLIN PHARMACIST DOCUMENTED: ICD-10-PCS | Mod: CPTII,S$GLB,, | Performed by: NURSE PRACTITIONER

## 2022-05-28 PROCEDURE — U0002 COVID-19 LAB TEST NON-CDC: HCPCS | Mod: QW,S$GLB,, | Performed by: NURSE PRACTITIONER

## 2022-05-28 PROCEDURE — 1159F PR MEDICATION LIST DOCUMENTED IN MEDICAL RECORD: ICD-10-PCS | Mod: CPTII,S$GLB,, | Performed by: NURSE PRACTITIONER

## 2022-05-28 PROCEDURE — U0002: ICD-10-PCS | Mod: QW,S$GLB,, | Performed by: NURSE PRACTITIONER

## 2022-05-28 PROCEDURE — 3075F SYST BP GE 130 - 139MM HG: CPT | Mod: CPTII,S$GLB,, | Performed by: NURSE PRACTITIONER

## 2022-05-28 PROCEDURE — 3079F DIAST BP 80-89 MM HG: CPT | Mod: CPTII,S$GLB,, | Performed by: NURSE PRACTITIONER

## 2022-05-28 PROCEDURE — 99203 OFFICE O/P NEW LOW 30 MIN: CPT | Mod: S$GLB,,, | Performed by: NURSE PRACTITIONER

## 2022-05-28 RX ORDER — BENZONATATE 200 MG/1
200 CAPSULE ORAL 3 TIMES DAILY PRN
Qty: 30 CAPSULE | Refills: 0 | Status: SHIPPED | OUTPATIENT
Start: 2022-05-28 | End: 2022-08-29

## 2022-05-28 RX ORDER — ALBUTEROL SULFATE 90 UG/1
2 AEROSOL, METERED RESPIRATORY (INHALATION) EVERY 6 HOURS PRN
Qty: 18 G | Refills: 0 | Status: SHIPPED | OUTPATIENT
Start: 2022-05-28 | End: 2022-08-29 | Stop reason: SDUPTHER

## 2022-05-28 NOTE — PROGRESS NOTES
"Subjective:       Patient ID: Brandi Freitas is a 61 y.o. female.    Vitals:  height is 5' 6" (1.676 m) and weight is 125.6 kg (277 lb). Her temperature is 98.7 °F (37.1 °C). Her blood pressure is 137/83 and her pulse is 87. Her respiration is 20 and oxygen saturation is 98%.     Chief Complaint: Cough (Home COVID test results were positive. - Entered by patient)    Pt is coming in today with a cough and congestion. pt states symptoms started 10 days ago. pt did a at home covid test Thursday and it came back positive.mucinex taken mild relief  covid vaccinated.    Provider note begins below:  61-year-old female with PMHx of hypertension an asthma presents to urgent care with complaints of 4 days of chest congestion, sinus pressure, cough and headaches.  Patient reports a positive COVID home test on Thursday.  OTC medications have been Mucinex with good relief.  Patient denies chest pain, shortness of breath, vomiting, diarrhea or abdominal pain.  Patient denies any sick contacts.  Tested positive for COVID-19 today.  Patient is awake and alert.  No acute distress.  Respirations are even and non-labored.    Cough  This is a new problem. The current episode started 1 to 4 weeks ago. The problem has been gradually worsening. The problem occurs every few minutes. The cough is non-productive. Associated symptoms include headaches. Pertinent negatives include no chest pain, chills, ear pain, rash or sore throat. Nothing aggravates the symptoms.       Constitution: Negative. Negative for chills, sweating and fatigue.   HENT: Positive for congestion and sinus pressure. Negative for ear pain, facial swelling and sore throat.    Neck: Negative for painful lymph nodes.   Cardiovascular: Negative.  Negative for chest trauma, chest pain and sob on exertion.   Eyes: Negative.  Negative for eye itching and eye pain.   Respiratory: Positive for cough. Negative for chest tightness and asthma.    Gastrointestinal: Negative.  " Negative for nausea, vomiting and diarrhea.   Endocrine: negative. cold intolerance and excessive thirst.   Genitourinary: Negative.  Negative for dysuria, frequency, urgency and hematuria.   Musculoskeletal: Negative for pain, trauma and joint pain.   Skin: Negative.  Negative for rash, wound and hives.   Allergic/Immunologic: Negative.  Negative for eczema, asthma, hives and itching.   Neurological: Positive for headaches. Negative for disorientation and altered mental status.   Hematologic/Lymphatic: Negative.  Negative for swollen lymph nodes.   Psychiatric/Behavioral: Negative.  Negative for altered mental status, disorientation and confusion.       Objective:      Physical Exam   Constitutional: She is oriented to person, place, and time. She appears well-developed. She is cooperative.  Non-toxic appearance. She does not appear ill. No distress.   HENT:   Head: Normocephalic and atraumatic.   Ears:   Right Ear: Hearing, tympanic membrane, external ear and ear canal normal.   Left Ear: Hearing, tympanic membrane, external ear and ear canal normal.   Nose: Nose normal. No mucosal edema, rhinorrhea or nasal deformity. No epistaxis. Right sinus exhibits no maxillary sinus tenderness and no frontal sinus tenderness. Left sinus exhibits no maxillary sinus tenderness and no frontal sinus tenderness.   Mouth/Throat: Uvula is midline, oropharynx is clear and moist and mucous membranes are normal. No trismus in the jaw. Normal dentition. No uvula swelling. No oropharyngeal exudate, posterior oropharyngeal edema or posterior oropharyngeal erythema.   Eyes: Conjunctivae and lids are normal. No scleral icterus.   Neck: Trachea normal and phonation normal. Neck supple. No edema present. No erythema present. No neck rigidity present.   Cardiovascular: Normal rate, regular rhythm, normal heart sounds and normal pulses.   Pulmonary/Chest: Effort normal and breath sounds normal. No stridor. No respiratory distress. She has no  decreased breath sounds. She has no wheezes. She has no rhonchi. She has no rales. She exhibits no tenderness.   Abdominal: Normal appearance. Soft. flat abdomen There is no abdominal tenderness. There is no left CVA tenderness and no right CVA tenderness.   Musculoskeletal: Normal range of motion.         General: No deformity. Normal range of motion.   Neurological: no focal deficit. She is alert and oriented to person, place, and time. She exhibits normal muscle tone. Coordination normal.   Skin: Skin is warm, dry, intact, not diaphoretic and not pale.   Psychiatric: Her speech is normal and behavior is normal. Mood, judgment and thought content normal.   Nursing note and vitals reviewed.    The following results have been reviewed with the patient:  LABS-  Results for orders placed or performed in visit on 05/28/22   POCT COVID-19 Rapid Screening   Result Value Ref Range    POC Rapid COVID Positive (A) Negative     Acceptable Yes                 Assessment:       1. COVID-19 virus infection    2. Cough    3. COVID          Plan:       FOLLOWUP  Follow up if symptoms worsen or fail to improve, for PLEASE CONTACT PCP OR CONTACT THE EMERGENCY ROOM..     PATIENT INSTRUCTIONS  Patient Instructions   INSTRUCTIONS:  - Rest.  - Drink plenty of fluids.  - Take Tylenol and/or Ibuprofen as directed as needed for fever/pain.  Do not take more than the recommended dose.  - follow up with your PCP within the next 1-2 weeks as needed.  - You must understand that you have received an Urgent Care treatment only and that you may be released before all of your medical problems are known or treated.   - You, the patient, will arrange for follow up care as instructed.   - If your condition worsens or fails to improve we recommend that you receive another evaluation at the ER immediately or contact your PCP to discuss your concerns.   - You can call [phone] or [phone] to help schedule an appointment with  the appropriate provider.             THANK YOU FOR ALLOWING ME TO PARTICIPATE IN YOUR HEALTHCARE,     Wesly Pandya, NP   COVID-19 virus infection  -     COVID-19 Home Symptom Monitoring  - Duration (days): 14  -     Ambulatory referral/consult to [facility]  -     albuterol (VENTOLIN HFA) 90 mcg/actuation inhaler; Inhale 2 puffs into the lungs every 6 (six) hours as needed for Wheezing or Shortness of Breath. Rescue  Dispense: 18 g; Refill: 0  -     benzonatate (TESSALON) 200 MG capsule; Take 1 capsule (200 mg total) by mouth 3 (three) times daily as needed for Cough.  Dispense: 30 capsule; Refill: 0    Cough  -     POCT COVID-19 Rapid Screening    COVID

## 2022-05-28 NOTE — PATIENT INSTRUCTIONS
INSTRUCTIONS:  - Rest.  - Drink plenty of fluids.  - Take Tylenol and/or Ibuprofen as directed as needed for fever/pain.  Do not take more than the recommended dose.  - follow up with your PCP within the next 1-2 weeks as needed.  - You must understand that you have received an Urgent Care treatment only and that you may be released before all of your medical problems are known or treated.   - You, the patient, will arrange for follow up care as instructed.   - If your condition worsens or fails to improve we recommend that you receive another evaluation at the ER immediately or contact your PCP to discuss your concerns.   - You can call [phone] or [phone] to help schedule an appointment with the appropriate provider.

## 2022-08-22 ENCOUNTER — HOSPITAL ENCOUNTER (OUTPATIENT)
Dept: RADIOLOGY | Facility: HOSPITAL | Age: 62
Discharge: HOME | End: 2022-08-22
Attending: FAMILY MEDICINE
Payer: COMMERCIAL

## 2022-08-22 DIAGNOSIS — Z12.31 BREAST CANCER SCREENING BY MAMMOGRAM: ICD-10-CM

## 2022-08-22 PROCEDURE — 77067 SCR MAMMO BI INCL CAD: CPT | Mod: 26,,, | Performed by: RADIOLOGY

## 2022-08-22 PROCEDURE — 77067 MAMMO DIGITAL SCREENING BILAT WITH TOMO: ICD-10-PCS | Mod: 26,,, | Performed by: RADIOLOGY

## 2022-08-22 PROCEDURE — 77063 BREAST TOMOSYNTHESIS BI: CPT | Mod: TC

## 2022-08-22 PROCEDURE — 77063 MAMMO DIGITAL SCREENING BILAT WITH TOMO: ICD-10-PCS | Mod: 26,,, | Performed by: RADIOLOGY

## 2022-08-22 PROCEDURE — 77063 BREAST TOMOSYNTHESIS BI: CPT | Mod: 26,,, | Performed by: RADIOLOGY

## 2022-08-29 ENCOUNTER — TELEPHONE (OUTPATIENT)
Dept: FAMILY MEDICINE | Facility: CLINIC | Age: 62
End: 2022-08-29
Payer: COMMERCIAL

## 2022-08-29 ENCOUNTER — LAB VISIT (OUTPATIENT)
Dept: LAB | Facility: HOSPITAL | Age: 62
End: 2022-08-29
Attending: FAMILY MEDICINE
Payer: COMMERCIAL

## 2022-08-29 ENCOUNTER — OFFICE VISIT (OUTPATIENT)
Dept: FAMILY MEDICINE | Facility: CLINIC | Age: 62
End: 2022-08-29
Payer: COMMERCIAL

## 2022-08-29 VITALS
TEMPERATURE: 99 F | WEIGHT: 279.75 LBS | BODY MASS INDEX: 44.96 KG/M2 | DIASTOLIC BLOOD PRESSURE: 90 MMHG | HEART RATE: 84 BPM | RESPIRATION RATE: 16 BRPM | HEIGHT: 66 IN | SYSTOLIC BLOOD PRESSURE: 150 MMHG | OXYGEN SATURATION: 99 %

## 2022-08-29 DIAGNOSIS — M25.562 ACUTE PAIN OF LEFT KNEE: Primary | ICD-10-CM

## 2022-08-29 DIAGNOSIS — Z00.00 ROUTINE GENERAL MEDICAL EXAMINATION AT A HEALTH CARE FACILITY: ICD-10-CM

## 2022-08-29 DIAGNOSIS — E55.9 VITAMIN D DEFICIENCY: ICD-10-CM

## 2022-08-29 DIAGNOSIS — M43.10 DEGENERATIVE SPONDYLOLISTHESIS: ICD-10-CM

## 2022-08-29 DIAGNOSIS — F33.1 MODERATE EPISODE OF RECURRENT MAJOR DEPRESSIVE DISORDER: ICD-10-CM

## 2022-08-29 DIAGNOSIS — I10 ESSENTIAL HYPERTENSION: ICD-10-CM

## 2022-08-29 LAB
ALBUMIN SERPL BCP-MCNC: 3.6 G/DL (ref 3.5–5.2)
ALP SERPL-CCNC: 88 U/L (ref 55–135)
ALT SERPL W/O P-5'-P-CCNC: 21 U/L (ref 10–44)
ANION GAP SERPL CALC-SCNC: 8 MMOL/L (ref 8–16)
AST SERPL-CCNC: 20 U/L (ref 10–40)
BASOPHILS # BLD AUTO: 0.05 K/UL (ref 0–0.2)
BASOPHILS NFR BLD: 0.8 % (ref 0–1.9)
BILIRUB SERPL-MCNC: 0.3 MG/DL (ref 0.1–1)
BUN SERPL-MCNC: 13 MG/DL (ref 6–20)
CALCIUM SERPL-MCNC: 9.8 MG/DL (ref 8.7–10.5)
CHLORIDE SERPL-SCNC: 108 MMOL/L (ref 95–110)
CHOLEST SERPL-MCNC: 161 MG/DL (ref 120–199)
CHOLEST/HDLC SERPL: 2.9 {RATIO} (ref 2–5)
CO2 SERPL-SCNC: 28 MMOL/L (ref 23–29)
CREAT SERPL-MCNC: 0.7 MG/DL (ref 0.5–1.4)
DIFFERENTIAL METHOD: ABNORMAL
EOSINOPHIL # BLD AUTO: 0.2 K/UL (ref 0–0.5)
EOSINOPHIL NFR BLD: 3.4 % (ref 0–8)
ERYTHROCYTE [DISTWIDTH] IN BLOOD BY AUTOMATED COUNT: 14.6 % (ref 11.5–14.5)
EST. GFR  (NO RACE VARIABLE): >60 ML/MIN/1.73 M^2
GLUCOSE SERPL-MCNC: 90 MG/DL (ref 70–110)
HCT VFR BLD AUTO: 42.4 % (ref 37–48.5)
HDLC SERPL-MCNC: 55 MG/DL (ref 40–75)
HDLC SERPL: 34.2 % (ref 20–50)
HGB BLD-MCNC: 12.8 G/DL (ref 12–16)
IMM GRANULOCYTES # BLD AUTO: 0.01 K/UL (ref 0–0.04)
IMM GRANULOCYTES NFR BLD AUTO: 0.2 % (ref 0–0.5)
LDLC SERPL CALC-MCNC: 96.4 MG/DL (ref 63–159)
LYMPHOCYTES # BLD AUTO: 2.8 K/UL (ref 1–4.8)
LYMPHOCYTES NFR BLD: 46 % (ref 18–48)
MCH RBC QN AUTO: 26 PG (ref 27–31)
MCHC RBC AUTO-ENTMCNC: 30.2 G/DL (ref 32–36)
MCV RBC AUTO: 86 FL (ref 82–98)
MONOCYTES # BLD AUTO: 0.6 K/UL (ref 0.3–1)
MONOCYTES NFR BLD: 9.7 % (ref 4–15)
NEUTROPHILS # BLD AUTO: 2.5 K/UL (ref 1.8–7.7)
NEUTROPHILS NFR BLD: 39.9 % (ref 38–73)
NONHDLC SERPL-MCNC: 106 MG/DL
NRBC BLD-RTO: 0 /100 WBC
PLATELET # BLD AUTO: 181 K/UL (ref 150–450)
PMV BLD AUTO: 12.5 FL (ref 9.2–12.9)
POTASSIUM SERPL-SCNC: 4.5 MMOL/L (ref 3.5–5.1)
PROT SERPL-MCNC: 7.9 G/DL (ref 6–8.4)
RBC # BLD AUTO: 4.93 M/UL (ref 4–5.4)
SODIUM SERPL-SCNC: 144 MMOL/L (ref 136–145)
TRIGL SERPL-MCNC: 48 MG/DL (ref 30–150)
TSH SERPL DL<=0.005 MIU/L-ACNC: 1.21 UIU/ML (ref 0.4–4)
WBC # BLD AUTO: 6.17 K/UL (ref 3.9–12.7)

## 2022-08-29 PROCEDURE — 85025 COMPLETE CBC W/AUTO DIFF WBC: CPT | Performed by: FAMILY MEDICINE

## 2022-08-29 PROCEDURE — 1159F MED LIST DOCD IN RCRD: CPT | Mod: CPTII,S$GLB,, | Performed by: FAMILY MEDICINE

## 2022-08-29 PROCEDURE — 3077F SYST BP >= 140 MM HG: CPT | Mod: CPTII,S$GLB,, | Performed by: FAMILY MEDICINE

## 2022-08-29 PROCEDURE — 84443 ASSAY THYROID STIM HORMONE: CPT | Performed by: FAMILY MEDICINE

## 2022-08-29 PROCEDURE — 80061 LIPID PANEL: CPT | Performed by: FAMILY MEDICINE

## 2022-08-29 PROCEDURE — 1159F PR MEDICATION LIST DOCUMENTED IN MEDICAL RECORD: ICD-10-PCS | Mod: CPTII,S$GLB,, | Performed by: FAMILY MEDICINE

## 2022-08-29 PROCEDURE — 99999 PR PBB SHADOW E&M-EST. PATIENT-LVL V: CPT | Mod: PBBFAC,,, | Performed by: FAMILY MEDICINE

## 2022-08-29 PROCEDURE — 3080F DIAST BP >= 90 MM HG: CPT | Mod: CPTII,S$GLB,, | Performed by: FAMILY MEDICINE

## 2022-08-29 PROCEDURE — 3080F PR MOST RECENT DIASTOLIC BLOOD PRESSURE >= 90 MM HG: ICD-10-PCS | Mod: CPTII,S$GLB,, | Performed by: FAMILY MEDICINE

## 2022-08-29 PROCEDURE — 36415 COLL VENOUS BLD VENIPUNCTURE: CPT | Mod: PN | Performed by: FAMILY MEDICINE

## 2022-08-29 PROCEDURE — 3008F PR BODY MASS INDEX (BMI) DOCUMENTED: ICD-10-PCS | Mod: CPTII,S$GLB,, | Performed by: FAMILY MEDICINE

## 2022-08-29 PROCEDURE — 3008F BODY MASS INDEX DOCD: CPT | Mod: CPTII,S$GLB,, | Performed by: FAMILY MEDICINE

## 2022-08-29 PROCEDURE — 80053 COMPREHEN METABOLIC PANEL: CPT | Performed by: FAMILY MEDICINE

## 2022-08-29 PROCEDURE — 99214 OFFICE O/P EST MOD 30 MIN: CPT | Mod: S$GLB,,, | Performed by: FAMILY MEDICINE

## 2022-08-29 PROCEDURE — 3044F PR MOST RECENT HEMOGLOBIN A1C LEVEL <7.0%: ICD-10-PCS | Mod: CPTII,S$GLB,, | Performed by: FAMILY MEDICINE

## 2022-08-29 PROCEDURE — 82306 VITAMIN D 25 HYDROXY: CPT | Performed by: FAMILY MEDICINE

## 2022-08-29 PROCEDURE — 99999 PR PBB SHADOW E&M-EST. PATIENT-LVL V: ICD-10-PCS | Mod: PBBFAC,,, | Performed by: FAMILY MEDICINE

## 2022-08-29 PROCEDURE — 99214 PR OFFICE/OUTPT VISIT, EST, LEVL IV, 30-39 MIN: ICD-10-PCS | Mod: S$GLB,,, | Performed by: FAMILY MEDICINE

## 2022-08-29 PROCEDURE — 3077F PR MOST RECENT SYSTOLIC BLOOD PRESSURE >= 140 MM HG: ICD-10-PCS | Mod: CPTII,S$GLB,, | Performed by: FAMILY MEDICINE

## 2022-08-29 PROCEDURE — 83036 HEMOGLOBIN GLYCOSYLATED A1C: CPT | Performed by: FAMILY MEDICINE

## 2022-08-29 PROCEDURE — 3044F HG A1C LEVEL LT 7.0%: CPT | Mod: CPTII,S$GLB,, | Performed by: FAMILY MEDICINE

## 2022-08-29 RX ORDER — SPIRONOLACTONE 50 MG/1
50 TABLET, FILM COATED ORAL 2 TIMES DAILY
Qty: 60 TABLET | Refills: 5 | Status: SHIPPED | OUTPATIENT
Start: 2022-08-29 | End: 2022-09-20

## 2022-08-29 RX ORDER — ALBUTEROL SULFATE 90 UG/1
2 AEROSOL, METERED RESPIRATORY (INHALATION) EVERY 6 HOURS PRN
Qty: 18 G | Refills: 0 | Status: SHIPPED | OUTPATIENT
Start: 2022-08-29 | End: 2022-10-31 | Stop reason: SDUPTHER

## 2022-08-29 RX ORDER — AMLODIPINE BESYLATE 10 MG/1
10 TABLET ORAL DAILY
Qty: 30 TABLET | Refills: 5 | Status: SHIPPED | OUTPATIENT
Start: 2022-08-29 | End: 2022-11-14 | Stop reason: SDUPTHER

## 2022-08-29 RX ORDER — GABAPENTIN 300 MG/1
300 CAPSULE ORAL 3 TIMES DAILY
Qty: 90 CAPSULE | Refills: 0 | Status: SHIPPED | OUTPATIENT
Start: 2022-08-29 | End: 2023-11-21

## 2022-08-29 RX ORDER — ALBUTEROL SULFATE 90 UG/1
2 AEROSOL, METERED RESPIRATORY (INHALATION) EVERY 6 HOURS PRN
Qty: 18 G | Refills: 3 | Status: SHIPPED | OUTPATIENT
Start: 2022-08-29 | End: 2022-08-29

## 2022-08-29 NOTE — TELEPHONE ENCOUNTER
----- Message from Keke Lindsey sent at 8/29/2022 11:07 AM CDT -----  Type: RX Refill Request    [facility] [phone]  Have you contacted your pharmacy:    Refill or New Rx:albuterol (VENTOLIN HFA) 90 mcg/actuation inhaler - call pharmacy. Insurance will be for provental    RX Name and Strength:    How is the patient currently taking it? (ex. 1XDay):    Is this a 30 day or 90 day RX:    Preferred Pharmacy with phone number:    Local or Mail Order:    Ordering Provider:    Would the patient rather a call back or a response via [facility]?     Best Call Back Number:    Additional Information:

## 2022-08-29 NOTE — PATIENT INSTRUCTIONS
[phone]    Please call for your appointment with Robin Dewhirst, [facility] therapist       Please contact the number listed below to schedule your referral for physical therapy      [phone]   (New Pt/Consult) [phone]  Established Patient [phone]

## 2022-08-29 NOTE — PROGRESS NOTES
Chief Complaint  Chief Complaint   Patient presents with    Back Pain       HPI  Brandi Freitas is a 61 y.o. female with multiple medical diagnoses as listed in the medical history and problem list that presents for knee and back pain.  Their last appointment with primary care was 5/22.      Ms. Gray has had worsening lower back pain x2 months. She follows with an orthopedic surgeon (Dr. Cummings), who she last saw on 2/22/2022. Xray at that time revealed L5/S1 DDD and right foraminal stenosis. She received a steroid injection, which she said managed her pain well. However, she has noticed the shot has started to wear off for the past 6-7 weeks. On interview today, her pain is 3/10, pounding, non-radiating, and localized to lower middle back. She is not taking any medications for the pain. Heat pads help occasionally.    She reports left knee pain for several months. The pain is dull, supra-patellar, 2/10 in severity, and worse with walking. It is mildly tender to palpation. She tried Mobic for x1 week, but did not think that it helped. Her most recent xray (5/2022) did not reveal any osteoarthritis. She denies any fever, night sweats, trauma, clicking, locking, rash, or falls.      She also reports worsening anxiety and depression for the past several months. She denies any new stressors. Psychiatric ROS was positive for low mood, anxiety, decreased concentration, and increased sleep. She denies any thoughts of self-harm.      PAST MEDICAL HISTORY:  Past Medical History:   Diagnosis Date    Anxiety     Arthritis     knee    Asthma     Bursitis, shoulder     Colon polyps     Dental bridge present     upper right    Dental crown present     Depression     Hypertension     Pes planus     Wears glasses     for reading       PAST SURGICAL HISTORY:  Past Surgical History:   Procedure Laterality Date    arthroscopy ankle right      bilateral inguinal hernia repair      COLONOSCOPY N/A 1/11/2018    Procedure:  COLONOSCOPY;  Surgeon: Christoph Nolasco MD;  Location: [facility] ENDO;  Service: Endoscopy;  Laterality: N/A;    left torn rotator cuff repair  09/2014    MYOMECTOMY      rt salphigectomy      SYNOVECTOMY      TRANSFORAMINAL EPIDURAL INJECTION OF STEROID Right 1/18/2022    Procedure: LUMBAR TRANSFORAMINAL RIGHT L5/S1 DIRECT REFERRAL;  Surgeon: Cherelle Carpenter MD;  Location: [facility] PAIN [facility];  Service: Pain Management;  Laterality: Right;    UMBILICAL HERNIA REPAIR         SOCIAL HISTORY:  Social History     Socioeconomic History    Marital status: Legally    Tobacco Use    Smoking status: Never    Smokeless tobacco: Never   Substance and Sexual Activity    Alcohol use: Yes     Comment: social     Drug use: No    Sexual activity: Yes     Comment: occasional       FAMILY HISTORY:  Family History   Problem Relation Age of Onset    Cancer Mother         breast    Diabetes Mother     Hypertension Mother     Breast cancer Mother     Cancer Brother         liver cancer    Breast cancer Maternal Aunt        ALLERGIES AND MEDICATIONS: updated and reviewed.  Review of patient's allergies indicates:   Allergen Reactions    Lisinopril Hives, Itching, Rash, Shortness Of Breath and Swelling     Current Outpatient Medications   Medication Sig Dispense Refill    albuterol (VENTOLIN HFA) 90 mcg/actuation inhaler Inhale 2 puffs into the lungs every 6 (six) hours as needed for Wheezing or Shortness of Breath. Rescue 18 g 0    albuterol-ipratropium (DUO-NEB) 2.5 mg-0.5 mg/3 mL nebulizer solution Take 3 mLs by nebulization every 6 (six) hours as needed for Wheezing or Shortness of Breath. 90 vial 5    amLODIPine (NORVASC) 10 MG tablet Take 1 tablet by mouth once daily 30 tablet 0    cetirizine (ZYRTEC) 10 MG tablet Take 10 mg by mouth once daily.      diphenhydrAMINE (BENADRYL) 25 mg capsule Take 2 capsules (50 mg total) by mouth every 6 (six) hours as needed for Itching or Allergies. 30 capsule 0    EPINEPHrine (EPIPEN) 0.3 mg/0.3  mL AtIn Inject 0.3 mLs (0.3 mg total) into the muscle as needed. 2 each 0    ergocalciferol (ERGOCALCIFEROL) 50,000 unit Cap Take 1 capsule (50,000 Units total) by mouth every 7 days. 12 capsule 1    spironolactone (ALDACTONE) 50 MG tablet Take 1 tablet (50 mg total) by mouth 2 (two) times daily. 60 tablet 5    VENTOLIN HFA 90 mcg/actuation inhaler INHALE 2 PUFFS BY MOUTH EVERY 6 HOURS AS NEEDED FOR WHEEZING AND FOR SHORTNESS OF BREATH 18 g 1    benzonatate (TESSALON) 200 MG capsule Take 1 capsule (200 mg total) by mouth 3 (three) times daily as needed for Cough. (Patient not taking: Reported on 8/29/2022) 30 capsule 0    famotidine (PEPCID) 20 MG tablet Take 1 tablet (20 mg total) by mouth 2 (two) times daily. (Patient not taking: No sig reported) 20 tablet 0    gabapentin (NEURONTIN) 300 MG capsule Take 1 capsule (300 mg total) by mouth 3 (three) times daily. (Patient not taking: No sig reported) 90 capsule 11    triamcinolone acetonide 0.1% (KENALOG) 0.1 % cream Apply topically 2 (two) times daily. (Patient not taking: No sig reported) 15 g 0     No current facility-administered medications for this visit.         ROS  Review of Systems   Constitutional:  Negative for fatigue, fever and unexpected weight change.   HENT:  Negative for congestion and sore throat.    Respiratory:  Negative for chest tightness, shortness of breath and wheezing.    Cardiovascular:  Positive for leg swelling. Negative for chest pain and palpitations.   Gastrointestinal:  Negative for blood in stool, constipation and diarrhea.   Endocrine: Negative for cold intolerance, heat intolerance and polyuria.   Genitourinary:  Negative for dysuria and hematuria.   Musculoskeletal:  Positive for arthralgias, back pain and joint swelling.   Neurological:  Positive for headaches. Negative for syncope.   Psychiatric/Behavioral:  Negative for self-injury. The patient is nervous/anxious.          Physical Exam  Vitals:    08/29/22 0944   BP: (!)  "150/86   Pulse: 84   Resp: 16   Temp: 99.1 °F (37.3 °C)   SpO2: 99%   Weight: 126.9 kg (279 lb 12.2 oz)   Height: 5' 6" (1.676 m)    Body mass index is 45.16 kg/m².  Weight: 126.9 kg (279 lb 12.2 oz)   Height: 5' 6" (167.6 cm)   Physical Exam  Constitutional:       Appearance: Normal appearance.   HENT:      Head: Normocephalic and atraumatic.      Right Ear: Tympanic membrane normal.      Left Ear: Tympanic membrane normal.      Nose: Nose normal.      Mouth/Throat:      Mouth: Mucous membranes are moist.      Pharynx: Oropharynx is clear.   Eyes:      Extraocular Movements: Extraocular movements intact.      Conjunctiva/sclera: Conjunctivae normal.      Pupils: Pupils are equal, round, and reactive to light.   Cardiovascular:      Rate and Rhythm: Normal rate and regular rhythm.      Pulses: Normal pulses.   Pulmonary:      Effort: Pulmonary effort is normal.   Abdominal:      General: Bowel sounds are normal.      Palpations: Abdomen is soft.   Musculoskeletal:         General: Tenderness present. Normal range of motion.      Right knee: Normal.      Left knee: No swelling. Normal range of motion. Tenderness present. No medial joint line or lateral joint line tenderness.      Instability Tests: Anterior drawer test negative. Posterior drawer test negative. Anterior Lachman test negative. Medial Mg test negative and lateral Mg test negative.      Comments: Supra-patellar tenderness on palpation   Mild pain on full extension   Skin:     General: Skin is warm and dry.   Neurological:      General: No focal deficit present.      Mental Status: She is alert and oriented to person, place, and time. Mental status is at baseline.   Psychiatric:         Mood and Affect: Mood normal.         Behavior: Behavior normal.         Thought Content: Thought content normal.         Judgment: Judgment normal.         Health Maintenance         Date Due Completion Date    COVID-19 Vaccine (4 - Booster for Moderna series) " 03/29/2022 11/29/2021    Pneumococcal Vaccines (Age 0-64) (2 - PCV) 07/19/2022 7/19/2021    Influenza Vaccine (1) 09/01/2022 ---    Colorectal Cancer Screening 01/11/2023 1/11/2018    Mammogram 08/22/2023 8/22/2022    TETANUS VACCINE 05/19/2026 5/19/2016    Lipid Panel 06/25/2026 6/25/2021    Cervical Cancer Screening 07/19/2026 7/19/2021              Assessment and Plan:  1. Acute pain of left knee  Given location of pain and most recent xray results (negative for OA), likely patellofemoral pain syndrome. Would benefit from strengthening and stretching exercise. Will consider meniscal tear if no improvement with PT.  - Ambulatory referral/consult to Physical/Occupational Therapy; Future    2. Routine general medical examination at a health care facility  Patient due for routine bloodwork.  - CBC Auto Differential; Future  - Comprehensive Metabolic Panel; Future  - Lipid Panel; Future  - Hemoglobin A1C; Future  - TSH; Future    3. Degenerative spondylolisthesis  Patient last seen by orthopedic surgeon 2/2022. Pt received spinal injection at that time, which she found helpful. Advised patient to follow-up with orthopedist, and recommended gabapentin to help with symptoms until her next appt.   - gabapentin (NEURONTIN) 300 MG capsule; Take 1 capsule (300 mg total) by mouth 3 (three) times daily.  Dispense: 90 capsule; Refill: 0    4. COVID-19 virus infection  Patient reports needing inhaler once every 1-2 months. Will continue home regimen.  - albuterol (VENTOLIN HFA) 90 mcg/actuation inhaler; Inhale 2 puffs into the lungs every 6 (six) hours as needed for Wheezing or Shortness of Breath. Rescue  Dispense: 18 g; Refill: 3    5. Essential hypertension  Will continue home medication. Encouraged patient to monitor blood pressure at home.  - amLODIPine (NORVASC) 10 MG tablet; Take 1 tablet (10 mg total) by mouth once daily.  Dispense: 30 tablet; Refill: 5  - spironolactone (ALDACTONE) 50 MG tablet; Take 1 tablet (50 mg  total) by mouth 2 (two) times daily.  Dispense: 60 tablet; Refill: 5    6. Vitamin D deficiency  - Vitamin D; Future    7. Moderate episode of recurrent major depressive disorder  Patient interested in referral to therapist to discuss worsening symptoms of anxiety and depression. Order placed.  - Ambulatory referral/consult to Psychiatry; Future

## 2022-08-29 NOTE — PROGRESS NOTES
"    Has Dementia Dx: No    Cognitive Function Screening:  No flowsheet data found.  Clock Drawing Test - Are the numbers in the correct order? Are two hands drawn on the clock? Is the correct time recorded? Are numbers recorded in the correct spatial position? If the above items are true, select "Yes"  [2]. If any are missing, select "Partial" [1]. If the clock drawing is not correct or incomplete, select "No" [0]     Mini-Cog 3 Minute Recall - How many words have been recalled?    Cognitive Function Screening Total - Less than 4 = Abnormal,  Greater than or equal to 4 = Normal        MEDICATIONS:  High Risk Medications:  Total Active Medications: 1  gabapentin - 300 MG    WHAT MATTERS MOST:  Advance Care Planning   ACP Status:   Patient does not have an ACP conversation on file  Living Will: No  Power of : No  [place]: No    What is most important right now is to focus on {ACPPTFOUCS:01168}    Accordingly, we have decided that the best plan to meet the patient's goals includes {ACPPTFOUCS2:76702}               Past Surgical History:   Procedure Laterality Date    arthroscopy ankle right      bilateral inguinal hernia repair      COLONOSCOPY N/A 1/11/2018    Procedure: COLONOSCOPY;  Surgeon: Christoph Nolasco MD;  Location: [facility] ENDO;  Service: Endoscopy;  Laterality: N/A;    left torn rotator cuff repair  09/2014    MYOMECTOMY      rt salphigectomy      SYNOVECTOMY      TRANSFORAMINAL EPIDURAL INJECTION OF STEROID Right 1/18/2022    Procedure: LUMBAR TRANSFORAMINAL RIGHT L5/S1 DIRECT REFERRAL;  Surgeon: Cherelle Carpenter MD;  Location: [facility];  Service: Pain Management;  Laterality: Right;    UMBILICAL HERNIA REPAIR         Past Medical History:   Diagnosis Date    Anxiety     Arthritis     knee    Asthma     Bursitis, shoulder     Colon polyps     Dental bridge present     upper right    Dental crown present     Depression     Hypertension     Pes planus     Wears glasses     for reading "       Social History     Socioeconomic History    Marital status: Legally    Tobacco Use    Smoking status: Never    Smokeless tobacco: Never   Substance and Sexual Activity    Alcohol use: Yes     Comment: social     Drug use: No    Sexual activity: Yes     Comment: occasional       Review of Systems    Objective:   LMP 07/18/2012     Physical Exam        Lab Results   Component Value Date    WBC 9.04 06/25/2021    HGB 12.8 06/25/2021    HCT 43.7 06/25/2021     06/25/2021    CHOL 177 06/25/2021    TRIG 81 06/25/2021    HDL 55 06/25/2021    ALT 26 05/11/2022    AST 21 05/11/2022     05/11/2022    K 4.7 05/11/2022     05/11/2022    CREATININE 0.7 05/11/2022    BUN 14 05/11/2022    CO2 26 05/11/2022    TSH 1.300 05/11/2022    HGBA1C 5.6 06/25/2021       Current Outpatient Medications on File Prior to Visit   Medication Sig Dispense Refill    albuterol (VENTOLIN HFA) 90 mcg/actuation inhaler Inhale 2 puffs into the lungs every 6 (six) hours as needed for Wheezing or Shortness of Breath. Rescue 18 g 0    albuterol-ipratropium (DUO-NEB) 2.5 mg-0.5 mg/3 mL nebulizer solution Take 3 mLs by nebulization every 6 (six) hours as needed for Wheezing or Shortness of Breath. (Patient not taking: Reported on 5/18/2022) 90 vial 5    amLODIPine (NORVASC) 10 MG tablet Take 1 tablet by mouth once daily 30 tablet 0    benzonatate (TESSALON) 200 MG capsule Take 1 capsule (200 mg total) by mouth 3 (three) times daily as needed for Cough. 30 capsule 0    cetirizine (ZYRTEC) 10 MG tablet Take 10 mg by mouth once daily.      diphenhydrAMINE (BENADRYL) 25 mg capsule Take 2 capsules (50 mg total) by mouth every 6 (six) hours as needed for Itching or Allergies. 30 capsule 0    EPINEPHrine (EPIPEN) 0.3 mg/0.3 mL AtIn Inject 0.3 mLs (0.3 mg total) into the muscle as needed. (Patient not taking: Reported on 5/18/2022) 2 each 0    ergocalciferol (ERGOCALCIFEROL) 50,000 unit Cap Take 1 capsule (50,000 Units total) by  mouth every 7 days. 12 capsule 1    famotidine (PEPCID) 20 MG tablet Take 1 tablet (20 mg total) by mouth 2 (two) times daily. (Patient not taking: Reported on 5/18/2022) 20 tablet 0    gabapentin (NEURONTIN) 300 MG capsule Take 1 capsule (300 mg total) by mouth 3 (three) times daily. (Patient not taking: Reported on 5/18/2022) 90 capsule 11    spironolactone (ALDACTONE) 50 MG tablet Take 1 tablet (50 mg total) by mouth 2 (two) times daily. 60 tablet 5    triamcinolone acetonide 0.1% (KENALOG) 0.1 % cream Apply topically 2 (two) times daily. (Patient not taking: Reported on 5/18/2022) 15 g 0    VENTOLIN HFA 90 mcg/actuation inhaler INHALE 2 PUFFS BY MOUTH EVERY 6 HOURS AS NEEDED FOR WHEEZING AND FOR SHORTNESS OF BREATH 18 g 1     No current facility-administered medications on file prior to visit.         Assessment:   61 y.o. female with multiple co-morbid illnesses here to follow-up with PCP and continue work-up of chronic issues:    Plan:     Problem List Items Addressed This Visit    None      Health Maintenance         Date Due Completion Date    COVID-19 Vaccine (3 - Booster for Moderna series) 09/16/2021 4/16/2021    Pneumococcal Vaccines (Age 0-64) (2 - PCV) 07/19/2022 7/19/2021    Influenza Vaccine (1) 09/01/2022 ---    Colorectal Cancer Screening 01/11/2023 1/11/2018    Mammogram 08/22/2023 8/22/2022    TETANUS VACCINE 05/19/2026 5/19/2016    Lipid Panel 06/25/2026 6/25/2021    Cervical Cancer Screening 07/19/2026 7/19/2021            Future Appointments   Date Time Provider Department Center   8/29/2022  9:20 AM Cherelle Stover MD [facility]         No follow-ups on file. Total clinical care time was 60 min, issues addressed include.    *** (student or resident)    Katharina Vazquez MD/MPH  [facility]  [phone] [facility]

## 2022-08-29 NOTE — TELEPHONE ENCOUNTER
Spoke with pharmacy and they stated that insurance will not cover for Ventolin, but they will for Proventil. Patient just had an office visit today 08/29/22

## 2022-08-30 LAB
25(OH)D3+25(OH)D2 SERPL-MCNC: 20 NG/ML (ref 30–96)
ESTIMATED AVG GLUCOSE: 108 MG/DL (ref 68–131)
HBA1C MFR BLD: 5.4 % (ref 4–5.6)

## 2022-08-30 NOTE — PROGRESS NOTES
Chief Complaint   Patient presents with    Back Pain       HPI  Brandi Freitas is a 61 y.o. female with multiple medical diagnoses as listed in the medical history and problem list that presents for follow-up back pain, pain in her left knee and depression    She has had low back pain treated with STEPAN by orthopedics, symptoms improved but seem to be returning, pain is normally worse on the right side but is now on the left knee, she has had swelling and some instability in the knee but no falls.     She has had depression that is ongoing and would like to see a therapist. She does not want medication. She experiences lack of interest in social activities     PAST MEDICAL HISTORY:  Past Medical History:   Diagnosis Date    Anxiety     Arthritis     knee    Asthma     Bursitis, shoulder     Colon polyps     Dental bridge present     upper right    Dental crown present     Depression     Hypertension     Pes planus     Wears glasses     for reading       PAST SURGICAL HISTORY:  Past Surgical History:   Procedure Laterality Date    arthroscopy ankle right      bilateral inguinal hernia repair      COLONOSCOPY N/A 1/11/2018    Procedure: COLONOSCOPY;  Surgeon: Christoph Nolasco MD;  [facility];  Service: Endoscopy;  Laterality: N/A;    left torn rotator cuff repair  09/2014    MYOMECTOMY      rt salphigectomy      SYNOVECTOMY      TRANSFORAMINAL EPIDURAL INJECTION OF STEROID Right 1/18/2022    Procedure: LUMBAR TRANSFORAMINAL RIGHT L5/S1 DIRECT REFERRAL;  Surgeon: Cherelle Carpenter MD;  Location: [facility];  Service: Pain Management;  Laterality: Right;    UMBILICAL HERNIA REPAIR         SOCIAL HISTORY:  Social History     Socioeconomic History    Marital status: Legally    Tobacco Use    Smoking status: Never    Smokeless tobacco: Never   Substance and Sexual Activity    Alcohol use: Yes     Comment: social     Drug use: No    Sexual activity: Yes     Comment: occasional       FAMILY  HISTORY:  Family History   Problem Relation Age of Onset    Cancer Mother         breast    Diabetes Mother     Hypertension Mother     Breast cancer Mother     Cancer Brother         liver cancer    Breast cancer Maternal Aunt        ALLERGIES AND MEDICATIONS: updated and reviewed.  Review of patient's allergies indicates:   Allergen Reactions    Lisinopril Hives, Itching, Rash, Shortness Of Breath and Swelling     Medication List with Changes/Refills   New Medications    ALBUTEROL (PROVENTIL HFA) 90 MCG/ACTUATION INHALER    Inhale 2 puffs into the lungs every 6 (six) hours as needed for Wheezing. Rescue   Current Medications    ALBUTEROL-IPRATROPIUM (DUO-NEB) 2.5 MG-0.5 MG/3 ML NEBULIZER SOLUTION    Take 3 mLs by nebulization every 6 (six) hours as needed for Wheezing or Shortness of Breath.    CETIRIZINE (ZYRTEC) 10 MG TABLET    Take 10 mg by mouth once daily.    DIPHENHYDRAMINE (BENADRYL) 25 MG CAPSULE    Take 2 capsules (50 mg total) by mouth every 6 (six) hours as needed for Itching or Allergies.    EPINEPHRINE (EPIPEN) 0.3 MG/0.3 ML ATIN    Inject 0.3 mLs (0.3 mg total) into the muscle as needed.    ERGOCALCIFEROL (ERGOCALCIFEROL) 50,000 UNIT CAP    Take 1 capsule (50,000 Units total) by mouth every 7 days.   Changed and/or Refilled Medications    Modified Medication Previous Medication    AMLODIPINE (NORVASC) 10 MG TABLET amLODIPine (NORVASC) 10 MG tablet       Take 1 tablet (10 mg total) by mouth once daily.    Take 1 tablet by mouth once daily    GABAPENTIN (NEURONTIN) 300 MG CAPSULE gabapentin (NEURONTIN) 300 MG capsule       Take 1 capsule (300 mg total) by mouth 3 (three) times daily.    Take 1 capsule (300 mg total) by mouth 3 (three) times daily.    SPIRONOLACTONE (ALDACTONE) 50 MG TABLET spironolactone (ALDACTONE) 50 MG tablet       Take 1 tablet (50 mg total) by mouth 2 (two) times daily.    Take 1 tablet (50 mg total) by mouth 2 (two) times daily.   Discontinued Medications    ALBUTEROL (VENTOLIN  "HFA) 90 MCG/ACTUATION INHALER    Inhale 2 puffs into the lungs every 6 (six) hours as needed for Wheezing or Shortness of Breath. Rescue    BENZONATATE (TESSALON) 200 MG CAPSULE    Take 1 capsule (200 mg total) by mouth 3 (three) times daily as needed for Cough.    FAMOTIDINE (PEPCID) 20 MG TABLET    Take 1 tablet (20 mg total) by mouth 2 (two) times daily.    TRIAMCINOLONE ACETONIDE 0.1% (KENALOG) 0.1 % CREAM    Apply topically 2 (two) times daily.       ROS  Review of Systems   Constitutional:  Negative for chills, diaphoresis, fatigue, fever and unexpected weight change.   HENT:  Negative for rhinorrhea, sinus pressure, sore throat and tinnitus.    Eyes:  Negative for photophobia and visual disturbance.   Respiratory:  Negative for cough, shortness of breath and wheezing.    Cardiovascular:  Negative for chest pain and palpitations.   Gastrointestinal:  Negative for abdominal pain, blood in stool, constipation, diarrhea, nausea and vomiting.   Genitourinary:  Negative for dysuria, flank pain, frequency and vaginal discharge.   Musculoskeletal:  Positive for arthralgias. Negative for joint swelling.   Skin:  Negative for rash.   Neurological:  Negative for speech difficulty, weakness, light-headedness and headaches.   Psychiatric/Behavioral:  Negative for behavioral problems and dysphoric mood.      Physical Exam  Vitals:    08/29/22 0944 08/29/22 1058   BP: (!) 150/86 (!) 150/90   Pulse: 84    Resp: 16    Temp: 99.1 °F (37.3 °C)    SpO2: 99%    Weight: 126.9 kg (279 lb 12.2 oz)    Height: 5' 6" (1.676 m)     Body mass index is 45.16 kg/m².  Weight: 126.9 kg (279 lb 12.2 oz)   Height: 5' 6" (167.6 cm)     Physical Exam  Vitals and nursing note reviewed.   Constitutional:       Appearance: She is well-developed.   Musculoskeletal:      Left knee: Swelling and crepitus present. Decreased range of motion. Tenderness present.   Skin:     General: Skin is warm and dry.      Findings: No erythema or rash. "   Neurological:      Mental Status: She is alert. Mental status is at baseline.   Psychiatric:         Behavior: Behavior normal.       Health Maintenance         Date Due Completion Date    COVID-19 Vaccine (4 - Booster for Moderna series) 03/29/2022 11/29/2021    Pneumococcal Vaccines (Age 0-64) (2 - PCV) 07/19/2022 7/19/2021    Influenza Vaccine (1) 09/01/2022 ---    Colorectal Cancer Screening 01/11/2023 1/11/2018    Mammogram 08/22/2023 8/22/2022    TETANUS VACCINE 05/19/2026 5/19/2016    Cervical Cancer Screening 07/19/2026 7/19/2021    Lipid Panel 08/29/2027 8/29/2022            Health maintenance reviewed and addressed as ordered      ASSESSMENT     1. Acute pain of left knee    2. Routine general medical examination at a health care facility    3. Degenerative spondylolisthesis    4. COVID-19 virus infection    5. Essential hypertension    6. Vitamin D deficiency    7. Moderate episode of recurrent major depressive disorder        PLAN:     Problem List Items Addressed This Visit          Psychiatric    Depression  Will refer to psychiatry dept for therapy    Relevant Orders    Ambulatory referral/consult to Psychiatry       Orthopedic    Knee pain, left - Primary  Refer to PT  Consider MRI if no improvement    Relevant Orders    Ambulatory referral/consult to Physical/Occupational Therapy     Other Visit Diagnoses       Routine general medical examination at a health care facility      For future use    Relevant Orders    CBC Auto Differential (Completed)    Comprehensive Metabolic Panel (Completed)    Lipid Panel (Completed)    Hemoglobin A1C (Completed)    TSH (Completed)    Degenerative spondylolisthesis      Refill until she can f/u with ortho    Relevant Medications    gabapentin (NEURONTIN) 300 MG capsule        Essential hypertension      Continue current regimen    Relevant Medications    amLODIPine (NORVASC) 10 MG tablet    spironolactone (ALDACTONE) 50 MG tablet    Vitamin D deficiency       Monitor with routine labs    Relevant Orders    Vitamin D (Completed)              Cherelle Stover MD  08/30/2022 2:16 PM      Follow up in about 6 weeks (around 10/10/2022) for management of chronic conditions.    Other orders placed this visit  Orders Placed This Encounter   Procedures    CBC Auto Differential    Comprehensive Metabolic Panel    Lipid Panel    Hemoglobin A1C    TSH    Vitamin D    Ambulatory referral/consult to Psychiatry    Ambulatory referral/consult to Physical/Occupational Therapy                  (0) independent

## 2022-09-12 ENCOUNTER — CLINICAL SUPPORT (OUTPATIENT)
Dept: FAMILY MEDICINE | Facility: CLINIC | Age: 62
End: 2022-09-12
Payer: COMMERCIAL

## 2022-09-12 VITALS — SYSTOLIC BLOOD PRESSURE: 150 MMHG | HEART RATE: 72 BPM | DIASTOLIC BLOOD PRESSURE: 92 MMHG

## 2022-09-12 DIAGNOSIS — I10 PRIMARY HYPERTENSION: Primary | ICD-10-CM

## 2022-09-12 PROCEDURE — 99499 UNLISTED E&M SERVICE: CPT | Mod: S$GLB,,, | Performed by: FAMILY MEDICINE

## 2022-09-12 PROCEDURE — 99999 PR PBB SHADOW E&M-EST. PATIENT-LVL II: ICD-10-PCS | Mod: PBBFAC,,,

## 2022-09-12 PROCEDURE — 99499 NO LOS: ICD-10-PCS | Mod: S$GLB,,, | Performed by: FAMILY MEDICINE

## 2022-09-12 PROCEDURE — 99999 PR PBB SHADOW E&M-EST. PATIENT-LVL II: CPT | Mod: PBBFAC,,,

## 2022-09-12 NOTE — PROGRESS NOTES
.Brandi Gray Zena 61 y.o. female is here today for Blood Pressure check.   History of HTN yes.    Review of patient's allergies indicates:   Allergen Reactions    Lisinopril Hives, Itching, Rash, Shortness Of Breath and Swelling     Creatinine   Date Value Ref Range Status   08/29/2022 0.7 0.5 - 1.4 mg/dL Final     Sodium   Date Value Ref Range Status   08/29/2022 144 136 - 145 mmol/L Final     Potassium   Date Value Ref Range Status   08/29/2022 4.5 3.5 - 5.1 mmol/L Final   ]  Patient denies taking blood pressure medications on a regular basis at the same time of the day.     Current Outpatient Medications:     albuterol (PROVENTIL HFA) 90 mcg/actuation inhaler, Inhale 2 puffs into the lungs every 6 (six) hours as needed for Wheezing. Rescue, Disp: 18 g, Rfl: 0    albuterol-ipratropium (DUO-NEB) 2.5 mg-0.5 mg/3 mL nebulizer solution, Take 3 mLs by nebulization every 6 (six) hours as needed for Wheezing or Shortness of Breath., Disp: 90 vial, Rfl: 5    amLODIPine (NORVASC) 10 MG tablet, Take 1 tablet (10 mg total) by mouth once daily., Disp: 30 tablet, Rfl: 5    cetirizine (ZYRTEC) 10 MG tablet, Take 10 mg by mouth once daily., Disp: , Rfl:     diphenhydrAMINE (BENADRYL) 25 mg capsule, Take 2 capsules (50 mg total) by mouth every 6 (six) hours as needed for Itching or Allergies., Disp: 30 capsule, Rfl: 0    EPINEPHrine (EPIPEN) 0.3 mg/0.3 mL AtIn, Inject 0.3 mLs (0.3 mg total) into the muscle as needed., Disp: 2 each, Rfl: 0    ergocalciferol (ERGOCALCIFEROL) 50,000 unit Cap, Take 1 capsule (50,000 Units total) by mouth every 7 days., Disp: 12 capsule, Rfl: 1    gabapentin (NEURONTIN) 300 MG capsule, Take 1 capsule (300 mg total) by mouth 3 (three) times daily., Disp: 90 capsule, Rfl: 0    spironolactone (ALDACTONE) 50 MG tablet, Take 1 tablet (50 mg total) by mouth 2 (two) times daily., Disp: 60 tablet, Rfl: 5  Does patient have record of home blood pressure readings no. Readings have been averaging n/a.   Last  dose of blood pressure medication was taken at over a week ago.  Patient is symptomatic.   Complains of muscle spasms and throat tightness when she takes medication.    BP: (!) 160/98 , Pulse: 80 .    Blood pressure reading after 15 minutes was 150/92, Pulse 72.  Dr. Stover notified.

## 2022-09-15 ENCOUNTER — CLINICAL SUPPORT (OUTPATIENT)
Dept: REHABILITATION | Facility: HOSPITAL | Age: 62
End: 2022-09-15
Attending: FAMILY MEDICINE
Payer: COMMERCIAL

## 2022-09-15 DIAGNOSIS — R29.898 WEAKNESS OF LEFT LOWER EXTREMITY: ICD-10-CM

## 2022-09-15 DIAGNOSIS — R26.9 GAIT DIFFICULTY: ICD-10-CM

## 2022-09-15 DIAGNOSIS — M25.562 CHRONIC PAIN OF LEFT KNEE: ICD-10-CM

## 2022-09-15 DIAGNOSIS — M25.662 DECREASED RANGE OF MOTION OF LEFT KNEE: ICD-10-CM

## 2022-09-15 DIAGNOSIS — M22.2X2 PATELLOFEMORAL PAIN SYNDROME OF LEFT KNEE: ICD-10-CM

## 2022-09-15 DIAGNOSIS — M25.562 ACUTE PAIN OF LEFT KNEE: ICD-10-CM

## 2022-09-15 DIAGNOSIS — G89.29 CHRONIC PAIN OF LEFT KNEE: ICD-10-CM

## 2022-09-15 PROBLEM — M53.86 DECREASED RANGE OF MOTION OF LUMBAR SPINE: Status: RESOLVED | Noted: 2021-12-10 | Resolved: 2022-09-15

## 2022-09-15 PROBLEM — R26.2 DIFFICULTY WALKING: Status: RESOLVED | Noted: 2021-12-10 | Resolved: 2022-09-15

## 2022-09-15 PROCEDURE — 97110 THERAPEUTIC EXERCISES: CPT | Mod: PN

## 2022-09-15 PROCEDURE — 97161 PT EVAL LOW COMPLEX 20 MIN: CPT | Mod: PN

## 2022-09-15 NOTE — PLAN OF CARE
[facility]  Physical Therapy Initial Evaluation    Date: 9/15/2022   Name: Brandi Gray [facility]  Clinic Number: [id number]    Therapy Diagnosis:   Encounter Diagnoses   Name Primary?    Acute pain of left knee     Patellofemoral pain syndrome of left knee     Chronic pain of left knee     Weakness of left lower extremity     Gait difficulty     Decreased range of motion of left knee      Physician: Cherelle Stover MD    Physician Orders: PT Eval and Treat   Medical Diagnosis from Referral: Acute pain of left knee  Evaluation Date: 9/15/2022  Authorization Period Expiration: 08/29/2023  Plan of Care Expiration: 12-15-22  Visit # / Visits authorized: 1/ 1   Progress Note Due: 10-15-22  FOTO: 1/ 1    Precautions: Standard    Time In: 2:30 pm  Time Out: 3:15 pm  Total Appointment Time (timed & untimed codes): 45  minutes    Subjective   Date of onset: 1 year ago  History of current condition - Conchita reports: that she had 2 fall about 1 years ago, which aggravate her L knee pain. Pt states she has pain in the anterior L knee. Pt denies any crepitus or knee giving out. Pt states when she sitting down or stand for too long takes time for her to walk. She says she has difficult to bend her knee.       DANIEL:She fall forwards and she landed on her knees.   Any popping: No  Any Locking:  No  Any buckling: No   Pain radiates: No  Pain constant or intermittent: constant  Any injections: No    Pain:  Current 10/10, worst 10/10, best 5/10   Location: left anterior knee   Description: Dull  Aggravating Factors: Standing, Bending, Walking, Morning, Extension, Flexing, and Lifting  Easing Factors: ice and rest    Prior Therapy: None   Social History:   Occupation:[employer]  Prior Level of Function: Independent   Current Level of Function: Independent     Pts goals: Decrease L knee pain     Imaging, bone scan films:   The alignment is within normal limits.  No fracture.  No marrow  replacement process.     Medical History:   Past Medical History:   Diagnosis Date    Anxiety     Arthritis     knee    Asthma     Bursitis, shoulder     Colon polyps     Dental bridge present     upper right    Dental crown present     Depression     Hypertension     Pes planus     Wears glasses     for reading       Surgical History:   Brandi Freitas  has a past surgical history that includes Myomectomy; bilateral inguinal hernia repair; Umbilical hernia repair; rt salphigectomy; arthroscopy ankle right; Synovectomy; left torn rotator cuff repair (09/2014); Colonoscopy (N/A, 1/11/2018); and Transforaminal epidural injection of steroid (Right, 1/18/2022).    Medications:   Brandi has a current medication list which includes the following prescription(s): albuterol, albuterol-ipratropium, amlodipine, cetirizine, diphenhydramine, epinephrine, ergocalciferol, gabapentin, and spironolactone.    Allergies:   Review of patient's allergies indicates:   Allergen Reactions    Lisinopril Hives, Itching, Rash, Shortness Of Breath and Swelling          Objective       Observation:        Posture Alignment: knee valgus     Sensation: intact to light touch    DTR: intact to light touch    GAIT DEVIATIONS: Brandi displays antalgic gait    Range of Motion:   Knee Left active   Flexion 100 deg with pain   Extension 0 deg with pain      Knee Right active   Flexion 115 deg   Extension 0 deg       Lower Extremity Strength   Right LE  Left LE    Knee extension: 4+/5 Knee extension: 4-/5   Knee flexion: 4+/5 Knee flexion: 4-/5   Hip flexion: 4+/5 Hip flexion: 4-/5   Hip extension:  4+/5 Hip extension: 4-/5   Hip abduction: 4+/5 Hip abduction: 4-/5   Hip adduction: 4+/5 Hip adduction 4-/5   Ankle dorsiflexion: 4+/5 Ankle dorsiflexion: 4+/5   Ankle plantarflexion: 4+/5 Ankle plantarflexion: 4+/5         Step down test: Positive  Squat: Positive  Single leg balance: Positive    Joint Mobility:      L Patellar sup./inf: increase pain   L  Patellar med/lat: increase pain    Palpation:   Crepitus: anterior L kne   Patella: L knee   Patellar tendon:pain   Quad tendon: pain      CMS Impairment/Limitation/Restriction for FOTO Knee Survey  Status Limitation G-Code CMS Severity Modifier  Intake 32% 68% Current Status CL - At least 60 percent but less than 80 percent  Predicted 56% 44% Goal Status+ CK - At least 40 percent but less than 60 percent  D/C Status CL **only report if this is a one time visit  +Based on FOTO predicted change score      TREATMENT   Treatment Time In: 3:00 pm  Treatment Time Out:  3:10 pm  Total Treatment time separate from Evaluation: 10 minutes    Conchita received therapeutic exercises to develop strength, endurance, ROM, and flexibility for 10 minutes including:  Quad sets  SAQ  SLR    Home Exercises and Patient Education Provided    Education provided:   - Perform HEP 2 times per day    Written Home Exercises Provided: Patient instructed to cont prior HEP.  Exercises were reviewed and Conchita was able to demonstrate them prior to the end of the session.  Conchita demonstrated good  understanding of the education provided.     See EMR under Patient Instructions for exercises provided 9/15/2022.    Assessment   Brandi is a 61 y.o. female referred to outpatient Physical Therapy with a medical diagnosis of  Acute pain of left knee. Pt presents to therapy with sign and symptoms of L patellofemoral pain.  Pt has increase in L anterior knee pain around patella. Increase L knee crepitus during LAQ, sit to stand, and ambulation. X-rays reveals No L knee OA. Pt ambulated with antalgic gait pattern. Pt has decrease L knee AROM in flexion with pain. Pt demonstrated decrease of L LE muscles strength. During palpation, increase anterior L knee pain. Increase L knee crepitus in flexion and extension. She experience severe L anterior knee pain. Pt will benefit from skilled PT services to decrease pain to return to work pain free.     Pt  prognosis is Good.   Pt will benefit from skilled outpatient Physical Therapy to address the deficits stated above and in the chart below, provide pt/family education, and to maximize pt's level of independence.     Plan of care discussed with patient: Yes  Pt's spiritual, cultural and educational needs considered and patient is agreeable to the plan of care and goals as stated below:     Anticipated Barriers for therapy: None     Medical Necessity is demonstrated by the following  History  Co-morbidities and personal factors that may impact the plan of care Co-morbidities:   Anxiety    Arthritis    knee   Asthma    Bursitis, shoulder    Colon polyps    Dental bridge present    upper right   Dental crown present    Depression    Hypertension    Pes planus    Wears glasses    for reading       Personal Factors:   no deficits     low   Examination  Body Structures and Functions, activity limitations and participation restrictions that may impact the plan of care Body Regions:   knee    Body Systems:    ROM  strength  balance  gait  transfers  transitions  motor control  motor learning    Participation Restrictions:   Community ambulation    Activity limitations:   Learning and applying knowledge  no deficits    General Tasks and Commands  no deficits    Communication  no deficits    Mobility  walking  moving around using equipment (WC)  using transportation (bus, train, plane, car)  driving (bike, car, motorcycle)    Self care  no deficits    Domestic Life  shopping  cooking  doing house work (cleaning house, washing dishes, laundry)    Interactions/Relationships  no deficits    Life Areas  no deficits    Community and Social Life  community life         Complexity: low   Clinical Presentation stable and uncomplicated low   Decision Making/ Complexity Score: low       GOALS: Short Term Goals:  4 weeks  1.Report decreased in pain at worse less than  <   / =  5  /10  to increase tolerance for functional mobility.On  going  2. Pt to improve L LE range of motion by 25% to allow for improved functional mobility to allow for improvement in IADLs. .On going  3. Increased B LE MMT 1/2 grade to increase tolerance for ADL and work activities.On going  4. Pt to tolerate HEP to improve ROM and independence with ADL's.On going    Long Term Goals: 8 weeks  1.Report decreased in pain at worse less than  <   / =  2  /10  to increase tolerance for functional mobility. On going  2.Pt to improve L range of motion by 75% to allow for improved functional mobility to allow for improvement in IADLs. On going  3.Increased L LE  MMT 1 grade to increase tolerance for ADL and work activities.On going  4. Pt will report 44% on FOTO knee survey  to demonstrate increase in LE function with every day tasks. On going  5. Pt to be Independent with HEP to improve ROM and independence with ADL's. On going    Plan   Plan of care Certification: 9/15/2022 to 12-15-22.    Outpatient Physical Therapy 2 times weekly for 12 weeks to include the following interventions: Gait Training, Manual Therapy, Moist Heat/ Ice, Neuromuscular Re-ed, Patient Education, Therapeutic Activities, and Therapeutic Exercise. Dry yanira Meyers, PT      I CERTIFY THE NEED FOR THESE SERVICES FURNISHED UNDER THIS PLAN OF TREATMENT AND WHILE UNDER MY CARE   Physician's comments:     Physician's Signature: ___________________________________________________

## 2022-09-20 RX ORDER — SPIRONOLACTONE 100 MG/1
100 TABLET, FILM COATED ORAL DAILY
Qty: 30 TABLET | Refills: 5 | Status: SHIPPED | OUTPATIENT
Start: 2022-09-20 | End: 2023-10-05 | Stop reason: CLARIF

## 2022-09-26 ENCOUNTER — TELEPHONE (OUTPATIENT)
Dept: FAMILY MEDICINE | Facility: CLINIC | Age: 62
End: 2022-09-26
Payer: COMMERCIAL

## 2022-09-28 ENCOUNTER — TELEPHONE (OUTPATIENT)
Dept: INTERNAL MEDICINE | Facility: CLINIC | Age: 62
End: 2022-09-28
Payer: COMMERCIAL

## 2022-09-28 NOTE — TELEPHONE ENCOUNTER
Called and spoke to pt. Pt denies trouble breathing. Pt states she has a history of asthma and is wheezing a little. Pt c/o cold like symptoms onset Saturday. Pt c/o sinus congestion and dry cough. Pt denies fever, CP, or SOB. Pt states she will take at home covid test today.

## 2022-09-29 ENCOUNTER — OFFICE VISIT (OUTPATIENT)
Dept: INTERNAL MEDICINE | Facility: CLINIC | Age: 62
End: 2022-09-29
Payer: COMMERCIAL

## 2022-09-29 ENCOUNTER — TELEPHONE (OUTPATIENT)
Dept: FAMILY MEDICINE | Facility: CLINIC | Age: 62
End: 2022-09-29
Payer: COMMERCIAL

## 2022-09-29 VITALS
HEIGHT: 66 IN | WEIGHT: 280.56 LBS | BODY MASS INDEX: 45.09 KG/M2 | DIASTOLIC BLOOD PRESSURE: 82 MMHG | TEMPERATURE: 98 F | SYSTOLIC BLOOD PRESSURE: 142 MMHG | HEART RATE: 93 BPM | OXYGEN SATURATION: 99 %

## 2022-09-29 DIAGNOSIS — J10.1 INFLUENZA A: Primary | ICD-10-CM

## 2022-09-29 DIAGNOSIS — J45.909 ASTHMA, CHRONIC, UNSPECIFIED ASTHMA SEVERITY, UNCOMPLICATED: ICD-10-CM

## 2022-09-29 LAB
CTP QC/QA: YES
CTP QC/QA: YES
POC MOLECULAR INFLUENZA A AGN: POSITIVE
POC MOLECULAR INFLUENZA B AGN: NEGATIVE
SARS-COV-2 RDRP RESP QL NAA+PROBE: NEGATIVE

## 2022-09-29 PROCEDURE — 1160F RVW MEDS BY RX/DR IN RCRD: CPT | Mod: CPTII,S$GLB,, | Performed by: PHYSICIAN ASSISTANT

## 2022-09-29 PROCEDURE — U0002 COVID-19 LAB TEST NON-CDC: HCPCS | Mod: QW,S$GLB,, | Performed by: PHYSICIAN ASSISTANT

## 2022-09-29 PROCEDURE — 1159F MED LIST DOCD IN RCRD: CPT | Mod: CPTII,S$GLB,, | Performed by: PHYSICIAN ASSISTANT

## 2022-09-29 PROCEDURE — 99214 PR OFFICE/OUTPT VISIT, EST, LEVL IV, 30-39 MIN: ICD-10-PCS | Mod: S$GLB,,, | Performed by: PHYSICIAN ASSISTANT

## 2022-09-29 PROCEDURE — 3008F PR BODY MASS INDEX (BMI) DOCUMENTED: ICD-10-PCS | Mod: CPTII,S$GLB,, | Performed by: PHYSICIAN ASSISTANT

## 2022-09-29 PROCEDURE — 99214 OFFICE O/P EST MOD 30 MIN: CPT | Mod: S$GLB,,, | Performed by: PHYSICIAN ASSISTANT

## 2022-09-29 PROCEDURE — 1159F PR MEDICATION LIST DOCUMENTED IN MEDICAL RECORD: ICD-10-PCS | Mod: CPTII,S$GLB,, | Performed by: PHYSICIAN ASSISTANT

## 2022-09-29 PROCEDURE — 87502 INFLUENZA DNA AMP PROBE: CPT | Mod: QW,S$GLB,, | Performed by: PHYSICIAN ASSISTANT

## 2022-09-29 PROCEDURE — 3077F SYST BP >= 140 MM HG: CPT | Mod: CPTII,S$GLB,, | Performed by: PHYSICIAN ASSISTANT

## 2022-09-29 PROCEDURE — 3077F PR MOST RECENT SYSTOLIC BLOOD PRESSURE >= 140 MM HG: ICD-10-PCS | Mod: CPTII,S$GLB,, | Performed by: PHYSICIAN ASSISTANT

## 2022-09-29 PROCEDURE — 3044F HG A1C LEVEL LT 7.0%: CPT | Mod: CPTII,S$GLB,, | Performed by: PHYSICIAN ASSISTANT

## 2022-09-29 PROCEDURE — 87502 POCT INFLUENZA A/B MOLECULAR: ICD-10-PCS | Mod: QW,S$GLB,, | Performed by: PHYSICIAN ASSISTANT

## 2022-09-29 PROCEDURE — 3079F DIAST BP 80-89 MM HG: CPT | Mod: CPTII,S$GLB,, | Performed by: PHYSICIAN ASSISTANT

## 2022-09-29 PROCEDURE — 1160F PR REVIEW ALL MEDS BY PRESCRIBER/CLIN PHARMACIST DOCUMENTED: ICD-10-PCS | Mod: CPTII,S$GLB,, | Performed by: PHYSICIAN ASSISTANT

## 2022-09-29 PROCEDURE — 99999 PR PBB SHADOW E&M-EST. PATIENT-LVL V: CPT | Mod: PBBFAC,,, | Performed by: PHYSICIAN ASSISTANT

## 2022-09-29 PROCEDURE — 99999 PR PBB SHADOW E&M-EST. PATIENT-LVL V: ICD-10-PCS | Mod: PBBFAC,,, | Performed by: PHYSICIAN ASSISTANT

## 2022-09-29 PROCEDURE — 3079F PR MOST RECENT DIASTOLIC BLOOD PRESSURE 80-89 MM HG: ICD-10-PCS | Mod: CPTII,S$GLB,, | Performed by: PHYSICIAN ASSISTANT

## 2022-09-29 PROCEDURE — 3044F PR MOST RECENT HEMOGLOBIN A1C LEVEL <7.0%: ICD-10-PCS | Mod: CPTII,S$GLB,, | Performed by: PHYSICIAN ASSISTANT

## 2022-09-29 PROCEDURE — 3008F BODY MASS INDEX DOCD: CPT | Mod: CPTII,S$GLB,, | Performed by: PHYSICIAN ASSISTANT

## 2022-09-29 PROCEDURE — U0002: ICD-10-PCS | Mod: QW,S$GLB,, | Performed by: PHYSICIAN ASSISTANT

## 2022-09-29 RX ORDER — OSELTAMIVIR PHOSPHATE 75 MG/1
75 CAPSULE ORAL 2 TIMES DAILY
Qty: 10 CAPSULE | Refills: 0 | Status: SHIPPED | OUTPATIENT
Start: 2022-09-29 | End: 2022-10-04

## 2022-09-29 RX ORDER — METHYLPREDNISOLONE 4 MG/1
TABLET ORAL
Qty: 1 EACH | Refills: 0 | Status: SHIPPED | OUTPATIENT
Start: 2022-09-29 | End: 2022-10-31

## 2022-09-29 RX ORDER — IPRATROPIUM BROMIDE AND ALBUTEROL SULFATE 2.5; .5 MG/3ML; MG/3ML
3 SOLUTION RESPIRATORY (INHALATION) EVERY 6 HOURS PRN
Qty: 90 EACH | Refills: 5 | Status: SHIPPED | OUTPATIENT
Start: 2022-09-29 | End: 2023-07-14

## 2022-09-29 NOTE — TELEPHONE ENCOUNTER
Please let her know that if we don't do this we may have to add another medication to control her blood pressure. I recommend she keep her 10/14 appointment and we can discuss this further, but she needs to go full speed ahead with changing her diet and exercise habits    Cherelle Stover MD

## 2022-09-29 NOTE — TELEPHONE ENCOUNTER
----- Message from Sophia Marcial LPN sent at 9/26/2022  1:46 PM CDT -----  Spoke with patient states that she does not want to increase aldactone because it currently gives her muslce spasms so she prefers not to take a higher dose.       Please advise.  ----- Message -----  From: Cherelle Stover MD  Sent: 9/20/2022   5:13 PM CDT  To: Ck [facility] Staff

## 2022-09-29 NOTE — LETTER
September 29, 2022    Brandi Freitas  [address]  [place] [address]             [facility]  Internal Medicine  [address]  [facility] [address]  Phone: [phone]  Fax: [phone]   September 29, 2022     Patient: Brandi Freitas   YOB: 1960   Date of Visit: 9/29/2022       To Whom it May Concern:    Brandi Freitas was seen in my clinic on 9/29/2022. She may return on Thursday 10/6/2022.     Please excuse her from any  work missed.    If you have any questions or concerns, please don't hesitate to call.    Sincerely,         Maria Esther Aguero PA-C

## 2022-09-29 NOTE — PROGRESS NOTES
"Subjective:       Patient ID: Brandi Freitas is a 61 y.o. female.        Chief Complaint: Cough, Nasal Congestion, Otalgia, and Shortness of Breath    Brandi Freitas is an established patient of Cherelle Stover MD here today for urgent care visit.    Sx started 5 days ago.  Cough, nasal congestion, wheezing, hears a "rattling."  Short of breath at times.  Right ear bothering her.  No N/V/D/C.  No fever.    At home covid test negative yesterday.      She has asthma.  She has not been using her duoneb because her medication .      HTN - tx with amlodipine 10 mg daily, spronolactone 100 mg daily    Vitamin d def         Review of Systems   Constitutional:  Negative for chills, diaphoresis, fatigue and fever.   HENT:  Positive for congestion, ear pain, postnasal drip and rhinorrhea. Negative for sore throat.    Eyes:  Negative for visual disturbance.   Respiratory:  Positive for cough, shortness of breath and wheezing. Negative for chest tightness.    Cardiovascular:  Negative for chest pain, palpitations and leg swelling.   Gastrointestinal:  Negative for abdominal pain, blood in stool, constipation, diarrhea, nausea and vomiting.   Genitourinary:  Negative for dysuria, frequency, hematuria and urgency.   Musculoskeletal:  Negative for arthralgias and back pain.   Skin:  Negative for rash.   Neurological:  Negative for dizziness, syncope, weakness and headaches.   Psychiatric/Behavioral:  Negative for dysphoric mood and sleep disturbance. The patient is not nervous/anxious.      Objective:      Physical Exam  Vitals and nursing note reviewed.   Constitutional:       Appearance: Normal appearance. She is well-developed.   HENT:      Head: Normocephalic.      Right Ear: External ear normal. A middle ear effusion is present.      Left Ear: External ear normal. A middle ear effusion is present.      Nose: Mucosal edema and rhinorrhea present.      Mouth/Throat:      Pharynx: Oropharynx is clear.   Eyes: "      Pupils: Pupils are equal, round, and reactive to light.   Cardiovascular:      Rate and Rhythm: Normal rate and regular rhythm.      Heart sounds: Normal heart sounds. No murmur heard.    No friction rub. No gallop.   Pulmonary:      Effort: Pulmonary effort is normal. No respiratory distress.      Breath sounds: Wheezing present.      Comments: She declines in office duoneb - prefers to do at home  Abdominal:      Palpations: Abdomen is soft.      Tenderness: There is no abdominal tenderness.   Skin:     General: Skin is warm and dry.   Neurological:      Mental Status: She is alert.       Assessment:       1. Influenza A    2. Asthma, chronic, unspecified asthma severity, uncomplicated        Plan:       Brandi was seen today for cough, nasal congestion, otalgia and shortness of breath.    Diagnoses and all orders for this visit:    Influenza A  -     POCT COVID-19 Rapid Screening  -     POCT Influenza A/B Molecular  -     oseltamivir (TAMIFLU) 75 MG capsule; Take 1 capsule (75 mg total) by mouth 2 (two) times daily. for 5 days    Asthma, chronic, unspecified asthma severity, uncomplicated  -     albuterol-ipratropium (DUO-NEB) 2.5 mg-0.5 mg/3 mL nebulizer solution; Take 3 mLs by nebulization every 6 (six) hours as needed for Wheezing or Shortness of Breath.  -     methylPREDNISolone (MEDROL DOSEPACK) 4 mg tablet; use as directed    + for influenza A  - for covid  Discussed tamiflu most effective when started in first 2-3 days  Her [birth year] is next week and she would like to try tamiflu at least so will prescribe  Duonebs every 6-8 hours  ED prompts reviewed  Return prompts reviewed  >30 minutes spent on patient encounter    Pt has been given instructions populated from [employer] database and has verbalized understanding of the after visit summary and information contained wherein.    Follow up with a primary care provider. May go to ER for acute shortness of breath, lightheadedness, fever, or any other emergent  "complaints or changes in condition.    "This note will be shared with the patient"    Future Appointments   Date Time Provider Department Center   10/14/2022  8:00 AM Cherelle Stover MD [facility]   10/28/2022  1:45 PM Porfirio Luigi, PT [facility]   11/1/2022 12:15 PM Jorge Luis Martinez, PTA [facility]   11/3/2022 10:45 AM Porfirio Luigi, PT [facility]   11/7/2022 12:15 PM Porfirio Luigi, PT [facility]   11/9/2022  1:45 PM Porfirio Luigi, PT [facility]   11/14/2022 10:45 AM Jorge Luis Martinez, PTA [facility]   11/16/2022 10:45 AM Becky Arias, PT [facility]                   "

## 2022-09-30 ENCOUNTER — PATIENT MESSAGE (OUTPATIENT)
Dept: FAMILY MEDICINE | Facility: CLINIC | Age: 62
End: 2022-09-30
Payer: COMMERCIAL

## 2022-10-31 ENCOUNTER — OFFICE VISIT (OUTPATIENT)
Dept: FAMILY MEDICINE | Facility: CLINIC | Age: 62
End: 2022-10-31
Payer: COMMERCIAL

## 2022-10-31 VITALS
HEART RATE: 82 BPM | BODY MASS INDEX: 45.14 KG/M2 | DIASTOLIC BLOOD PRESSURE: 80 MMHG | OXYGEN SATURATION: 98 % | RESPIRATION RATE: 18 BRPM | WEIGHT: 280.88 LBS | TEMPERATURE: 99 F | SYSTOLIC BLOOD PRESSURE: 136 MMHG | HEIGHT: 66 IN

## 2022-10-31 DIAGNOSIS — J45.31 MILD PERSISTENT ASTHMA WITH EXACERBATION: Primary | ICD-10-CM

## 2022-10-31 PROBLEM — U07.1 COVID: Status: RESOLVED | Noted: 2022-05-28 | Resolved: 2022-10-31

## 2022-10-31 PROCEDURE — 1160F PR REVIEW ALL MEDS BY PRESCRIBER/CLIN PHARMACIST DOCUMENTED: ICD-10-PCS | Mod: CPTII,S$GLB,, | Performed by: PHYSICIAN ASSISTANT

## 2022-10-31 PROCEDURE — 99214 OFFICE O/P EST MOD 30 MIN: CPT | Mod: S$GLB,,, | Performed by: PHYSICIAN ASSISTANT

## 2022-10-31 PROCEDURE — 3044F HG A1C LEVEL LT 7.0%: CPT | Mod: CPTII,S$GLB,, | Performed by: PHYSICIAN ASSISTANT

## 2022-10-31 PROCEDURE — 1159F PR MEDICATION LIST DOCUMENTED IN MEDICAL RECORD: ICD-10-PCS | Mod: CPTII,S$GLB,, | Performed by: PHYSICIAN ASSISTANT

## 2022-10-31 PROCEDURE — 3079F PR MOST RECENT DIASTOLIC BLOOD PRESSURE 80-89 MM HG: ICD-10-PCS | Mod: CPTII,S$GLB,, | Performed by: PHYSICIAN ASSISTANT

## 2022-10-31 PROCEDURE — 3044F PR MOST RECENT HEMOGLOBIN A1C LEVEL <7.0%: ICD-10-PCS | Mod: CPTII,S$GLB,, | Performed by: PHYSICIAN ASSISTANT

## 2022-10-31 PROCEDURE — 99214 PR OFFICE/OUTPT VISIT, EST, LEVL IV, 30-39 MIN: ICD-10-PCS | Mod: S$GLB,,, | Performed by: PHYSICIAN ASSISTANT

## 2022-10-31 PROCEDURE — 1160F RVW MEDS BY RX/DR IN RCRD: CPT | Mod: CPTII,S$GLB,, | Performed by: PHYSICIAN ASSISTANT

## 2022-10-31 PROCEDURE — 3075F SYST BP GE 130 - 139MM HG: CPT | Mod: CPTII,S$GLB,, | Performed by: PHYSICIAN ASSISTANT

## 2022-10-31 PROCEDURE — 3075F PR MOST RECENT SYSTOLIC BLOOD PRESS GE 130-139MM HG: ICD-10-PCS | Mod: CPTII,S$GLB,, | Performed by: PHYSICIAN ASSISTANT

## 2022-10-31 PROCEDURE — 99999 PR PBB SHADOW E&M-EST. PATIENT-LVL IV: CPT | Mod: PBBFAC,,, | Performed by: PHYSICIAN ASSISTANT

## 2022-10-31 PROCEDURE — 1159F MED LIST DOCD IN RCRD: CPT | Mod: CPTII,S$GLB,, | Performed by: PHYSICIAN ASSISTANT

## 2022-10-31 PROCEDURE — 3079F DIAST BP 80-89 MM HG: CPT | Mod: CPTII,S$GLB,, | Performed by: PHYSICIAN ASSISTANT

## 2022-10-31 PROCEDURE — 99999 PR PBB SHADOW E&M-EST. PATIENT-LVL IV: ICD-10-PCS | Mod: PBBFAC,,, | Performed by: PHYSICIAN ASSISTANT

## 2022-10-31 RX ORDER — DICLOFENAC SODIUM 75 MG/1
75 TABLET, DELAYED RELEASE ORAL 2 TIMES DAILY PRN
COMMUNITY
Start: 2022-09-16 | End: 2022-11-14 | Stop reason: SDUPTHER

## 2022-10-31 RX ORDER — ALBUTEROL SULFATE 90 UG/1
2 AEROSOL, METERED RESPIRATORY (INHALATION) EVERY 6 HOURS PRN
Qty: 18 G | Refills: 0 | Status: SHIPPED | OUTPATIENT
Start: 2022-10-31 | End: 2023-07-10

## 2022-10-31 RX ORDER — PROMETHAZINE HYDROCHLORIDE AND DEXTROMETHORPHAN HYDROBROMIDE 6.25; 15 MG/5ML; MG/5ML
5 SYRUP ORAL 3 TIMES DAILY PRN
Qty: 180 ML | Refills: 0 | Status: SHIPPED | OUTPATIENT
Start: 2022-10-31 | End: 2022-11-10

## 2022-10-31 RX ORDER — PREDNISONE 20 MG/1
40 TABLET ORAL DAILY
Qty: 10 TABLET | Refills: 0 | Status: SHIPPED | OUTPATIENT
Start: 2022-10-31 | End: 2022-11-05

## 2022-10-31 NOTE — PROGRESS NOTES
Subjective:       Patient ID: Brandi Freitas is a 62 y.o. female.    Chief Complaint: Cough, Chest Congestion, and Wheezing (Still with sx since 09/2022)    Cough  This is a new problem. The current episode started more than 1 month ago. The problem has been unchanged. The problem occurs constantly. The cough is Non-productive. Associated symptoms include wheezing. Pertinent negatives include no chest pain, chills, ear pain, fever, headaches, myalgias, nasal congestion, postnasal drip, rhinorrhea, sore throat or shortness of breath. Treatments tried: steriod, nebs, albuterol, benadryl, zyrtec. Her past medical history is significant for asthma.     + for Flu A 1 month ago. Had medrol pack at that time. Symptoms improved but didn't resolve. Gradually worsening since then. Using zyrtec, benadryl and inhalers. No fever, productive cough or SOB>    Social History     Socioeconomic History    Marital status: Legally    Tobacco Use    Smoking status: Never    Smokeless tobacco: Never   Substance and Sexual Activity    Alcohol use: Yes     Comment: social     Drug use: No    Sexual activity: Yes     Comment: occasional       Review of Systems   Constitutional:  Negative for chills and fever.   HENT:  Negative for ear pain, postnasal drip, rhinorrhea and sore throat.    Respiratory:  Positive for cough and wheezing. Negative for shortness of breath.    Cardiovascular:  Negative for chest pain.   Musculoskeletal:  Negative for myalgias.   Neurological:  Negative for headaches.       Objective:      Physical Exam  Constitutional:       Appearance: Normal appearance.   HENT:      Head: Normocephalic and atraumatic.      Mouth/Throat:      Pharynx: Oropharynx is clear.   Cardiovascular:      Rate and Rhythm: Normal rate and regular rhythm.   Pulmonary:      Effort: Pulmonary effort is normal.      Breath sounds: Wheezing (diffuse expiratory) present.   Neurological:      Mental Status: She is alert.    Psychiatric:         Mood and Affect: Mood normal.         Behavior: Behavior normal.       Assessment:       Problem List Items Addressed This Visit    None  Visit Diagnoses       Mild persistent asthma with exacerbation    -  Primary    Relevant Medications    predniSONE (DELTASONE) 20 MG tablet    promethazine-dextromethorphan (PROMETHAZINE-DM) 6.25-15 mg/5 mL Syrp    albuterol (PROVENTIL HFA) 90 mcg/actuation inhaler            Plan:         Brandi was seen today for cough, chest congestion and wheezing.    Diagnoses and all orders for this visit:    Mild persistent asthma with exacerbation  -     predniSONE (DELTASONE) 20 MG tablet; Take 2 tablets (40 mg total) by mouth once daily. for 5 days  -     promethazine-dextromethorphan (PROMETHAZINE-DM) 6.25-15 mg/5 mL Syrp; Take 5 mLs by mouth 3 (three) times daily as needed (cough, can cause drowsiness).  -     albuterol (PROVENTIL HFA) 90 mcg/actuation inhaler; Inhale 2 puffs into the lungs every 6 (six) hours as needed for Wheezing. Rescue  -     push fluids. Call if symptoms worse, fever, productive cough, feeling bad

## 2022-11-03 ENCOUNTER — CLINICAL SUPPORT (OUTPATIENT)
Dept: REHABILITATION | Facility: HOSPITAL | Age: 62
End: 2022-11-03
Attending: FAMILY MEDICINE
Payer: COMMERCIAL

## 2022-11-03 DIAGNOSIS — R29.898 WEAKNESS OF LEFT LOWER EXTREMITY: ICD-10-CM

## 2022-11-03 DIAGNOSIS — M22.2X2 PATELLOFEMORAL PAIN SYNDROME OF LEFT KNEE: Primary | ICD-10-CM

## 2022-11-03 DIAGNOSIS — G89.29 CHRONIC PAIN OF LEFT KNEE: ICD-10-CM

## 2022-11-03 DIAGNOSIS — R26.9 GAIT DIFFICULTY: ICD-10-CM

## 2022-11-03 DIAGNOSIS — M25.562 CHRONIC PAIN OF LEFT KNEE: ICD-10-CM

## 2022-11-03 DIAGNOSIS — M25.662 DECREASED RANGE OF MOTION OF LEFT KNEE: ICD-10-CM

## 2022-11-03 PROCEDURE — 97110 THERAPEUTIC EXERCISES: CPT | Mod: PN

## 2022-11-03 NOTE — PROGRESS NOTES
[facility] OUTPATIENT THERAPY AND WELLNESS   Physical Therapy Treatment Note     Name: Brandi Gray [facility]  Clinic Number: [id number]    Therapy Diagnosis:   Encounter Diagnoses   Name Primary?    Patellofemoral pain syndrome of left knee Yes    Chronic pain of left knee     Weakness of left lower extremity     Gait difficulty     Decreased range of motion of left knee      Physician: Cherelle Stover MD    Visit Date: 11/3/2022    Physician Orders: PT Eval and Treat   Medical Diagnosis from Referral: Acute pain of left knee  Evaluation Date: 9/15/2022  Authorization Period Expiration: 12-31-22  Plan of Care Expiration: 12-15-22  Visit # / Visits authorized: 1/ 20   Progress Note Due: 12-3-22  FOTO: 1/ 1     Precautions: Standard    Time In: 10:50 am  Time Out: 11:30 am  Total Billable Time: 40 minutes      SUBJECTIVE     Pt reports: that she has some L knee pain. Pt states pain increases when she is more standing and walk more. .  She was compliant with home exercise program.  Response to previous treatment: No change  Functional change: none    Pain: 2/10  Location: left anterior knee     OBJECTIVE     Objective Measures updated at progress report unless specified.       TREATMENT     Conchita received the treatments listed below:      received therapeutic exercises to develop strength and ROM for 45  minutes including:    Nustep 6 min  Quad sets 3x10 hold 3 sec  SAQ 3x10 hold 3 sec  SLR 3x10  HL hip abd YTB 3x10  HL hip add ball squeeze 3x10   Bridges 3x10   Clamshells YTB 3x10   Prone hip extension 3x10  Prone TKE 3x10     received the following manual therapy techniques: Soft tissue Mobilization were applied to the: n/a for 00 minutes, including:      PATIENT EDUCATION AND HOME EXERCISES     Home Exercises and Patient Education Provided     Education provided:   - Perform HEP 2 times per day     Written Home Exercises Provided: Patient instructed to cont prior HEP.  Exercises were reviewed and Conchita was able  to demonstrate them prior to the end of the session.  Conchita demonstrated good  understanding of the education provided.      See EMR under Patient Instructions for exercises provided 9/15/2022.    ASSESSMENT     Pt presented to first f/u since initial eval due to scheduling issues. Pt was re-assessed today. Pt did not demonstrated any improvements in L knee AROM and L LE muscles strength. Pt cont with quad weakness. Pt has met 0/4 STGs. Overall, pt did not have enough therapy to demonstrate improvements. Overall, pt did not have enough visits to demonstrate improvements. Plan to cont muscles strength to decrease L knee pain to return to work.     Conchita Is progressing well towards her goals.   Pt prognosis is Good.     Pt will continue to benefit from skilled outpatient physical therapy to address the deficits listed in the problem list box on initial evaluation, provide pt/family education and to maximize pt's level of independence in the home and community environment.     Pt's spiritual, cultural and educational needs considered and pt agreeable to plan of care and goals.     Anticipated barriers to physical therapy: none  GOALS: Short Term Goals:  4 weeks  1.Report decreased in pain at worse less than  <   / =  5  /10  to increase tolerance for functional mobility. Goal not met 11-3-22  2. Pt to improve L LE range of motion by 25% to allow for improved functional mobility to allow for improvement in IADLs. .Goal not met 11-3-22  3. Increased B LE MMT 1/2 grade to increase tolerance for ADL and work activities.Goal not met 11-3-22  4. Pt to tolerate HEP to improve ROM and independence with ADL's. Goal not met 11-3-22     Long Term Goals: 8 weeks  1.Report decreased in pain at worse less than  <   / =  2  /10  to increase tolerance for functional mobility. On going  2.Pt to improve L range of motion by 75% to allow for improved functional mobility to allow for improvement in IADLs. On going  3.Increased  L LE  MMT 1 grade to increase tolerance for ADL and work activities.On going  4. Pt will report 44% on FOTO knee survey  to demonstrate increase in LE function with every day tasks. On going  5. Pt to be Independent with HEP to improve ROM and independence with ADL's. On going    PLAN     Plan of care Certification: 9/15/2022 to 12-15-22.    Porfirio Meyers, PT

## 2022-11-07 ENCOUNTER — CLINICAL SUPPORT (OUTPATIENT)
Dept: REHABILITATION | Facility: HOSPITAL | Age: 62
End: 2022-11-07
Attending: FAMILY MEDICINE
Payer: COMMERCIAL

## 2022-11-07 DIAGNOSIS — R26.9 GAIT DIFFICULTY: ICD-10-CM

## 2022-11-07 DIAGNOSIS — M25.562 CHRONIC PAIN OF LEFT KNEE: ICD-10-CM

## 2022-11-07 DIAGNOSIS — G89.29 CHRONIC PAIN OF LEFT KNEE: ICD-10-CM

## 2022-11-07 DIAGNOSIS — R29.898 WEAKNESS OF LEFT LOWER EXTREMITY: ICD-10-CM

## 2022-11-07 DIAGNOSIS — M22.2X2 PATELLOFEMORAL PAIN SYNDROME OF LEFT KNEE: Primary | ICD-10-CM

## 2022-11-07 DIAGNOSIS — M25.662 DECREASED RANGE OF MOTION OF LEFT KNEE: ICD-10-CM

## 2022-11-07 PROCEDURE — 97110 THERAPEUTIC EXERCISES: CPT | Mod: PN

## 2022-11-07 PROCEDURE — 97140 MANUAL THERAPY 1/> REGIONS: CPT | Mod: PN

## 2022-11-07 NOTE — PROGRESS NOTES
[facility]   Physical Therapy Treatment Note     Name: Brandi Gray [facility]  Clinic Number: [id number]    Therapy Diagnosis:   Encounter Diagnoses   Name Primary?    Patellofemoral pain syndrome of left knee Yes    Chronic pain of left knee     Weakness of left lower extremity     Gait difficulty     Decreased range of motion of left knee        Physician: Cherelle Stover MD    Visit Date: 11/7/2022    Physician Orders: PT Eval and Treat   Medical Diagnosis from Referral: Acute pain of left knee  Evaluation Date: 9/15/2022  Authorization Period Expiration: 12-31-22  Plan of Care Expiration: 12-15-22  Visit # / Visits authorized: 3/ 20   Progress Note Due: 12-3-22  FOTO: 1/ 1     Precautions: Standard    Time In: 12:27 pm  Time Out: 1:15 pm  Total Billable Time: 38  minutes      SUBJECTIVE     Pt reports: that she has increase of B knee pain. Pt states she had pain over the weekend. Pain today is 8/10.   She was compliant with home exercise program.  Response to previous treatment: No change  Functional change: none    Pain: 8/10  Location: left anterior knee     OBJECTIVE     Objective Measures updated at progress report unless specified.       TREATMENT     Conchita received the treatments listed below:      received therapeutic exercises to develop strength and ROM for 30  minutes including:    Nustep 6 min  Quad sets 2x10 hold 3 sec  SAQ 3x10 hold 3 sec  SLR 2x10  HL hip abd RTB 3x10  HL hip add ball squeeze 3x10   Bridges 3x10   Clamshells RTB 3x10   Prone hip extension 3x10  Prone TKE 3x10     received the following manual therapy techniques: Soft tissue Mobilization were applied to the: n/a for 8 minutes, including:  IASTM Anterior L knee    Pt received 10 min of L knee ice pack to decrease pain and inflammation     PATIENT EDUCATION AND HOME EXERCISES     Home Exercises and Patient Education Provided     Education provided:   - Perform HEP 2 times per day     Written Home Exercises  Provided: Patient instructed to cont prior HEP.  Exercises were reviewed and Conchita was able to demonstrate them prior to the end of the session.  Conchita demonstrated good  understanding of the education provided.      See EMR under Patient Instructions for exercises provided 9/15/2022.    ASSESSMENT     Pt presented to therapy with increase of anterior L knee pain. Pain was high today. IASTM performed to patella tendon and lateral/ medial retinaculu. Pain subsided in the end of PT session. Pt cont with weakness of L quad and gluteus muscles.She required mod v/c's to perform exercises with proper form and proper quad activation. Plan to cont muscles strength to decrease L knee pain to return to work.     Conchita Is progressing well towards her goals.   Pt prognosis is Good.     Pt will continue to benefit from skilled outpatient physical therapy to address the deficits listed in the problem list box on initial evaluation, provide pt/family education and to maximize pt's level of independence in the home and community environment.     Pt's spiritual, cultural and educational needs considered and pt agreeable to plan of care and goals.     Anticipated barriers to physical therapy: none  GOALS: Short Term Goals:  4 weeks  1.Report decreased in pain at worse less than  <   / =  5  /10  to increase tolerance for functional mobility. Goal not met 11-3-22  2. Pt to improve L LE range of motion by 25% to allow for improved functional mobility to allow for improvement in IADLs. .Goal not met 11-3-22  3. Increased B LE MMT 1/2 grade to increase tolerance for ADL and work activities.Goal not met 11-3-22  4. Pt to tolerate HEP to improve ROM and independence with ADL's. Goal not met 11-3-22     Long Term Goals: 8 weeks  1.Report decreased in pain at worse less than  <   / =  2  /10  to increase tolerance for functional mobility. On going  2.Pt to improve L range of motion by 75% to allow for improved functional  mobility to allow for improvement in IADLs. On going  3.Increased L LE  MMT 1 grade to increase tolerance for ADL and work activities.On going  4. Pt will report 44% on FOTO knee survey  to demonstrate increase in LE function with every day tasks. On going  5. Pt to be Independent with HEP to improve ROM and independence with ADL's. On going    PLAN     Plan of care Certification: 9/15/2022 to 12-15-22.    Porfirio Meyers, PT

## 2022-11-11 NOTE — PROGRESS NOTES
"[facility] OUTPATIENT THERAPY AND WELLNESS   Physical Therapy Treatment Note     Name: Brandi Gray [facility]  Clinic Number: [id number]    Therapy Diagnosis:   Encounter Diagnoses   Name Primary?    Patellofemoral pain syndrome of left knee Yes    Chronic pain of left knee     Weakness of left lower extremity     Gait difficulty     Decreased range of motion of left knee          Physician: Cherelle Stover MD    Visit Date: 11/14/2022    Physician Orders: PT Eval and Treat   Medical Diagnosis from Referral: Acute pain of left knee  Evaluation Date: 9/15/2022  Authorization Period Expiration: 12-31-22  Plan of Care Expiration: 12-15-22  Visit # / Visits authorized: 3/ 20   Progress Note Due: 12-3-22  FOTO: 1/ 1     Precautions: Standard    Time In: 1057am  Time Out: 1135AM  Total Billable Time: 38  minutes      SUBJECTIVE     Pt reports: there was an incident at the [employer] recently and when she jumped up to respond her knee didn't like that quick movement.   She was somewhat compliant with home exercise program.  Response to previous treatment: Good  Functional change: none    Pain: 3-4/10  Location: left anterior knee     OBJECTIVE     Objective Measures updated at progress report unless specified.       TREATMENT     Conchita received the treatments listed below:      received therapeutic exercises to develop strength and ROM for 38 minutes including:    Nustep 6 min  Quad sets 2x10 hold 3 sec  SAQ 3x10 hold 3 sec  SLR 2x10  HL hip abd RTB 3x10  HL hip add ball squeeze 3x10   Bridges (glut squeeze)  2x10x5"  Clamshells RTB 3x10        received the following manual therapy techniques: Soft tissue Mobilization were applied to the: n/a for 00 minutes, including:  IASTM Anterior L knee    Pt received 10 min of L knee ice pack to decrease pain and inflammation     PATIENT EDUCATION AND HOME EXERCISES     Home Exercises and Patient Education Provided     Education provided:   - Perform HEP 2 times per day     Written Home " Exercises Provided: Patient instructed to cont prior HEP.  Exercises were reviewed and Conchita was able to demonstrate them prior to the end of the session.  Conchita demonstrated good  understanding of the education provided.      See EMR under Patient Instructions for exercises provided 9/15/2022.    ASSESSMENT     Pt presented to therapy with antalgic gait. Pt reports moderate knee pain though pt demonstrated discomfort with most TE. Added shuttle for LE strengthening with good tolerance. Fatigue noted throughout session. Pt would benefit from continued skilled physical therapy in order to reach pt's goals.       Conchita Is progressing well towards her goals.   Pt prognosis is Good.     Pt will continue to benefit from skilled outpatient physical therapy to address the deficits listed in the problem list box on initial evaluation, provide pt/family education and to maximize pt's level of independence in the home and community environment.     Pt's spiritual, cultural and educational needs considered and pt agreeable to plan of care and goals.     Anticipated barriers to physical therapy: none  GOALS: Short Term Goals:  4 weeks  1.Report decreased in pain at worse less than  <   / =  5  /10  to increase tolerance for functional mobility. Goal not met 11-3-22  2. Pt to improve L LE range of motion by 25% to allow for improved functional mobility to allow for improvement in IADLs. .Goal not met 11-3-22  3. Increased B LE MMT 1/2 grade to increase tolerance for ADL and work activities.Goal not met 11-3-22  4. Pt to tolerate HEP to improve ROM and independence with ADL's. Goal not met 11-3-22     Long Term Goals: 8 weeks  1.Report decreased in pain at worse less than  <   / =  2  /10  to increase tolerance for functional mobility. On going  2.Pt to improve L range of motion by 75% to allow for improved functional mobility to allow for improvement in IADLs. On going  3.Increased L LE  MMT 1 grade to increase  tolerance for ADL and work activities.On going  4. Pt will report 44% on FOTO knee survey  to demonstrate increase in LE function with every day tasks. On going  5. Pt to be Independent with HEP to improve ROM and independence with ADL's. On going    PLAN     Plan of care Certification: 9/15/2022 to 12-15-22.    Continue lower extremity strengthening     Jorge Luis Martinez, ДМИТРИЙ   11/14/2022

## 2022-11-14 ENCOUNTER — OFFICE VISIT (OUTPATIENT)
Dept: FAMILY MEDICINE | Facility: CLINIC | Age: 62
End: 2022-11-14
Payer: COMMERCIAL

## 2022-11-14 ENCOUNTER — CLINICAL SUPPORT (OUTPATIENT)
Dept: REHABILITATION | Facility: HOSPITAL | Age: 62
End: 2022-11-14
Attending: FAMILY MEDICINE
Payer: COMMERCIAL

## 2022-11-14 VITALS
HEIGHT: 66 IN | BODY MASS INDEX: 45.6 KG/M2 | TEMPERATURE: 98 F | DIASTOLIC BLOOD PRESSURE: 82 MMHG | WEIGHT: 283.75 LBS | SYSTOLIC BLOOD PRESSURE: 137 MMHG | HEART RATE: 75 BPM

## 2022-11-14 DIAGNOSIS — E66.01 MORBID OBESITY WITH BMI OF 40.0-44.9, ADULT: ICD-10-CM

## 2022-11-14 DIAGNOSIS — R29.898 WEAKNESS OF LEFT LOWER EXTREMITY: ICD-10-CM

## 2022-11-14 DIAGNOSIS — G89.29 CHRONIC PAIN OF LEFT KNEE: ICD-10-CM

## 2022-11-14 DIAGNOSIS — E55.9 VITAMIN D DEFICIENCY: ICD-10-CM

## 2022-11-14 DIAGNOSIS — M25.662 DECREASED RANGE OF MOTION OF LEFT KNEE: ICD-10-CM

## 2022-11-14 DIAGNOSIS — M25.562 CHRONIC PAIN OF LEFT KNEE: ICD-10-CM

## 2022-11-14 DIAGNOSIS — J45.20 MILD INTERMITTENT ASTHMA, UNSPECIFIED WHETHER COMPLICATED: Chronic | ICD-10-CM

## 2022-11-14 DIAGNOSIS — R26.9 GAIT DIFFICULTY: ICD-10-CM

## 2022-11-14 DIAGNOSIS — I10 ESSENTIAL HYPERTENSION: ICD-10-CM

## 2022-11-14 DIAGNOSIS — Z00.00 ANNUAL PHYSICAL EXAM: Primary | ICD-10-CM

## 2022-11-14 DIAGNOSIS — M22.2X2 PATELLOFEMORAL PAIN SYNDROME OF LEFT KNEE: Primary | ICD-10-CM

## 2022-11-14 PROCEDURE — 1160F RVW MEDS BY RX/DR IN RCRD: CPT | Mod: CPTII,S$GLB,, | Performed by: FAMILY MEDICINE

## 2022-11-14 PROCEDURE — 1159F MED LIST DOCD IN RCRD: CPT | Mod: CPTII,S$GLB,, | Performed by: FAMILY MEDICINE

## 2022-11-14 PROCEDURE — 3044F PR MOST RECENT HEMOGLOBIN A1C LEVEL <7.0%: ICD-10-PCS | Mod: CPTII,S$GLB,, | Performed by: FAMILY MEDICINE

## 2022-11-14 PROCEDURE — 3075F SYST BP GE 130 - 139MM HG: CPT | Mod: CPTII,S$GLB,, | Performed by: FAMILY MEDICINE

## 2022-11-14 PROCEDURE — 3008F BODY MASS INDEX DOCD: CPT | Mod: CPTII,S$GLB,, | Performed by: FAMILY MEDICINE

## 2022-11-14 PROCEDURE — 97110 THERAPEUTIC EXERCISES: CPT | Mod: PN,CQ

## 2022-11-14 PROCEDURE — 3008F PR BODY MASS INDEX (BMI) DOCUMENTED: ICD-10-PCS | Mod: CPTII,S$GLB,, | Performed by: FAMILY MEDICINE

## 2022-11-14 PROCEDURE — 1160F PR REVIEW ALL MEDS BY PRESCRIBER/CLIN PHARMACIST DOCUMENTED: ICD-10-PCS | Mod: CPTII,S$GLB,, | Performed by: FAMILY MEDICINE

## 2022-11-14 PROCEDURE — 3044F HG A1C LEVEL LT 7.0%: CPT | Mod: CPTII,S$GLB,, | Performed by: FAMILY MEDICINE

## 2022-11-14 PROCEDURE — 3079F DIAST BP 80-89 MM HG: CPT | Mod: CPTII,S$GLB,, | Performed by: FAMILY MEDICINE

## 2022-11-14 PROCEDURE — 3075F PR MOST RECENT SYSTOLIC BLOOD PRESS GE 130-139MM HG: ICD-10-PCS | Mod: CPTII,S$GLB,, | Performed by: FAMILY MEDICINE

## 2022-11-14 PROCEDURE — 99396 PR PREVENTIVE VISIT,EST,40-64: ICD-10-PCS | Mod: S$GLB,,, | Performed by: FAMILY MEDICINE

## 2022-11-14 PROCEDURE — 99999 PR PBB SHADOW E&M-EST. PATIENT-LVL III: CPT | Mod: PBBFAC,,, | Performed by: FAMILY MEDICINE

## 2022-11-14 PROCEDURE — 1159F PR MEDICATION LIST DOCUMENTED IN MEDICAL RECORD: ICD-10-PCS | Mod: CPTII,S$GLB,, | Performed by: FAMILY MEDICINE

## 2022-11-14 PROCEDURE — 3079F PR MOST RECENT DIASTOLIC BLOOD PRESSURE 80-89 MM HG: ICD-10-PCS | Mod: CPTII,S$GLB,, | Performed by: FAMILY MEDICINE

## 2022-11-14 PROCEDURE — 99396 PREV VISIT EST AGE 40-64: CPT | Mod: S$GLB,,, | Performed by: FAMILY MEDICINE

## 2022-11-14 PROCEDURE — 99999 PR PBB SHADOW E&M-EST. PATIENT-LVL III: ICD-10-PCS | Mod: PBBFAC,,, | Performed by: FAMILY MEDICINE

## 2022-11-14 RX ORDER — DICLOFENAC SODIUM 75 MG/1
75 TABLET, DELAYED RELEASE ORAL 2 TIMES DAILY PRN
Qty: 60 TABLET | Refills: 3 | Status: SHIPPED | OUTPATIENT
Start: 2022-11-14 | End: 2023-11-21

## 2022-11-14 RX ORDER — ERGOCALCIFEROL 1.25 MG/1
50000 CAPSULE ORAL
Qty: 12 CAPSULE | Refills: 3 | Status: SHIPPED | OUTPATIENT
Start: 2022-11-14 | End: 2024-01-19 | Stop reason: SDUPTHER

## 2022-11-14 RX ORDER — AMLODIPINE BESYLATE 10 MG/1
10 TABLET ORAL DAILY
Qty: 30 TABLET | Refills: 6 | Status: SHIPPED | OUTPATIENT
Start: 2022-11-14 | End: 2023-11-21 | Stop reason: SDUPTHER

## 2022-11-14 NOTE — PROGRESS NOTES
"Physical Exam  /82   Pulse 75   Temp 97.9 °F (36.6 °C) (Oral)   Ht 5' 6" (1.676 m)   Wt 128.7 kg (283 lb 11.7 oz)   LMP 2012   BMI 45.80 kg/m²      Office Visit    Patient Name: Brandi Freitas    : 1960  MRN: [id number]      Assessment/Plan:  Brandi Freitas is a 62 y.o. female who presents today for :    Annual physical exam  Morbid obesity with BMI of 40.0-44.9, adult  -previous labs reviewed and discussed with patient  -anticipatory guidance provided with age appropriate preventative services discussed, healthy diet and regular physical exercise also discussed with patient  -d/w pt about the importance of portion control  -Recommend 15-30 minutes of moderate intensity exercise 5 days/week.    Essential hypertension  -     amLODIPine (NORVASC) 10 MG tablet; Take 1 tablet (10 mg total) by mouth once daily.  Dispense: 30 tablet; Refill: 6  -continue current medication regimen  -DASH diet, regular cardiovascular exercises  -stressed portion control as above, with emphasis on low carb/low fat diet to help with weight loss      Vitamin D deficiency  -     ergocalciferol (ERGOCALCIFEROL) 50,000 unit Cap; Take 1 capsule (50,000 Units total) by mouth every 7 days.  Dispense: 12 capsule; Refill: 3    Chronic pain of left knee  -     diclofenac (VOLTAREN) 75 MG EC tablet; Take 1 tablet (75 mg total) by mouth 2 (two) times daily as needed.  Dispense: 60 tablet; Refill: 3  -stable, continue current regimen     Mild intermittent asthma, unspecified whether complicated  -well controlled, continue current regimen           Follow up 6mo    This note was created by combination of typed  and MModal dictation.  Transcription errors may be present.  If there are any questions, please contact me.        ----------------------------------------------------------------------------------------------------------------------      HPI:  Patient Care Team:  Cherelle Stover MD as PCP - General " (Internal Medicine)  Issac Sparks MA as Care Coordinator    Brandi is a 62 y.o. female with      Patient Active Problem List   Diagnosis    Hypertension    Depression    Peroneal tendinitis    Knee pain, left    Asthma    Complete rupture of rotator cuff    Morbid obesity with BMI of 40.0-44.9, adult    Dermatitis    Supraspinatus tendon tear    Allergic rhinitis    Patellofemoral pain syndrome of left knee    Chronic pain of left knee    Weakness of left lower extremity    Gait difficulty    Decreased range of motion of left knee       This patient is new to me     Brandi has PMHx as noted above and presents today for:  Annual Exam      Her last Annual checkup was with her PCP over a year ago. She is currently doing well and has no specific complaint today. Health maintenance-wise, she is up to date on colon cancer screening/MMG/Pap screening. She has the flu over a month ago, for which she has fully recovered. She declined the flu vaccine today. Her BP is well controlled on current regimen. She denies any cardiovascular or neurologic complaints today. Recent labs were reviewed and are wnl with the except of slightly low Vit D levels.        Additional ROS    CONST: no fever, no activity change, weight stable.   EYES: no vision change.   ENT: no sore throat. No dysphagia.   CV: no CP with exertion  RESP: no SOB  GI: no N/V/diarrhea/constipation  : no urinary concerns  MSK: no new myalgias or arthralgias.   SKIN: no new rashes  NEURO: no focal deficits.   PSYCH: no new issues.   ENDOCRINE: no polyuria.           Current Medications  Medications reviewed and updated.       Current Outpatient Medications:     albuterol (PROVENTIL HFA) 90 mcg/actuation inhaler, Inhale 2 puffs into the lungs every 6 (six) hours as needed for Wheezing. Rescue, Disp: 18 g, Rfl: 0    albuterol-ipratropium (DUO-NEB) 2.5 mg-0.5 mg/3 mL nebulizer solution, Take 3 mLs by nebulization every 6 (six) hours as needed for Wheezing or Shortness  of Breath., Disp: 90 each, Rfl: 5    cetirizine (ZYRTEC) 10 MG tablet, Take 10 mg by mouth once daily., Disp: , Rfl:     diphenhydrAMINE (BENADRYL) 25 mg capsule, Take 2 capsules (50 mg total) by mouth every 6 (six) hours as needed for Itching or Allergies., Disp: 30 capsule, Rfl: 0    EPINEPHrine (EPIPEN) 0.3 mg/0.3 mL AtIn, Inject 0.3 mLs (0.3 mg total) into the muscle as needed., Disp: 2 each, Rfl: 0    gabapentin (NEURONTIN) 300 MG capsule, Take 1 capsule (300 mg total) by mouth 3 (three) times daily., Disp: 90 capsule, Rfl: 0    spironolactone (ALDACTONE) 100 MG tablet, Take 1 tablet (100 mg total) by mouth once daily., Disp: 30 tablet, Rfl: 5    amLODIPine (NORVASC) 10 MG tablet, Take 1 tablet (10 mg total) by mouth once daily., Disp: 30 tablet, Rfl: 6    diclofenac (VOLTAREN) 75 MG EC tablet, Take 1 tablet (75 mg total) by mouth 2 (two) times daily as needed., Disp: 60 tablet, Rfl: 3    ergocalciferol (ERGOCALCIFEROL) 50,000 unit Cap, Take 1 capsule (50,000 Units total) by mouth every 7 days., Disp: 12 capsule, Rfl: 3    Past Surgical History:   Procedure Laterality Date    arthroscopy ankle right      bilateral inguinal hernia repair      COLONOSCOPY N/A 1/11/2018    Procedure: COLONOSCOPY;  Surgeon: Christoph Nolasco MD;  Location: [facility] ENDO;  Service: Endoscopy;  Laterality: N/A;    left torn rotator cuff repair  09/2014    MYOMECTOMY      rt salphigectomy      SYNOVECTOMY      TRANSFORAMINAL EPIDURAL INJECTION OF STEROID Right 1/18/2022    Procedure: LUMBAR TRANSFORAMINAL RIGHT L5/S1 DIRECT REFERRAL;  Surgeon: Cherelle Carpenter MD;  Location: [facility] PAIN MGT;  Service: Pain Management;  Laterality: Right;    UMBILICAL HERNIA REPAIR         Family History   Problem Relation Age of Onset    Cancer Mother         breast    Diabetes Mother     Hypertension Mother     Breast cancer Mother     Cancer Brother         liver cancer    Breast cancer Maternal Aunt        Social History     Socioeconomic History     "Marital status: Legally    Tobacco Use    Smoking status: Never    Smokeless tobacco: Never   Substance and Sexual Activity    Alcohol use: Yes     Comment: social     Drug use: No    Sexual activity: Yes     Comment: occasional           Allergies   Review of patient's allergies indicates:   Allergen Reactions    Lisinopril Hives, Itching, Rash, Shortness Of Breath and Swelling             Review of Systems  See HPI      [unfilled]  /82   Pulse 75   Temp 97.9 °F (36.6 °C) (Oral)   Ht 5' 6" (1.676 m)   Wt 128.7 kg (283 lb 11.7 oz)   LMP 07/18/2012   BMI 45.80 kg/m²     GEN: NAD, well developed, pleasant, well nourished  HEENT: NCAT, PERRLA, EOMI, sclera clear, anicteric, bilateral ear exam wnl, O/P clear, MMM with no lesions  NECK: normal, supple with midline trachea, no LAD, no thyromegaly  LUNGS: CTAB, no w/r/r, no increased work of breathing   HEART: RRR, normal S1 and S2, no m/r/g, no edema  ABD: s/nt/nd, NABS  SKIN: normal turgor, no rashes  PSYCH: AOx3, appropriate mood and affect  MSK: warm/well perfused, normal ROM in all extremities, no c/c/e.  NEURO: normal without focal findings, CN II-XII are grossly intact.  Sensation/strength grossly normal, gait and station normal.         Labs  Lab Results   Component Value Date    HGBA1C 5.4 08/29/2022     Lab Results   Component Value Date     08/29/2022    K 4.5 08/29/2022     08/29/2022    CO2 28 08/29/2022    BUN 13 08/29/2022    CREATININE 0.7 08/29/2022    CALCIUM 9.8 08/29/2022    ANIONGAP 8 08/29/2022    ESTGFRAFRICA >60.0 05/11/2022    EGFRNONAA >60.0 05/11/2022     Lab Results   Component Value Date    CHOL 161 08/29/2022    CHOL 177 06/25/2021    CHOL 159 06/23/2020     Lab Results   Component Value Date    HDL 55 08/29/2022    HDL 55 06/25/2021    HDL 54 06/23/2020     Lab Results   Component Value Date    LDLCALC 96.4 08/29/2022    LDLCALC 105.8 06/25/2021    LDLCALC 97.4 06/23/2020     Lab Results   Component Value Date "    TRIG 48 08/29/2022    TRIG 81 06/25/2021    TRIG 38 06/23/2020     Lab Results   Component Value Date    CHOLHDL 34.2 08/29/2022    CHOLHDL 31.1 06/25/2021    CHOLHDL 34.0 06/23/2020     Last set of blood work has been reviewed as noted above.

## 2022-12-16 ENCOUNTER — TELEPHONE (OUTPATIENT)
Dept: ORTHOPEDICS | Facility: CLINIC | Age: 62
End: 2022-12-16
Payer: COMMERCIAL

## 2022-12-19 ENCOUNTER — HOSPITAL ENCOUNTER (OUTPATIENT)
Dept: RADIOLOGY | Facility: HOSPITAL | Age: 62
Discharge: HOME | End: 2022-12-19
Attending: ORTHOPAEDIC SURGERY
Payer: COMMERCIAL

## 2022-12-19 ENCOUNTER — TELEPHONE (OUTPATIENT)
Dept: ORTHOPEDICS | Facility: CLINIC | Age: 62
End: 2022-12-19
Payer: COMMERCIAL

## 2022-12-19 ENCOUNTER — OFFICE VISIT (OUTPATIENT)
Dept: ORTHOPEDICS | Facility: CLINIC | Age: 62
End: 2022-12-19
Payer: COMMERCIAL

## 2022-12-19 DIAGNOSIS — M51.36 DDD (DEGENERATIVE DISC DISEASE), LUMBAR: Primary | ICD-10-CM

## 2022-12-19 DIAGNOSIS — M54.16 LUMBAR RADICULOPATHY, CHRONIC: ICD-10-CM

## 2022-12-19 DIAGNOSIS — M43.10 DEGENERATIVE SPONDYLOLISTHESIS: Primary | ICD-10-CM

## 2022-12-19 DIAGNOSIS — M51.36 DDD (DEGENERATIVE DISC DISEASE), LUMBAR: ICD-10-CM

## 2022-12-19 PROCEDURE — 72110 X-RAY EXAM L-2 SPINE 4/>VWS: CPT | Mod: 26,,, | Performed by: RADIOLOGY

## 2022-12-19 PROCEDURE — 72110 X-RAY EXAM L-2 SPINE 4/>VWS: CPT | Mod: TC

## 2022-12-19 PROCEDURE — 72110 XR LUMBAR SPINE AP AND LAT WITH FLEX/EXT: ICD-10-PCS | Mod: 26,,, | Performed by: RADIOLOGY

## 2022-12-19 PROCEDURE — 3044F HG A1C LEVEL LT 7.0%: CPT | Mod: CPTII,S$GLB,, | Performed by: ORTHOPAEDIC SURGERY

## 2022-12-19 PROCEDURE — 99999 PR PBB SHADOW E&M-EST. PATIENT-LVL III: ICD-10-PCS | Mod: PBBFAC,,, | Performed by: ORTHOPAEDIC SURGERY

## 2022-12-19 PROCEDURE — 3044F PR MOST RECENT HEMOGLOBIN A1C LEVEL <7.0%: ICD-10-PCS | Mod: CPTII,S$GLB,, | Performed by: ORTHOPAEDIC SURGERY

## 2022-12-19 PROCEDURE — 1160F PR REVIEW ALL MEDS BY PRESCRIBER/CLIN PHARMACIST DOCUMENTED: ICD-10-PCS | Mod: CPTII,S$GLB,, | Performed by: ORTHOPAEDIC SURGERY

## 2022-12-19 PROCEDURE — 99999 PR PBB SHADOW E&M-EST. PATIENT-LVL III: CPT | Mod: PBBFAC,,, | Performed by: ORTHOPAEDIC SURGERY

## 2022-12-19 PROCEDURE — 1159F PR MEDICATION LIST DOCUMENTED IN MEDICAL RECORD: ICD-10-PCS | Mod: CPTII,S$GLB,, | Performed by: ORTHOPAEDIC SURGERY

## 2022-12-19 PROCEDURE — 99214 PR OFFICE/OUTPT VISIT, EST, LEVL IV, 30-39 MIN: ICD-10-PCS | Mod: S$GLB,,, | Performed by: ORTHOPAEDIC SURGERY

## 2022-12-19 PROCEDURE — 1160F RVW MEDS BY RX/DR IN RCRD: CPT | Mod: CPTII,S$GLB,, | Performed by: ORTHOPAEDIC SURGERY

## 2022-12-19 PROCEDURE — 1159F MED LIST DOCD IN RCRD: CPT | Mod: CPTII,S$GLB,, | Performed by: ORTHOPAEDIC SURGERY

## 2022-12-19 PROCEDURE — 99214 OFFICE O/P EST MOD 30 MIN: CPT | Mod: S$GLB,,, | Performed by: ORTHOPAEDIC SURGERY

## 2022-12-19 NOTE — PROGRESS NOTES
DATE: 12/19/2022  PATIENT: Brandi Freitas    Attending Physician: German Cummings M.D.    HISTORY:  Brandi Freitas is a 62 y.o. female who returns to me today for low back pain (3/10). She is not having any leg pain, but does have pain in BL knees (7/10) which is worse with weight bearing and causing her to need to use a walker to ambulate. Previously she had L5-S1 R TFESI for low back pain w/ RLE radiculopathy and that improved her symptoms. She has previously seen Dr. Beard for her knee pain and is trying to get an appt to FU with him.     PMH/PSH/FamHx/SocHx:  Unchanged from prior visit    ROS:  Positive for LBP and BL knee pain  Denies perineal paresthesias, bowel or bladder incontinence    EXAM:  LMP 07/18/2012     My physical examination was notable for the following findings: no lumbar TTP, motor 5/5 BLE, SILT. Negative straight leg raise bilaterally. Hips and knees relatively painless ROM but does have pain BL knees w weight bearing.     IMAGING:  Previous MRI lumbar showed L5-S1 DDD with R moderate foraminal stenosis. No central stenosis.     Lumbar XRs showed L5-S1 DDD with anterolisthesis measuring 7mm. The flexion and extension views demonstrate no instability.    ASSESSMENT/PLAN:  Patient has L5-S1 degen spondy and DDD with R foraminal stenosis. Previously improved with R L5/S1 TFESI. At this time I will refer her for BL L5/S1 TFESI. She plans to FU with Dr. Beard for her BL knee pain. I educated the patient on the importance of core/back strengthening, correct posture, bending/lifting ergonomics, and low-impact aerobic exercises (walking, elliptical, and aquatherapy). Surgical option would be L5-S1 ALIF (w/ or w/o PLDF) if she were to get worse, but she would need to lose weight. Patient will FU PRN.    I have personally examined the patient and agree with the above plan.    German Cummings MD  Orthopaedic Spine Surgeon  Department of Orthopaedic Surgery  [phone]

## 2022-12-20 ENCOUNTER — HOSPITAL ENCOUNTER (OUTPATIENT)
Dept: RADIOLOGY | Facility: HOSPITAL | Age: 62
Discharge: HOME | End: 2022-12-20
Attending: ORTHOPAEDIC SURGERY
Payer: COMMERCIAL

## 2022-12-20 ENCOUNTER — OFFICE VISIT (OUTPATIENT)
Dept: ORTHOPEDICS | Facility: CLINIC | Age: 62
End: 2022-12-20
Payer: COMMERCIAL

## 2022-12-20 ENCOUNTER — TELEPHONE (OUTPATIENT)
Dept: ADMINISTRATIVE | Facility: OTHER | Age: 62
End: 2022-12-20
Payer: COMMERCIAL

## 2022-12-20 VITALS — HEIGHT: 64 IN | WEIGHT: 272.25 LBS | BODY MASS INDEX: 46.48 KG/M2

## 2022-12-20 DIAGNOSIS — M25.562 PAIN IN BOTH KNEES, UNSPECIFIED CHRONICITY: Primary | ICD-10-CM

## 2022-12-20 DIAGNOSIS — M25.552 LEFT HIP PAIN: ICD-10-CM

## 2022-12-20 DIAGNOSIS — M25.561 PAIN IN BOTH KNEES, UNSPECIFIED CHRONICITY: ICD-10-CM

## 2022-12-20 DIAGNOSIS — M25.562 PAIN IN BOTH KNEES, UNSPECIFIED CHRONICITY: ICD-10-CM

## 2022-12-20 DIAGNOSIS — M17.12 PRIMARY OSTEOARTHRITIS OF LEFT KNEE: ICD-10-CM

## 2022-12-20 DIAGNOSIS — M17.11 PRIMARY OSTEOARTHRITIS OF RIGHT KNEE: ICD-10-CM

## 2022-12-20 DIAGNOSIS — M25.561 PAIN IN BOTH KNEES, UNSPECIFIED CHRONICITY: Primary | ICD-10-CM

## 2022-12-20 PROCEDURE — 73564 X-RAY EXAM KNEE 4 OR MORE: CPT | Mod: 26,LT,, | Performed by: RADIOLOGY

## 2022-12-20 PROCEDURE — 73564 PR  X-RAY KNEE 4+ VIEW: ICD-10-PCS | Mod: 26,LT,, | Performed by: RADIOLOGY

## 2022-12-20 PROCEDURE — 73564 X-RAY EXAM KNEE 4 OR MORE: CPT | Mod: 26,RT,, | Performed by: RADIOLOGY

## 2022-12-20 PROCEDURE — 1160F PR REVIEW ALL MEDS BY PRESCRIBER/CLIN PHARMACIST DOCUMENTED: ICD-10-PCS | Mod: CPTII,S$GLB,, | Performed by: ORTHOPAEDIC SURGERY

## 2022-12-20 PROCEDURE — 73564 X-RAY EXAM KNEE 4 OR MORE: CPT | Mod: TC,50

## 2022-12-20 PROCEDURE — 99999 PR PBB SHADOW E&M-EST. PATIENT-LVL IV: CPT | Mod: PBBFAC,,, | Performed by: ORTHOPAEDIC SURGERY

## 2022-12-20 PROCEDURE — 3044F HG A1C LEVEL LT 7.0%: CPT | Mod: CPTII,S$GLB,, | Performed by: ORTHOPAEDIC SURGERY

## 2022-12-20 PROCEDURE — 73502 X-RAY EXAM HIP UNI 2-3 VIEWS: CPT | Mod: 26,LT,, | Performed by: RADIOLOGY

## 2022-12-20 PROCEDURE — 73502 XR HIP WITH PELVIS WHEN PERFORMED, 2 OR 3 VIEWS LEFT: ICD-10-PCS | Mod: 26,LT,, | Performed by: RADIOLOGY

## 2022-12-20 PROCEDURE — 3008F PR BODY MASS INDEX (BMI) DOCUMENTED: ICD-10-PCS | Mod: CPTII,S$GLB,, | Performed by: ORTHOPAEDIC SURGERY

## 2022-12-20 PROCEDURE — 1159F MED LIST DOCD IN RCRD: CPT | Mod: CPTII,S$GLB,, | Performed by: ORTHOPAEDIC SURGERY

## 2022-12-20 PROCEDURE — 3008F BODY MASS INDEX DOCD: CPT | Mod: CPTII,S$GLB,, | Performed by: ORTHOPAEDIC SURGERY

## 2022-12-20 PROCEDURE — 1160F RVW MEDS BY RX/DR IN RCRD: CPT | Mod: CPTII,S$GLB,, | Performed by: ORTHOPAEDIC SURGERY

## 2022-12-20 PROCEDURE — 1159F PR MEDICATION LIST DOCUMENTED IN MEDICAL RECORD: ICD-10-PCS | Mod: CPTII,S$GLB,, | Performed by: ORTHOPAEDIC SURGERY

## 2022-12-20 PROCEDURE — 3044F PR MOST RECENT HEMOGLOBIN A1C LEVEL <7.0%: ICD-10-PCS | Mod: CPTII,S$GLB,, | Performed by: ORTHOPAEDIC SURGERY

## 2022-12-20 PROCEDURE — 73502 X-RAY EXAM HIP UNI 2-3 VIEWS: CPT | Mod: TC,LT

## 2022-12-20 PROCEDURE — 99213 PR OFFICE/OUTPT VISIT, EST, LEVL III, 20-29 MIN: ICD-10-PCS | Mod: S$GLB,,, | Performed by: ORTHOPAEDIC SURGERY

## 2022-12-20 PROCEDURE — 99999 PR PBB SHADOW E&M-EST. PATIENT-LVL IV: ICD-10-PCS | Mod: PBBFAC,,, | Performed by: ORTHOPAEDIC SURGERY

## 2022-12-20 PROCEDURE — 99213 OFFICE O/P EST LOW 20 MIN: CPT | Mod: S$GLB,,, | Performed by: ORTHOPAEDIC SURGERY

## 2022-12-20 NOTE — PROGRESS NOTES
Subjective:   Brandi Freitas is a 62 y.o. female who presents for evaluation of bilateral knee pain as well as left sided hip pain that has been present for several years. She says her left knee is worse than her right. She has tried physical therapy without much relief. She is currently taking diclofenac that gives her mild relief. 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 She says being on her feet and walking exacerbates her pain. She has never had CSI in either knee. She currently ambulates with a cane.         Past Medical History:   Diagnosis Date    Anxiety     Arthritis     knee    Asthma     Bursitis, shoulder     Colon polyps     Dental bridge present     upper right    Dental crown present     Depression     Hypertension     Pes planus     Wears glasses     for reading       Past Surgical History:   Procedure Laterality Date    arthroscopy ankle right      bilateral inguinal hernia repair      COLONOSCOPY N/A 1/11/2018    Procedure: COLONOSCOPY;  Surgeon: Christoph Nolasco MD;  Location: [facility] ENDO;  Service: Endoscopy;  Laterality: N/A;    left torn rotator cuff repair  09/2014    MYOMECTOMY      rt salphigectomy      SYNOVECTOMY      TRANSFORAMINAL EPIDURAL INJECTION OF STEROID Right 1/18/2022    Procedure: LUMBAR TRANSFORAMINAL RIGHT L5/S1 DIRECT REFERRAL;  Surgeon: Cherelle Carpenter MD;  Location: [facility] PAIN MGT;  Service: Pain Management;  Laterality: Right;    UMBILICAL HERNIA REPAIR         Family History   Problem Relation Age of Onset    Cancer Mother         breast    Diabetes Mother     Hypertension Mother     Breast cancer Mother     Cancer Brother         liver cancer    Breast cancer Maternal Aunt        Social History     Socioeconomic History    Marital status: Legally    Tobacco Use    Smoking status: Never    Smokeless tobacco: Never   Substance and Sexual Activity    Alcohol use: Yes     Comment: social     Drug use: No    Sexual activity: Yes      "Comment: occasional       Objective:      Vitals:    12/20/22 1404   Weight: 123.5 kg (272 lb 4.3 oz)   Height: 5' 4" (1.626 m)     Physical Exam    MSK:  Left Lower Extremity  Inspection  - Skin intact throughout, no open wounds  - No swelling  - No ecchymosis, erythema, or signs of cellulitis  Palpation  - TTP medial and lateral joint line  Range of motion  - AROM and PROM of the hip, ankle, and foot intact without pain  - ROM at knee 0-115  Stability  - No evidence of joint dislocation or abnormal laxity  - Negative Anterior/Posterior Drawer tests  - Negative Lachman's test  - Negative Mg's test  Neurovascular  - TA/EHL/Gastroc/FHL assessed in isolation without deficit  - SILT throughout  - Compartments soft  - DP palpated   - Capillary Refill <3s  - Muscle tone normal    Right Lower Extremity  Inspection  - Skin intact throughout, no open wounds  - No swelling  - No ecchymosis, erythema, or signs of cellulitis  Palpation  - TTP lateral > medial joint line  Range of motion  - AROM and PROM of the hip, ankle, and foot intact without pain  - ROM at knee 0-115  Stability  - No evidence of joint dislocation or abnormal laxity  - Negative Anterior/Posterior Drawer tests  - Negative Lachman's test  Neurovascular  - TA/EHL/Gastroc/FHL assessed in isolation without deficit  - SILT throughout  - Compartments soft  - DP palpated   - Capillary Refill <3s  - Negative Log roll  - Negative Stinchfield  - Muscle tone normal      Imaging Review: I independently reviewed and interpreted the imaging and my findings are as follows: XR bilateral knee: mild joint space narrowing worse on the medial compartments and slightly worse on the left side, KL2.   XR L hip: well maintained joint space, normal alignment, no signs of fracture or dislocation      Assessment:   DJD, knees  Plan:     - Referral to pain management for injections vs RFA of bilateral knees     The patient's pathophysiology was explained in detail with reference " to x-rays, models, other visual aids as appropriate.  Treatment options were discussed in detail.  Questions were invited and answered to the patient's satisfaction.

## 2022-12-23 ENCOUNTER — PATIENT MESSAGE (OUTPATIENT)
Dept: PAIN MEDICINE | Facility: OTHER | Age: 62
End: 2022-12-23
Payer: COMMERCIAL

## 2022-12-23 ENCOUNTER — TELEPHONE (OUTPATIENT)
Dept: ADMINISTRATIVE | Facility: OTHER | Age: 62
End: 2022-12-23
Payer: COMMERCIAL

## 2022-12-27 ENCOUNTER — PATIENT MESSAGE (OUTPATIENT)
Dept: PAIN MEDICINE | Facility: OTHER | Age: 62
End: 2022-12-27
Payer: COMMERCIAL

## 2023-01-12 ENCOUNTER — HOSPITAL ENCOUNTER (OUTPATIENT)
Facility: OTHER | Age: 63
Discharge: HOME | End: 2023-01-12
Attending: ANESTHESIOLOGY | Admitting: ANESTHESIOLOGY
Payer: COMMERCIAL

## 2023-01-12 VITALS
HEART RATE: 70 BPM | TEMPERATURE: 99 F | WEIGHT: 270 LBS | OXYGEN SATURATION: 100 % | DIASTOLIC BLOOD PRESSURE: 58 MMHG | SYSTOLIC BLOOD PRESSURE: 114 MMHG | BODY MASS INDEX: 46.1 KG/M2 | HEIGHT: 64 IN | RESPIRATION RATE: 16 BRPM

## 2023-01-12 DIAGNOSIS — G89.29 CHRONIC PAIN: ICD-10-CM

## 2023-01-12 DIAGNOSIS — M51.36 DDD (DEGENERATIVE DISC DISEASE), LUMBAR: ICD-10-CM

## 2023-01-12 DIAGNOSIS — M54.17 LUMBOSACRAL RADICULOPATHY: Primary | ICD-10-CM

## 2023-01-12 PROCEDURE — 99152 MOD SED SAME PHYS/QHP 5/>YRS: CPT | Mod: ,,, | Performed by: ANESTHESIOLOGY

## 2023-01-12 PROCEDURE — 25000003 PHARM REV CODE 250: Performed by: STUDENT IN AN ORGANIZED HEALTH CARE EDUCATION/TRAINING PROGRAM

## 2023-01-12 PROCEDURE — 64483 NJX AA&/STRD TFRM EPI L/S 1: CPT | Mod: 50 | Performed by: ANESTHESIOLOGY

## 2023-01-12 PROCEDURE — 64483 NJX AA&/STRD TFRM EPI L/S 1: CPT | Mod: 50,,, | Performed by: ANESTHESIOLOGY

## 2023-01-12 PROCEDURE — 63600175 PHARM REV CODE 636 W HCPCS: Performed by: ANESTHESIOLOGY

## 2023-01-12 PROCEDURE — 64483 PR EPIDURAL INJ, ANES/STEROID, TRANSFORAMINAL, LUMB/SACR, SNGL LEVL: ICD-10-PCS | Mod: 50,,, | Performed by: ANESTHESIOLOGY

## 2023-01-12 PROCEDURE — 25500020 PHARM REV CODE 255: Performed by: ANESTHESIOLOGY

## 2023-01-12 PROCEDURE — 99152 PR MOD CONSCIOUS SEDATION, SAME PHYS, 5+ YRS, FIRST 15 MIN: ICD-10-PCS | Mod: ,,, | Performed by: ANESTHESIOLOGY

## 2023-01-12 PROCEDURE — 25000003 PHARM REV CODE 250: Performed by: ANESTHESIOLOGY

## 2023-01-12 PROCEDURE — 99152 MOD SED SAME PHYS/QHP 5/>YRS: CPT | Performed by: ANESTHESIOLOGY

## 2023-01-12 RX ORDER — MIDAZOLAM HYDROCHLORIDE 1 MG/ML
INJECTION INTRAMUSCULAR; INTRAVENOUS
Status: DISCONTINUED | OUTPATIENT
Start: 2023-01-12 | End: 2023-01-12 | Stop reason: HOSPADM

## 2023-01-12 RX ORDER — SODIUM CHLORIDE 9 MG/ML
500 INJECTION, SOLUTION INTRAVENOUS CONTINUOUS
Status: DISCONTINUED | OUTPATIENT
Start: 2023-01-12 | End: 2023-01-12 | Stop reason: HOSPADM

## 2023-01-12 RX ORDER — DEXAMETHASONE SODIUM PHOSPHATE 10 MG/ML
INJECTION INTRAMUSCULAR; INTRAVENOUS
Status: DISCONTINUED | OUTPATIENT
Start: 2023-01-12 | End: 2023-01-12 | Stop reason: HOSPADM

## 2023-01-12 RX ORDER — FENTANYL CITRATE 50 UG/ML
INJECTION, SOLUTION INTRAMUSCULAR; INTRAVENOUS
Status: DISCONTINUED | OUTPATIENT
Start: 2023-01-12 | End: 2023-01-12 | Stop reason: HOSPADM

## 2023-01-12 RX ORDER — LIDOCAINE HYDROCHLORIDE 20 MG/ML
INJECTION, SOLUTION INFILTRATION; PERINEURAL
Status: DISCONTINUED | OUTPATIENT
Start: 2023-01-12 | End: 2023-01-12 | Stop reason: HOSPADM

## 2023-01-12 RX ORDER — LIDOCAINE HYDROCHLORIDE 10 MG/ML
INJECTION, SOLUTION EPIDURAL; INFILTRATION; INTRACAUDAL; PERINEURAL
Status: DISCONTINUED | OUTPATIENT
Start: 2023-01-12 | End: 2023-01-12 | Stop reason: HOSPADM

## 2023-01-12 NOTE — DISCHARGE INSTRUCTIONS
Thank you for allowing us to care for you today. You may receive a survey about the care we provided. Your feedback is valuable and helps us provide excellent care throughout the community.     Home Care Instructions for Pain Management:    1. DIET:   You may resume your normal diet today.   2. BATHING:   You may shower with luke warm water. No tub baths or anything that will soak injection sites under water for the next 24 hours.  3. DRESSING:   You may remove your bandage today.   4. ACTIVITY LEVEL:   You may resume your normal activities 24 hrs after your procedure. Nothing strenuous today.  5. MEDICATIONS:   You may resume your normal medications today. To restart blood thinners, ask your doctor.  6. DRIVING    If you have received any sedatives by mouth today, you may not drive for 12 hours.    If you have received any sedation through your IV, you may not drive for 24 hrs.   7. SPECIAL INSTRUCTIONS:   No heat to the injection site for 24 hrs including, hot bath or shower, heating pad, moist heat, or hot tubs.    Use ice pack to injection site for any pain or discomfort.  Apply ice packs for 20 minute intervals as needed.    IF you have diabetes, be sure to monitor your blood sugar more closely. IF your injection contained steroids your blood sugar levels may become higher than normal.  8.  Call Pain Management Clinic at [phone] to schedule appointment for Bilateral Knee evaluation based on referral from Dr. Beard      If you are still having pain upon discharge:  Your pain may improve over the next 48 hours. The anesthetic (numbing medication) works immediately to 48 hours. IF your injection contained a steroid (anti-inflammatory medication), it takes approximately 3 days to start feeling relief and 7-10 days to see your greatest results from the medication. It is possible you may need subsequent injections. This would be discussed at your follow up appointment with pain management or your referring  doctor.    Please call the PAIN MANAGEMENT office at [phone] or ON CALL pager at [phone] if you experienced any:   -Weakness or loss of sensation  -Fever > 101.5  -Pain uncontrolled with oral medications   -Persistent nausea, vomiting, or diarrhea  -Redness or drainage from the injection sites, or any other worrisome concerns.   If physician on call was not reached or could not communicate with our office for any reason please go to the nearest emergency department. Adult Procedural Sedation Instructions    Recovery After Procedural Sedation (Adult)  You have been given medicine by vein to make you sleep during your surgery. This may have included both a pain medicine and sleeping medicine. Most of the effects have worn off. But you may still have some drowsiness for the next 6 to 8 hours.  Home care  Follow these guidelines when you get home:  For the next 8 hours, you should be watched by a responsible adult. This person should make sure your condition is not getting worse.  Don't drink any alcohol for the next 24 hours.  Don't drive, operate dangerous machinery, or make important business or personal decisions during the next 24 hours.  Note: Your healthcare provider may tell you not to take any medicine by mouth for pain or sleep in the next 4 hours. These medicines may react with the medicines you were given in the hospital. This could cause a much stronger response than usual.  Follow-up care  Follow up with your healthcare provider if you are not alert and back to your usual level of activity within 12 hours.  When to seek medical advice  Call your healthcare provider right away if any of these occur:  Drowsiness gets worse  Weakness or dizziness gets worse  Repeated vomiting  You can't be awakened   Date Last Reviewed: 10/18/2016  © 1831-9472 [employer]. [address] [place] [address], [place] [address]. All rights reserved. This information is not intended as a substitute for professional  medical care. Always follow your healthcare professional's instructions.

## 2023-01-12 NOTE — OP NOTE
Lumbar Transforaminal Epidural Steroid Injection under Fluoroscopic Guidance    The procedure, risks, benefits, and options were discussed with the patient. There are no contraindications to the procedure. The patent expressed understanding and agreed to the procedure. Informed written consent was obtained prior to the start of the procedure and can be found in the patient's chart.    PATIENT NAME: Brandi Sahu   MRN: [id number]     DATE OF PROCEDURE: 01/12/2023    PROCEDURE:  Bilateral  L5/S1 Lumbar Transforaminal Epidural Steroid Injection under Fluoroscopic Guidance    PRE-OP DIAGNOSIS: Lumbar radiculopathy [M54.16] Lumbar radiculopathy [M54.16]    POST-OP DIAGNOSIS: Same    PHYSICIAN: Lorraine Braxton MD    ASSISTANTS: Ghanshyam Estrada MD Resident    MEDICATIONS INJECTED: Preservative-free Decadron 10mg with 5cc of Lidocaine 1% MPF     LOCAL ANESTHETIC INJECTED: Xylocaine 2%     SEDATION: Versed 2mg and Fentanyl 50mcg                                                                                                                                                                                     Conscious sedation ordered by M.SARITA. Patient re-evaluation prior to administration of conscious sedation. No changes noted in patient's status from initial evaluation. The patient's vital signs were monitored by RN and patient remained hemodynamically stable throughout the procedure.    Event Time In   Sedation Start 0953   Sedation End 1006       ESTIMATED BLOOD LOSS: None    COMPLICATIONS: None    TECHNIQUE: Time-out was performed to identify the patient and procedure to be performed. With the patient laying in a prone position, the surgical area was prepped and draped in the usual sterile fashion using ChloraPrep and a fenestrated drape.The levels were determined under fluoroscopy guidance. Skin anesthesia was achieved by injecting Lidocaine 2% over the injection sites. The transforaminal spaces were then approached with a 22  gauge, 5 inch spinal quinke needle that was introduced under fluoroscopic guidance in the AP and Lateral views. Once the needle tip was in the area of the transforaminal space, and there was no blood, CSF or paraesthesias, contrast dye Omnipaque (300mg/mL) was injected to confirm placement and there was no vascular runoff. Fluoroscopic imaging in the AP and lateral views revealed a clear outline of the spinal nerve with proximal spread of agent through the neural foramen into the epidural space. 3 mL of the medication mixture listed above was injected slowly at each site. Displacement of the radio opaque contrast after injection of the medication confirmed that the medication went into the area of the transforaminal spaces. The needles were removed and bleeding was nil. A sterile dressing was applied. No specimens collected. The patient tolerated the procedure well.     PAIN BEFORE THE PROCEDURE: 3-10/10    PAIN AFTER THE PROCEDURE: 0/10    The patient was monitored after the procedure in the recovery area. They were given post-procedure and discharge instructions to follow at home. The patient was discharged in a stable condition.      Lorraine Braxton MD

## 2023-01-12 NOTE — DISCHARGE SUMMARY
Discharge Note  Short Stay      SUMMARY     Admit Date: 1/12/2023    Attending Physician: Lorraine Braxton      Discharge Physician: Lorraine Braxton      Discharge Date: 1/12/2023 9:54 AM    Procedure(s) (LRB):  INJECTION, STEROID, EPIDURAL, TRANSFORAMINAL APPROACH, BILATERAL L5-S1 CONTRAST DIRECT REF (Bilateral)    Final Diagnosis: Lumbar radiculopathy [M54.16]    Disposition: Home or self care    Patient Instructions:   Current Discharge Medication List        CONTINUE these medications which have NOT CHANGED    Details   albuterol (PROVENTIL HFA) 90 mcg/actuation inhaler Inhale 2 puffs into the lungs every 6 (six) hours as needed for Wheezing. Rescue  Qty: 18 g, Refills: 0    Associated Diagnoses: Mild persistent asthma with exacerbation      albuterol-ipratropium (DUO-NEB) 2.5 mg-0.5 mg/3 mL nebulizer solution Take 3 mLs by nebulization every 6 (six) hours as needed for Wheezing or Shortness of Breath.  Qty: 90 each, Refills: 5    Associated Diagnoses: Asthma, chronic, unspecified asthma severity, uncomplicated      amLODIPine (NORVASC) 10 MG tablet Take 1 tablet (10 mg total) by mouth once daily.  Qty: 30 tablet, Refills: 6    Comments: .  Associated Diagnoses: Essential hypertension      cetirizine (ZYRTEC) 10 MG tablet Take 10 mg by mouth once daily.      diclofenac (VOLTAREN) 75 MG EC tablet Take 1 tablet (75 mg total) by mouth 2 (two) times daily as needed.  Qty: 60 tablet, Refills: 3    Associated Diagnoses: Chronic pain of left knee      diphenhydrAMINE (BENADRYL) 25 mg capsule Take 2 capsules (50 mg total) by mouth every 6 (six) hours as needed for Itching or Allergies.  Qty: 30 capsule, Refills: 0      EPINEPHrine (EPIPEN) 0.3 mg/0.3 mL AtIn Inject 0.3 mLs (0.3 mg total) into the muscle as needed.  Qty: 2 each, Refills: 0      ergocalciferol (ERGOCALCIFEROL) 50,000 unit Cap Take 1 capsule (50,000 Units total) by mouth every 7 days.  Qty: 12 capsule, Refills: 3    Associated Diagnoses: Vitamin D deficiency       gabapentin (NEURONTIN) 300 MG capsule Take 1 capsule (300 mg total) by mouth 3 (three) times daily.  Qty: 90 capsule, Refills: 0    Associated Diagnoses: Degenerative spondylolisthesis      spironolactone (ALDACTONE) 100 MG tablet Take 1 tablet (100 mg total) by mouth once daily.  Qty: 30 tablet, Refills: 5    Associated Diagnoses: Primary hypertension                 Discharge Diagnosis: Lumbar radiculopathy [M54.16]  Condition on Discharge: Stable with no complications to procedure   Diet on Discharge: Same as before.  Activity: as per instruction sheet.  Discharge to: Home with a responsible adult.  Follow up: 2-4 weeks       Please call my office or pager at [phone] if experienced any weakness or loss of sensation, fever > 101.5, pain uncontrolled with oral medications, persistent nausea/vomiting/or diarrhea, redness or drainage from the incisions, or any other worrisome concerns. If physician on call was not reached or could not communicate with our office for any reason please go to the nearest emergency department

## 2023-01-12 NOTE — H&P
"HPI  Patient presenting for Procedure(s) (LRB):  INJECTION, STEROID, EPIDURAL, TRANSFORAMINAL APPROACH, BILATERAL L5-S1 CONTRAST DIRECT REF (Bilateral)     Patient on Anti-coagulation No    No health changes since previous encounter    Past Medical History:   Diagnosis Date    Anxiety     Arthritis     knee    Asthma     Bursitis, shoulder     Colon polyps     Dental bridge present     upper right    Dental crown present     Depression     Hypertension     Pes planus     Wears glasses     for reading     Past Surgical History:   Procedure Laterality Date    arthroscopy ankle right      bilateral inguinal hernia repair      COLONOSCOPY N/A 1/11/2018    Procedure: COLONOSCOPY;  Surgeon: Christoph Nolasco MD;  Location: [facility] ENDO;  Service: Endoscopy;  Laterality: N/A;    left torn rotator cuff repair  09/2014    MYOMECTOMY      rt salphigectomy      SYNOVECTOMY      TRANSFORAMINAL EPIDURAL INJECTION OF STEROID Right 1/18/2022    Procedure: LUMBAR TRANSFORAMINAL RIGHT L5/S1 DIRECT REFERRAL;  Surgeon: Cherelle Carpenter MD;  Location: [facility] PAIN MGT;  Service: Pain Management;  Laterality: Right;    UMBILICAL HERNIA REPAIR       Review of patient's allergies indicates:   Allergen Reactions    Lisinopril Hives, Itching, Rash, Shortness Of Breath and Swelling      Current Facility-Administered Medications   Medication    0.9%  NaCl infusion       PMHx, PSHx, Allergies, Medications reviewed in epic    ROS negative except pain complaints in HPI    OBJECTIVE:    BP (!) 172/81 (BP Location: Right arm, Patient Position: Sitting)   Pulse 82   Temp 98.8 °F (37.1 °C) (Oral)   Resp 18   Ht 5' 4" (1.626 m)   Wt 122.5 kg (270 lb)   LMP 07/18/2012   SpO2 100%   Breastfeeding No   BMI 46.35 kg/m²     PHYSICAL EXAMINATION:    GENERAL: Well appearing, in no acute distress, alert and oriented x3.  PSYCH:  Mood and affect appropriate.  SKIN: Skin color, texture, turgor normal, no rashes or lesions which will impact the " procedure.  CV: RRR with palpation of the radial artery.  PULM: No evidence of respiratory difficulty, symmetric chest rise. Clear to auscultation.  NEURO: Cranial nerves grossly intact.    Plan:    Proceed with procedure as planned Procedure(s) (LRB):  INJECTION, STEROID, EPIDURAL, TRANSFORAMINAL APPROACH, BILATERAL L5-S1 CONTRAST DIRECT REF (Bilateral)    Zachariah N Weilenman  01/12/2023

## 2023-01-13 ENCOUNTER — TELEPHONE (OUTPATIENT)
Dept: ORTHOPEDICS | Facility: CLINIC | Age: 63
End: 2023-01-13
Payer: COMMERCIAL

## 2023-01-13 NOTE — TELEPHONE ENCOUNTER
Spoke with patient and reschedule appt with pain management at [facility].    ----- Message from Ana Nieves sent at 1/13/2023  1:19 PM CST -----  Regarding: PT IS REQUESTING A NEW REFERRAL BEING SENT TO PAIN MANAGMENT FOR KNEE INJECTION  Contact: pt  Pt's requesting a call back from staff regarding issue.. Pt had appt on yesterday and assumed injection would be in the knee as well...   Pt is also wanting a woke note for about knee appt if possible..     Confirmed contact info below:  Contact Name: Brandi Sahu  Phone Number: [phone]

## 2023-01-17 ENCOUNTER — TELEPHONE (OUTPATIENT)
Dept: SPINE | Facility: CLINIC | Age: 63
End: 2023-01-17
Payer: COMMERCIAL

## 2023-01-17 NOTE — TELEPHONE ENCOUNTER
This message is for patient in regards to his/her appointment 01/18/23 at 4:00p   With  Cherelle Carpenter MD.        If you have any questions or concerns please contact [phone]       also inquired IPM within message.    [facility] providers and Mid-levels offer interventional, procedure--based options to treat chronic pain. The goal is to manage chronic pain by reducing pain frequency and intensity and address your functional goals for activities of daily living while simultaneously reducing or eliminating your reliance on medications. Please bring any records or images that you have from prior treatments for your pain. You will be presented with multi-modal treatment plan that may or may not include imaging, interventional procedures, physical/occupational/aqua therapy, pain creams, and non-narcotic pain medications used for the treatments of chronic pain.     Pt verbalized understanding and has confirmed appt

## 2023-01-18 ENCOUNTER — OFFICE VISIT (OUTPATIENT)
Dept: PAIN MEDICINE | Facility: CLINIC | Age: 63
End: 2023-01-18
Payer: COMMERCIAL

## 2023-01-18 VITALS — RESPIRATION RATE: 18 BRPM | WEIGHT: 286.63 LBS | HEIGHT: 64 IN | BODY MASS INDEX: 48.93 KG/M2

## 2023-01-18 DIAGNOSIS — M17.12 PRIMARY OSTEOARTHRITIS OF LEFT KNEE: Primary | ICD-10-CM

## 2023-01-18 DIAGNOSIS — M17.11 PRIMARY OSTEOARTHRITIS OF RIGHT KNEE: ICD-10-CM

## 2023-01-18 PROCEDURE — 1159F PR MEDICATION LIST DOCUMENTED IN MEDICAL RECORD: ICD-10-PCS | Mod: CPTII,S$GLB,, | Performed by: ANESTHESIOLOGY

## 2023-01-18 PROCEDURE — 3008F BODY MASS INDEX DOCD: CPT | Mod: CPTII,S$GLB,, | Performed by: ANESTHESIOLOGY

## 2023-01-18 PROCEDURE — 99999 PR PBB SHADOW E&M-EST. PATIENT-LVL IV: ICD-10-PCS | Mod: PBBFAC,,, | Performed by: ANESTHESIOLOGY

## 2023-01-18 PROCEDURE — 99999 PR PBB SHADOW E&M-EST. PATIENT-LVL IV: CPT | Mod: PBBFAC,,, | Performed by: ANESTHESIOLOGY

## 2023-01-18 PROCEDURE — 1159F MED LIST DOCD IN RCRD: CPT | Mod: CPTII,S$GLB,, | Performed by: ANESTHESIOLOGY

## 2023-01-18 PROCEDURE — 99214 OFFICE O/P EST MOD 30 MIN: CPT | Mod: S$GLB,,, | Performed by: ANESTHESIOLOGY

## 2023-01-18 PROCEDURE — 3008F PR BODY MASS INDEX (BMI) DOCUMENTED: ICD-10-PCS | Mod: CPTII,S$GLB,, | Performed by: ANESTHESIOLOGY

## 2023-01-18 PROCEDURE — 99214 PR OFFICE/OUTPT VISIT, EST, LEVL IV, 30-39 MIN: ICD-10-PCS | Mod: S$GLB,,, | Performed by: ANESTHESIOLOGY

## 2023-01-18 NOTE — PROGRESS NOTES
PCP: Cherelle Stover MD    REFERRING PHYSICIAN: Wesly Beard MD    CHIEF COMPLAINT: Left Knee pain    Original HISTORY OF PRESENT ILLNESS: Brandi Sahu presents to the clinic for the evaluation of the above pain. The pain started several months ago.    Original Pain Description:  The pain is located in the left knee. The pain is described as throbbing. Exacerbating factors: Standing and Walking. Mitigating factors sitting. Symptoms interfere with daily activity. The patient feels like symptoms have been worsening. Patient denies night fever/night sweats and significant weight loss. Over the past month the pain has worsened.     Original PAIN SCORES:  Best: Pain is 3  Worst: Pain is 10  Usually: Pain is 8  Current: Pain is 8    INTERVAL HISTORY:     6 weeks of Conservative therapy (PT/Chiro/Home Exercises with Dates)  Yes, started in august 2022 and completed in December of 22. Is compliant with HEP    Treatments / Medications: (Ice/Heat/NSAIDS/APAP/etc):  Volatren 75mg ED BID  Neurontin 300mg - no longer taking     Report: Reviewed      Interventional Pain Procedures: (Previous injections)  01/18/22 TFESI Bilateral L5/S1  01/12/23 TFESI Bilateral L5/S1    Past Medical History:   Diagnosis Date    Anxiety     Arthritis     knee    Asthma     Bursitis, shoulder     Colon polyps     Dental bridge present     upper right    Dental crown present     Depression     Hypertension     Pes planus     Wears glasses     for reading     Past Surgical History:   Procedure Laterality Date    arthroscopy ankle right      bilateral inguinal hernia repair      COLONOSCOPY N/A 1/11/2018    Procedure: COLONOSCOPY;  Surgeon: Christoph Nolasco MD;  Location: [facility];  Service: Endoscopy;  Laterality: N/A;    left torn rotator cuff repair  09/2014    MYOMECTOMY      rt salphigectomy      SYNOVECTOMY      TRANSFORAMINAL EPIDURAL INJECTION OF STEROID Right 1/18/2022    Procedure: LUMBAR TRANSFORAMINAL RIGHT L5/S1 DIRECT  REFERRAL;  Surgeon: Cherelle Carpenter MD;  Location: [facility] PAIN MGT;  Service: Pain Management;  Laterality: Right;    TRANSFORAMINAL EPIDURAL INJECTION OF STEROID Bilateral 1/12/2023    Procedure: INJECTION, STEROID, EPIDURAL, TRANSFORAMINAL APPROACH, BILATERAL L5-S1 CONTRAST DIRECT REF;  Surgeon: Lorraine Braxton MD;  Location: [facility] PAIN MGT;  Service: Pain Management;  Laterality: Bilateral;    UMBILICAL HERNIA REPAIR       Social History     Socioeconomic History    Marital status:    Tobacco Use    Smoking status: Never    Smokeless tobacco: Never   Substance and Sexual Activity    Alcohol use: Yes     Comment: social     Drug use: No    Sexual activity: Yes     Comment: occasional     Family History   Problem Relation Age of Onset    Cancer Mother         breast    Diabetes Mother     Hypertension Mother     Breast cancer Mother     Cancer Brother         liver cancer    Breast cancer Maternal Aunt        Review of patient's allergies indicates:   Allergen Reactions    Lisinopril Hives, Itching, Rash, Shortness Of Breath and Swelling       Current Outpatient Medications   Medication Sig    albuterol (PROVENTIL HFA) 90 mcg/actuation inhaler Inhale 2 puffs into the lungs every 6 (six) hours as needed for Wheezing. Rescue    albuterol-ipratropium (DUO-NEB) 2.5 mg-0.5 mg/3 mL nebulizer solution Take 3 mLs by nebulization every 6 (six) hours as needed for Wheezing or Shortness of Breath.    amLODIPine (NORVASC) 10 MG tablet Take 1 tablet (10 mg total) by mouth once daily.    cetirizine (ZYRTEC) 10 MG tablet Take 10 mg by mouth once daily.    diclofenac (VOLTAREN) 75 MG EC tablet Take 1 tablet (75 mg total) by mouth 2 (two) times daily as needed.    diphenhydrAMINE (BENADRYL) 25 mg capsule Take 2 capsules (50 mg total) by mouth every 6 (six) hours as needed for Itching or Allergies.    EPINEPHrine (EPIPEN) 0.3 mg/0.3 mL AtIn Inject 0.3 mLs (0.3 mg total) into the muscle as needed.    ergocalciferol  "(ERGOCALCIFEROL) 50,000 unit Cap Take 1 capsule (50,000 Units total) by mouth every 7 days.    gabapentin (NEURONTIN) 300 MG capsule Take 1 capsule (300 mg total) by mouth 3 (three) times daily.    spironolactone (ALDACTONE) 100 MG tablet Take 1 tablet (100 mg total) by mouth once daily.     No current facility-administered medications for this visit.       ROS:  GENERAL: No fever. No chills. No fatigue. Denies weight loss. Denies weight gain.  HEENT: Denies headaches. Denies vision change. Denies eye pain. Denies double vision. Denies ear pain.   CV: Denies chest pain.   PULM: Denies of shortness of breath.  GI: Denies constipation. No diarrhea. No abdominal pain. Denies nausea. Denies vomiting. No blood in stool.  HEME: Denies bleeding problems.  : Denies urgency. No painful urination. No blood in urine.  MS: Denies joint stiffness. Denies joint swelling.  Denies back pain.  SKIN: Denies rash.   NEURO: Denies seizures. No weakness.  PSYCH:  Denies difficulty sleeping. No anxiety. Denies depression. No suicidal thoughts.       VITALS:   Vitals:    01/18/23 1609   Resp: 18   Weight: 130 kg (286 lb 9.6 oz)   Height: 5' 4" (1.626 m)   PainSc:   8   PainLoc: Knee         PHYSICAL EXAM:   GENERAL: Well appearing, in no acute distress, alert and oriented x3.  PSYCH:  Mood and affect appropriate.  SKIN: Skin color, texture, turgor normal, no rashes or lesions.  HEENT:  Normocephalic, atraumatic. Cranial nerves grossly intact.  NECK: No pain to palpation over the cervical paraspinous muscles. No pain to palpation over facets. No pain with neck flexion, extension, or lateral flexion.   PULM: No evidence of respiratory difficulty, symmetric chest rise.  GI:  Non-distended  BACK: Normal range of motion. No pain to palpation over the spinous processes. No pain to palpation over facet joints. There is no pain with palpation over the sacroiliac joints bilaterally. Straight leg raising in the supine position is negative to " radicular pain.   EXTREMITIES: No deformities, edema, or skin discoloration.   MUSCULOSKELETAL: Bilateral upper and lower extremity strength is normal and symmetric. No atrophy is noted.   Left Knee: TTP medial and lateral joint line. AROM and PROM of the hip, ankle, and foot intact without pain. Negative Anterior Drawer, Lachman's and Mg's tests. 5/5 motor strength TA/EHL/Gastroc/FHL. Capillary Refill <3s.   NEURO: Sensation is equal and appropriate bilaterally. Bilateral upper and lower extremity coordination and muscle stretch reflexes are physiologic and symmetric. Plantar response are downgoing.   GAIT: normal.      LABS:      IMAGING:    XR KNEE ORTHO BILAT WITH FLEXION     CLINICAL HISTORY:  RM3; Pain in right knee     TECHNIQUE:  AP standing of both knees, PA flexion standing views of both knees, and Merchant views of both knees were performed.  Lateral views of both knees were also performed.     COMPARISON:  Two thousand twenty-two left knee     FINDINGS:  8 mm subcortical cyst at posterior central patella articular surface right patellofemoral joint.  Mild moderate DJD patellofemoral joints, small mild size right suprapatellar joint effusion question.     Mild tricompartment DJD changes otherwise particularly medial compartments and left lateral compartment     Impression:     See results above.        Electronically signed by: Henry Zheng MD  Date:                                            12/20/2022  Time:                                           15:51      ASSESSMENT: 62 y.o. year old female with pain, consistent with:    Encounter Diagnoses   Name Primary?    Primary osteoarthritis of left knee Yes    Primary osteoarthritis of right knee     BMI 45.0-49.9, adult        DISCUSSION: Ms. Sahu comes back to us from Dr. Beard with L>R knee pain. Xrays show mild tricompartmental changes. Her BMI has also been increasing and is now 49. She is interested in weight loss but life events have  gotten in the way.     PLAN:  Schedule steroid injection left knee in clinic  Bariatric medicine consult placed today  3.   Continue with HEP  4.   Follow up in 4 weeks to reevaluate. Will consider synvisc next.     I would like to thank Wesly Beard MD for the opportunity to assist in the care of this patient. We had a very nice visit and I look forward to continuing her care. Please let me know if I can be of further assistance.     TEDDY RANGEL  01/18/2023

## 2023-02-02 ENCOUNTER — TELEPHONE (OUTPATIENT)
Dept: PAIN MEDICINE | Facility: CLINIC | Age: 63
End: 2023-02-02
Payer: COMMERCIAL

## 2023-02-02 DIAGNOSIS — M17.12 PRIMARY OSTEOARTHRITIS OF LEFT KNEE: Primary | ICD-10-CM

## 2023-02-02 NOTE — TELEPHONE ENCOUNTER
Staff reached out to pt in regarding her pt in tomorrow staff informed pt that her procedure is still pending and that we will reach out to her in the morning to inform her if it needs to be rescheduled pt verbalized understanding and thanked staff for updating her

## 2023-02-24 ENCOUNTER — TELEPHONE (OUTPATIENT)
Dept: PAIN MEDICINE | Facility: CLINIC | Age: 63
End: 2023-02-24
Payer: COMMERCIAL

## 2023-02-24 NOTE — TELEPHONE ENCOUNTER
Patient was contacted and informed that her knee injection was still pending approval from her insurance carrier.

## 2023-02-24 NOTE — TELEPHONE ENCOUNTER
----- Message from Felisa Mcduffie sent at 2/24/2023  2:43 PM CST -----  Regarding: Patient call back  Who called:TYSHAWN ROME [[id number]]    What is the request in detail: Patient is requesting a call back. She states she would like to know if her procedure has been approved and if she needs someone else to drive her. She would like to further discuss.   Please advise.    Can the clinic reply by [facility]? No    Best call back number: [phone]    Additional Information: N/A

## 2023-03-09 ENCOUNTER — TELEPHONE (OUTPATIENT)
Dept: PAIN MEDICINE | Facility: OTHER | Age: 63
End: 2023-03-09
Payer: COMMERCIAL

## 2023-03-10 ENCOUNTER — TELEPHONE (OUTPATIENT)
Dept: PAIN MEDICINE | Facility: CLINIC | Age: 63
End: 2023-03-10
Payer: COMMERCIAL

## 2023-03-10 NOTE — TELEPHONE ENCOUNTER
----- Message from Kermit South sent at 3/10/2023  1:02 PM CST -----  Regarding: Return omaira  Contact: TYSHAWN ROME [[id number]]  Type:  Patient Returning Call    Who Called: TYSHAWN ROME [[id number]]    Who Left Message for Patient:     Does the patient know what this is regarding?:yes    Would the patient rather a call back or a response via [facility]? call    Best Call Back Number: [phone]    Additional Information: Would like to speak with staff in regards to knowing if she still can come in today to get knee injection. Please advise

## 2023-03-28 ENCOUNTER — TELEPHONE (OUTPATIENT)
Dept: PAIN MEDICINE | Facility: CLINIC | Age: 63
End: 2023-03-28
Payer: COMMERCIAL

## 2023-03-29 ENCOUNTER — PROCEDURE VISIT (OUTPATIENT)
Dept: PAIN MEDICINE | Facility: CLINIC | Age: 63
End: 2023-03-29
Payer: COMMERCIAL

## 2023-03-29 VITALS
TEMPERATURE: 98 F | HEART RATE: 81 BPM | WEIGHT: 286 LBS | SYSTOLIC BLOOD PRESSURE: 171 MMHG | HEIGHT: 64 IN | DIASTOLIC BLOOD PRESSURE: 91 MMHG | BODY MASS INDEX: 48.83 KG/M2 | RESPIRATION RATE: 18 BRPM

## 2023-03-29 DIAGNOSIS — M17.12 PRIMARY OSTEOARTHRITIS OF LEFT KNEE: Primary | ICD-10-CM

## 2023-03-29 NOTE — PROCEDURES
Procedures    Patient Name: Brandi Sahu  MRN: [id number]    INFORMED CONSENT: The procedure, risks, benefits and options were discussed with patient. There are no contraindications to the procedure. The patient expressed understanding and agreed to proceed. The personnel performing the procedure was discussed. I verify that I personally obtained Brandi's consent prior to the start of the procedure and the signed consent can be found on the patient's chart.    Procedure Date: 03/29/2023    Anesthesia: Topical    Pre Procedure diagnosis: M17.9 Osteoarthritis of knee, unspecified  Post-Procedure diagnosis: same    Sedation: None    PROCEDURE: left KNEE INTRAARTICULAR steroid INJECTION    The patient was laid supine on the procedure table with the knee flexed to 30 degrees. The knee was prepped with chlorhexidine three times. After performing a time out the supra-patellar joint recess was identified on ultrasound. A 22G 1.5 inch needle was advanced through the skin while depositing 3mL of Lidocaine 1% along the intended needle tract. A 22G 4 inch needle was advanced under guidance to enter the recess. Aspiration was negative. 5 mL of 40 mg Depomedrol and .25% bupivicaine was injected without pain or resistance. The needle was removed intact and the site was dressed with a sterile bandage.     There were no complications. Patient tolerated procedure well.    Blood Loss: Nill  Specimen: None    Mark Garcia MD  03/29/2023    I personally participated in and supervised this procedure. I agree with the documentation presented above.     Cherelle Carpenter  03/29/2023

## 2023-07-07 ENCOUNTER — TELEPHONE (OUTPATIENT)
Dept: BARIATRICS | Facility: CLINIC | Age: 63
End: 2023-07-07
Payer: COMMERCIAL

## 2023-07-07 DIAGNOSIS — J45.31 MILD PERSISTENT ASTHMA WITH EXACERBATION: ICD-10-CM

## 2023-07-10 ENCOUNTER — TELEPHONE (OUTPATIENT)
Dept: BARIATRICS | Facility: CLINIC | Age: 63
End: 2023-07-10
Payer: COMMERCIAL

## 2023-07-10 ENCOUNTER — OFFICE VISIT (OUTPATIENT)
Dept: FAMILY MEDICINE | Facility: CLINIC | Age: 63
End: 2023-07-10
Payer: COMMERCIAL

## 2023-07-10 VITALS
HEART RATE: 100 BPM | OXYGEN SATURATION: 97 % | DIASTOLIC BLOOD PRESSURE: 84 MMHG | TEMPERATURE: 99 F | WEIGHT: 285.69 LBS | HEIGHT: 64 IN | BODY MASS INDEX: 48.77 KG/M2 | SYSTOLIC BLOOD PRESSURE: 142 MMHG

## 2023-07-10 DIAGNOSIS — Z12.31 BREAST CANCER SCREENING BY MAMMOGRAM: ICD-10-CM

## 2023-07-10 DIAGNOSIS — J03.90 EXUDATIVE TONSILLITIS: Primary | ICD-10-CM

## 2023-07-10 DIAGNOSIS — Z12.11 COLON CANCER SCREENING: ICD-10-CM

## 2023-07-10 DIAGNOSIS — I10 PRIMARY HYPERTENSION: ICD-10-CM

## 2023-07-10 PROCEDURE — 1159F MED LIST DOCD IN RCRD: CPT | Mod: CPTII,S$GLB,, | Performed by: NURSE PRACTITIONER

## 2023-07-10 PROCEDURE — 3008F BODY MASS INDEX DOCD: CPT | Mod: CPTII,S$GLB,, | Performed by: NURSE PRACTITIONER

## 2023-07-10 PROCEDURE — 99214 PR OFFICE/OUTPT VISIT, EST, LEVL IV, 30-39 MIN: ICD-10-PCS | Mod: S$GLB,,, | Performed by: NURSE PRACTITIONER

## 2023-07-10 PROCEDURE — 3008F PR BODY MASS INDEX (BMI) DOCUMENTED: ICD-10-PCS | Mod: CPTII,S$GLB,, | Performed by: NURSE PRACTITIONER

## 2023-07-10 PROCEDURE — 1160F PR REVIEW ALL MEDS BY PRESCRIBER/CLIN PHARMACIST DOCUMENTED: ICD-10-PCS | Mod: CPTII,S$GLB,, | Performed by: NURSE PRACTITIONER

## 2023-07-10 PROCEDURE — 99999 PR PBB SHADOW E&M-EST. PATIENT-LVL V: CPT | Mod: PBBFAC,,, | Performed by: NURSE PRACTITIONER

## 2023-07-10 PROCEDURE — 1159F PR MEDICATION LIST DOCUMENTED IN MEDICAL RECORD: ICD-10-PCS | Mod: CPTII,S$GLB,, | Performed by: NURSE PRACTITIONER

## 2023-07-10 PROCEDURE — 3079F DIAST BP 80-89 MM HG: CPT | Mod: CPTII,S$GLB,, | Performed by: NURSE PRACTITIONER

## 2023-07-10 PROCEDURE — 3077F SYST BP >= 140 MM HG: CPT | Mod: CPTII,S$GLB,, | Performed by: NURSE PRACTITIONER

## 2023-07-10 PROCEDURE — 99214 OFFICE O/P EST MOD 30 MIN: CPT | Mod: S$GLB,,, | Performed by: NURSE PRACTITIONER

## 2023-07-10 PROCEDURE — 3079F PR MOST RECENT DIASTOLIC BLOOD PRESSURE 80-89 MM HG: ICD-10-PCS | Mod: CPTII,S$GLB,, | Performed by: NURSE PRACTITIONER

## 2023-07-10 PROCEDURE — 1160F RVW MEDS BY RX/DR IN RCRD: CPT | Mod: CPTII,S$GLB,, | Performed by: NURSE PRACTITIONER

## 2023-07-10 PROCEDURE — 3077F PR MOST RECENT SYSTOLIC BLOOD PRESSURE >= 140 MM HG: ICD-10-PCS | Mod: CPTII,S$GLB,, | Performed by: NURSE PRACTITIONER

## 2023-07-10 PROCEDURE — 99999 PR PBB SHADOW E&M-EST. PATIENT-LVL V: ICD-10-PCS | Mod: PBBFAC,,, | Performed by: NURSE PRACTITIONER

## 2023-07-10 RX ORDER — BENZONATATE 200 MG/1
200 CAPSULE ORAL 3 TIMES DAILY PRN
Qty: 30 CAPSULE | Refills: 0 | Status: SHIPPED | OUTPATIENT
Start: 2023-07-10 | End: 2023-07-20

## 2023-07-10 RX ORDER — AMOXICILLIN AND CLAVULANATE POTASSIUM 875; 125 MG/1; MG/1
1 TABLET, FILM COATED ORAL EVERY 12 HOURS
Qty: 14 TABLET | Refills: 0 | OUTPATIENT
Start: 2023-07-10 | End: 2023-07-14

## 2023-07-10 RX ORDER — ALBUTEROL SULFATE 90 UG/1
AEROSOL, METERED RESPIRATORY (INHALATION)
Qty: 18 G | Refills: 0 | Status: SHIPPED | OUTPATIENT
Start: 2023-07-10 | End: 2023-07-10

## 2023-07-10 RX ORDER — ALBUTEROL SULFATE 90 UG/1
2 AEROSOL, METERED RESPIRATORY (INHALATION) EVERY 6 HOURS PRN
Qty: 18 G | Refills: 0 | Status: SHIPPED | OUTPATIENT
Start: 2023-07-10 | End: 2023-11-21 | Stop reason: SDUPTHER

## 2023-07-10 NOTE — PROGRESS NOTES
Routine Office Visit    Patient Name: Brandi Sahu    : 1960  MRN: [id number]    Chief Complaint:  sore throat, mouth lesions, cough    Subjective:  Brandi is a 62 y.o. female who presents today for:    Sore throat Throat, mouth lesion, cough - patient who is new to me with a history of obesity, asthma, hypertension reports today for evaluation.  For the last 5 days she has been dealing with a cough productive of clear mucus with a sore throat.  She also noticed as of late some mouth lesions underneath the tongue which not painful.  She is having some swollen glands in the left side.  She has a history of asthma and did have some wheezing this morning.  Denies any shortness of breath, fevers, chills, recent sick contacts.  She has taken Benadryl, Zyrtec and started Flonase yesterday for the symptoms all with only mild benefit.  She reports that her home blood pressures have been normal.  Whenever she checks it is usually less than 140/90.  Amlodipine and spironolactone together do sometimes cause muscle aches so she sometimes takes this on a p.r.n. basis.    Past Medical History  Past Medical History:   Diagnosis Date    Anxiety     Arthritis     knee    Asthma     Bursitis, shoulder     Colon polyps     Dental bridge present     upper right    Dental crown present     Depression     Hypertension     Pes planus     Wears glasses     for reading       Past Surgical History  Past Surgical History:   Procedure Laterality Date    arthroscopy ankle right      bilateral inguinal hernia repair      COLONOSCOPY N/A 2018    Procedure: COLONOSCOPY;  Surgeon: Christoph Nolasco MD;  Location: [facility];  Service: Endoscopy;  Laterality: N/A;    left torn rotator cuff repair  2014    MYOMECTOMY      rt salphigectomy      SYNOVECTOMY      TRANSFORAMINAL EPIDURAL INJECTION OF STEROID Right 2022    Procedure: LUMBAR TRANSFORAMINAL RIGHT L5/S1 DIRECT REFERRAL;  Surgeon: Cherelle Carpenter MD;  Location: [facility]IN  [facility];  Service: Pain Management;  Laterality: Right;    TRANSFORAMINAL EPIDURAL INJECTION OF STEROID Bilateral 1/12/2023    Procedure: INJECTION, STEROID, EPIDURAL, TRANSFORAMINAL APPROACH, BILATERAL L5-S1 CONTRAST DIRECT REF;  Surgeon: Lorraine Braxton MD;  Location: [facility] PAIN MGT;  Service: Pain Management;  Laterality: Bilateral;    UMBILICAL HERNIA REPAIR         Family History  Family History   Problem Relation Age of Onset    Cancer Mother         breast    Diabetes Mother     Hypertension Mother     Breast cancer Mother     Cancer Brother         liver cancer    Breast cancer Maternal Aunt        Social History  Social History     Socioeconomic History    Marital status:    Tobacco Use    Smoking status: Never    Smokeless tobacco: Never   Substance and Sexual Activity    Alcohol use: Yes     Comment: social     Drug use: No    Sexual activity: Yes     Comment: occasional       Current Medications  Current Outpatient Medications on File Prior to Visit   Medication Sig Dispense Refill    albuterol (PROVENTIL/VENTOLIN HFA) 90 mcg/actuation inhaler INHALE 2 PUFFS BY MOUTH EVERY 6 HOURS AS NEEDED FOR WHEEZING RESCUE 18 g 0    albuterol-ipratropium (DUO-NEB) 2.5 mg-0.5 mg/3 mL nebulizer solution Take 3 mLs by nebulization every 6 (six) hours as needed for Wheezing or Shortness of Breath. 90 each 5    amLODIPine (NORVASC) 10 MG tablet Take 1 tablet (10 mg total) by mouth once daily. 30 tablet 6    cetirizine (ZYRTEC) 10 MG tablet Take 10 mg by mouth once daily.      diclofenac (VOLTAREN) 75 MG EC tablet Take 1 tablet (75 mg total) by mouth 2 (two) times daily as needed. 60 tablet 3    diphenhydrAMINE (BENADRYL) 25 mg capsule Take 2 capsules (50 mg total) by mouth every 6 (six) hours as needed for Itching or Allergies. 30 capsule 0    ergocalciferol (ERGOCALCIFEROL) 50,000 unit Cap Take 1 capsule (50,000 Units total) by mouth every 7 days. 12 capsule 3    gabapentin (NEURONTIN) 300 MG capsule Take 1 capsule (300  "mg total) by mouth 3 (three) times daily. 90 capsule 0    spironolactone (ALDACTONE) 100 MG tablet Take 1 tablet (100 mg total) by mouth once daily. 30 tablet 5    EPINEPHrine (EPIPEN) 0.3 mg/0.3 mL AtIn Inject 0.3 mLs (0.3 mg total) into the muscle as needed. 2 each 0    [DISCONTINUED] albuterol (PROVENTIL HFA) 90 mcg/actuation inhaler Inhale 2 puffs into the lungs every 6 (six) hours as needed for Wheezing. Rescue 18 g 0     No current facility-administered medications on file prior to visit.       Allergies   Review of patient's allergies indicates:   Allergen Reactions    Lisinopril Hives, Itching, Rash, Shortness Of Breath and Swelling       Review of Systems (Pertinent positives)  Review of Systems   Constitutional:  Negative for chills and fever.   HENT:  Positive for congestion, ear pain and sore throat. Negative for ear discharge, hearing loss, nosebleeds, sinus pain and tinnitus.    Eyes: Negative.    Respiratory:  Positive for cough, sputum production and wheezing. Negative for hemoptysis, shortness of breath and stridor.    Cardiovascular: Negative.    Gastrointestinal: Negative.    Genitourinary: Negative.    Musculoskeletal: Negative.    Skin: Negative.    Neurological: Negative.    Endo/Heme/Allergies: Negative.    Psychiatric/Behavioral: Negative.       BP (!) 142/84 (BP Location: Left arm, Patient Position: Sitting, BP Method: Large (Manual))   Pulse 100   Temp 98.5 °F (36.9 °C) (Oral)   Ht 5' 4" (1.626 m)   Wt 129.6 kg (285 lb 11.5 oz)   LMP 07/18/2012   SpO2 97%   BMI 49.04 kg/m²     Physical Exam  Vitals reviewed.   Constitutional:       General: She is not in acute distress.     Appearance: Normal appearance. She is not ill-appearing, toxic-appearing or diaphoretic.   HENT:      Head: Normocephalic and atraumatic.      Right Ear: Tympanic membrane, ear canal and external ear normal.      Left Ear: Tympanic membrane, ear canal and external ear normal.      Nose: No congestion or " rhinorrhea.      Comments: Pale nasal mucosa noted no congestion or rhinorrhea     Mouth/Throat:      Mouth: Mucous membranes are dry.      Pharynx: Uvula midline. Posterior oropharyngeal erythema present.      Tonsils: 2+ on the right.      Comments: No obvious oral lesions noted; no significant lesions noted under tongue  Eyes:      General:         Right eye: No discharge.      Extraocular Movements: Extraocular movements intact.      Conjunctiva/sclera: Conjunctivae normal.   Cardiovascular:      Rate and Rhythm: Normal rate and regular rhythm.      Pulses: Normal pulses.      Heart sounds: Normal heart sounds.   Pulmonary:      Effort: Pulmonary effort is normal. No respiratory distress.      Breath sounds: Normal breath sounds. No wheezing.   Abdominal:      General: Bowel sounds are normal. There is no distension.      Palpations: Abdomen is soft.      Tenderness: There is no abdominal tenderness.   Lymphadenopathy:      Cervical: Cervical adenopathy present.      Right cervical: No superficial, deep or posterior cervical adenopathy.     Left cervical: Superficial cervical adenopathy present. No deep or posterior cervical adenopathy.   Skin:     General: Skin is warm and dry.      Capillary Refill: Capillary refill takes less than 2 seconds.   Neurological:      Mental Status: She is alert and oriented to person, place, and time.   Psychiatric:         Mood and Affect: Mood normal.         Behavior: Behavior normal.        Assessment/Plan:  Brandi Sahu is a 62 y.o. female who presents today for :    Brandi was seen today for cough, itching, generalized body aches and mouth lesions.    Diagnoses and all orders for this visit:    Exudative tonsillitis  -     amoxicillin-clavulanate 875-125mg (AUGMENTIN) 875-125 mg per tablet; Take 1 tablet by mouth every 12 (twelve) hours.  -     benzonatate (TESSALON) 200 MG capsule; Take 1 capsule (200 mg total) by mouth 3 (three) times daily as needed for Cough.    Throat  signs suggestive of bacterial infection.  Treat with Augmentin today.  P.r.n. Tessalon.  Continue Flonase for congestion and postnasal drip.  No need for steroids.  Breath sounds clear.    No obvious prominent oral lesions noted.  Recommended patient to monitor.  Follow-up for any worsening, especially if lymphadenopathy does not improve.    Primary hypertension    Mildly elevated today.  Recheck in 2 weeks.  Will schedule annual for patient next month with PCP.    Colon cancer screening  -     Ambulatory referral/consult to Endo Procedure ; Future    Due for colon cancer screening.  Referral placed.    Breast cancer screening by mammogram  -     Mammo Digital Screening Bilat w/ [facility]; Future    Breast cancer screening ordered for patient.        This office note has been dictated.  This dictation has been generated using M-Modal Fluency Direct dictation; some phonetic errors may occur.   My collaborating physician is Dr. Ed Turner.

## 2023-07-10 NOTE — TELEPHONE ENCOUNTER
Care Due:                  Date            Visit Type   Department     Provider  --------------------------------------------------------------------------------                                SAME DAY [facility]                              ESTABLISHED   MED/ INTERNAL  Last Visit: 11-      PATIENT      MED/ PEDS      Tano Valdez                              [facility]                              PRIMARY      MEDICINE/  Next Visit: 08-      CARE ([facility])   INTERNAL MED   Cherelle Sotver                                                            Last  Test          Frequency    Reason                     Performed    Due Date  --------------------------------------------------------------------------------    CMP.........  12 months..  spironolactone...........  08- 08-    [facility] Embedded Care Due Messages. Reference number: [id number].   7/10/2023 11:34:40 AM CDT

## 2023-07-10 NOTE — TELEPHONE ENCOUNTER
----- Message from Amalia Croft MA sent at 7/10/2023 11:08 AM CDT -----  Type: Patient Call Back    Who called:[employer]     What is the request in detail:albuterol (PROVENTIL/VENTOLIN HFA) 90 mcg/actuation inhaler.. not covered by the pt's. Ins. Needs to be changed to generic Proventil..     Can the clinic reply by [facility]?NO    Would the patient rather a call back or a response via [facility]? yes    Best call back number:[phone]

## 2023-07-13 PROCEDURE — 99284 EMERGENCY DEPT VISIT MOD MDM: CPT | Mod: ER

## 2023-07-14 ENCOUNTER — HOSPITAL ENCOUNTER (EMERGENCY)
Facility: HOSPITAL | Age: 63
Discharge: HOME | End: 2023-07-14
Attending: EMERGENCY MEDICINE
Payer: COMMERCIAL

## 2023-07-14 VITALS
RESPIRATION RATE: 18 BRPM | OXYGEN SATURATION: 100 % | HEART RATE: 82 BPM | TEMPERATURE: 98 F | BODY MASS INDEX: 45.8 KG/M2 | DIASTOLIC BLOOD PRESSURE: 76 MMHG | WEIGHT: 285 LBS | HEIGHT: 66 IN | SYSTOLIC BLOOD PRESSURE: 143 MMHG

## 2023-07-14 DIAGNOSIS — R21 RASH AND NONSPECIFIC SKIN ERUPTION: ICD-10-CM

## 2023-07-14 DIAGNOSIS — J30.2 SEASONAL ALLERGIC RHINITIS, UNSPECIFIED TRIGGER: Primary | ICD-10-CM

## 2023-07-14 DIAGNOSIS — J40 BRONCHITIS: ICD-10-CM

## 2023-07-14 PROCEDURE — 96372 THER/PROPH/DIAG INJ SC/IM: CPT | Performed by: EMERGENCY MEDICINE

## 2023-07-14 PROCEDURE — 25000003 PHARM REV CODE 250: Mod: ER | Performed by: EMERGENCY MEDICINE

## 2023-07-14 PROCEDURE — 63600175 PHARM REV CODE 636 W HCPCS: Mod: ER | Performed by: EMERGENCY MEDICINE

## 2023-07-14 RX ORDER — OLOPATADINE HYDROCHLORIDE 1 MG/ML
1 SOLUTION/ DROPS OPHTHALMIC 2 TIMES DAILY
Qty: 5 ML | Refills: 0 | Status: SHIPPED | OUTPATIENT
Start: 2023-07-14 | End: 2024-07-13

## 2023-07-14 RX ORDER — AZITHROMYCIN 250 MG/1
TABLET, FILM COATED ORAL
Qty: 6 TABLET | Refills: 0 | Status: SHIPPED | OUTPATIENT
Start: 2023-07-14 | End: 2023-07-19

## 2023-07-14 RX ORDER — MONTELUKAST SODIUM 10 MG/1
10 TABLET ORAL NIGHTLY
Qty: 30 TABLET | Refills: 0 | Status: SHIPPED | OUTPATIENT
Start: 2023-07-14 | End: 2023-08-13

## 2023-07-14 RX ORDER — LORATADINE 10 MG/1
10 TABLET ORAL EVERY MORNING
Qty: 60 TABLET | Refills: 0 | Status: SHIPPED | OUTPATIENT
Start: 2023-07-14 | End: 2024-07-13

## 2023-07-14 RX ORDER — IPRATROPIUM BROMIDE AND ALBUTEROL SULFATE 2.5; .5 MG/3ML; MG/3ML
3 SOLUTION RESPIRATORY (INHALATION) EVERY 6 HOURS PRN
Qty: 120 EACH | Refills: 0 | Status: SHIPPED | OUTPATIENT
Start: 2023-07-14 | End: 2024-07-13

## 2023-07-14 RX ORDER — PREDNISONE 20 MG/1
40 TABLET ORAL DAILY
Qty: 10 TABLET | Refills: 0 | Status: SHIPPED | OUTPATIENT
Start: 2023-07-14 | End: 2023-07-19

## 2023-07-14 RX ORDER — DEXAMETHASONE SODIUM PHOSPHATE 4 MG/ML
12 INJECTION, SOLUTION INTRA-ARTICULAR; INTRALESIONAL; INTRAMUSCULAR; INTRAVENOUS; SOFT TISSUE
Status: COMPLETED | OUTPATIENT
Start: 2023-07-14 | End: 2023-07-14

## 2023-07-14 RX ORDER — GUAIFENESIN 100 MG/5ML
100-200 SOLUTION ORAL EVERY 4 HOURS PRN
Qty: 60 ML | Refills: 0 | Status: SHIPPED | OUTPATIENT
Start: 2023-07-14 | End: 2023-07-24

## 2023-07-14 RX ORDER — GUAIFENESIN 100 MG/5ML
200 SOLUTION ORAL
Status: COMPLETED | OUTPATIENT
Start: 2023-07-14 | End: 2023-07-14

## 2023-07-14 RX ORDER — FLUTICASONE PROPIONATE 50 MCG
2 SPRAY, SUSPENSION (ML) NASAL DAILY
Qty: 16 G | Refills: 0 | Status: SHIPPED | OUTPATIENT
Start: 2023-07-14

## 2023-07-14 RX ORDER — DIPHENHYDRAMINE HYDROCHLORIDE 50 MG/ML
50 INJECTION INTRAMUSCULAR; INTRAVENOUS
Status: DISCONTINUED | OUTPATIENT
Start: 2023-07-14 | End: 2023-07-14

## 2023-07-14 RX ADMIN — DEXAMETHASONE SODIUM PHOSPHATE 12 MG: 4 INJECTION INTRA-ARTICULAR; INTRALESIONAL; INTRAMUSCULAR; INTRAVENOUS; SOFT TISSUE at 01:07

## 2023-07-14 RX ADMIN — GUAIFENESIN 200 MG: 200 SOLUTION ORAL at 01:07

## 2023-07-14 NOTE — ED PROVIDER NOTES
Encounter Date: 7/13/2023       History     Chief Complaint   Patient presents with    Rash     Complains of persistent rash and itching. Given benadryl and zyrtec by pcp without improvement. Currently taking abx but had rash/itching prior to starting abx.     Cough     Complains of cough and sinus congestion not improved with inhaler, abx and cough pill given by pcp.      62 y.o. female with multiple medical problems including hypertension, asthma, and others presents emergency department complaining of nasal congestion, rhinorrhea, dry cough, itchy/watery eyes that began over a week ago.  She reports being seen by her primary care doctor four days ago for these symptoms and also with diffuse body itching.  She reports being prescribed antibiotic for symptoms which she began taking on Monday.  Rash to bilateral arms and legs on Monday after 1st dose of antibiotic.  She reports continued use of the antibiotic over the last four days.  Denies fever, shortness of breath, chest pain, sore throat, vomiting, or vision disturbance    She also reports taking albuterol inhaler, Flonase, Mucinex, Benadryl and Zyrtec.  Denies tobacco use.    The history is provided by the patient.     Review of patient's allergies indicates:   Allergen Reactions    Lisinopril Hives, Itching, Rash, Shortness Of Breath and Swelling     Past Medical History:   Diagnosis Date    Anxiety     Arthritis     knee    Asthma     Bursitis, shoulder     Colon polyps     Dental bridge present     upper right    Dental crown present     Depression     Hypertension     Pes planus     Wears glasses     for reading     Past Surgical History:   Procedure Laterality Date    arthroscopy ankle right      bilateral inguinal hernia repair      COLONOSCOPY N/A 1/11/2018    Procedure: COLONOSCOPY;  Surgeon: Christoph Nolasco MD;  Location: [facility];  Service: Endoscopy;  Laterality: N/A;    left torn rotator cuff repair  09/2014    MYOMECTOMY      rt salphigectomy       SYNOVECTOMY      TRANSFORAMINAL EPIDURAL INJECTION OF STEROID Right 1/18/2022    Procedure: LUMBAR TRANSFORAMINAL RIGHT L5/S1 DIRECT REFERRAL;  Surgeon: Cherelle Carpenter MD;  Location: [facility] PAIN T;  Service: Pain Management;  Laterality: Right;    TRANSFORAMINAL EPIDURAL INJECTION OF STEROID Bilateral 1/12/2023    Procedure: INJECTION, STEROID, EPIDURAL, TRANSFORAMINAL APPROACH, BILATERAL L5-S1 CONTRAST DIRECT REF;  Surgeon: Lorraine Braxton MD;  Location: [facility] PAIN T;  Service: Pain Management;  Laterality: Bilateral;    UMBILICAL HERNIA REPAIR       Family History   Problem Relation Age of Onset    Cancer Mother         breast    Diabetes Mother     Hypertension Mother     Breast cancer Mother     Cancer Brother         liver cancer    Breast cancer Maternal Aunt      Social History     Tobacco Use    Smoking status: Never    Smokeless tobacco: Never   Substance Use Topics    Alcohol use: Yes     Comment: social     Drug use: No     Review of Systems   Constitutional:  Negative for appetite change and fever.   HENT:  Positive for congestion, rhinorrhea and sneezing. Negative for drooling, sore throat, trouble swallowing and voice change.    Eyes:  Positive for discharge (watery) and itching.   Respiratory:  Positive for cough and wheezing (audible).    Cardiovascular:  Negative for chest pain.   Gastrointestinal:  Negative for diarrhea and vomiting.   Skin:  Positive for rash.   All other systems reviewed and are negative.      Physical Exam     Initial Vitals [07/13/23 2256]   BP Pulse Resp Temp SpO2   (!) 172/87 91 20 99 °F (37.2 °C) 99 %      MAP       --         Physical Exam    Nursing note and vitals reviewed.  Constitutional: She appears well-developed and well-nourished. She is not diaphoretic. She is cooperative.  Non-toxic appearance. No distress.   Frequent coughing   HENT:   Head: Normocephalic and atraumatic.   Mouth/Throat: Uvula is midline, oropharynx is clear and moist and mucous membranes are  normal. No trismus in the jaw. No uvula swelling. No posterior oropharyngeal edema.   There is no facial swelling, tongue swelling, sublingual swelling, submandibular swelling, food phonation or pooling of oral secretions.   Eyes: Conjunctivae, EOM and lids are normal. Right eye exhibits discharge (watery). Right eye exhibits no chemosis. Left eye exhibits discharge (watery). Left eye exhibits no chemosis. Right conjunctiva is not injected. Left conjunctiva is not injected.   Neck: Phonation normal. Neck supple. No stridor present.   Normal range of motion.  Cardiovascular:  Normal rate and intact distal pulses.           Pulmonary/Chest: Effort normal and breath sounds normal. No accessory muscle usage or stridor. No tachypnea. No respiratory distress. She has no decreased breath sounds. She has no wheezes. She has no rhonchi. She has no rales.       Abdominal: She exhibits no distension. There is no abdominal tenderness.   Musculoskeletal:         General: No tenderness. Normal range of motion.      Cervical back: Normal range of motion and neck supple.     Neurological: She is alert and oriented to person, place, and time. She has normal strength. Gait normal. GCS eye subscore is 4. GCS verbal subscore is 5. GCS motor subscore is 6.   Skin: Skin is warm. Capillary refill takes less than 2 seconds. Rash noted. Rash is maculopapular (bilateral arms and legs).   Psychiatric: She has a normal mood and affect.         ED Course   Procedures  Labs Reviewed - No data to display       Imaging Results    None          Medications   dexAMETHasone injection 12 mg (12 mg Intramuscular Given 7/14/23 0150)   guaiFENesin 100 mg/5 ml syrup 200 mg (200 mg Oral Given 7/14/23 0151)     Medical Decision Making:   Differential Diagnosis:   Otitis media, influenza, sinusitis, bacterial pharyngitis, tonsillitis, bronchitis, rhinosinusitis, angioedema, contact dermatitis, atopic dermatitis, viral exanthem, phytophotodermatitis,  folliculitis, impetigo, drug eruption, formication, dermatosis, tinea corporis, SSS, TEN, others                              Clinical ImpressionDictation #1  MRN:[id number]  [facility]:[id number] :   Final diagnoses:  [J30.2] Seasonal allergic rhinitis, unspecified trigger (Primary)  [J40] Bronchitis  [R21] Rash and nonspecific skin eruption        ED Disposition Condition    Discharge Stable          ED Prescriptions       Medication Sig Dispense Start Date End Date Auth. Provider    albuterol-ipratropium (DUO-NEB) 2.5 mg-0.5 mg/3 mL nebulizer solution Take 3 mLs by nebulization every 6 (six) hours as needed for Wheezing or Shortness of Breath (cough). 120 each 2023 Lori South MD    fluticasone propionate (FLONASE) 50 mcg/actuation nasal spray 2 sprays (100 mcg total) by Each Nostril route once daily. 16 g 2023 -- Lori South MD    montelukast (SINGULAIR) 10 mg tablet Take 1 tablet (10 mg total) by mouth every evening. 30 tablet 2023 Lori South MD    loratadine (CLARITIN) 10 mg tablet Take 1 tablet (10 mg total) by mouth every morning. 60 tablet 2023 Lori South MD    guaiFENesin 100 mg/5 ml (ROBITUSSIN) 100 mg/5 mL syrup () Take 5-10 mLs (100-200 mg total) by mouth every 4 (four) hours as needed for Cough or Congestion. 60 mL 2023 Lori South MD    olopatadine (PATANOL) 0.1 % ophthalmic solution Place 1 drop into both eyes 2 (two) times daily. 5 mL 2023 Lori South MD    predniSONE (DELTASONE) 20 MG tablet () Take 2 tablets (40 mg total) by mouth once daily. for 5 days 10 tablet 2023 Lori South MD    azithromycin (Z-ILIANA) 250 MG tablet () Take 2 tablets by mouth on day 1; Take 1 tablet by mouth on days 2-5 6 tablet 2023 Lori South MD          Follow-up Information       Follow up With Specialties Details Why Contact Info    The nearest emergency department.  Go to  As  needed, If symptoms worsen     Cherelle Stover MD Family Medicine Call  As needed, for ongoing care [address] [facility]  [place] [id number]  [phone]               Lori South MD  07/31/23 0516

## 2023-07-24 ENCOUNTER — CLINICAL SUPPORT (OUTPATIENT)
Dept: FAMILY MEDICINE | Facility: CLINIC | Age: 63
End: 2023-07-24
Payer: COMMERCIAL

## 2023-07-24 VITALS — OXYGEN SATURATION: 99 % | DIASTOLIC BLOOD PRESSURE: 78 MMHG | HEART RATE: 88 BPM | SYSTOLIC BLOOD PRESSURE: 136 MMHG

## 2023-07-24 DIAGNOSIS — I10 PRIMARY HYPERTENSION: Primary | ICD-10-CM

## 2023-07-24 PROCEDURE — 99999 PR PBB SHADOW E&M-EST. PATIENT-LVL II: CPT | Mod: PBBFAC,,,

## 2023-07-24 PROCEDURE — 99999 PR PBB SHADOW E&M-EST. PATIENT-LVL II: ICD-10-PCS | Mod: PBBFAC,,,

## 2023-07-24 NOTE — PROGRESS NOTES
Brandi Sahu 62 y.o. female is here today for Blood Pressure check.   History of HTN no.    Review of patient's allergies indicates:   Allergen Reactions    Lisinopril Hives, Itching, Rash, Shortness Of Breath and Swelling     Creatinine   Date Value Ref Range Status   08/29/2022 0.7 0.5 - 1.4 mg/dL Final     Sodium   Date Value Ref Range Status   08/29/2022 144 136 - 145 mmol/L Final     Potassium   Date Value Ref Range Status   08/29/2022 4.5 3.5 - 5.1 mmol/L Final   ]  Patient denies taking blood pressure medications on a regular basis at the same time of the day.     Current Outpatient Medications:     albuterol (PROVENTIL HFA) 90 mcg/actuation inhaler, Inhale 2 puffs into the lungs every 6 (six) hours as needed for Wheezing. Rescue, Disp: 18 g, Rfl: 0    albuterol-ipratropium (DUO-NEB) 2.5 mg-0.5 mg/3 mL nebulizer solution, Take 3 mLs by nebulization every 6 (six) hours as needed for Wheezing or Shortness of Breath (cough)., Disp: 120 each, Rfl: 0    amLODIPine (NORVASC) 10 MG tablet, Take 1 tablet (10 mg total) by mouth once daily., Disp: 30 tablet, Rfl: 6    diclofenac (VOLTAREN) 75 MG EC tablet, Take 1 tablet (75 mg total) by mouth 2 (two) times daily as needed., Disp: 60 tablet, Rfl: 3    diphenhydrAMINE (BENADRYL) 25 mg capsule, Take 2 capsules (50 mg total) by mouth every 6 (six) hours as needed for Itching or Allergies., Disp: 30 capsule, Rfl: 0    EPINEPHrine (EPIPEN) 0.3 mg/0.3 mL AtIn, Inject 0.3 mLs (0.3 mg total) into the muscle as needed., Disp: 2 each, Rfl: 0    ergocalciferol (ERGOCALCIFEROL) 50,000 unit Cap, Take 1 capsule (50,000 Units total) by mouth every 7 days., Disp: 12 capsule, Rfl: 3    fluticasone propionate (FLONASE) 50 mcg/actuation nasal spray, 2 sprays (100 mcg total) by Each Nostril route once daily., Disp: 16 g, Rfl: 0    gabapentin (NEURONTIN) 300 MG capsule, Take 1 capsule (300 mg total) by mouth 3 (three) times daily., Disp: 90 capsule, Rfl: 0    guaiFENesin 100 mg/5 ml  (ROBITUSSIN) 100 mg/5 mL syrup, Take 5-10 mLs (100-200 mg total) by mouth every 4 (four) hours as needed for Cough or Congestion., Disp: 60 mL, Rfl: 0    loratadine (CLARITIN) 10 mg tablet, Take 1 tablet (10 mg total) by mouth every morning., Disp: 60 tablet, Rfl: 0    montelukast (SINGULAIR) 10 mg tablet, Take 1 tablet (10 mg total) by mouth every evening., Disp: 30 tablet, Rfl: 0    olopatadine (PATANOL) 0.1 % ophthalmic solution, Place 1 drop into both eyes 2 (two) times daily., Disp: 5 mL, Rfl: 0    spironolactone (ALDACTONE) 100 MG tablet, Take 1 tablet (100 mg total) by mouth once daily., Disp: 30 tablet, Rfl: 5  Does patient have record of home blood pressure readings no.   Last dose of blood pressure medication was taken at 7/22/23.  Patient is asymptomatic.   Complains of congestion. Patient scheduled a office visit for 7/26/23    BP: 136/78 , Pulse: 88 .  Np Jaquelin will be  notified.

## 2023-07-26 ENCOUNTER — HOSPITAL ENCOUNTER (OUTPATIENT)
Dept: RADIOLOGY | Facility: HOSPITAL | Age: 63
Discharge: HOME | End: 2023-07-26
Payer: COMMERCIAL

## 2023-07-26 ENCOUNTER — OFFICE VISIT (OUTPATIENT)
Dept: FAMILY MEDICINE | Facility: CLINIC | Age: 63
End: 2023-07-26
Payer: COMMERCIAL

## 2023-07-26 VITALS
BODY MASS INDEX: 46.33 KG/M2 | HEART RATE: 80 BPM | WEIGHT: 288.25 LBS | TEMPERATURE: 98 F | DIASTOLIC BLOOD PRESSURE: 70 MMHG | OXYGEN SATURATION: 95 % | SYSTOLIC BLOOD PRESSURE: 138 MMHG | HEIGHT: 66 IN

## 2023-07-26 DIAGNOSIS — J06.9 UPPER RESPIRATORY TRACT INFECTION, UNSPECIFIED TYPE: ICD-10-CM

## 2023-07-26 DIAGNOSIS — J45.20 MILD INTERMITTENT ASTHMA, UNSPECIFIED WHETHER COMPLICATED: Primary | Chronic | ICD-10-CM

## 2023-07-26 DIAGNOSIS — J45.20 MILD INTERMITTENT ASTHMA, UNSPECIFIED WHETHER COMPLICATED: ICD-10-CM

## 2023-07-26 DIAGNOSIS — I10 PRIMARY HYPERTENSION: ICD-10-CM

## 2023-07-26 LAB
CTP QC/QA: YES
POC MOLECULAR INFLUENZA A AGN: NEGATIVE
POC MOLECULAR INFLUENZA B AGN: NEGATIVE
SARS-COV-2 RNA RESP QL NAA+PROBE: NOT DETECTED

## 2023-07-26 PROCEDURE — 87635 SARS-COV-2 COVID-19 AMP PRB: CPT

## 2023-07-26 PROCEDURE — 3075F PR MOST RECENT SYSTOLIC BLOOD PRESS GE 130-139MM HG: ICD-10-PCS | Mod: CPTII,S$GLB,,

## 2023-07-26 PROCEDURE — 1159F PR MEDICATION LIST DOCUMENTED IN MEDICAL RECORD: ICD-10-PCS | Mod: CPTII,S$GLB,,

## 2023-07-26 PROCEDURE — 3078F PR MOST RECENT DIASTOLIC BLOOD PRESSURE < 80 MM HG: ICD-10-PCS | Mod: CPTII,S$GLB,,

## 2023-07-26 PROCEDURE — 3078F DIAST BP <80 MM HG: CPT | Mod: CPTII,S$GLB,,

## 2023-07-26 PROCEDURE — 99999 PR PBB SHADOW E&M-EST. PATIENT-LVL V: ICD-10-PCS | Mod: PBBFAC,,,

## 2023-07-26 PROCEDURE — 99999 PR PBB SHADOW E&M-EST. PATIENT-LVL V: CPT | Mod: PBBFAC,,,

## 2023-07-26 PROCEDURE — 1159F MED LIST DOCD IN RCRD: CPT | Mod: CPTII,S$GLB,,

## 2023-07-26 PROCEDURE — 87502 POCT INFLUENZA A/B MOLECULAR: ICD-10-PCS | Mod: QW,S$GLB,,

## 2023-07-26 PROCEDURE — 3075F SYST BP GE 130 - 139MM HG: CPT | Mod: CPTII,S$GLB,,

## 2023-07-26 PROCEDURE — 71046 XR CHEST PA AND LATERAL: ICD-10-PCS | Mod: 26,,, | Performed by: RADIOLOGY

## 2023-07-26 PROCEDURE — 99214 PR OFFICE/OUTPT VISIT, EST, LEVL IV, 30-39 MIN: ICD-10-PCS | Mod: S$GLB,,,

## 2023-07-26 PROCEDURE — 3008F BODY MASS INDEX DOCD: CPT | Mod: CPTII,S$GLB,,

## 2023-07-26 PROCEDURE — 71046 X-RAY EXAM CHEST 2 VIEWS: CPT | Mod: 26,,, | Performed by: RADIOLOGY

## 2023-07-26 PROCEDURE — 71046 X-RAY EXAM CHEST 2 VIEWS: CPT | Mod: TC,FY,PO

## 2023-07-26 PROCEDURE — 3008F PR BODY MASS INDEX (BMI) DOCUMENTED: ICD-10-PCS | Mod: CPTII,S$GLB,,

## 2023-07-26 PROCEDURE — 87502 INFLUENZA DNA AMP PROBE: CPT | Mod: QW,S$GLB,,

## 2023-07-26 PROCEDURE — 99214 OFFICE O/P EST MOD 30 MIN: CPT | Mod: S$GLB,,,

## 2023-07-26 RX ORDER — PROMETHAZINE HYDROCHLORIDE AND DEXTROMETHORPHAN HYDROBROMIDE 6.25; 15 MG/5ML; MG/5ML
5 SYRUP ORAL EVERY 6 HOURS PRN
Qty: 240 ML | Refills: 0 | Status: SHIPPED | OUTPATIENT
Start: 2023-07-26 | End: 2023-11-21

## 2023-07-26 NOTE — PROGRESS NOTES
HPI     Chief Complaint:  Chief Complaint   Patient presents with    Cough    Shortness of Breath    Chest Congestion       Brandi Sahu is a 62 y.o. female with multiple medical diagnoses as listed in the medical history and problem list that presents for cough.  Pt is new to me   HPI    Congestion and cough x 2 weeks with SOB, wheezing, congestion in chest. Denies sinus pain, runny nose, ear pain, or sore throat. Mostly dry, barking cough, nonproductive. No fever. Denies chest pain. Seen 7/10 by EDWARDO Hammer prescribed augmentin and tessalon but stopped augmentin because developed rash. Went to ED 7/14. Never swabbed for COVID. Using albuterol inhaler every 4 hours with relief. Duonebs bid and cough drops with relief. Taking robitussin without relief. Was given dexamethasone IM 12mg 7/14 without relief in ED. Completed zpak and prednisone x 5 days but no relief. Finished on 7/19. Also recent travel over past week.     Other concerns below  Assessment & Plan       Problem List Items Addressed This Visit          Pulmonary    Asthma - Primary (Chronic)    Current Assessment & Plan     Duo-neb/albuterol. Singulair    Could be r/t to mild exacerbation. No acute concerns. Xray today.           Relevant Medications    promethazine-dextromethorphan (PROMETHAZINE-DM) 6.25-15 mg/5 mL Syrp    Other Relevant Orders    X-Ray Chest PA And Lateral    COVID-19 Routine Screening    POCT Influenza A/B Molecular       Cardiac/Vascular    Hypertension    Current Assessment & Plan     The current medical regimen is effective;  continue present plan and medications.            Other Visit Diagnoses       Upper respiratory tract infection, unspecified type      Xray today. Continue albuterol inhaler and treatments for wheezing and SOB.     Start phenergan-DM to help with cough. Read precautions.     I encourage rest, fluids, cough suppressants, lozenges, and Chloraseptic spray .     Relevant Medications     promethazine-dextromethorphan (PROMETHAZINE-DM) 6.25-15 mg/5 mL Syrp    Other Relevant Orders    X-Ray Chest PA And Lateral    COVID-19 Routine Screening    POCT Influenza A/B Molecular            --------------------------------------------      Health Maintenance:  Health Maintenance         Date Due Completion Date    COVID-19 Vaccine (4 - Moderna series) 01/24/2022 11/29/2021    Pneumococcal Vaccines (Age 0-64) (2 - PCV) 07/19/2022 7/19/2021    Colorectal Cancer Screening 01/11/2023 1/11/2018    Mammogram 08/22/2023 8/22/2022    Influenza Vaccine (1) 09/01/2023 ---    Hemoglobin A1c (Diabetic Prevention Screening) 08/29/2025 8/29/2022    TETANUS VACCINE 05/19/2026 5/19/2016    Cervical Cancer Screening 07/19/2026 7/19/2021    Lipid Panel 08/29/2027 8/29/2022            Follow Up:  Follow up in about 2 weeks (around 8/9/2023), or if symptoms worsen or fail to improve.    Discussed DDx, condition, and treatment.   Education sent to patient portal/included in after visit summary.  ED precautions given.   Notify provider if symptoms do not resolve or increase in severity.   Patient verbalizes understanding and agrees with plan of care.    Exam     Review of Systems:  (as noted above)  Review of Systems   Constitutional:  Negative for activity change, appetite change, chills, diaphoresis, fatigue, fever and unexpected weight change.   HENT:  Negative for congestion, dental problem, drooling, ear pain, nosebleeds, postnasal drip, rhinorrhea, sinus pressure, sinus pain, sneezing and sore throat.    Respiratory:  Positive for cough, chest tightness, shortness of breath and wheezing. Negative for apnea, choking and stridor.    Cardiovascular:  Negative for chest pain, palpitations and leg swelling.   Musculoskeletal:  Negative for neck pain and neck stiffness.   Neurological:  Negative for dizziness, speech difficulty, weakness, numbness and headaches.   Hematological:  Negative for adenopathy. Does not bruise/bleed  "easily.     Physical Exam:   Physical Exam  Constitutional:       General: She is not in acute distress.     Appearance: Normal appearance. She is obese. She is not toxic-appearing.   HENT:      Head: Normocephalic and atraumatic.      Nose: Nose normal. No congestion.      Mouth/Throat:      Mouth: Mucous membranes are moist.      Pharynx: No oropharyngeal exudate or posterior oropharyngeal erythema.   Eyes:      Conjunctiva/sclera: Conjunctivae normal.   Cardiovascular:      Rate and Rhythm: Normal rate and regular rhythm.      Pulses: Normal pulses.      Heart sounds: Normal heart sounds.   Pulmonary:      Effort: Pulmonary effort is normal. No tachypnea, bradypnea, accessory muscle usage or respiratory distress.      Breath sounds: Transmitted upper airway sounds present. No stridor. Examination of the right-upper field reveals wheezing. Examination of the left-upper field reveals wheezing. Decreased breath sounds and wheezing present. No rales.   Chest:      Chest wall: No tenderness.   Musculoskeletal:      Cervical back: Normal range of motion. No rigidity.   Lymphadenopathy:      Cervical: No cervical adenopathy.   Skin:     Capillary Refill: Capillary refill takes less than 2 seconds.   Neurological:      General: No focal deficit present.      Mental Status: She is alert and oriented to person, place, and time.   Psychiatric:         Mood and Affect: Mood normal.     Vitals:    07/26/23 1137   BP: 138/70   Pulse: 80   Temp: 98.2 °F (36.8 °C)   TempSrc: Oral   SpO2: 95%   Weight: 130.7 kg (288 lb 4 oz)   Height: 5' 6" (1.676 m)      Body mass index is 46.52 kg/m².        History     Past Medical History:  Past Medical History:   Diagnosis Date    Anxiety     Arthritis     knee    Asthma     Bursitis, shoulder     Colon polyps     Dental bridge present     upper right    Dental crown present     Depression     Hypertension     Pes planus     Wears glasses     for reading       Past Surgical History:  Past " Surgical History:   Procedure Laterality Date    arthroscopy ankle right      bilateral inguinal hernia repair      COLONOSCOPY N/A 1/11/2018    Procedure: COLONOSCOPY;  Surgeon: Christoph Nolasco MD;  Location: [facility] ENDO;  Service: Endoscopy;  Laterality: N/A;    left torn rotator cuff repair  09/2014    MYOMECTOMY      rt salphigectomy      SYNOVECTOMY      TRANSFORAMINAL EPIDURAL INJECTION OF STEROID Right 1/18/2022    Procedure: LUMBAR TRANSFORAMINAL RIGHT L5/S1 DIRECT REFERRAL;  Surgeon: Cherelle Carpenter MD;  Location: [facility] PAIN MGT;  Service: Pain Management;  Laterality: Right;    TRANSFORAMINAL EPIDURAL INJECTION OF STEROID Bilateral 1/12/2023    Procedure: INJECTION, STEROID, EPIDURAL, TRANSFORAMINAL APPROACH, BILATERAL L5-S1 CONTRAST DIRECT REF;  Surgeon: Lorraine Braxton MD;  Location: [facility] PAIN MGT;  Service: Pain Management;  Laterality: Bilateral;    UMBILICAL HERNIA REPAIR         Social History:  Social History     Socioeconomic History    Marital status:    Tobacco Use    Smoking status: Never    Smokeless tobacco: Never   Substance and Sexual Activity    Alcohol use: Yes     Comment: social     Drug use: No    Sexual activity: Yes     Comment: occasional       Family History:  Family History   Problem Relation Age of Onset    Cancer Mother         breast    Diabetes Mother     Hypertension Mother     Breast cancer Mother     Cancer Brother         liver cancer    Breast cancer Maternal Aunt        Allergies and Medications: (updated and reviewed)  Review of patient's allergies indicates:   Allergen Reactions    Lisinopril Hives, Itching, Rash, Shortness Of Breath and Swelling     Current Outpatient Medications   Medication Sig Dispense Refill    albuterol (PROVENTIL HFA) 90 mcg/actuation inhaler Inhale 2 puffs into the lungs every 6 (six) hours as needed for Wheezing. Rescue 18 g 0    albuterol-ipratropium (DUO-NEB) 2.5 mg-0.5 mg/3 mL nebulizer solution Take 3 mLs by nebulization every 6  (six) hours as needed for Wheezing or Shortness of Breath (cough). 120 each 0    amLODIPine (NORVASC) 10 MG tablet Take 1 tablet (10 mg total) by mouth once daily. 30 tablet 6    diclofenac (VOLTAREN) 75 MG EC tablet Take 1 tablet (75 mg total) by mouth 2 (two) times daily as needed. 60 tablet 3    diphenhydrAMINE (BENADRYL) 25 mg capsule Take 2 capsules (50 mg total) by mouth every 6 (six) hours as needed for Itching or Allergies. 30 capsule 0    ergocalciferol (ERGOCALCIFEROL) 50,000 unit Cap Take 1 capsule (50,000 Units total) by mouth every 7 days. 12 capsule 3    fluticasone propionate (FLONASE) 50 mcg/actuation nasal spray 2 sprays (100 mcg total) by Each Nostril route once daily. 16 g 0    gabapentin (NEURONTIN) 300 MG capsule Take 1 capsule (300 mg total) by mouth 3 (three) times daily. 90 capsule 0    loratadine (CLARITIN) 10 mg tablet Take 1 tablet (10 mg total) by mouth every morning. 60 tablet 0    montelukast (SINGULAIR) 10 mg tablet Take 1 tablet (10 mg total) by mouth every evening. 30 tablet 0    olopatadine (PATANOL) 0.1 % ophthalmic solution Place 1 drop into both eyes 2 (two) times daily. 5 mL 0    spironolactone (ALDACTONE) 100 MG tablet Take 1 tablet (100 mg total) by mouth once daily. 30 tablet 5    EPINEPHrine (EPIPEN) 0.3 mg/0.3 mL AtIn Inject 0.3 mLs (0.3 mg total) into the muscle as needed. 2 each 0    promethazine-dextromethorphan (PROMETHAZINE-DM) 6.25-15 mg/5 mL Syrp Take 5 mLs by mouth every 6 (six) hours as needed (cough). 240 mL 0     No current facility-administered medications for this visit.       Patient Care Team:  Cherelle Stover MD as PCP - General (Internal Medicine)  Issac Sparks MA as Care Coordinator         - The patient is given an After Visit Summary that lists all medications with directions, allergies, education, orders placed during this encounter and follow-up instructions.      - I have reviewed the patient's medical information including past medical, family,  and social history sections including the medications and allergies.      - We discussed the patient's current medications.     This note was created by combination of typed  and MModal dictation.  Transcription errors may be present.  If there are any questions, please contact me.

## 2023-07-26 NOTE — ASSESSMENT & PLAN NOTE
Duo-neb/albuterol. Singulair    Could be r/t to mild exacerbation. No acute concerns. Xray today.

## 2023-07-26 NOTE — PATIENT INSTRUCTIONS
Xray today. Continue albuterol inhaler and treatments for wheezing and SOB.     Start phenergan-DM to help with cough. Read precautions.     I encourage rest, fluids, cough suppressants, lozenges, and Chloraseptic spray .

## 2023-08-02 ENCOUNTER — TELEPHONE (OUTPATIENT)
Dept: ENDOSCOPY | Facility: HOSPITAL | Age: 63
End: 2023-08-02

## 2023-08-02 ENCOUNTER — CLINICAL SUPPORT (OUTPATIENT)
Dept: ENDOSCOPY | Facility: HOSPITAL | Age: 63
End: 2023-08-02
Payer: COMMERCIAL

## 2023-08-02 VITALS — WEIGHT: 280 LBS | HEIGHT: 66 IN | BODY MASS INDEX: 45 KG/M2

## 2023-08-02 DIAGNOSIS — Z12.11 COLON CANCER SCREENING: ICD-10-CM

## 2023-08-02 NOTE — PLAN OF CARE
Spoke to patient to schedule procedure(s) Colonoscopy       Physician to perform procedure(s) Dr. LUIS FELIPE Tellez  Date of Procedure (s) 10/30/23  Arrival Time 7:00 AM  Time of Procedure(s) 8:00 AM   Location of Procedure(s) [address]  Type of Rx Prep sent to patient: PEG  Instructions provided to patient via [facility]    Patient was informed on the following information and verbalized understanding. Screening questionnaire reviewed with patient and complete. If procedure requires anesthesia, a responsible adult needs to be present to accompany the patient home, patient cannot drive after receiving anesthesia. Appointment details are tentative, especially check-in time. Patient will receive a prep-op call 4 days prior to confirm check-in time for procedure. If applicable the patient should contact their pharmacy to verify Rx for procedure prep is ready for pick-up. Patient was advised to call the scheduling department at [phone] if pharmacy states no Rx is available. Patient was advised to call the endoscopy scheduling department if any questions or concerns arise.      [facility] Scheduling Department

## 2023-08-02 NOTE — TELEPHONE ENCOUNTER
Spoke to patient to schedule procedure(s) Colonoscopy       Physician to perform procedure(s) Dr. LUIS FELIPE Tellez  Date of Procedure (s) 10/30/23  Arrival Time 7:00 AM  Time of Procedure(s) 8:00 AM   Location of Procedure(s) [address]  Type of Rx Prep sent to patient: PEG  Instructions provided to patient via [facility]    Patient was informed on the following information and verbalized understanding. Screening questionnaire reviewed with patient and complete. If procedure requires anesthesia, a responsible adult needs to be present to accompany the patient home, patient cannot drive after receiving anesthesia. Appointment details are tentative, especially check-in time. Patient will receive a prep-op call 4 days prior to confirm check-in time for procedure. If applicable the patient should contact their pharmacy to verify Rx for procedure prep is ready for pick-up. Patient was advised to call the scheduling department at [phone] if pharmacy states no Rx is available. Patient was advised to call the endoscopy scheduling department if any questions or concerns arise.      [facility] Scheduling Department

## 2023-08-24 ENCOUNTER — HOSPITAL ENCOUNTER (OUTPATIENT)
Dept: RADIOLOGY | Facility: HOSPITAL | Age: 63
Discharge: HOME | End: 2023-08-24
Attending: NURSE PRACTITIONER
Payer: COMMERCIAL

## 2023-08-24 DIAGNOSIS — Z12.31 BREAST CANCER SCREENING BY MAMMOGRAM: ICD-10-CM

## 2023-08-24 PROCEDURE — 77067 SCR MAMMO BI INCL CAD: CPT | Mod: TC

## 2023-08-24 PROCEDURE — 77063 MAMMO DIGITAL SCREENING BILAT WITH TOMO: ICD-10-PCS | Mod: 26,,, | Performed by: RADIOLOGY

## 2023-08-24 PROCEDURE — 77067 SCR MAMMO BI INCL CAD: CPT | Mod: 26,,, | Performed by: RADIOLOGY

## 2023-08-24 PROCEDURE — 77063 BREAST TOMOSYNTHESIS BI: CPT | Mod: 26,,, | Performed by: RADIOLOGY

## 2023-08-24 PROCEDURE — 77067 MAMMO DIGITAL SCREENING BILAT WITH TOMO: ICD-10-PCS | Mod: 26,,, | Performed by: RADIOLOGY

## 2023-09-06 DIAGNOSIS — I10 HYPERTENSION: ICD-10-CM

## 2023-09-26 ENCOUNTER — PATIENT MESSAGE (OUTPATIENT)
Dept: ENDOSCOPY | Facility: HOSPITAL | Age: 63
End: 2023-09-26
Payer: COMMERCIAL

## 2023-09-26 ENCOUNTER — TELEPHONE (OUTPATIENT)
Dept: ENDOSCOPY | Facility: HOSPITAL | Age: 63
End: 2023-09-26
Payer: COMMERCIAL

## 2023-09-26 NOTE — TELEPHONE ENCOUNTER
Called patient to reschedule Colonoscopy 10/30. No answer. Voicemail message left. Message sent to portal.

## 2023-09-26 NOTE — TELEPHONE ENCOUNTER
----- Message from Myriam Workman MA sent at 9/22/2023  3:23 PM CDT -----    ----- Message -----  From: Keiko Corcoran  Sent: 9/22/2023   3:08 PM CDT  To: Geoff Stoner Staff    Type: Patient Call Back    Who called: self     What is the request in detail: patient would like to reschedule her colonoscopy. Please call     Can the clinic reply by [facility]? no    Would the patient rather a call back or a response via [facility]?  call    Best call back number: .[phone] (home)      Additional Information:

## 2023-09-26 NOTE — PROGRESS NOTES
Subjective     Patient ID: Brandi Sahu is a 62 y.o. female.    Chief Complaint: Consult    CC:    New pt to me, referred by Porter Marc MD  [address]  [place]  [place] [address] , with Patient Active Problem List:     Hypertension     Depression     Peroneal tendinitis     Knee pain, left     Asthma     Complete rupture of rotator cuff     Morbid obesity with BMI of 40.0-44.9, adult     Dermatitis     Supraspinatus tendon tear     Allergic rhinitis     Patellofemoral pain syndrome of left knee     Chronic pain of left knee     Weakness of left lower extremity     Gait difficulty     Decreased range of motion of left knee     Primary osteoarthritis of left knee     Primary osteoarthritis of right knee       Lab Results       Component                Value               Date                       HGBA1C                   5.4                 08/29/2022                 HGBA1C                   5.6                 06/25/2021                 HGBA1C                   5.5                 06/23/2020            Lab Results       Component                Value               Date                       LDLCALC                  96.4                08/29/2022                 CREATININE               0.7                 08/29/2022                Current attempts at weight loss:  Has been trying to watch what she eats and walking around in short spurts. Limited by back and knee.     Previous diet attempts: [facility]- Did not lose much. Detoxes.     History of medication for loss: phentermine on and off last filled 9/5/23, and in August  from [facility].   checked today     Heaviest weight: 285#    Lightest weight: 120#    Goal weight: 160#      Last eye exam:    last year. No glaucoma per pt.       Provider:    Typical eating patterns: Works for [employer] as [employer]. Mostly overnight, some overtimes. Lives alone. Cooks sometimes.   Breakfast: yogurt and turkey jerky and 2 cheese sticks. Egg. Egg salad  sandwich    lunch: may skip. Belvita cookie/bar. Granola bars.     dinner: greens or cabbage, pasta, baked chicken from hot bar.     snacks:  Belvita cookie/bar. Granola bars. Turkey sticks.     Beverages: water, Crystal Light. ETOH-1-2 drinks months.     Willingness to change: 8/10    Cardiac studies:   Sinus rhythm with Premature atrial complexes with Aberrant conduction   Otherwise normal ECG   When compared with ECG of 20-JUN-2021 05:54,   Vent. rate has decreased BY  42 BPM     BMR:[id number]    PBF:  56.3%      Review of Systems       Objective   /80 (BP Location: Right arm, Patient Position: Sitting)   Pulse 62   Wt 123.6 kg (272 lb 8 oz)   LMP 07/18/2012   SpO2 96%   BMI 43.98 kg/m²     Physical Exam  Vitals reviewed.   Constitutional:       General: She is not in acute distress.     Appearance: She is well-developed.      Comments: With severe obesity     HENT:      Head: Normocephalic and atraumatic.   Eyes:      General: No scleral icterus.     Pupils: Pupils are equal, round, and reactive to light.   Neck:      Thyroid: No thyromegaly.   Cardiovascular:      Rate and Rhythm: Normal rate.      Heart sounds: Normal heart sounds. No murmur heard.     No friction rub. No gallop.   Pulmonary:      Effort: Pulmonary effort is normal. No respiratory distress.      Breath sounds: Normal breath sounds. No wheezing.   Abdominal:      General: Bowel sounds are normal. There is no distension.      Palpations: Abdomen is soft.      Tenderness: There is no abdominal tenderness.   Musculoskeletal:         General: Normal range of motion.      Cervical back: Normal range of motion and neck supple.      Right lower leg: Edema present.      Left lower leg: Edema present.   Skin:     General: Skin is warm and dry.      Findings: No erythema.   Neurological:      Mental Status: She is alert and oriented to person, place, and time.      Cranial Nerves: No cranial nerve deficit.      Comments: + antalgic gate    Psychiatric:         Behavior: Behavior normal.         Judgment: Judgment normal.            Assessment and Plan     1. Class 3 severe obesity due to excess calories with serious comorbidity and body mass index (BMI) of 40.0 to 44.9 in adult    2. Primary hypertension    3. Primary osteoarthritis of both knees    Other orders  -     topiramate (TOPAMAX) 25 MG tablet; Take 1 tablet (25 mg total) by mouth 2 (two) times daily.  Dispense: 60 tablet; Refill: 1        1. Class 3 severe obesity due to excess calories with serious comorbidity and body mass index (BMI) of 40.0 to 44.9 in adult  Patient was informed that topiramate is used for migraine prevention and seizures. Weight loss is a common side effect that is well documented. S/he understands this. S/he was informed of the potential side effects such as serious and possibly fatal rash in which case the medication should be discontinued immediately. Paresthesias, forgetfulness, fatigue, kidney stones, GI symptoms, and changes in lab values such as electrolytes, blood counts and kidney function.    Start topiramate  in the evening for 1 week, then morning and evening.       Try to keep about 2/3 calorie intake during the daylight hours, and no more than 1/3 of calorie intake after dark.       Increase low impact activity as tolerated.  Avoid high impact activity, very heavy lifting or other exercise regimens that may cause discomfort.       1000 elvin pb meal planner, meal ideas and seated exercises given.     2. Primary hypertension  Expect improvement with weight loss. Avoid stimulant anorectics     3. Primary osteoarthritis of both knees  Optimize BMI     Increase low impact activity as tolerated.  Avoid high impact activity, very heavy lifting or other exercise regimens that may cause discomfort.              No follow-ups on file.

## 2023-09-27 ENCOUNTER — OFFICE VISIT (OUTPATIENT)
Dept: BARIATRICS | Facility: CLINIC | Age: 63
End: 2023-09-27
Payer: COMMERCIAL

## 2023-09-27 VITALS
WEIGHT: 272.5 LBS | DIASTOLIC BLOOD PRESSURE: 80 MMHG | SYSTOLIC BLOOD PRESSURE: 138 MMHG | HEART RATE: 62 BPM | OXYGEN SATURATION: 96 % | BODY MASS INDEX: 43.98 KG/M2

## 2023-09-27 DIAGNOSIS — I10 PRIMARY HYPERTENSION: ICD-10-CM

## 2023-09-27 DIAGNOSIS — M17.0 PRIMARY OSTEOARTHRITIS OF BOTH KNEES: ICD-10-CM

## 2023-09-27 DIAGNOSIS — E66.01 CLASS 3 SEVERE OBESITY DUE TO EXCESS CALORIES WITH SERIOUS COMORBIDITY AND BODY MASS INDEX (BMI) OF 40.0 TO 44.9 IN ADULT: Primary | ICD-10-CM

## 2023-09-27 PROCEDURE — 99999 PR PBB SHADOW E&M-EST. PATIENT-LVL V: CPT | Mod: PBBFAC,,, | Performed by: INTERNAL MEDICINE

## 2023-09-27 PROCEDURE — 1159F PR MEDICATION LIST DOCUMENTED IN MEDICAL RECORD: ICD-10-PCS | Mod: CPTII,S$GLB,, | Performed by: INTERNAL MEDICINE

## 2023-09-27 PROCEDURE — 3008F PR BODY MASS INDEX (BMI) DOCUMENTED: ICD-10-PCS | Mod: CPTII,S$GLB,, | Performed by: INTERNAL MEDICINE

## 2023-09-27 PROCEDURE — 1160F PR REVIEW ALL MEDS BY PRESCRIBER/CLIN PHARMACIST DOCUMENTED: ICD-10-PCS | Mod: CPTII,S$GLB,, | Performed by: INTERNAL MEDICINE

## 2023-09-27 PROCEDURE — 3079F PR MOST RECENT DIASTOLIC BLOOD PRESSURE 80-89 MM HG: ICD-10-PCS | Mod: CPTII,S$GLB,, | Performed by: INTERNAL MEDICINE

## 2023-09-27 PROCEDURE — 99215 OFFICE O/P EST HI 40 MIN: CPT | Mod: S$GLB,,, | Performed by: INTERNAL MEDICINE

## 2023-09-27 PROCEDURE — 3079F DIAST BP 80-89 MM HG: CPT | Mod: CPTII,S$GLB,, | Performed by: INTERNAL MEDICINE

## 2023-09-27 PROCEDURE — 99215 PR OFFICE/OUTPT VISIT, EST, LEVL V, 40-54 MIN: ICD-10-PCS | Mod: S$GLB,,, | Performed by: INTERNAL MEDICINE

## 2023-09-27 PROCEDURE — 1160F RVW MEDS BY RX/DR IN RCRD: CPT | Mod: CPTII,S$GLB,, | Performed by: INTERNAL MEDICINE

## 2023-09-27 PROCEDURE — 1159F MED LIST DOCD IN RCRD: CPT | Mod: CPTII,S$GLB,, | Performed by: INTERNAL MEDICINE

## 2023-09-27 PROCEDURE — 3075F SYST BP GE 130 - 139MM HG: CPT | Mod: CPTII,S$GLB,, | Performed by: INTERNAL MEDICINE

## 2023-09-27 PROCEDURE — 99999 PR PBB SHADOW E&M-EST. PATIENT-LVL V: ICD-10-PCS | Mod: PBBFAC,,, | Performed by: INTERNAL MEDICINE

## 2023-09-27 PROCEDURE — 3075F PR MOST RECENT SYSTOLIC BLOOD PRESS GE 130-139MM HG: ICD-10-PCS | Mod: CPTII,S$GLB,, | Performed by: INTERNAL MEDICINE

## 2023-09-27 PROCEDURE — 3008F BODY MASS INDEX DOCD: CPT | Mod: CPTII,S$GLB,, | Performed by: INTERNAL MEDICINE

## 2023-09-27 RX ORDER — TOPIRAMATE 25 MG/1
25 TABLET ORAL 2 TIMES DAILY
Qty: 60 TABLET | Refills: 1 | Status: SHIPPED | OUTPATIENT
Start: 2023-09-27 | End: 2023-11-21

## 2023-09-27 NOTE — PATIENT INSTRUCTIONS
"PLEASE ARRIVE 15-20 min EARLY FOR YOUR APPOINTMENTS. This allows us to perform all of the services for your appointment in a timely fashion. Arriving late for your appointment not only decreases the time available for the doctor to see you, it also leads to delays for every other patient scheduled after you. Patients arriving after their appointment time may be asked to wait until all patients that arrived on time are seen, and/or there is another available slot for you to be worked in. When this is not possible, you may be asked to reschedule.   Thank you for your consideration in this matter.     Patient was informed that topiramate is used for migraine prevention and seizures. Weight loss is a common side effect that is well documented. S/he understands this. S/he was informed of the potential side effects such as serious and possibly fatal rash in which case the medication should be discontinued immediately. Paresthesias, forgetfulness, fatigue, kidney stones, GI symptoms, and changes in lab values such as electrolytes, blood counts and kidney function.    Start topiramate  in the evening for 1 week, then morning and evening.       Try to keep about 2/3 calorie intake during the daylight hours, and no more than 1/3 of calorie intake after dark.       Increase low impact activity as tolerated.  Avoid high impact activity, very heavy lifting or other exercise regimens that may cause discomfort.               1000 calorie  Meal Plan  STARCHES 80 CALORIES PER SERVING 15g CARB, 3g PROTEIN, 1g FAT  Servings per day   bread   tortilla   crackers   cooked cereals   dry cereals   pasta   rice   corn   popcorn   potato (small)   potato, mashed   sweet potato   squash, winter   cooked beans, peas, lentils (add 1 meat exchange)   1 slice   1 (6")   4-6 (3/4 oz)   1/2 cup   3/4 cup   1/2 cup   1/3 cup  1/2 cup   1 small light bag  1 (3 oz)   1/2 cup   1/3 cup   1 cup   1/2 cup Most starches are a good source of B vitamins "   Choose whole grain foods such as 100% whole wheat bread and flour, brown rice, tortillas, etc. for nutrients and fiber.   Combine beans (starch & meat) with grains (starch) for their complimentary proteins and fiber   Combine grains (starch) with milk (milk) or cheese (meat) to compliment proteins     2   FRUIT 60 CALORIES PER SERVING 15g CARB    fresh fruit   banana  melon (cubes)   berries  canned fruit   dried fruit    1 small   1/2  ½ cup   ¾ cup  ½ cup   ¼ cup    Choose whole fruits for fiber   No fruit juices   2   DAIRY  CALORIES PER SERVING 12g CARB, 8g PROTEIN, 0-8g FAT    milk   yogurt  Protein soy or almond milk 1 cup   1 cup   1 cup Use unsweetened almond or soy milk with added protein  Avoid chocolate or flavored milk  Avoid yogurt with more than 8 gms of sugar. Gms of protein should be higher than grams of sugar               1   MEAT AND SUBSTITUES  CALORIES PER SERVING 7g Protein, 0-13g FAT    Meat,Seafood and fish   cheese   cottage cheese   egg   peanut butter   tofu or tempeh  cooked beans, peas, lentils (add 1 starch)  Quinoa (add 1 starch)   Nuts and seeds (½ serving protein + 1 fat)  Nutritional yeast  Morning Star grillers 1 oz     1 oz  1/4 cup     1   1.5 Tbsp   4 oz (1/2 cup)   1/2 cup              ¼ cup            2 tablespoons    1 debbie or ½ cup      Choose fish, seafood and lower fat cheeses  Limit frying or adding fat.      8   FATS 45 CALORIES PER SERVING     oil   mayonnaise   cream cheese   salad dressing   peanuts   avocado   butter or margarine    1 tsp   1 tsp   1 Tbsp   1 Tbsp   10   1/8   1 tsp Eat less fat.   Eat less saturated fat such as animal fat found in fatter meat, cheese, and butter. Also eat less hydrogenated fat.      2-3   VEGETABLES 25 CALORIES PER SERVING 5 g CARB, 2g PROTEIN    raw vegetables   cooked vegetables   tomato or vegetable juice 1 cup   1/2 cup     1/2 cup Choose dark green leafy and deep yellow vegetables such as spinach, zuchinni,  squash, mushrooms, cauliflower ,broccoli, carrots, and peppers.   Unlimited      1000 CALORIE MEAL PLAN  Eat 3 small meals per day with 1-2 small snacks to keep you full throughout the day  Aim for at least 15 gms of protein at breakfast, at least 25 grams at lunch and at least 25 grams of protein at dinner.  Snacks should contain at least 5 grams of protein  Limit starches to 1 serving per meal or snack.  Keep your carbs whole grain or whole wheat  Limit your intake of refined sugar including sugary beverages ie sweet tea, lemonade, fruit punch             Fruit Protein Dairy Starch Fats Calories   Breakfast 1 2    370   Lunch  3  1 1 290   Dinner 1 3  1 1 315   Snack   1   120   Total 2 8 1 2 2 1085     Sample breakfasts:  2 scrambled eggs (use spray), 1 cup Protein Silk soy milk, 1 slice whole wheat toast, 1 small orange  Omelet made with 1 egg, 1-ounce low fat cheese and non-starchy veggies, 1 low fat plain yogurt with ½ banana  Plain yogurt with ¾ cup unsweetened cold cereal and ½ cup fresh fruit salad, 2 hardboiled eggs  Sample lunches:  ½ whole wheat bagel topped with 3 ounces of low fat cheese melted in oven with a wild green salad with 1 TBSP dressing  ¾ cup cooked beans with 6 whole grain crackers, 10 roasted peanuts  ½ medium baked potato with 3 ounces of low fat cheddar cheese and 1 TBSP  sour cream  1 small wheat tortilla brushed with 1 tsp olive oil then brushed with pizza sauce and 4 ounces low fat cheese, sliced mushrooms and green peppers broiled until cheese is melted.  ½ cup chopped melon    Sample Dinners:  4 ounces of tuna pan seared in 1 tsp olive oil, ½ baked small sweet potato and 2 small plums  ½ cup chick peas, 1 cup cauliflower sauteed with 1 tbsp chopped onion, 1 tsp oil, and 2 tsp raymond powder. Add low sodium vegetable stock to desired consistency. Serve with ½ cup brown rice  Red beans seasoned with onions and bell peppers served over cauliflower rice  1 cup cooked green or brown  lentils served with ½ cup cooked quinoa and chopped tomatoes and cucumbers and 1 tbsp feta cheese  Sample Snacks:  1 cup of Protein Silk Soy milk with 3 squares kalen crackers  3/4 ounce Triscuits with 1 ounce cubed cheese  Chobani Triple Zero yogurt with ¾ cup unsweetened cereal  ½ cup edamame (shelled)      Meal Ideas for Regular Bariatric Diet  *Recipes and products available at [email or web address]      Breakfast: (15-20g protein)    - Egg white omelet: 2 egg whites or ½ cup Egg Beaters. (Optional proteins: cheese, shrimp, black beans, chicken, sliced turkey) (Optional veggies: tomatoes, salsa, spinach, mushrooms, onions, green peppers, or small slice avocado)     - Egg and sausage: 1 egg or ¼ cup Egg Beaters (any variety), with 1 debbie or 2 links of Turkey sausage or Veggie breakfast sausage (Net Element or [place])    - Crust-less breakfast quiche: To make a glass pie dish, mix 4oz part skim Ricotta, 1 cup skim milk, and 2 eggs as your base. Add protein: shredded cheese, sliced lean ham or turkey, turkey medrano/sausage. Add veggies: tomato, onion, green onion, mushroom, green pepper, spinach, etc.    - Yogurt parfait: Mix 1 - 6oz container Dannon Light N Fit vanilla yogurt, with ¼ cup Kashi Go Lean cereal    - Cottage cheese and fruit: ½ cup part-skim cottage cheese or ricotta cheese topped with fresh fruit or sugar free preserves     - Rohini Miguel's Vanilla Egg custard* (add 2 Tbsp instant coffee granules to make Cappuccino Custard*)    - Hi-Protein café latte (skim milk, decaf coffee, 1 scoop protein powder). Optional to add Sugar free syrup or extract flavoring.    Lunch: (20-30g protein)    - ½ cup Black bean soup (Homemade or Progresso), with ¼ cup shredded low-fat cheese. Top with chopped tomato or fresh salsa.     - Lean deli turkey breast and low-fat sliced cheese, mustard or light heart to moisten, rolled up together, or wrapped in a Branden lettuce leaf    - Chicken salad made from dinner  leftovers, moisten with low-fat salad dressing or light heart. Also try leftover salmon, shrimp, tuna or boiled eggs. Serve ½ cup over dark green salad    - Fat-free canned refried beans, topped with ¼ cup shredded low-fat cheese. Top with chopped tomato or fresh salsa.     - Greek salad: Top mixed greens with 1-2oz grilled chicken, tomatoes, red onions, 2-3 kalamata olives, and sprinkle lightly with feta cheese. Spritz with Balsamic vinegar to taste.     - Crust-less lunch quiche: To make a glass pie dish, mix 4oz part skim Ricotta, 1 cup skim milk, and 2 eggs as your base. Add protein: shredded cheese, sliced lean ham or turkey, shrimp, chicken. Add veggies: tomato, onion, green onion, mushroom, green pepper, spinach, artichoke, broccoli, etc.    - Pizza bake: tomato sauce, low-fat shredded mozzarella and turkey pepperoni or [place]aguayan medrano. Add any veggies.    - Cucumber crab bites: Spread ¼ cup crab dip (lump crabmeat + light cream cheese and green onions) over sliced cucumber.     - Chicken with light spinach and artichoke dip*: Puree in : 6oz cooked and drained spinach, 2 cloves garlic, 1 can cannelloni beans, ½ cup chopped green onions, 1 can drained artichoke hearts (not marinated in oil), lemon juice and basil. Mix in 2oz chopped up chicken.    Supper: (20-30g protein)    - Serve grilled fish over dark green salad tossed with low-fat dressing, served with grilled asparagus montilla     - Rotisserie chicken salad: served with sliced strawberries, walnuts, fat-free feta cheese crumbles and 1 tbsp Lis Own Light Raspberry Kimball Vinaigrette    - Shrimp cocktail: Dip cold boiled shrimp in homemade low-sugar cocktail sauce (1/2 cup Ariadna One Carb ketchup, 2 tbsp horseradish, 1/4 tsp hot sauce, 1 tsp Worcestershire sauce, 1 tbsp freshly-squeezed lemon juice). Serve with dark green salad, walnuts, and crumbled blue cheese drizzled with olive oil and Balsamic vinegar    - Tuna Melt: Spread tuna salad  onto 2 thick slices of tomato. Top with low-fat cheese and broil until cheese is melted. May also be made with chicken salad of shrimp salad. [place] with different types of cheeses.    - Homemade low-fat Chili using extra lean ground beef or ground turkey. Top with shredded cheese and salsa as desired. May add dollop fat-free sour cream if desired    - Dinner Omelet with shrimp or chicken and onion, green peppers and chives.    - No noodle lasagna: Use sliced zucchini or eggplant in place of noodles.  Layer with part skim ricotta cheese and low sugar meat sauce (use very lean ground beef or ground turkey).    - Mexican chicken bake: Bake chunks of chicken breast or thigh with taco seasoning, Pace brand enchilada sauce, green onions and low-fat cheese. Serve with ¼ cup black beans or fat free refried beans topped with chopped tomatoes or salsa.    - Javy frozen meatballs, simmered in Classico Marinara sauce. Different flavors of salsa or spaghetti sauce create different dishes! Sprinkle with parmesan cheese. Serve with grilled or steamed veggies, or a dark green salad.    - Simmer boneless skinless chicken thigh chunks in Classico Marinara sauce or roasted salsa until tender with chopped onion, bell pepper, garlic, mushrooms, spinach, etc.     - Hamburger, without the bun, dressed the way you like. Served with grilled or steamed veggies.    - Eggplant parmesan: Bake slices of eggplant at 350 degrees for 15 minutes. Layer tomato sauce, sliced eggplant and low-fat mozzarella cheese in a baking dish and cover with foil. Bake 30-40 more minutes or until bubbly. Uncover and bake at 400 degrees for about 15 more minutes, or until top is slightly crisp.    - Fish tacos: grilled/baked white fish, wrapped in Branden lettuce leaf, topped with salsa, shredded low-fat cheese, and light coleslaw.    Snacks: (100-200 calories; >5g protein)    - 1 low-fat cheese stick with 8 cherry tomatoes or 1 serving fresh fruit  - 4  thin slices fat-free turkey breast and 1 slice low-fat cheese  - 4 thin slices fat-free honey ham with wedge of melon  - 1/4 cup unsalted nuts with ½ cup fruit  - 6-oz container Dannon Light n Fit vanilla yogurt, topped with 1oz unsalted nuts         - apple, celery or baby carrots spread with 2 Tbsp natural peanut butter or almond butter   - apple slices with 1 oz slice low-fat cheese  - celery, cucumber, bell pepper or baby carrots dipped in ¼ cup hummus bean spread or light spinach and artichoke dip (*recipe in lunch section)  - 100 calorie bag microwave light popcorn with 3 tbsp grated parmesan cheese  - Abhijit Links Beef Steak - 14g protein! (similar to beef jerky)  - 2 wedges [place] Cow - Light Herb & Garlic Cheese with sliced cucumber or green bell pepper  - 1/2 cup low-fat cottage cheese with ¼ cup fruit or ¼ cup salsa  - RTD Protein drinks: Atkins, Low Carb Slim Fast, EAS light, Muscle Milk Light, etc.  - Homemade Protein drinks: [facility] Soy95, Isopure, Nectar, UNJURY, Whey Gourmet, etc. Mix 1 scoop powder with 8oz skim/1% milk or light soymilk.  - Protein bars: Atkins, EAS, Pure Protein, Think Thin, Detour, etc. Must have 0-4 grams sugar - Read the label.    Takeout Options: No more than twice/week  Deli - Salads (no pasta or rice), meats, cheeses. Roasted chicken. Lox (salmon)    Mexican - Platters which don't include tortillas, chips, or rice. Go easy on the beans. Example: Fajitas without the tortillas. Ask the  not to bring chips to the table if they are too tempting.    Greek - Meat or fish and vegetable, but no bread or rice. Including hummus, baba ganoush, etc, is OK. Most sit-down Greek restaurants can provide you with cucumber slices for dipping instead of prieto bread.    Fast Food (Avoid as much as possible) - Salads (no croutons and limit salad dressing to 2 tbsp), grilled chicken sandwich without the bun and ask for no heart. Clarisas low fat chili or Taco Bell pintos and cheese.    BBQ - The  meats are fine if you ask for sauces on the side, but most of the traditional side dishes are loaded with carbs. Rhys slaw, baked beans and BBQ sauce are typically made with sugar.    Chinese - Nothing deep-fried, no rice or noodles. Many Chinese sauces have starch and sugar in them, so you'll have to use your judgement. If you find that these sauces trigger cravings, or cause Dumping, you can ask for the sauce to be made without sugar or just use soy sauce.    SEATED RESISTANCE BAND EXERCISES     If you do not have a resistance band, or do not feel comfortable using a resistance band, these exercises can also be done holding a light hand weight or water bottle.  If you are just starting to exercise, you may want to go through the motions without any weights or resistance till you become comfortable with the movements.     Do each of the movements shown 10 times (10 repetitions). You can repeat the exercises a second or  third time as well for greater benefit. The amount of tension on the resistance bands should be adjusted so  you can complete one set of 10 repetitions with effort. Increase the tension every few weeks. Do these  exercises 2 or 3 times a week.     * If an exercise hurts your back or joints, stop doing that particular exercise, but keep doing all the others *        CHEST EXERCISE          Start Position:   Sit tall, with feet shoulder width apart and feet in front of knees.   Belly pulled in.   Place the band around your upper back, grasp band in each hand, knuckles (rings) facing front. Arms  should be bent so that knuckles are in front of elbows   Slide shoulder blades down your back and slightly together (as if making a V).   Relax your neck.     To Perform This Exercise:   Press hands forward to lengthen arms using chest muscles (not arms!).   Dont arch your back!)   Return to start position and repeat 10 times.   Breathe!           BACK EXERCISE          Start Position:   Sit tall, with  feet shoulder width apart and feet in front of knees.   Belly pulled in.   Grasp band in each hand and raise overhead. Arms should be slightly wider than shoulder width and no  slack in the band.   Slide shoulder blades down your back and slightly together (as if making a V).   Relax your neck.     To Perform This Exercise:   Open arms pulling down towards chest using upper back muscles (not arms!).   Squeeze through shoulder blades at the bottom of the movement (dont arch your back!).   Return to start position and repeat 10 times.   Breathe!           SHOULDER EXERCISE          Start Position:   Sit tall, with feet shoulder width apart and feet in front of knees.   Belly pulled in.   Sit on band so that you can grasp band in one hand with tension on the band, but not so much tension that  you cannot straighten the arm. It may take a few tries to find the right amount of tension.   Slide shoulder blades down your back and slightly together (as if making a V).   Relax your neck.     To Perform This Exercise:   Press fist to the ceiling, slightly in front of the body.   SLOWLY return to start position and repeat 10 times.   Switch sides and repeat on the other side.   Breathe!           TRICEPS EXERCISE          Start Position:   Sit tall, with feet shoulder width apart and feet in front of knees.   Belly pulled in.   Sit on one end of the band. Grasp other end of band in one hand.   Point elbow directly toward the ceiling (if this is difficult, you may support the upper arm with the opposite  hand)   Be sure there is no slack in the band in the starting position.   Slide shoulder blades down your back and slightly together (as if making a V).   Relax your neck.     To Perform This Exercise:   Extend your arm up to the ceiling, as shown.   Squeeze through shoulder blades at the bottom of the movement (dont arch your back!).   SLOWLY return to start position and repeat 10 times.   Repeat on the other side.    Breathe!           BICEP EXERCISE          Start Position:   Sit tall, with feet shoulder width apart and feet in front of knees.   Belly pulled in.   Place center of exercise band under one foot and step the end of the band under the other foot.   Grasp each end of the band with one hand. Make sure there is no slack between your foot and the hand  that holds the band.   Hold your elbows to your sides.   Pull abdominals in, lift chest, press shoulders down and back.     To Perform This Exercise:   As you curl up, keep your wrist from changing position in relation to your forearm and your arm stable  from the shoulder to the elbow.   Bend and straighten your elbow in a slow and controlled movement. Repeat this motion 10 times.   Repeat on the other side.   Breathe!

## 2023-10-02 ENCOUNTER — ANESTHESIA EVENT (OUTPATIENT)
Dept: ENDOSCOPY | Facility: HOSPITAL | Age: 63
End: 2023-10-02
Payer: COMMERCIAL

## 2023-10-02 ENCOUNTER — TELEPHONE (OUTPATIENT)
Dept: ENDOSCOPY | Facility: HOSPITAL | Age: 63
End: 2023-10-02
Payer: COMMERCIAL

## 2023-10-02 DIAGNOSIS — M17.12 PRIMARY OSTEOARTHRITIS OF LEFT KNEE: Primary | ICD-10-CM

## 2023-10-02 RX ORDER — LIDOCAINE HYDROCHLORIDE 10 MG/ML
1 INJECTION, SOLUTION EPIDURAL; INFILTRATION; INTRACAUDAL; PERINEURAL ONCE
Status: CANCELLED | OUTPATIENT
Start: 2023-10-02 | End: 2023-10-02

## 2023-10-03 ENCOUNTER — TELEPHONE (OUTPATIENT)
Dept: GASTROENTEROLOGY | Facility: CLINIC | Age: 63
End: 2023-10-03
Payer: COMMERCIAL

## 2023-10-03 ENCOUNTER — HOSPITAL ENCOUNTER (OUTPATIENT)
Facility: HOSPITAL | Age: 63
Discharge: HOME | End: 2023-10-03
Attending: INTERNAL MEDICINE | Admitting: INTERNAL MEDICINE
Payer: COMMERCIAL

## 2023-10-03 ENCOUNTER — ANESTHESIA (OUTPATIENT)
Dept: ENDOSCOPY | Facility: HOSPITAL | Age: 63
End: 2023-10-03
Payer: COMMERCIAL

## 2023-10-03 VITALS
TEMPERATURE: 98 F | HEART RATE: 84 BPM | OXYGEN SATURATION: 100 % | DIASTOLIC BLOOD PRESSURE: 79 MMHG | SYSTOLIC BLOOD PRESSURE: 152 MMHG | RESPIRATION RATE: 22 BRPM

## 2023-10-03 DIAGNOSIS — R00.0 TACHYCARDIA: Primary | ICD-10-CM

## 2023-10-03 DIAGNOSIS — Z12.11 SCREEN FOR COLON CANCER: ICD-10-CM

## 2023-10-03 PROCEDURE — D9220A PRA ANESTHESIA: Mod: ANES,,, | Performed by: ANESTHESIOLOGY

## 2023-10-03 PROCEDURE — 93010 EKG 12-LEAD: ICD-10-PCS | Mod: ,,, | Performed by: INTERNAL MEDICINE

## 2023-10-03 PROCEDURE — D9220A PRA ANESTHESIA: ICD-10-PCS | Mod: CRNA,,, | Performed by: NURSE ANESTHETIST, CERTIFIED REGISTERED

## 2023-10-03 PROCEDURE — 37000009 HC ANESTHESIA EA ADD 15 MINS: Performed by: INTERNAL MEDICINE

## 2023-10-03 PROCEDURE — D9220A PRA ANESTHESIA: Mod: CRNA,,, | Performed by: NURSE ANESTHETIST, CERTIFIED REGISTERED

## 2023-10-03 PROCEDURE — 63600175 PHARM REV CODE 636 W HCPCS: Performed by: NURSE ANESTHETIST, CERTIFIED REGISTERED

## 2023-10-03 PROCEDURE — G0105 COLORECTAL SCRN; HI RISK IND: HCPCS | Performed by: INTERNAL MEDICINE

## 2023-10-03 PROCEDURE — D9220A PRA ANESTHESIA: ICD-10-PCS | Mod: ANES,,, | Performed by: ANESTHESIOLOGY

## 2023-10-03 PROCEDURE — G0105 COLORECTAL SCRN; HI RISK IND: HCPCS | Mod: ,,, | Performed by: INTERNAL MEDICINE

## 2023-10-03 PROCEDURE — 93010 ELECTROCARDIOGRAM REPORT: CPT | Mod: ,,, | Performed by: INTERNAL MEDICINE

## 2023-10-03 PROCEDURE — 25000003 PHARM REV CODE 250: Performed by: ANESTHESIOLOGY

## 2023-10-03 PROCEDURE — 93005 ELECTROCARDIOGRAM TRACING: CPT

## 2023-10-03 PROCEDURE — G0105 COLORECTAL SCRN; HI RISK IND: ICD-10-PCS | Mod: ,,, | Performed by: INTERNAL MEDICINE

## 2023-10-03 PROCEDURE — 37000008 HC ANESTHESIA 1ST 15 MINUTES: Performed by: INTERNAL MEDICINE

## 2023-10-03 PROCEDURE — 25000003 PHARM REV CODE 250: Performed by: NURSE ANESTHETIST, CERTIFIED REGISTERED

## 2023-10-03 RX ORDER — LIDOCAINE HYDROCHLORIDE 10 MG/ML
INJECTION, SOLUTION INTRAVENOUS
Status: DISCONTINUED | OUTPATIENT
Start: 2023-10-03 | End: 2023-10-03

## 2023-10-03 RX ORDER — PROPOFOL 10 MG/ML
VIAL (ML) INTRAVENOUS
Status: DISCONTINUED | OUTPATIENT
Start: 2023-10-03 | End: 2023-10-03

## 2023-10-03 RX ORDER — SODIUM CHLORIDE 9 MG/ML
INJECTION, SOLUTION INTRAVENOUS CONTINUOUS
Status: DISCONTINUED | OUTPATIENT
Start: 2023-10-03 | End: 2023-10-03 | Stop reason: HOSPADM

## 2023-10-03 RX ORDER — LIDOCAINE HYDROCHLORIDE 20 MG/ML
INJECTION, SOLUTION EPIDURAL; INFILTRATION; INTRACAUDAL; PERINEURAL
Status: DISCONTINUED
Start: 2023-10-03 | End: 2023-10-03 | Stop reason: HOSPADM

## 2023-10-03 RX ORDER — PROPOFOL 10 MG/ML
INJECTION, EMULSION INTRAVENOUS
Status: DISCONTINUED
Start: 2023-10-03 | End: 2023-10-03 | Stop reason: HOSPADM

## 2023-10-03 RX ADMIN — PROPOFOL 50 MG: 10 INJECTION, EMULSION INTRAVENOUS at 08:10

## 2023-10-03 RX ADMIN — PROPOFOL 100 MG: 10 INJECTION, EMULSION INTRAVENOUS at 08:10

## 2023-10-03 RX ADMIN — SODIUM CHLORIDE: 0.9 INJECTION, SOLUTION INTRAVENOUS at 08:10

## 2023-10-03 RX ADMIN — LIDOCAINE HYDROCHLORIDE 50 MG: 10 INJECTION, SOLUTION INTRAVENOUS at 08:10

## 2023-10-03 NOTE — H&P
Short Stay Endoscopy History and Physical    PCP - Cherelle Stover MD    Procedure - Colonoscopy  ASA - per anesthesia  Mallampati - per anesthesia  History of Anesthesia problems - no  Family history Anesthesia problems - no   Plan of anesthesia - General, MAC    HPI:  This is a 62 y.o. female here for evaluation of : personal history of colon polyps      ROS:  Constitutional: No fevers, chills, No weight loss  CV: No chest pain  Pulm: No cough, No shortness of breath  GI: see HPI  Derm: No rash    Medical History:  has a past medical history of Anxiety, Arthritis, Asthma, Bursitis, shoulder, Colon polyps, Dental bridge present, Dental crown present, Depression, Hypertension, Pes planus, and Wears glasses.    Surgical History:  has a past surgical history that includes Myomectomy; bilateral inguinal hernia repair; Umbilical hernia repair; rt salphigectomy; arthroscopy ankle right; Synovectomy; left torn rotator cuff repair (09/2014); Colonoscopy (N/A, 1/11/2018); Transforaminal epidural injection of steroid (Right, 1/18/2022); and Transforaminal epidural injection of steroid (Bilateral, 1/12/2023).    Family History: family history includes Breast cancer in her maternal aunt and mother; Cancer in her brother and mother; Diabetes in her mother; Hypertension in her mother.. Otherwise no colon cancer, inflammatory bowel disease, or GI malignancies.    Social History:  reports that she has never smoked. She has never used smokeless tobacco. She reports current alcohol use. She reports that she does not use drugs.    Review of patient's allergies indicates:   Allergen Reactions    Lisinopril Hives, Itching, Rash, Shortness Of Breath and Swelling       Medications:   Medications Prior to Admission   Medication Sig Dispense Refill Last Dose    albuterol (PROVENTIL HFA) 90 mcg/actuation inhaler Inhale 2 puffs into the lungs every 6 (six) hours as needed for Wheezing. Rescue 18 g 0     albuterol-ipratropium (DUO-NEB)  2.5 mg-0.5 mg/3 mL nebulizer solution Take 3 mLs by nebulization every 6 (six) hours as needed for Wheezing or Shortness of Breath (cough). 120 each 0     amLODIPine (NORVASC) 10 MG tablet Take 1 tablet (10 mg total) by mouth once daily. 30 tablet 6 10/1/2023    diclofenac (VOLTAREN) 75 MG EC tablet Take 1 tablet (75 mg total) by mouth 2 (two) times daily as needed. 60 tablet 3     diphenhydrAMINE (BENADRYL) 25 mg capsule Take 2 capsules (50 mg total) by mouth every 6 (six) hours as needed for Itching or Allergies. 30 capsule 0     EPINEPHrine (EPIPEN) 0.3 mg/0.3 mL AtIn Inject 0.3 mLs (0.3 mg total) into the muscle as needed. 2 each 0     ergocalciferol (ERGOCALCIFEROL) 50,000 unit Cap Take 1 capsule (50,000 Units total) by mouth every 7 days. 12 capsule 3     fluticasone propionate (FLONASE) 50 mcg/actuation nasal spray 2 sprays (100 mcg total) by Each Nostril route once daily. 16 g 0     gabapentin (NEURONTIN) 300 MG capsule Take 1 capsule (300 mg total) by mouth 3 (three) times daily. 90 capsule 0     loratadine (CLARITIN) 10 mg tablet Take 1 tablet (10 mg total) by mouth every morning. 60 tablet 0     olopatadine (PATANOL) 0.1 % ophthalmic solution Place 1 drop into both eyes 2 (two) times daily. 5 mL 0     promethazine-dextromethorphan (PROMETHAZINE-DM) 6.25-15 mg/5 mL Syrp Take 5 mLs by mouth every 6 (six) hours as needed (cough). 240 mL 0     spironolactone (ALDACTONE) 100 MG tablet Take 1 tablet (100 mg total) by mouth once daily. 30 tablet 5     topiramate (TOPAMAX) 25 MG tablet Take 1 tablet (25 mg total) by mouth 2 (two) times daily. 60 tablet 1          Physical Exam:    Vital Signs:   Vitals:    10/03/23 0755   BP:    Pulse: 85   Resp:    Temp:        Gen: NAD, lying comfortably  HENT: NCAT, oropharynx clear  Eyes: anicteric sclerae, EOMI grossly  Neck: supple, no visible masses/goiter  Cardiac: RRR  Lungs: non-labored breathing  Abd: soft, NT/ND, normoactive BS  Ext: no LE edema, warm, well  perfused  Skin: skin intact on exposed body parts, no visible rashes, lesions  Neuro: A&Ox4, neuro exam grossly intact, moves all extremities  Psych: appropriate mood, affect        Labs:  Lab Results   Component Value Date    WBC 6.17 08/29/2022    HGB 12.8 08/29/2022    HCT 42.4 08/29/2022     08/29/2022    CHOL 161 08/29/2022    TRIG 48 08/29/2022    HDL 55 08/29/2022    ALT 21 08/29/2022    AST 20 08/29/2022     08/29/2022    K 4.5 08/29/2022     08/29/2022    CREATININE 0.7 08/29/2022    BUN 13 08/29/2022    CO2 28 08/29/2022    TSH 1.214 08/29/2022    HGBA1C 5.4 08/29/2022       Plan:  Colonoscopy     I have explained the risks and benefits of endoscopy procedures to the patient including but not limited to bleeding, perforation, infection, and death.  The patient was asked if they understand and allowed to ask any further questions to their satisfaction.    Herbie Tellez MD

## 2023-10-03 NOTE — ANESTHESIA PREPROCEDURE EVALUATION
10/03/2023  Brandi Sahu is a 62 y.o., female.    Past Medical History:   Diagnosis Date    Anxiety     Arthritis     knee    Asthma     Bursitis, shoulder     Colon polyps     Dental bridge present     upper right    Dental crown present     Depression     Hypertension     Pes planus     Wears glasses     for reading       Pre-op Assessment    I have reviewed the Patient Summary Reports.     I have reviewed the Nursing Notes.    I have reviewed the Medications.     Review of Systems  Anesthesia Hx:  No problems with previous Anesthesia  Neg history of prior surgery. Denies Family Hx of Anesthesia complications.   Denies Personal Hx of Anesthesia complications.   Social:  Non-Smoker, No Alcohol Use    Hematology/Oncology:  Hematology Normal   Oncology Normal     EENT/Dental:EENT/Dental Normal   Cardiovascular:   Exercise tolerance: good Hypertension ECG has been reviewed. EKG DOS: short AR interval, HR 89   Pulmonary:  Pulmonary Normal    Renal/:  Renal/ Normal     Hepatic/GI:  Hepatic/GI Normal    Musculoskeletal:   Arthritis     Neurological:  Neurology Normal    Endocrine:  Endocrine Normal  Morbid Obesity / BMI > 40  Dermatological:  Skin Normal    Psych:   anxiety          Physical Exam  General: Well nourished    Airway:  Mallampati: II   Mouth Opening: Normal  Tongue: Normal  Neck ROM: Normal ROM    Dental:  Intact    Chest/Lungs:coughing  Heart:  Rate: Tachycardia  Rhythm: Regular Rhythm  Sounds: Normal  Periods of tachycardia up to 150 with PVC's with return to baseline.        Anesthesia Plan  Type of Anesthesia, risks & benefits discussed:    Anesthesia Type: Gen Natural Airway  Intra-op Monitoring Plan: Standard ASA Monitors  Induction:  IV  Informed Consent: Informed consent signed with the Patient and all parties understand the risks and agree with anesthesia plan.  All  questions answered.   ASA Score: 3    Ready For Surgery From Anesthesia Perspective.     .

## 2023-10-03 NOTE — TRANSFER OF CARE
Anesthesia Transfer of Care Note    Patient: Brandi Sahu    Procedure(s) Performed: Procedure(s) (LRB):  COLONOSCOPY (N/A)    Patient location: GI    Anesthesia Type: general    Transport from OR: Transported from OR on room air with adequate spontaneous ventilation    Post pain: adequate analgesia    Post assessment: no apparent anesthetic complications    Post vital signs: stable    Level of consciousness: sedated and responds to stimulation    Nausea/Vomiting: no nausea/vomiting    Complications: none    Transfer of care protocol was followed      Last vitals:   Visit Vitals  BP (!) 153/90 (BP Location: Left arm, Patient Position: Sitting)   Pulse 86   Temp 36.7 °C (98.1 °F) (Oral)   Resp (!) 25   LMP 07/18/2012   SpO2 100%   Breastfeeding No

## 2023-10-03 NOTE — PLAN OF CARE
Procedure and recovery complete. Pt awake and alert. MD Tellez and son at bedside, procedure findings and suggestions discussed. Discharge instructions given, pt verbalized understanding of instruction. Iv was D/c'd, inner cannula intact. No distress noted. Patient with dry coughing spells No c/o pain. Gait steady, able to ambulate without assistance. Pt walked out accompanied by son.

## 2023-10-03 NOTE — [employer]ATION PATIENT INSTRUCTIONS
Discharge Summary/Instructions after an Endoscopic Procedure  Patient Name: Brandi Sahu  Patient MRN: [id number]  Patient YOB: 1960  Tuesday, October 3, 2023  Herbie Tellez MD  Dear patient,  As a result of recent federal legislation (The Federal Cures Act), you may   receive lab or pathology results from your procedure in your [facility]   account before your physician is able to contact you. Your physician or   their representative will relay the results to you with their   recommendations at their soonest availability.  Thank you,  RESTRICTIONS:  During your procedure today, you received medications for sedation.  These   medications may affect your judgment, balance and coordination.  Therefore,   for 24 hours, you have the following restrictions:   - DO NOT drive a car, operate machinery, make legal/financial decisions,   sign important papers or drink alcohol.    ACTIVITY:  Today: no heavy lifting, straining or running due to procedural   sedation/anesthesia.  The following day: return to full activity including work.  DIET:  Eat and drink normally unless instructed otherwise.     TREATMENT FOR COMMON SIDE EFFECTS:  - Mild abdominal pain, nausea, belching, bloating or excessive gas:  rest,   eat lightly and use a heating pad.  - Sore Throat: treat with throat lozenges and/or gargle with warm salt   water.  - Because air was used during the procedure, expelling large amounts of air   from your rectum or belching is normal.  - If a bowel prep was taken, you may not have a bowel movement for 1-3 days.    This is normal.  SYMPTOMS TO WATCH FOR AND REPORT TO YOUR PHYSICIAN:  1. Abdominal pain or bloating, other than gas cramps.  2. Chest pain.  3. Back pain.  4. Signs of infection such as: chills or fever occurring within 24 hours   after the procedure.  5. Rectal bleeding, which would show as bright red, maroon, or black stools.   (A tablespoon of blood from the rectum is not serious, especially if    hemorrhoids are present.)  6. Vomiting.  7. Weakness or dizziness.  GO DIRECTLY TO THE NEAREST EMERGENCY ROOM IF YOU HAVE ANY OF THE FOLLOWING:      Difficulty breathing              Chills and/or fever over 101 F   Persistent vomiting and/or vomiting blood   Severe abdominal pain   Severe chest pain   Black, tarry stools   Bleeding- more than one tablespoon   Any other symptom or condition that you feel may need urgent attention  Your doctor recommends these additional instructions:  If any biopsies were taken, your doctors clinic will contact you in 1 to 2   weeks with any results.  - Discharge patient to home.   - Resume previous diet.   - Continue present medications.   - Repeat colonoscopy in 5 years for surveillance.  For questions, problems or results please call your physician - Herbie Tellez MD at Work:  [phone].  [facility] can be reached at [phone]     IF A COMPLICATION OR EMERGENCY SITUATION ARISES AND YOU ARE UNABLE TO REACH   YOUR PHYSICIAN - GO DIRECTLY TO THE EMERGENCY ROOM.  Herbie Tellez MD  10/3/2023 8:29:42 AM  This report has been verified and signed electronically.  Dear patient,  As a result of recent federal legislation (The Federal Cures Act), you may   receive lab or pathology results from your procedure in your [facility]   account before your physician is able to contact you. Your physician or   their representative will relay the results to you with their   recommendations at their soonest availability.  Thank you,  PROVATION

## 2023-10-03 NOTE — TELEPHONE ENCOUNTER
----- Message from Herbie Tellez MD sent at 10/3/2023  9:41 AM CDT -----  Sooner would be best if possible  ----- Message -----  From: Kati Marcial MA  Sent: 10/3/2023   9:15 AM CDT  To: Herbie Tellez MD    Hi,     The soonest I was able to schedule is 10/24 at 2:00. If she needs something sooner I can send a message to the cardiology department.   ----- Message -----  From: Herbie Tellez MD  Sent: 10/3/2023   9:09 AM CDT  To: [facility] Clinical Support    Hi   She came for colonoscopy today at  and was tachycardic. We completed the procedure but I placed a cards referral. Can we help her get in soon? Thanks

## 2023-10-03 NOTE — ADDENDUM NOTE
Addendum  created 10/03/23 0909 by Dayana Mayer CRNA    Clinical Note Signed, Flowsheet accepted, Intraprocedure Event edited

## 2023-10-03 NOTE — TELEPHONE ENCOUNTER
----- Message from Herbie Tellez MD sent at 10/3/2023  9:09 AM CDT -----  Hi   She came for colonoscopy today at  and was tachycardic. We completed the procedure but I placed a cards referral. Can we help her get in soon? Thanks

## 2023-10-03 NOTE — ANESTHESIA POSTPROCEDURE EVALUATION
Anesthesia Post Evaluation    Patient: Brandi Sahu    Procedure(s) Performed: Procedure(s) (LRB):  COLONOSCOPY (N/A)    Final Anesthesia Type: general      Patient location during evaluation: GI PACU  Patient participation: Yes- Able to Participate  Level of consciousness: awake and alert, oriented and awake  Post-procedure vital signs: reviewed and stable  Airway patency: patent    PONV status at discharge: No PONV  Anesthetic complications: no      Cardiovascular status: blood pressure returned to baseline  Respiratory status: unassisted, spontaneous ventilation and room air  Hydration status: euvolemic  Follow-up not needed.          Vitals Value Taken Time   /90 10/03/23 0845   Temp 36.7 °C (98.1 °F) 10/03/23 0830   Pulse 86 10/03/23 0845   Resp 25 10/03/23 0845   SpO2 100 % 10/03/23 0845         No case tracking events are documented in the log.      Pain/Jory Score: Jory Score: 10 (10/3/2023  8:45 AM)

## 2023-10-04 ENCOUNTER — TELEPHONE (OUTPATIENT)
Dept: FAMILY MEDICINE | Facility: CLINIC | Age: 63
End: 2023-10-04
Payer: COMMERCIAL

## 2023-10-04 NOTE — TELEPHONE ENCOUNTER
----- Message from Gee Mcknight NP sent at 10/4/2023  3:18 PM CDT -----  Please assist pt with scheduling an appointment this week with first available provider.  ----- Message -----  From: Herbie Tellez MD  Sent: 10/4/2023   1:42 PM CDT  To: Marino Hammer NP; Eunice John NP; #    Hi - are either of you this patient's outpatient primary provider? I see notes from both. Dr Stover is listed but it says she is unavailable.  Patient presented for outpatient colonoscopy yesterday and was tachycardic. We placed a cardiology referral but would really like her to see someone from primary this week as well.    Thanks!  Herbie Tellez GI  ----- Message -----  From: Interface, Lab In [facility]  Sent: 10/3/2023   5:12 PM CDT  To: Herbie Tellez MD

## 2023-10-05 ENCOUNTER — HOSPITAL ENCOUNTER (OUTPATIENT)
Facility: HOSPITAL | Age: 63
Discharge: HOME | End: 2023-10-06
Attending: EMERGENCY MEDICINE | Admitting: HOSPITALIST
Payer: COMMERCIAL

## 2023-10-05 ENCOUNTER — OFFICE VISIT (OUTPATIENT)
Dept: FAMILY MEDICINE | Facility: CLINIC | Age: 63
End: 2023-10-05
Payer: COMMERCIAL

## 2023-10-05 VITALS
HEIGHT: 66 IN | HEART RATE: 104 BPM | RESPIRATION RATE: 16 BRPM | TEMPERATURE: 99 F | DIASTOLIC BLOOD PRESSURE: 80 MMHG | WEIGHT: 272.94 LBS | OXYGEN SATURATION: 98 % | SYSTOLIC BLOOD PRESSURE: 162 MMHG | BODY MASS INDEX: 43.86 KG/M2

## 2023-10-05 DIAGNOSIS — R07.9 CHEST PAIN: ICD-10-CM

## 2023-10-05 DIAGNOSIS — R05.9 COUGH, UNSPECIFIED TYPE: ICD-10-CM

## 2023-10-05 DIAGNOSIS — I49.9 ARRHYTHMIA: ICD-10-CM

## 2023-10-05 DIAGNOSIS — I48.92 ATRIAL FLUTTER, UNSPECIFIED TYPE: ICD-10-CM

## 2023-10-05 DIAGNOSIS — M25.562 PAIN IN BOTH KNEES, UNSPECIFIED CHRONICITY: Primary | ICD-10-CM

## 2023-10-05 DIAGNOSIS — R00.0 SINUS TACHYCARDIA: ICD-10-CM

## 2023-10-05 DIAGNOSIS — R00.2 PALPITATIONS: Primary | ICD-10-CM

## 2023-10-05 DIAGNOSIS — M25.561 PAIN IN BOTH KNEES, UNSPECIFIED CHRONICITY: Primary | ICD-10-CM

## 2023-10-05 DIAGNOSIS — R00.0 TACHYCARDIA: Primary | ICD-10-CM

## 2023-10-05 PROBLEM — I47.19 NARROW COMPLEX TACHYCARDIA: Status: ACTIVE | Noted: 2023-10-05

## 2023-10-05 LAB
ALBUMIN SERPL BCP-MCNC: 3.8 G/DL (ref 3.5–5.2)
ALP SERPL-CCNC: 116 U/L (ref 55–135)
ALT SERPL W/O P-5'-P-CCNC: 30 U/L (ref 10–44)
ANION GAP SERPL CALC-SCNC: 11 MMOL/L (ref 8–16)
AST SERPL-CCNC: 28 U/L (ref 10–40)
BASOPHILS # BLD AUTO: 0.1 K/UL (ref 0–0.2)
BASOPHILS NFR BLD: 1.5 % (ref 0–1.9)
BILIRUB SERPL-MCNC: 0.4 MG/DL (ref 0.1–1)
BUN SERPL-MCNC: 6 MG/DL (ref 6–20)
CALCIUM SERPL-MCNC: 10.1 MG/DL (ref 8.7–10.5)
CHLORIDE SERPL-SCNC: 103 MMOL/L (ref 95–110)
CO2 SERPL-SCNC: 26 MMOL/L (ref 23–29)
CREAT SERPL-MCNC: 0.8 MG/DL (ref 0.5–1.4)
DIFFERENTIAL METHOD: ABNORMAL
EOSINOPHIL # BLD AUTO: 0.5 K/UL (ref 0–0.5)
EOSINOPHIL NFR BLD: 6.9 % (ref 0–8)
ERYTHROCYTE [DISTWIDTH] IN BLOOD BY AUTOMATED COUNT: 14.3 % (ref 11.5–14.5)
EST. GFR  (NO RACE VARIABLE): >60 ML/MIN/1.73 M^2
GLUCOSE SERPL-MCNC: 99 MG/DL (ref 70–110)
HCT VFR BLD AUTO: 42.3 % (ref 37–48.5)
HGB BLD-MCNC: 12.7 G/DL (ref 12–16)
IMM GRANULOCYTES # BLD AUTO: 0.01 K/UL (ref 0–0.04)
IMM GRANULOCYTES NFR BLD AUTO: 0.2 % (ref 0–0.5)
LYMPHOCYTES # BLD AUTO: 2.3 K/UL (ref 1–4.8)
LYMPHOCYTES NFR BLD: 35.2 % (ref 18–48)
MCH RBC QN AUTO: 25.6 PG (ref 27–31)
MCHC RBC AUTO-ENTMCNC: 30 G/DL (ref 32–36)
MCV RBC AUTO: 85 FL (ref 82–98)
MONOCYTES # BLD AUTO: 1 K/UL (ref 0.3–1)
MONOCYTES NFR BLD: 14.9 % (ref 4–15)
NEUTROPHILS # BLD AUTO: 2.7 K/UL (ref 1.8–7.7)
NEUTROPHILS NFR BLD: 41.3 % (ref 38–73)
NRBC BLD-RTO: 0 /100 WBC
PLATELET # BLD AUTO: 179 K/UL (ref 150–450)
PMV BLD AUTO: 12.3 FL (ref 9.2–12.9)
POTASSIUM SERPL-SCNC: 4.1 MMOL/L (ref 3.5–5.1)
PROT SERPL-MCNC: 8.9 G/DL (ref 6–8.4)
RBC # BLD AUTO: 4.96 M/UL (ref 4–5.4)
SODIUM SERPL-SCNC: 140 MMOL/L (ref 136–145)
T4 FREE SERPL-MCNC: 1.37 NG/DL (ref 0.71–1.51)
TSH SERPL DL<=0.005 MIU/L-ACNC: 0.4 UIU/ML (ref 0.4–4)
WBC # BLD AUTO: 6.64 K/UL (ref 3.9–12.7)

## 2023-10-05 PROCEDURE — 25000003 PHARM REV CODE 250

## 2023-10-05 PROCEDURE — 84443 ASSAY THYROID STIM HORMONE: CPT | Performed by: EMERGENCY MEDICINE

## 2023-10-05 PROCEDURE — 1159F PR MEDICATION LIST DOCUMENTED IN MEDICAL RECORD: ICD-10-PCS | Mod: CPTII,S$GLB,, | Performed by: NURSE PRACTITIONER

## 2023-10-05 PROCEDURE — 84439 ASSAY OF FREE THYROXINE: CPT | Performed by: EMERGENCY MEDICINE

## 2023-10-05 PROCEDURE — 3077F SYST BP >= 140 MM HG: CPT | Mod: CPTII,S$GLB,, | Performed by: NURSE PRACTITIONER

## 2023-10-05 PROCEDURE — G0378 HOSPITAL OBSERVATION PER HR: HCPCS

## 2023-10-05 PROCEDURE — 99214 PR OFFICE/OUTPT VISIT, EST, LEVL IV, 30-39 MIN: ICD-10-PCS | Mod: S$GLB,,, | Performed by: NURSE PRACTITIONER

## 2023-10-05 PROCEDURE — 3008F BODY MASS INDEX DOCD: CPT | Mod: CPTII,S$GLB,, | Performed by: NURSE PRACTITIONER

## 2023-10-05 PROCEDURE — 99999 PR PBB SHADOW E&M-EST. PATIENT-LVL V: ICD-10-PCS | Mod: PBBFAC,,, | Performed by: NURSE PRACTITIONER

## 2023-10-05 PROCEDURE — 1160F PR REVIEW ALL MEDS BY PRESCRIBER/CLIN PHARMACIST DOCUMENTED: ICD-10-PCS | Mod: CPTII,S$GLB,, | Performed by: NURSE PRACTITIONER

## 2023-10-05 PROCEDURE — 99285 EMERGENCY DEPT VISIT HI MDM: CPT | Mod: 25

## 2023-10-05 PROCEDURE — 93010 EKG 12-LEAD: ICD-10-PCS | Mod: ,,, | Performed by: INTERNAL MEDICINE

## 2023-10-05 PROCEDURE — 93010 EKG 12-LEAD: ICD-10-PCS | Mod: S$GLB,,, | Performed by: INTERNAL MEDICINE

## 2023-10-05 PROCEDURE — 93005 ELECTROCARDIOGRAM TRACING: CPT

## 2023-10-05 PROCEDURE — 93005 EKG 12-LEAD: ICD-10-PCS | Mod: S$GLB,,, | Performed by: NURSE PRACTITIONER

## 2023-10-05 PROCEDURE — 99999 PR PBB SHADOW E&M-EST. PATIENT-LVL V: CPT | Mod: PBBFAC,,, | Performed by: NURSE PRACTITIONER

## 2023-10-05 PROCEDURE — 1160F RVW MEDS BY RX/DR IN RCRD: CPT | Mod: CPTII,S$GLB,, | Performed by: NURSE PRACTITIONER

## 2023-10-05 PROCEDURE — 3079F PR MOST RECENT DIASTOLIC BLOOD PRESSURE 80-89 MM HG: ICD-10-PCS | Mod: CPTII,S$GLB,, | Performed by: NURSE PRACTITIONER

## 2023-10-05 PROCEDURE — 80053 COMPREHEN METABOLIC PANEL: CPT | Performed by: EMERGENCY MEDICINE

## 2023-10-05 PROCEDURE — 85025 COMPLETE CBC W/AUTO DIFF WBC: CPT | Performed by: EMERGENCY MEDICINE

## 2023-10-05 PROCEDURE — 93010 ELECTROCARDIOGRAM REPORT: CPT | Mod: ,,, | Performed by: INTERNAL MEDICINE

## 2023-10-05 PROCEDURE — 1159F MED LIST DOCD IN RCRD: CPT | Mod: CPTII,S$GLB,, | Performed by: NURSE PRACTITIONER

## 2023-10-05 PROCEDURE — 99214 OFFICE O/P EST MOD 30 MIN: CPT | Mod: S$GLB,,, | Performed by: NURSE PRACTITIONER

## 2023-10-05 PROCEDURE — 3077F PR MOST RECENT SYSTOLIC BLOOD PRESSURE >= 140 MM HG: ICD-10-PCS | Mod: CPTII,S$GLB,, | Performed by: NURSE PRACTITIONER

## 2023-10-05 PROCEDURE — 93005 ELECTROCARDIOGRAM TRACING: CPT | Mod: S$GLB,,, | Performed by: NURSE PRACTITIONER

## 2023-10-05 PROCEDURE — 96372 THER/PROPH/DIAG INJ SC/IM: CPT | Performed by: EMERGENCY MEDICINE

## 2023-10-05 PROCEDURE — 3008F PR BODY MASS INDEX (BMI) DOCUMENTED: ICD-10-PCS | Mod: CPTII,S$GLB,, | Performed by: NURSE PRACTITIONER

## 2023-10-05 PROCEDURE — 63600175 PHARM REV CODE 636 W HCPCS: Performed by: EMERGENCY MEDICINE

## 2023-10-05 PROCEDURE — 3079F DIAST BP 80-89 MM HG: CPT | Mod: CPTII,S$GLB,, | Performed by: NURSE PRACTITIONER

## 2023-10-05 PROCEDURE — 93010 ELECTROCARDIOGRAM REPORT: CPT | Mod: S$GLB,,, | Performed by: INTERNAL MEDICINE

## 2023-10-05 RX ORDER — IPRATROPIUM BROMIDE AND ALBUTEROL SULFATE 2.5; .5 MG/3ML; MG/3ML
3 SOLUTION RESPIRATORY (INHALATION) EVERY 6 HOURS PRN
Status: DISCONTINUED | OUTPATIENT
Start: 2023-10-05 | End: 2023-10-06 | Stop reason: HOSPADM

## 2023-10-05 RX ORDER — IBUPROFEN 200 MG
24 TABLET ORAL
Status: DISCONTINUED | OUTPATIENT
Start: 2023-10-05 | End: 2023-10-06 | Stop reason: HOSPADM

## 2023-10-05 RX ORDER — IBUPROFEN 200 MG
16 TABLET ORAL
Status: DISCONTINUED | OUTPATIENT
Start: 2023-10-05 | End: 2023-10-06 | Stop reason: HOSPADM

## 2023-10-05 RX ORDER — SPIRONOLACTONE 25 MG/1
50 TABLET ORAL 2 TIMES DAILY
Status: DISCONTINUED | OUTPATIENT
Start: 2023-10-05 | End: 2023-10-06 | Stop reason: HOSPADM

## 2023-10-05 RX ORDER — AMLODIPINE BESYLATE 5 MG/1
10 TABLET ORAL DAILY
Status: DISCONTINUED | OUTPATIENT
Start: 2023-10-06 | End: 2023-10-06 | Stop reason: HOSPADM

## 2023-10-05 RX ORDER — NALOXONE HCL 0.4 MG/ML
0.02 VIAL (ML) INJECTION
Status: DISCONTINUED | OUTPATIENT
Start: 2023-10-05 | End: 2023-10-06 | Stop reason: HOSPADM

## 2023-10-05 RX ORDER — TALC
6 POWDER (GRAM) TOPICAL NIGHTLY PRN
Status: DISCONTINUED | OUTPATIENT
Start: 2023-10-05 | End: 2023-10-06 | Stop reason: HOSPADM

## 2023-10-05 RX ORDER — ACETAMINOPHEN 325 MG/1
650 TABLET ORAL EVERY 4 HOURS PRN
Status: DISCONTINUED | OUTPATIENT
Start: 2023-10-05 | End: 2023-10-06 | Stop reason: HOSPADM

## 2023-10-05 RX ORDER — PROCHLORPERAZINE EDISYLATE 5 MG/ML
5 INJECTION INTRAMUSCULAR; INTRAVENOUS EVERY 6 HOURS PRN
Status: DISCONTINUED | OUTPATIENT
Start: 2023-10-05 | End: 2023-10-06 | Stop reason: HOSPADM

## 2023-10-05 RX ORDER — SODIUM CHLORIDE 0.9 % (FLUSH) 0.9 %
10 SYRINGE (ML) INJECTION EVERY 12 HOURS PRN
Status: DISCONTINUED | OUTPATIENT
Start: 2023-10-05 | End: 2023-10-06 | Stop reason: HOSPADM

## 2023-10-05 RX ORDER — ONDANSETRON 8 MG/1
8 TABLET, ORALLY DISINTEGRATING ORAL EVERY 8 HOURS PRN
Status: DISCONTINUED | OUTPATIENT
Start: 2023-10-05 | End: 2023-10-06 | Stop reason: HOSPADM

## 2023-10-05 RX ORDER — SPIRONOLACTONE 50 MG/1
50 TABLET, FILM COATED ORAL 2 TIMES DAILY
COMMUNITY

## 2023-10-05 RX ORDER — GLUCAGON 1 MG
1 KIT INJECTION
Status: DISCONTINUED | OUTPATIENT
Start: 2023-10-05 | End: 2023-10-06 | Stop reason: HOSPADM

## 2023-10-05 RX ORDER — ENOXAPARIN SODIUM 150 MG/ML
1 INJECTION SUBCUTANEOUS EVERY 12 HOURS
Status: DISCONTINUED | OUTPATIENT
Start: 2023-10-05 | End: 2023-10-06 | Stop reason: HOSPADM

## 2023-10-05 RX ADMIN — ENOXAPARIN SODIUM 120 MG: 120 INJECTION SUBCUTANEOUS at 08:10

## 2023-10-05 RX ADMIN — SPIRONOLACTONE 50 MG: 25 TABLET ORAL at 08:10

## 2023-10-05 NOTE — H&P
"[facility] Medicine  History & Physical    Patient Name: Brandi Sahu  MRN: [id number]  Patient Class: OP- Observation  Admission Date: 10/5/2023  Attending Physician: Skylar Fierro, *   Primary Care Provider: Cherelle Stover MD         Patient information was obtained from patient, past medical records and ER records.     Subjective:     Principal Problem:Narrow complex tachycardia    Chief Complaint:   Chief Complaint   Patient presents with    Irregular Heart Beat     Patient endorses brief 2 day history of irregular HR which was discovered pre colonoscopy 2 days ago, EKGs are w her. Saw her PCP today who did an EKG and was told to come to the ER.  Arrives via POV w CC: chest "tightness", SOB w minimal activity. PMH sig for asthma. EKG done in triage and reviewed by MD.        HPI: Brandi Sahu is a 62 y.o. with a pmh of HTN, asthma, anxiety, and depression presents to the hospital with complaints of irregular heartbeat following her PCP visit. Patient states that she went to get a colonoscopy 2 days ago where irregular heart beat was noticed and was told follow up with her PCP. She went to her PCP this morning and the EKG shows irregular heart beat, which she brought with her today. Patient states that she is symptom free and has no pain at this time. Denies chest pain, shortness of breath, headaches, blurry vision, abdominal pain, n/v/d, any swelling of her extremities, melena, hematemesis, hematochezia, hematuria, or any other associated symptoms. She does complain of a cough and congestion for sometime now and has a history of asthma. She uses an inhaler and breathing treatments at home as needed. Patient states that she is compliant with her home medications but did not take her medications today. She denies any family history of heart conditions. Patient is not on any blood thinners. She is allergic to lisinopril.     In the ED: Patient is afebrile without leukocytosis, " elevated bp on presentation 198/93, RR 15-28, CBC and CMP unremarkable, TSH slightly decreased to 0.398, EKG on 10/3 shows sinus rhythm with PVC's and EKG today shows sinus tachycardia with no PVC's, the EKG's from the PCP office shows atrial fibrillation/atrial flutter. ED discussed case with Dr. Pleitez of Cardiology and recommended patient admission for observation and further workup.     Case discussed with the ED.     Brandi Sahu will be placed under observation for further work up for her arrhythmia.       Past Medical History:   Diagnosis Date    Anxiety     Arthritis     knee    Asthma     Bursitis, shoulder     Colon polyps     Dental bridge present     upper right    Dental crown present     Depression     Hypertension     Pes planus     Wears glasses     for reading       Past Surgical History:   Procedure Laterality Date    arthroscopy ankle right      bilateral inguinal hernia repair      COLONOSCOPY N/A 01/11/2018    Procedure: COLONOSCOPY;  Surgeon: Christoph Nolasco MD;  Location: [facility] ENDO;  Service: Endoscopy;  Laterality: N/A;    COLONOSCOPY N/A 10/03/2023    Procedure: COLONOSCOPY;  Surgeon: Herbie Tellez MD;  Location: [facility] ENDO;  Service: Endoscopy;  Laterality: N/A;  instructions sent to patient portal. TBou  referral: EDWARDO Hammer NP    left torn rotator cuff repair  09/2014    MYOMECTOMY      rt salphigectomy      SYNOVECTOMY      TRANSFORAMINAL EPIDURAL INJECTION OF STEROID Right 01/18/2022    Procedure: LUMBAR TRANSFORAMINAL RIGHT L5/S1 DIRECT REFERRAL;  Surgeon: Cherelle Carpenter MD;  Location: [facility] PAIN MGT;  Service: Pain Management;  Laterality: Right;    TRANSFORAMINAL EPIDURAL INJECTION OF STEROID Bilateral 01/12/2023    Procedure: INJECTION, STEROID, EPIDURAL, TRANSFORAMINAL APPROACH, BILATERAL L5-S1 CONTRAST DIRECT REF;  Surgeon: Lorraine Braxton MD;  Location: [facility] PAIN MGT;  Service: Pain Management;  Laterality: Bilateral;    UMBILICAL HERNIA REPAIR         Review of patient's  allergies indicates:   Allergen Reactions    Lisinopril Hives, Itching, Rash, Shortness Of Breath and Swelling       No current facility-administered medications on file prior to encounter.     Current Outpatient Medications on File Prior to Encounter   Medication Sig    albuterol (PROVENTIL HFA) 90 mcg/actuation inhaler Inhale 2 puffs into the lungs every 6 (six) hours as needed for Wheezing. Rescue    albuterol-ipratropium (DUO-NEB) 2.5 mg-0.5 mg/3 mL nebulizer solution Take 3 mLs by nebulization every 6 (six) hours as needed for Wheezing or Shortness of Breath (cough).    amLODIPine (NORVASC) 10 MG tablet Take 1 tablet (10 mg total) by mouth once daily.    diclofenac (VOLTAREN) 75 MG EC tablet Take 1 tablet (75 mg total) by mouth 2 (two) times daily as needed.    diphenhydrAMINE (BENADRYL) 25 mg capsule Take 2 capsules (50 mg total) by mouth every 6 (six) hours as needed for Itching or Allergies.    EPINEPHrine (EPIPEN) 0.3 mg/0.3 mL AtIn Inject 0.3 mLs (0.3 mg total) into the muscle as needed.    ergocalciferol (ERGOCALCIFEROL) 50,000 unit Cap Take 1 capsule (50,000 Units total) by mouth every 7 days.    fluticasone propionate (FLONASE) 50 mcg/actuation nasal spray 2 sprays (100 mcg total) by Each Nostril route once daily.    gabapentin (NEURONTIN) 300 MG capsule Take 1 capsule (300 mg total) by mouth 3 (three) times daily.    loratadine (CLARITIN) 10 mg tablet Take 1 tablet (10 mg total) by mouth every morning.    olopatadine (PATANOL) 0.1 % ophthalmic solution Place 1 drop into both eyes 2 (two) times daily.    promethazine-dextromethorphan (PROMETHAZINE-DM) 6.25-15 mg/5 mL Syrp Take 5 mLs by mouth every 6 (six) hours as needed (cough).    spironolactone (ALDACTONE) 50 MG tablet Take 50 mg by mouth 2 (two) times daily.    topiramate (TOPAMAX) 25 MG tablet Take 1 tablet (25 mg total) by mouth 2 (two) times daily.    [DISCONTINUED] spironolactone (ALDACTONE) 100 MG tablet Take 1 tablet (100 mg total) by mouth  once daily.     Family History       Problem Relation (Age of Onset)    Breast cancer Mother, Maternal Aunt    Cancer Mother, Brother    Diabetes Mother    Hypertension Mother          Tobacco Use    Smoking status: Never    Smokeless tobacco: Never   Substance and Sexual Activity    Alcohol use: Yes     Comment: social     Drug use: No    Sexual activity: Not Currently     Birth control/protection: None     Comment: occasional     Review of Systems   Constitutional:  Negative for appetite change, chills, fatigue and fever.   HENT:  Positive for congestion. Negative for sore throat.    Eyes:  Negative for visual disturbance.   Respiratory:  Positive for cough. Negative for chest tightness, shortness of breath and wheezing.    Cardiovascular:  Negative for chest pain, palpitations and leg swelling.   Gastrointestinal:  Negative for abdominal pain, diarrhea, nausea and vomiting.   Endocrine: Negative for polyuria.   Genitourinary:  Negative for dysuria, hematuria and urgency.   Musculoskeletal:  Negative for back pain and myalgias.   Skin:  Negative for rash.   Neurological:  Negative for dizziness, syncope, weakness, light-headedness and headaches.   Psychiatric/Behavioral:  Negative for confusion.      Objective:     Vital Signs (Most Recent):  Temp: 99.3 °F (37.4 °C) (10/05/23 1333)  Pulse: 84 (10/05/23 1615)  Resp: (!) 28 (10/05/23 1615)  BP: 129/76 (10/05/23 1615)  SpO2: 100 % (10/05/23 1615) Vital Signs (24h Range):  Temp:  [99.2 °F (37.3 °C)-99.3 °F (37.4 °C)] 99.3 °F (37.4 °C)  Pulse:  [] 84  Resp:  [13-28] 28  SpO2:  [98 %-100 %] 100 %  BP: (129-198)/(76-97) 129/76        There is no height or weight on file to calculate BMI.     Physical Exam  Constitutional:       General: She is not in acute distress.     Appearance: Normal appearance. She is obese. She is not ill-appearing.   HENT:      Head: Normocephalic and atraumatic.      Nose: Congestion present.      Mouth/Throat:      Mouth: Mucous membranes  are moist.   Eyes:      Extraocular Movements: Extraocular movements intact.   Cardiovascular:      Rate and Rhythm: Tachycardia present. Rhythm irregular.      Pulses: Normal pulses.      Heart sounds: No murmur heard.     No friction rub.      Comments: Irregular heart beat, sinus tachy rate 143, possible atrial fibrillation per EKG in the ED.   Pulmonary:      Breath sounds: Normal breath sounds. No wheezing or rales.      Comments: States mild discomfort while taking deep breaths.   Abdominal:      Palpations: Abdomen is soft.      Tenderness: There is no abdominal tenderness. There is no guarding.   Musculoskeletal:         General: Normal range of motion.   Skin:     General: Skin is warm and dry.   Neurological:      General: No focal deficit present.      Mental Status: She is alert and oriented to person, place, and time. Mental status is at baseline.   Psychiatric:         Mood and Affect: Mood normal.         Behavior: Behavior normal.         Thought Content: Thought content normal.             Significant Labs: All pertinent labs within the past 24 hours have been reviewed.  CBC:   Recent Labs   Lab 10/05/23  1232   WBC 6.64   HGB 12.7   HCT 42.3        CMP:   Recent Labs   Lab 10/05/23  1232      K 4.1      CO2 26   GLU 99   BUN 6   CREATININE 0.8   CALCIUM 10.1   PROT 8.9*   ALBUMIN 3.8   BILITOT 0.4   ALKPHOS 116   AST 28   ALT 30   ANIONGAP 11     TSH:   Recent Labs   Lab 10/05/23  1232   TSH 0.398*       Significant Imaging: I have reviewed all pertinent imaging results/findings within the past 24 hours.    Assessment/Plan:     * Narrow complex tachycardia  Hemodynamically stable: denies any chest pain, palpitations, sob, headaches, vision issues   EKG's from PCP shows sinus tachycardia   In the ED patient has sinus rhythm and then on repeat EKG has sinus tachycardia with possibility of atrial fib/flutter and irregular QRS complexes.   Cardiology consulted- Dr. Pleitez-  recommended Lovenox 1mg/kg subQ q 12hrs  Echo pending  Cardiac monitoring   Cardiology f/u and management regarding arrhythmia with possible home rate control and anticoagulation medication     Morbid obesity with BMI of 40.0-44.9, adult  There is no height or weight on file to calculate BMI. Morbid obesity complicates all aspects of disease management from diagnostic modalities to treatment. Weight loss encouraged and health benefits explained to patient.         Asthma  History of asthma  Productive cough and congestion   Afebrile, no white count  Season allergies vs bronchitis   CXR pending   Duo-neb/albuterol PRN    Hypertension  Well controlled with home bp medications  BP elevated today on presentation at 198/93- did not take home meds  Continue home bp meds: Norvasc and spironolactone  Monitor bp and adjust as needed          VTE Risk Mitigation (From admission, onward)      None             On 10/05/2023, patient should be placed in hospital observation services under my care in collaboration with Skylar Fierro MD.      Tobi Nelson PA-C  Department of Hospital Medicine  [facility] - Emergency Dept

## 2023-10-05 NOTE — HPI
Brandi Sahu is a 62 y.o. with a pmh of HTN, asthma, anxiety, and depression presents to the hospital with complaints of irregular heartbeat following her PCP visit. Patient states that she went to get a colonoscopy 2 days ago where irregular heart beat was noticed and was told follow up with her PCP. She went to the PCP this morning and the EKG shows irregular heart beat and she brought those EKG's with her during the patient encounter. Her PCP instructed her to come to the ED and get further workup. Patient states that she is symptom free and has no pain at this time. Denies chest pain, shortness of breath, headaches, blurry vision, abdominal pain, n/v/d, any swelling of her extremities, melena, hematemesis, hematochezia, hematuria, or any other associated symptoms. Patient states that she is compliant with her home medications but did not take her medications today. She denies any family history of heart conditions. Patient is not on any blood thinners. She is allergic to lisinopril.     In the ED: Patient is afebrile without leukocytosis, elevated bp on presentation 198/93, RR 15-28, CBC and CMP unremarkable, TSH slightly decreased to 0.398, EKG on 10/3 shows sinus rhythm with PVC's and EKG today shows sinus tachycardia with no PVC's, the EKG's from the PCP office shows atrial fibrillation/atrial flutter. ED discussed case with Dr. Pleitez of Cardiology and recommended patient admission for observation and further workup.     Case discussed with the ED.     Brandi Sahu will be placed under observation for further work up for her arrhythmia.

## 2023-10-05 NOTE — ASSESSMENT & PLAN NOTE
History of asthma  Productive cough and congestion for the past few days  Afebrile, no white count  Season allergies vs bronchitis   CXR pending   Duo-neb/albuterol PRN

## 2023-10-05 NOTE — ED TRIAGE NOTES
Pt present to ED from PCP with concerns of irregular heart rate. Pt states that she gotten a colonoscopy 2 days ago and was told to follow up with pcp for irregular EKG. Pt denies chest pain, n/v, or headache. Pt states she only feel SOB upon coughing. Pt has history of HTN, and asthma. Pt last dose of HTN medication was last night.

## 2023-10-05 NOTE — ASSESSMENT & PLAN NOTE
Well controlled with home bp medications  BP elevated today on presentation at 198/93- did not take home meds  Continue home bp meds: Norvasc and spironolactone  Monitor bp and adjust as needed

## 2023-10-05 NOTE — ASSESSMENT & PLAN NOTE
Hemodynamically stable: denies any chest pain, palpitations, sob, headaches, vision issues   EKG's from PCP shows sinus tachycardia   In the ED patient has sinus rhythm and then on repeat EKG has sinus tachycardia with possibility of atrial fib/flutter and irregular QRS complexes.   Cardiology consulted- Dr. Pleitez- recommended Lovenox 1mg/kg subQ q 12hrs  Echo pending  Cardiac monitoring   Cardiology f/u and management regarding arrhythmia with possible home rate control and anticoagulation medication

## 2023-10-05 NOTE — SUBJECTIVE & OBJECTIVE
Past Medical History:   Diagnosis Date    Anxiety     Arthritis     knee    Asthma     Bursitis, shoulder     Colon polyps     Dental bridge present     upper right    Dental crown present     Depression     Hypertension     Pes planus     Wears glasses     for reading       Past Surgical History:   Procedure Laterality Date    arthroscopy ankle right      bilateral inguinal hernia repair      COLONOSCOPY N/A 01/11/2018    Procedure: COLONOSCOPY;  Surgeon: Christoph Nolasco MD;  Location: [facility] ENDO;  Service: Endoscopy;  Laterality: N/A;    COLONOSCOPY N/A 10/03/2023    Procedure: COLONOSCOPY;  Surgeon: Herbie Tellez MD;  Location: [facility] ENDO;  Service: Endoscopy;  Laterality: N/A;  instructions sent to patient portal. TBou  referral: EDWARDO Hammer NP    left torn rotator cuff repair  09/2014    MYOMECTOMY      rt salphigectomy      SYNOVECTOMY      TRANSFORAMINAL EPIDURAL INJECTION OF STEROID Right 01/18/2022    Procedure: LUMBAR TRANSFORAMINAL RIGHT L5/S1 DIRECT REFERRAL;  Surgeon: Cherelle Carpenter MD;  Location: [facility] PAIN MGT;  Service: Pain Management;  Laterality: Right;    TRANSFORAMINAL EPIDURAL INJECTION OF STEROID Bilateral 01/12/2023    Procedure: INJECTION, STEROID, EPIDURAL, TRANSFORAMINAL APPROACH, BILATERAL L5-S1 CONTRAST DIRECT REF;  Surgeon: Lorraine Braxton MD;  Location: [facility] PAIN MGT;  Service: Pain Management;  Laterality: Bilateral;    UMBILICAL HERNIA REPAIR         Review of patient's allergies indicates:   Allergen Reactions    Lisinopril Hives, Itching, Rash, Shortness Of Breath and Swelling       No current facility-administered medications on file prior to encounter.     Current Outpatient Medications on File Prior to Encounter   Medication Sig    albuterol (PROVENTIL HFA) 90 mcg/actuation inhaler Inhale 2 puffs into the lungs every 6 (six) hours as needed for Wheezing. Rescue    albuterol-ipratropium (DUO-NEB) 2.5 mg-0.5 mg/3 mL nebulizer solution Take 3 mLs by nebulization every 6 (six) hours as  needed for Wheezing or Shortness of Breath (cough).    amLODIPine (NORVASC) 10 MG tablet Take 1 tablet (10 mg total) by mouth once daily.    diclofenac (VOLTAREN) 75 MG EC tablet Take 1 tablet (75 mg total) by mouth 2 (two) times daily as needed.    diphenhydrAMINE (BENADRYL) 25 mg capsule Take 2 capsules (50 mg total) by mouth every 6 (six) hours as needed for Itching or Allergies.    EPINEPHrine (EPIPEN) 0.3 mg/0.3 mL AtIn Inject 0.3 mLs (0.3 mg total) into the muscle as needed.    ergocalciferol (ERGOCALCIFEROL) 50,000 unit Cap Take 1 capsule (50,000 Units total) by mouth every 7 days.    fluticasone propionate (FLONASE) 50 mcg/actuation nasal spray 2 sprays (100 mcg total) by Each Nostril route once daily.    gabapentin (NEURONTIN) 300 MG capsule Take 1 capsule (300 mg total) by mouth 3 (three) times daily.    loratadine (CLARITIN) 10 mg tablet Take 1 tablet (10 mg total) by mouth every morning.    olopatadine (PATANOL) 0.1 % ophthalmic solution Place 1 drop into both eyes 2 (two) times daily.    promethazine-dextromethorphan (PROMETHAZINE-DM) 6.25-15 mg/5 mL Syrp Take 5 mLs by mouth every 6 (six) hours as needed (cough).    spironolactone (ALDACTONE) 50 MG tablet Take 50 mg by mouth 2 (two) times daily.    topiramate (TOPAMAX) 25 MG tablet Take 1 tablet (25 mg total) by mouth 2 (two) times daily.    [DISCONTINUED] spironolactone (ALDACTONE) 100 MG tablet Take 1 tablet (100 mg total) by mouth once daily.     Family History       Problem Relation (Age of Onset)    Breast cancer Mother, Maternal Aunt    Cancer Mother, Brother    Diabetes Mother    Hypertension Mother          Tobacco Use    Smoking status: Never    Smokeless tobacco: Never   Substance and Sexual Activity    Alcohol use: Yes     Comment: social     Drug use: No    Sexual activity: Not Currently     Birth control/protection: None     Comment: occasional     Review of Systems   Constitutional:  Negative for appetite change, chills, fatigue and fever.    HENT:  Positive for congestion. Negative for sore throat.    Eyes:  Negative for visual disturbance.   Respiratory:  Positive for cough. Negative for chest tightness, shortness of breath and wheezing.    Cardiovascular:  Negative for chest pain, palpitations and leg swelling.   Gastrointestinal:  Negative for abdominal pain, diarrhea, nausea and vomiting.   Endocrine: Negative for polyuria.   Genitourinary:  Negative for dysuria, hematuria and urgency.   Musculoskeletal:  Negative for back pain and myalgias.   Skin:  Negative for rash.   Neurological:  Negative for dizziness, syncope, weakness, light-headedness and headaches.   Psychiatric/Behavioral:  Negative for confusion.      Objective:     Vital Signs (Most Recent):  Temp: 99.3 °F (37.4 °C) (10/05/23 1333)  Pulse: 84 (10/05/23 1615)  Resp: (!) 28 (10/05/23 1615)  BP: 129/76 (10/05/23 1615)  SpO2: 100 % (10/05/23 1615) Vital Signs (24h Range):  Temp:  [99.2 °F (37.3 °C)-99.3 °F (37.4 °C)] 99.3 °F (37.4 °C)  Pulse:  [] 84  Resp:  [13-28] 28  SpO2:  [98 %-100 %] 100 %  BP: (129-198)/(76-97) 129/76        There is no height or weight on file to calculate BMI.     Physical Exam  Constitutional:       General: She is not in acute distress.     Appearance: Normal appearance. She is obese. She is not ill-appearing.   HENT:      Head: Normocephalic and atraumatic.      Nose: Congestion present.      Mouth/Throat:      Mouth: Mucous membranes are moist.   Eyes:      Extraocular Movements: Extraocular movements intact.   Cardiovascular:      Rate and Rhythm: Tachycardia present. Rhythm irregular.      Pulses: Normal pulses.      Heart sounds: No murmur heard.     No friction rub.      Comments: Irregular heart beat, sinus tachy rate 143, possible atrial fibrillation per EKG in the ED.   Pulmonary:      Breath sounds: Normal breath sounds. No wheezing or rales.      Comments: States mild discomfort while taking deep breaths.   Abdominal:      Palpations: Abdomen  is soft.      Tenderness: There is no abdominal tenderness. There is no guarding.   Musculoskeletal:         General: Normal range of motion.   Skin:     General: Skin is warm and dry.   Neurological:      General: No focal deficit present.      Mental Status: She is alert and oriented to person, place, and time. Mental status is at baseline.   Psychiatric:         Mood and Affect: Mood normal.         Behavior: Behavior normal.         Thought Content: Thought content normal.             Significant Labs: All pertinent labs within the past 24 hours have been reviewed.  CBC:   Recent Labs   Lab 10/05/23  1232   WBC 6.64   HGB 12.7   HCT 42.3        CMP:   Recent Labs   Lab 10/05/23  1232      K 4.1      CO2 26   GLU 99   BUN 6   CREATININE 0.8   CALCIUM 10.1   PROT 8.9*   ALBUMIN 3.8   BILITOT 0.4   ALKPHOS 116   AST 28   ALT 30   ANIONGAP 11     TSH:   Recent Labs   Lab 10/05/23  1232   TSH 0.398*       Significant Imaging: I have reviewed all pertinent imaging results/findings within the past 24 hours.

## 2023-10-05 NOTE — PHARMACY MED REC
"Patient is unable to confirm medications at bedside.    Admission Medication History     The home medication history was taken by Augusta De Oliveira.    You may go to "Admission" then "Reconcile Home Medications" tabs to review and/or act upon these items.     The home medication list has been updated by the Pharmacy department.   Please read ALL comments highlighted in yellow.   Please address this information as you see fit.    Feel free to contact us if you have any questions or require assistance.    Medications listed below were obtained from: Patient/family and Analytic software- [employer]  (Not in a hospital admission)          Augusta De Oliveira  [phone]                 .          "

## 2023-10-05 NOTE — PROGRESS NOTES
Routine Office Visit    Patient Name: Brandi Sahu    : 1960  MRN: [id number]    Chief Complaint:  Cough, tachycardia follow-up    Subjective:  Brandi is a 62 y.o. female who presents today for:    Cough, tachycardia follow-up - patient who is known to me with the below documented medical history reports today for evaluation.  Recently for her colonoscopy she states that she was told she had a high heart rate.  She did have an EKG done which showed premature supraventricular complexes and PVCs.  She denies any chest pain but does report some coughing in the last 2 weeks.  She does have some associated shortness of breath with the coughing. She does have a scheduled cardiology appointment for later this month.  She does have some Nasal congestion as well.    Past Medical History  Past Medical History:   Diagnosis Date    Anxiety     Arthritis     knee    Asthma     Bursitis, shoulder     Colon polyps     Dental bridge present     upper right    Dental crown present     Depression     Hypertension     Pes planus     Wears glasses     for reading       Past Surgical History  Past Surgical History:   Procedure Laterality Date    arthroscopy ankle right      bilateral inguinal hernia repair      COLONOSCOPY N/A 2018    Procedure: COLONOSCOPY;  Surgeon: Christoph Nolasco MD;  Location: [facility] ENDO;  Service: Endoscopy;  Laterality: N/A;    COLONOSCOPY N/A 10/03/2023    Procedure: COLONOSCOPY;  Surgeon: Herbie Tellez MD;  Location: [facility] ENDO;  Service: Endoscopy;  Laterality: N/A;  instructions sent to patient portal. TBou  referral: EDWARDO Hammer NP    left torn rotator cuff repair  2014    MYOMECTOMY      rt salphigectomy      SYNOVECTOMY      TRANSFORAMINAL EPIDURAL INJECTION OF STEROID Right 2022    Procedure: LUMBAR TRANSFORAMINAL RIGHT L5/S1 DIRECT REFERRAL;  Surgeon: Cherelle Carpenter MD;  Location: [facility] PAIN MGT;  Service: Pain Management;  Laterality: Right;    TRANSFORAMINAL EPIDURAL INJECTION OF  STEROID Bilateral 01/12/2023    Procedure: INJECTION, STEROID, EPIDURAL, TRANSFORAMINAL APPROACH, BILATERAL L5-S1 CONTRAST DIRECT REF;  Surgeon: Lorraine Braxton MD;  Location: [facility] PAIN MGT;  Service: Pain Management;  Laterality: Bilateral;    UMBILICAL HERNIA REPAIR         Family History  Family History   Problem Relation Age of Onset    Cancer Mother         breast    Diabetes Mother     Hypertension Mother     Breast cancer Mother     Cancer Brother         liver cancer    Breast cancer Maternal Aunt        Social History  Social History     Socioeconomic History    Marital status:    Tobacco Use    Smoking status: Never    Smokeless tobacco: Never   Substance and Sexual Activity    Alcohol use: Yes     Comment: social     Drug use: No    Sexual activity: Not Currently     Birth control/protection: None     Comment: occasional       Current Medications  Current Outpatient Medications on File Prior to Visit   Medication Sig Dispense Refill    albuterol (PROVENTIL HFA) 90 mcg/actuation inhaler Inhale 2 puffs into the lungs every 6 (six) hours as needed for Wheezing. Rescue 18 g 0    albuterol-ipratropium (DUO-NEB) 2.5 mg-0.5 mg/3 mL nebulizer solution Take 3 mLs by nebulization every 6 (six) hours as needed for Wheezing or Shortness of Breath (cough). 120 each 0    amLODIPine (NORVASC) 10 MG tablet Take 1 tablet (10 mg total) by mouth once daily. 30 tablet 6    diclofenac (VOLTAREN) 75 MG EC tablet Take 1 tablet (75 mg total) by mouth 2 (two) times daily as needed. 60 tablet 3    diphenhydrAMINE (BENADRYL) 25 mg capsule Take 2 capsules (50 mg total) by mouth every 6 (six) hours as needed for Itching or Allergies. 30 capsule 0    ergocalciferol (ERGOCALCIFEROL) 50,000 unit Cap Take 1 capsule (50,000 Units total) by mouth every 7 days. 12 capsule 3    fluticasone propionate (FLONASE) 50 mcg/actuation nasal spray 2 sprays (100 mcg total) by Each Nostril route once daily. 16 g 0    loratadine (CLARITIN) 10 mg  "tablet Take 1 tablet (10 mg total) by mouth every morning. 60 tablet 0    olopatadine (PATANOL) 0.1 % ophthalmic solution Place 1 drop into both eyes 2 (two) times daily. 5 mL 0    promethazine-dextromethorphan (PROMETHAZINE-DM) 6.25-15 mg/5 mL Syrp Take 5 mLs by mouth every 6 (six) hours as needed (cough). 240 mL 0    topiramate (TOPAMAX) 25 MG tablet Take 1 tablet (25 mg total) by mouth 2 (two) times daily. 60 tablet 1    EPINEPHrine (EPIPEN) 0.3 mg/0.3 mL AtIn Inject 0.3 mLs (0.3 mg total) into the muscle as needed. 2 each 0    gabapentin (NEURONTIN) 300 MG capsule Take 1 capsule (300 mg total) by mouth 3 (three) times daily. 90 capsule 0    spironolactone (ALDACTONE) 100 MG tablet Take 1 tablet (100 mg total) by mouth once daily. 30 tablet 5     No current facility-administered medications on file prior to visit.       Allergies   Review of patient's allergies indicates:   Allergen Reactions    Lisinopril Hives, Itching, Rash, Shortness Of Breath and Swelling       Review of Systems (Pertinent positives)  Review of Systems   Constitutional:  Negative for chills, fever and weight loss.   HENT:  Positive for congestion.    Eyes: Negative.    Respiratory:  Positive for cough, sputum production and shortness of breath. Negative for hemoptysis and wheezing.    Cardiovascular:  Positive for leg swelling. Negative for chest pain and palpitations.   Gastrointestinal: Negative.    Genitourinary: Negative.    Skin: Negative.        BP (!) 162/80 (BP Location: Left arm, Patient Position: Sitting, BP Method: X-Large (Manual))   Pulse 104   Temp 99.2 °F (37.3 °C) (Oral)   Resp 16   Ht 5' 6" (1.676 m)   Wt 123.8 kg (272 lb 14.9 oz)   LMP 07/18/2012   SpO2 98%   BMI 44.05 kg/m²     Physical Exam  Constitutional:       General: She is not in acute distress.     Appearance: Normal appearance. She is not ill-appearing, toxic-appearing or diaphoretic.   HENT:      Head: Normocephalic and atraumatic.   Cardiovascular:      " Rate and Rhythm: Tachycardia present. Rhythm irregular.      Pulses: Normal pulses.      Heart sounds: Normal heart sounds.      Comments: HR varying but mostly tachycardic  Pulmonary:      Effort: Pulmonary effort is normal. No tachypnea or accessory muscle usage.      Breath sounds: Examination of the right-middle field reveals wheezing and rhonchi. Examination of the left-middle field reveals wheezing, rhonchi and rales. Examination of the right-lower field reveals wheezing and rhonchi. Examination of the left-lower field reveals wheezing, rhonchi and rales. Wheezing, rhonchi and rales present.   Musculoskeletal:      Right lower le+ Pitting Edema present.      Left lower le+ Pitting Edema present.   Skin:     General: Skin is warm and dry.      Capillary Refill: Capillary refill takes less than 2 seconds.   Neurological:      General: No focal deficit present.      Mental Status: She is alert and oriented to person, place, and time.   Psychiatric:         Mood and Affect: Mood normal.         Behavior: Behavior normal.          Assessment/Plan:  Brandi Sahu is a 62 y.o. female who presents today for :    Brandi was seen today for follow-up and cough.    Diagnoses and all orders for this visit:    Tachycardia  -     IN OFFICE EKG 12-LEAD (to Muse)    Cough, unspecified type    I did multiple EKGs during the visit which show heart rates varying between 80 and 140 within seconds.  Given her high heart rate with such variations I did recommend emergent evaluation in the ER.  Patient is upset about this but agreeable.  I did make copies of the EKGs that were done today for the patient to bring with her to the emergency room.  Report was called to the emergency room and the patient left the clinic in no acute distress.        This office note has been dictated.  This dictation has been generated using M-Modal Fluency Direct dictation; some phonetic errors may occur.

## 2023-10-05 NOTE — DISCHARGE INSTRUCTIONS
Please return immediately if you get worse or if new problems develop.  Please follow-up with the cardiologist above this week.  Call today for an appointment.  You may also follow up with the primary care doctor.  No caffeine energy drinks cold medicines.  Rest.     - Please make sure you check you blood pressures, prior to taking your blood pressure medications. Hold/Do not take your blood pressure medications if systolic blood pressure (top number) is < 120.

## 2023-10-05 NOTE — ED PROVIDER NOTES
"Encounter Date: 10/5/2023    SCRIBE #1 NOTE: I, Kae Nickerson, am scribing for, and in the presence of,  Christiano Orr MD. Other sections scribed: HPI, ROS.       History     Chief Complaint   Patient presents with    Irregular Heart Beat     Patient endorses brief 2 day history of irregular HR which was discovered pre colonoscopy 2 days ago, EKGs are w her. Saw her PCP today who did an EKG and was told to come to the ER.  Arrives via POV w CC: chest "tightness", SOB w minimal activity. PMH sig for asthma. EKG done in triage and reviewed by MD.     CC: Irregular heartbeat    HPI: History is obtained from independent historian. This is a 62 y.o. F who has a PMHx of Anxiety, Arthritis, Asthma, Bursitis, Colon polyps, Depression, HTN, and Pes planus who presents to the ED for emergent evaluation of intermittent irregular heartbeat that was first noticed during Colonoscopy 2 days ago. Pt reports a heart rate ranging from  bpm during Colonoscopy. She had a follow-up at her PCP's office today and EKG showed fluctuating HR and pt was instructed to report to the ED for further evaluation. Additionally, the pt endorses cough for the past 3-4 weeks and reports nasal congestion. She was told by her PCP that the cough is possibly due to another Bronchitis flare. Pt has a left ankle brace in place. She reports an issue with the left ankle for the last 18 years. She denies leg pain, or leg swelling. Pt does not have a Hx of thyroid disease. Pt denies fever, chills, ear pain, sore throat, runny nose, eye problem, SOB, CP, abdominal pain, nausea, vomiting, diarrhea, dysuria, arm or leg problems, rash, or headache.     The history is provided by the patient. No  was used.     Review of patient's allergies indicates:   Allergen Reactions    Lisinopril Hives, Itching, Rash, Shortness Of Breath and Swelling     Past Medical History:   Diagnosis Date    Anxiety     Arthritis     knee    Asthma     Bursitis, " shoulder     Colon polyps     Dental bridge present     upper right    Dental crown present     Depression     Hypertension     Pes planus     Wears glasses     for reading     Past Surgical History:   Procedure Laterality Date    arthroscopy ankle right      bilateral inguinal hernia repair      COLONOSCOPY N/A 01/11/2018    Procedure: COLONOSCOPY;  Surgeon: Christoph Nolasco MD;  Location: [facility] ENDO;  Service: Endoscopy;  Laterality: N/A;    COLONOSCOPY N/A 10/03/2023    Procedure: COLONOSCOPY;  Surgeon: Herbie Tellez MD;  Location: [facility] ENDO;  Service: Endoscopy;  Laterality: N/A;  instructions sent to patient portal. TBou  referral: EDWARDO Hammer NP    left torn rotator cuff repair  09/2014    MYOMECTOMY      rt salphigectomy      SYNOVECTOMY      TRANSFORAMINAL EPIDURAL INJECTION OF STEROID Right 01/18/2022    Procedure: LUMBAR TRANSFORAMINAL RIGHT L5/S1 DIRECT REFERRAL;  Surgeon: Cherelle Carpenter MD;  Location: [facility] PAIN MGT;  Service: Pain Management;  Laterality: Right;    TRANSFORAMINAL EPIDURAL INJECTION OF STEROID Bilateral 01/12/2023    Procedure: INJECTION, STEROID, EPIDURAL, TRANSFORAMINAL APPROACH, BILATERAL L5-S1 CONTRAST DIRECT REF;  Surgeon: Lorraine Braxton MD;  Location: [facility] PAIN MGT;  Service: Pain Management;  Laterality: Bilateral;    UMBILICAL HERNIA REPAIR       Family History   Problem Relation Age of Onset    Cancer Mother         breast    Diabetes Mother     Hypertension Mother     Breast cancer Mother     Cancer Brother         liver cancer    Breast cancer Maternal Aunt      Social History     Tobacco Use    Smoking status: Never    Smokeless tobacco: Never   Substance Use Topics    Alcohol use: Yes     Comment: social     Drug use: No     Review of Systems   Constitutional:  Negative for chills and fever.   HENT:  Positive for congestion. Negative for rhinorrhea and sore throat.    Eyes:  Negative for pain and visual disturbance.   Respiratory:  Positive for cough. Negative for shortness of  breath.    Cardiovascular:  Negative for chest pain and leg swelling.   Gastrointestinal:  Negative for abdominal pain, diarrhea, nausea and vomiting.   Genitourinary:  Negative for dysuria.   Musculoskeletal:  Negative for back pain and myalgias.   Skin:  Negative for rash.   Neurological:  Negative for weakness and headaches.   Hematological:  Does not bruise/bleed easily.       Physical Exam     Initial Vitals   BP Pulse Resp Temp SpO2   10/05/23 1157 10/05/23 1157 10/05/23 1157 10/05/23 1333 10/05/23 1157   (!) 198/93 86 15 99.3 °F (37.4 °C) 98 %      MAP       --                Physical Exam  The patient was examined specifically for the following:   General:No significant distress, Good color, Warm and dry. Head and neck:Scalp atraumatic, Neck supple. Neurological:Appropriate conversation, Gross motor deficits. Eyes:Conjugate gaze, Clear corneas. ENT: No epistaxis. Cardiac: Regular rate and rhythm, Grossly normal heart tones. Pulmonary: Wheezing, Rales. Gastrointestinal: Abdominal tenderness, Abdominal distention. Musculoskeletal: Extremity deformity, Apparent pain with range of motion of the joints. Skin: Rash.   The findings on examination were normal except for the following:  The patient has an elevated BMI.  She is wearing a splint on the right ankle for chronic pain.  Lungs are clear.  The heart tones are normal.  The abdomen is soft.  Extremities are nontender there is no apparent pain with range of motion any joints.  The patient has no rash.  ED Course   Procedures      Labs Reviewed   TSH - Abnormal; Notable for the following components:       Result Value    TSH 0.398 (*)     All other components within normal limits   COMPREHENSIVE METABOLIC PANEL - Abnormal; Notable for the following components:    Total Protein 8.9 (*)     All other components within normal limits   CBC W/ AUTO DIFFERENTIAL - Abnormal; Notable for the following components:    MCH 25.6 (*)     MCHC 30.0 (*)     All other  components within normal limits   T4, FREE     EKG Readings: (Independently Interpreted)   This patient is in a sinus rhythm with premature atrial complexes.  The patient has a heart rate of 88.  There are PACs.  There is no definite evidence of acute myocardial infarction or malignant arrhythmia.  ST segment and T-waves are normal.       Imaging Results    None          Medications - No data to display  Medical Decision Making  Amount and/or Complexity of Data Reviewed  Labs: ordered.    The above this patient presents emergency room with a complaint of palpitations.  She saw her primary care doctor.  She presents with 2 copies of EKGs that demonstrates sinus tachycardia.  In the emergency room her heart rate is around 97.  On EKG she had a sinus rhythm with a heart rate of 88.  The EKGs from clinic reveal P-waves before QRS complexes.  The heart rate is 138.  I am concerned that this patient is going in and out of flutter.  She could also have atrial fibrillation.  The QRS complexes are slightly irregular.  In the emergency room the patient is clearly in sinus rhythm when her EKG is taken.  On the monitor the patient has episodes where her heart rate just picks up to 143.  Again, I think this is a intermittent atrial fibrillation flutter syndrome.  I discussed this case with Dr. Pleitez who suggested the patient be admitted to the hospital.    The lungs are clear the heart tones are normal the patient is in no distress.  She has a very slight cough.  She is nasal congestion.  I believe she has a viral URI.         Scribe Attestation:   Scribe #1: I performed the above scribed service and the documentation accurately describes the services I performed. I attest to the accuracy of the note.                      I personally performed the services described in this documentation.  All medical record  entries made by the scribe are at my direction and in my presence.  Signed, Dr. Orr  Clinical Impression:   Final  diagnoses:  [R00.2] Palpitations (Primary)  [R00.0] Sinus tachycardia  [I48.92] Atrial flutter, unspecified type        ED Disposition Condition    Observation Stable                  Christiano Orr MD  10/05/23 4422

## 2023-10-06 VITALS
DIASTOLIC BLOOD PRESSURE: 56 MMHG | SYSTOLIC BLOOD PRESSURE: 132 MMHG | RESPIRATION RATE: 20 BRPM | TEMPERATURE: 99 F | HEIGHT: 64 IN | HEART RATE: 79 BPM | OXYGEN SATURATION: 95 % | BODY MASS INDEX: 47.16 KG/M2 | WEIGHT: 276.25 LBS

## 2023-10-06 LAB
ALBUMIN SERPL BCP-MCNC: 3.4 G/DL (ref 3.5–5.2)
ALP SERPL-CCNC: 108 U/L (ref 55–135)
ALT SERPL W/O P-5'-P-CCNC: 30 U/L (ref 10–44)
ANION GAP SERPL CALC-SCNC: 9 MMOL/L (ref 8–16)
AST SERPL-CCNC: 31 U/L (ref 10–40)
AV INDEX (PROSTH): 0.86
AV MEAN GRADIENT: 5 MMHG
AV PEAK GRADIENT: 9 MMHG
AV VALVE AREA BY VELOCITY RATIO: 2.81 CM²
AV VALVE AREA: 3.12 CM²
AV VELOCITY RATIO: 0.77
BASOPHILS # BLD AUTO: 0.09 K/UL (ref 0–0.2)
BASOPHILS NFR BLD: 1.6 % (ref 0–1.9)
BILIRUB SERPL-MCNC: 0.3 MG/DL (ref 0.1–1)
BSA FOR ECHO PROCEDURE: 2.38 M2
BUN SERPL-MCNC: 8 MG/DL (ref 6–20)
CALCIUM SERPL-MCNC: 9.5 MG/DL (ref 8.7–10.5)
CHLORIDE SERPL-SCNC: 105 MMOL/L (ref 95–110)
CHOLEST SERPL-MCNC: 153 MG/DL (ref 120–199)
CHOLEST/HDLC SERPL: 4.6 {RATIO} (ref 2–5)
CO2 SERPL-SCNC: 27 MMOL/L (ref 23–29)
CREAT SERPL-MCNC: 0.8 MG/DL (ref 0.5–1.4)
CV ECHO LV RWT: 0.53 CM
DIFFERENTIAL METHOD: ABNORMAL
DOP CALC AO PEAK VEL: 1.51 M/S
DOP CALC AO VTI: 25.1 CM
DOP CALC LVOT AREA: 3.6 CM2
DOP CALC LVOT DIAMETER: 2.15 CM
DOP CALC LVOT PEAK VEL: 1.17 M/S
DOP CALC LVOT STROKE VOLUME: 78.38 CM3
DOP CALCLVOT PEAK VEL VTI: 21.6 CM
E WAVE DECELERATION TIME: 212.92 MSEC
E/A RATIO: 1.03
E/E' RATIO: 6.4 M/S
ECHO LV POSTERIOR WALL: 1.19 CM (ref 0.6–1.1)
EOSINOPHIL # BLD AUTO: 0.4 K/UL (ref 0–0.5)
EOSINOPHIL NFR BLD: 6.6 % (ref 0–8)
ERYTHROCYTE [DISTWIDTH] IN BLOOD BY AUTOMATED COUNT: 14.2 % (ref 11.5–14.5)
EST. GFR  (NO RACE VARIABLE): >60 ML/MIN/1.73 M^2
FRACTIONAL SHORTENING: 25 % (ref 28–44)
GLUCOSE SERPL-MCNC: 97 MG/DL (ref 70–110)
HCT VFR BLD AUTO: 41.7 % (ref 37–48.5)
HDLC SERPL-MCNC: 33 MG/DL (ref 40–75)
HDLC SERPL: 21.6 % (ref 20–50)
HGB BLD-MCNC: 12.3 G/DL (ref 12–16)
IMM GRANULOCYTES # BLD AUTO: 0.01 K/UL (ref 0–0.04)
IMM GRANULOCYTES NFR BLD AUTO: 0.2 % (ref 0–0.5)
INTERVENTRICULAR SEPTUM: 1.16 CM (ref 0.6–1.1)
IVC DIAMETER: 1.94 CM
IVRT: 83.73 MSEC
LA MAJOR: 5.4 CM
LA MINOR: 3.83 CM
LA WIDTH: 3.8 CM
LDLC SERPL CALC-MCNC: 111.2 MG/DL (ref 63–159)
LEFT ATRIUM SIZE: 3.71 CM
LEFT ATRIUM VOLUME INDEX: 24 ML/M2
LEFT ATRIUM VOLUME: 53.7 CM3
LEFT INTERNAL DIMENSION IN SYSTOLE: 3.32 CM (ref 2.1–4)
LEFT VENTRICLE DIASTOLIC VOLUME INDEX: 40.26 ML/M2
LEFT VENTRICLE DIASTOLIC VOLUME: 90.19 ML
LEFT VENTRICLE MASS INDEX: 84 G/M2
LEFT VENTRICLE SYSTOLIC VOLUME INDEX: 20.1 ML/M2
LEFT VENTRICLE SYSTOLIC VOLUME: 44.94 ML
LEFT VENTRICULAR INTERNAL DIMENSION IN DIASTOLE: 4.45 CM (ref 3.5–6)
LEFT VENTRICULAR MASS: 188.89 G
LV LATERAL E/E' RATIO: 4.92 M/S
LV SEPTAL E/E' RATIO: 9.14 M/S
LVOT MG: 3.17 MMHG
LVOT MV: 0.84 CM/S
LYMPHOCYTES # BLD AUTO: 2.1 K/UL (ref 1–4.8)
LYMPHOCYTES NFR BLD: 37.8 % (ref 18–48)
MCH RBC QN AUTO: 25.4 PG (ref 27–31)
MCHC RBC AUTO-ENTMCNC: 29.5 G/DL (ref 32–36)
MCV RBC AUTO: 86 FL (ref 82–98)
MONOCYTES # BLD AUTO: 1.1 K/UL (ref 0.3–1)
MONOCYTES NFR BLD: 20.7 % (ref 4–15)
MV PEAK A VEL: 0.62 M/S
MV PEAK E VEL: 0.64 M/S
MV STENOSIS PRESSURE HALF TIME: 61.75 MS
MV VALVE AREA P 1/2 METHOD: 3.56 CM2
NEUTROPHILS # BLD AUTO: 1.8 K/UL (ref 1.8–7.7)
NEUTROPHILS NFR BLD: 33.1 % (ref 38–73)
NONHDLC SERPL-MCNC: 120 MG/DL
NRBC BLD-RTO: 0 /100 WBC
PISA TR MAX VEL: 2.51 M/S
PLATELET # BLD AUTO: 164 K/UL (ref 150–450)
PMV BLD AUTO: 12.8 FL (ref 9.2–12.9)
POTASSIUM SERPL-SCNC: 4 MMOL/L (ref 3.5–5.1)
PROT SERPL-MCNC: 8 G/DL (ref 6–8.4)
PV PEAK GRADIENT: 8 MMHG
PV PEAK VELOCITY: 1.39 M/S
RA MAJOR: 4.68 CM
RA PRESSURE ESTIMATED: 3 MMHG
RA WIDTH: 3.92 CM
RBC # BLD AUTO: 4.84 M/UL (ref 4–5.4)
RIGHT VENTRICULAR END-DIASTOLIC DIMENSION: 3.57 CM
RV TB RVSP: 6 MMHG
SINUS: 2.77 CM
SODIUM SERPL-SCNC: 141 MMOL/L (ref 136–145)
STJ: 2.22 CM
TDI LATERAL: 0.13 M/S
TDI SEPTAL: 0.07 M/S
TDI: 0.1 M/S
TR MAX PG: 25 MMHG
TRICUSPID ANNULAR PLANE SYSTOLIC EXCURSION: 2.53 CM
TRIGL SERPL-MCNC: 44 MG/DL (ref 30–150)
TV PEAK GRADIENT: 1 MMHG
TV REST PULMONARY ARTERY PRESSURE: 28 MMHG
WBC # BLD AUTO: 5.47 K/UL (ref 3.9–12.7)
Z-SCORE OF LEFT VENTRICULAR DIMENSION IN END DIASTOLE: -5.88
Z-SCORE OF LEFT VENTRICULAR DIMENSION IN END SYSTOLE: -3.01

## 2023-10-06 PROCEDURE — 80061 LIPID PANEL: CPT

## 2023-10-06 PROCEDURE — G0378 HOSPITAL OBSERVATION PER HR: HCPCS

## 2023-10-06 PROCEDURE — 36415 COLL VENOUS BLD VENIPUNCTURE: CPT

## 2023-10-06 PROCEDURE — 99213 PR OFFICE/OUTPT VISIT, EST, LEVL III, 20-29 MIN: ICD-10-PCS | Mod: 25,,, | Performed by: INTERNAL MEDICINE

## 2023-10-06 PROCEDURE — 94761 N-INVAS EAR/PLS OXIMETRY MLT: CPT

## 2023-10-06 PROCEDURE — 25000242 PHARM REV CODE 250 ALT 637 W/ HCPCS

## 2023-10-06 PROCEDURE — 85025 COMPLETE CBC W/AUTO DIFF WBC: CPT

## 2023-10-06 PROCEDURE — 96372 THER/PROPH/DIAG INJ SC/IM: CPT | Performed by: EMERGENCY MEDICINE

## 2023-10-06 PROCEDURE — 80053 COMPREHEN METABOLIC PANEL: CPT

## 2023-10-06 PROCEDURE — 25000003 PHARM REV CODE 250

## 2023-10-06 PROCEDURE — 25000003 PHARM REV CODE 250: Performed by: INTERNAL MEDICINE

## 2023-10-06 PROCEDURE — 99213 OFFICE O/P EST LOW 20 MIN: CPT | Mod: 25,,, | Performed by: INTERNAL MEDICINE

## 2023-10-06 PROCEDURE — 63600175 PHARM REV CODE 636 W HCPCS: Performed by: EMERGENCY MEDICINE

## 2023-10-06 RX ORDER — METOPROLOL SUCCINATE 25 MG/1
25 TABLET, EXTENDED RELEASE ORAL DAILY
Qty: 30 TABLET | Refills: 11 | Status: SHIPPED | OUTPATIENT
Start: 2023-10-06 | End: 2024-10-05

## 2023-10-06 RX ORDER — METOPROLOL SUCCINATE 25 MG/1
25 TABLET, EXTENDED RELEASE ORAL DAILY
Status: DISCONTINUED | OUTPATIENT
Start: 2023-10-06 | End: 2023-10-06 | Stop reason: HOSPADM

## 2023-10-06 RX ADMIN — ENOXAPARIN SODIUM 120 MG: 120 INJECTION SUBCUTANEOUS at 09:10

## 2023-10-06 RX ADMIN — METOPROLOL SUCCINATE ER TABLETS 25 MG: 25 TABLET, FILM COATED, EXTENDED RELEASE ORAL at 10:10

## 2023-10-06 RX ADMIN — IPRATROPIUM BROMIDE AND ALBUTEROL SULFATE 3 ML: .5; 3 SOLUTION RESPIRATORY (INHALATION) at 01:10

## 2023-10-06 RX ADMIN — SPIRONOLACTONE 50 MG: 25 TABLET ORAL at 10:10

## 2023-10-06 RX ADMIN — AMLODIPINE BESYLATE 10 MG: 5 TABLET ORAL at 10:10

## 2023-10-06 NOTE — PLAN OF CARE
Problem: Adult Inpatient Plan of Care  Goal: Plan of Care Review  Outcome: Adequate for Care Transition  Goal: Patient-Specific Goal (Individualized)  Outcome: Adequate for Care Transition  Goal: Absence of Hospital-Acquired Illness or Injury  Outcome: Adequate for Care Transition  Goal: Optimal Comfort and Wellbeing  Outcome: Adequate for Care Transition  Goal: Readiness for Transition of Care  Outcome: Adequate for Care Transition     Problem: Bariatric Environmental Safety  Goal: Safety Maintained with Care  Outcome: Adequate for Care Transition     Problem: Dysrhythmia  Goal: Normalized Cardiac Rhythm  Outcome: Adequate for Care Transition

## 2023-10-06 NOTE — HOSPITAL COURSE
Brandi Sahu was placed under observation for management of paroxysmal atrial fibrillation and tachycardia.     Throughout her observation stay, Ms. Sahu continue state that she felt well, was without any issues or complaints and only presented due to recommendations from her PCP.  Cardiology was consulted and recommended starting patient on Xarelto 20 mg daily as well as Toprol-XL and having the patient follow up in Cardiology Clinic.  Echocardiogram was ordered which noted normal LV size, wall thickness, normal wall motion and EF of 60-65%.  Left atrium was noted to be mildly dilated with normal right ventricular size and systolic function.  I educated the patient on the importance of adherence to her Xarelto as well as Toprol-XL.  I stressed to her to ensure that she checks her blood pressure prior to administration of her antihypertensive medication regimen and to hold/not take her blood pressure medications if her systolic blood pressure is less than 120. Vitals prior to discharge are as follows:  Afebrile at 99.3° F, HR:  86 bpm, RR: 20, BP: 159/70 with oxygen saturation of 94% on RA.    All findings and plan were explained to the patient. All questions and concerns were answered. Patient verbalized understanding. Patient is in stable condition to d/c home and has been informed to follow up with her PCP within the next 7-10 days to discuss her observation stay and to follow-up with Cardiology. Patient has been educated to return to the ED if she experiences any further palpitations, shortness of breath, chest pain, lightheadness, weakness, or discomfort.

## 2023-10-06 NOTE — NURSING
Pt arrive to the unit by wheelchair. Patient transferred self to bed. Tele monitoring initiated.  Admit assessment initiated.  Pt is AAOx4.  No acute distress or pain noted.

## 2023-10-06 NOTE — NURSING
[facility]  Nurses Note -- 4 Eyes      10/5/2023       Skin assessed on: Admit      [x] No Pressure Injuries Present    []Prevention Measures Documented    [] Yes LDA  for Pressure Injury Previously documented     [] Yes New Pressure Injury Discovered   [] LDA for New Pressure Injury Added      Attending RN:  Keisha Sexton RN     Second RN:  MARY Mejia         Statement Selected

## 2023-10-06 NOTE — PLAN OF CARE
Problem: Dysrhythmia  Goal: Normalized Cardiac Rhythm  Outcome: Ongoing, Progressing  Intervention: Monitor and Manage Cardiac Rhythm Effect  10/6/2023 0911 by Keisha Sexton, RN  Flowsheets (Taken 10/6/2023 0645)  Dysrhythmia Management: (Cardiac Monitor in place and monitoring) other (see comments)  10/6/2023 0909 by Keisha Sexton, RN  Flowsheets (Taken 10/6/2023 0645)  VTE Prevention/Management:   ambulation promoted   ROM (active) performed

## 2023-10-06 NOTE — SUBJECTIVE & OBJECTIVE
Past Medical History:   Diagnosis Date    Anxiety     Arthritis     knee    Asthma     Bursitis, shoulder     Colon polyps     Dental bridge present     upper right    Dental crown present     Depression     Hypertension     Pes planus     Wears glasses     for reading       Past Surgical History:   Procedure Laterality Date    arthroscopy ankle right      bilateral inguinal hernia repair      COLONOSCOPY N/A 01/11/2018    Procedure: COLONOSCOPY;  Surgeon: Christoph Nolasco MD;  Location: [facility] ENDO;  Service: Endoscopy;  Laterality: N/A;    COLONOSCOPY N/A 10/03/2023    Procedure: COLONOSCOPY;  Surgeon: Herbie Tellez MD;  Location: [facility] ENDO;  Service: Endoscopy;  Laterality: N/A;  instructions sent to patient portal. TBou  referral: EDWARDO Hammer NP    left torn rotator cuff repair  09/2014    MYOMECTOMY      rt salphigectomy      SYNOVECTOMY      TRANSFORAMINAL EPIDURAL INJECTION OF STEROID Right 01/18/2022    Procedure: LUMBAR TRANSFORAMINAL RIGHT L5/S1 DIRECT REFERRAL;  Surgeon: Cherelle Carpenter MD;  Location: [facility] PAIN MGT;  Service: Pain Management;  Laterality: Right;    TRANSFORAMINAL EPIDURAL INJECTION OF STEROID Bilateral 01/12/2023    Procedure: INJECTION, STEROID, EPIDURAL, TRANSFORAMINAL APPROACH, BILATERAL L5-S1 CONTRAST DIRECT REF;  Surgeon: Lorraine Braxton MD;  Location: [facility] PAIN MGT;  Service: Pain Management;  Laterality: Bilateral;    UMBILICAL HERNIA REPAIR         Review of patient's allergies indicates:   Allergen Reactions    Lisinopril Hives, Itching, Rash, Shortness Of Breath and Swelling       No current facility-administered medications on file prior to encounter.     Current Outpatient Medications on File Prior to Encounter   Medication Sig    albuterol (PROVENTIL HFA) 90 mcg/actuation inhaler Inhale 2 puffs into the lungs every 6 (six) hours as needed for Wheezing. Rescue    albuterol-ipratropium (DUO-NEB) 2.5 mg-0.5 mg/3 mL nebulizer solution Take 3 mLs by nebulization every 6 (six) hours as  needed for Wheezing or Shortness of Breath (cough).    amLODIPine (NORVASC) 10 MG tablet Take 1 tablet (10 mg total) by mouth once daily.    diclofenac (VOLTAREN) 75 MG EC tablet Take 1 tablet (75 mg total) by mouth 2 (two) times daily as needed.    diphenhydrAMINE (BENADRYL) 25 mg capsule Take 2 capsules (50 mg total) by mouth every 6 (six) hours as needed for Itching or Allergies.    EPINEPHrine (EPIPEN) 0.3 mg/0.3 mL AtIn Inject 0.3 mLs (0.3 mg total) into the muscle as needed.    ergocalciferol (ERGOCALCIFEROL) 50,000 unit Cap Take 1 capsule (50,000 Units total) by mouth every 7 days.    fluticasone propionate (FLONASE) 50 mcg/actuation nasal spray 2 sprays (100 mcg total) by Each Nostril route once daily.    gabapentin (NEURONTIN) 300 MG capsule Take 1 capsule (300 mg total) by mouth 3 (three) times daily.    loratadine (CLARITIN) 10 mg tablet Take 1 tablet (10 mg total) by mouth every morning.    olopatadine (PATANOL) 0.1 % ophthalmic solution Place 1 drop into both eyes 2 (two) times daily.    promethazine-dextromethorphan (PROMETHAZINE-DM) 6.25-15 mg/5 mL Syrp Take 5 mLs by mouth every 6 (six) hours as needed (cough).    spironolactone (ALDACTONE) 50 MG tablet Take 50 mg by mouth 2 (two) times daily.    topiramate (TOPAMAX) 25 MG tablet Take 1 tablet (25 mg total) by mouth 2 (two) times daily.     Family History       Problem Relation (Age of Onset)    Breast cancer Mother, Maternal Aunt    Cancer Mother, Brother    Diabetes Mother    Hypertension Mother          Tobacco Use    Smoking status: Never    Smokeless tobacco: Never   Substance and Sexual Activity    Alcohol use: Yes     Comment: social     Drug use: No    Sexual activity: Not Currently     Birth control/protection: None     Comment: occasional     Review of Systems   Constitutional: Negative.   HENT: Negative.     Eyes: Negative.    Respiratory: Negative.     Endocrine: Negative.    Hematologic/Lymphatic: Negative.    Skin: Negative.     Musculoskeletal: Negative.    Gastrointestinal: Negative.    Genitourinary: Negative.    Neurological: Negative.    Psychiatric/Behavioral: Negative.     Allergic/Immunologic: Negative.      Objective:     Vital Signs (Most Recent):  Temp: 99.9 °F (37.7 °C) (10/06/23 0703)  Pulse: 92 (10/06/23 0703)  Resp: 18 (10/06/23 0703)  BP: 130/70 (10/06/23 0703)  SpO2: 99 % (10/06/23 0815) Vital Signs (24h Range):  Temp:  [98.7 °F (37.1 °C)-99.9 °F (37.7 °C)] 99.9 °F (37.7 °C)  Pulse:  [] 92  Resp:  [13-28] 18  SpO2:  [92 %-100 %] 99 %  BP: (122-198)/(70-99) 130/70     Weight: 125.3 kg (276 lb 3.8 oz)  Body mass index is 47.42 kg/m².    SpO2: 99 %         Intake/Output Summary (Last 24 hours) at 10/6/2023 0943  Last data filed at 10/6/2023 0831  Gross per 24 hour   Intake 0 ml   Output --   Net 0 ml       Lines/Drains/Airways       Peripheral Intravenous Line  Duration                  Peripheral IV - Single Lumen 10/05/23 1209 20 G Anterior;Proximal;Right Forearm <1 day                     Physical Exam  Constitutional:       Appearance: Normal appearance. She is well-developed.   HENT:      Head: Normocephalic.   Eyes:      Pupils: Pupils are equal, round, and reactive to light.   Cardiovascular:      Rate and Rhythm: Normal rate. Frequent Extrasystoles are present.  Pulmonary:      Effort: Pulmonary effort is normal.      Breath sounds: Normal breath sounds.   Abdominal:      General: Bowel sounds are normal.      Palpations: Abdomen is soft.      Tenderness: There is no abdominal tenderness.   Musculoskeletal:         General: Normal range of motion.      Cervical back: Normal range of motion and neck supple.   Skin:     General: Skin is warm.   Neurological:      Mental Status: She is alert and oriented to person, place, and time.          Significant Labs: BMP:   Recent Labs   Lab 10/05/23  1232 10/06/23  0640   GLU 99 97    141   K 4.1 4.0    105   CO2 26 27   BUN 6 8   CREATININE 0.8 0.8   CALCIUM  "10.1 9.5   , CMP   Recent Labs   Lab 10/05/23  1232 10/06/23  0640    141   K 4.1 4.0    105   CO2 26 27   GLU 99 97   BUN 6 8   CREATININE 0.8 0.8   CALCIUM 10.1 9.5   PROT 8.9* 8.0   ALBUMIN 3.8 3.4*   BILITOT 0.4 0.3   ALKPHOS 116 108   AST 28 31   ALT 30 30   ANIONGAP 11 9   , CBC   Recent Labs   Lab 10/05/23  1232 10/06/23  0640   WBC 6.64 5.47   HGB 12.7 12.3   HCT 42.3 41.7    164   , INR No results for input(s): "INR", "PROTIME" in the last 48 hours., Lipid Panel   Recent Labs   Lab 10/06/23  0640   CHOL 153   HDL 33*   LDLCALC 111.2   TRIG 44   CHOLHDL 21.6   , Troponin No results for input(s): "TROPONINI" in the last 48 hours., and All pertinent lab results from the last 24 hours have been reviewed.    Significant Imaging: Echocardiogram: Transthoracic echo (TTE) complete (Cupid Only):   Results for orders placed or performed during the hospital encounter of 10/05/23   Echo   Result Value Ref Range    BSA 2.38 m2    LVOT stroke volume 78.38 cm3    LVIDd 4.45 3.5 - 6.0 cm    LV Systolic Volume 44.94 mL    LV Systolic Volume Index 20.1 mL/m2    LVIDs 3.32 2.1 - 4.0 cm    LV Diastolic Volume 90.19 mL    LV Diastolic Volume Index 40.26 mL/m2    IVS 1.16 (A) 0.6 - 1.1 cm    LVOT diameter 2.15 cm    LVOT area 3.6 cm2    FS 25 (A) 28 - 44 %    Left Ventricle Relative Wall Thickness 0.53 cm    Posterior Wall 1.19 (A) 0.6 - 1.1 cm    LV mass 188.89 g    LV Mass Index 84 g/m2    MV Peak E Chirag 0.64 m/s    TDI LATERAL 0.13 m/s    TDI SEPTAL 0.07 m/s    E/E' ratio 6.40 m/s    MV Peak A Chirag 0.62 m/s    TR Max Chirag 2.51 m/s    E/A ratio 1.03     IVRT 83.73 msec    E wave deceleration time 212.92 msec    LV SEPTAL E/E' RATIO 9.14 m/s    LV LATERAL E/E' RATIO 4.92 m/s    LVOT peak chirag 1.17 m/s    Left Ventricular Outflow Tract Mean Velocity 0.84 cm/s    Left Ventricular Outflow Tract Mean Gradient 3.17 mmHg    LA size 3.71 cm    Left Atrium Minor Axis 3.83 cm    RVDD 3.57 cm    TAPSE 2.53 cm    RA Major " Axis 4.68 cm    RA Width 3.92 cm    AV mean gradient 5 mmHg    AV peak gradient 9 mmHg    Ao peak flor 1.51 m/s    Ao VTI 25.10 cm    LVOT peak VTI 21.60 cm    AV valve area 3.12 cm²    AV Velocity Ratio 0.77     AV index (prosthetic) 0.86     KB by Velocity Ratio 2.81 cm²    MV stenosis pressure 1/2 time 61.75 ms    MV valve area p 1/2 method 3.56 cm2    TV peak gradient 1 mmHg    Triscuspid Valve Regurgitation Peak Gradient 25 mmHg    PV PEAK VELOCITY 1.39 m/s    PV peak gradient 8 mmHg    Sinus 2.77 cm    STJ 2.22 cm    IVC diameter 1.94 cm    Mean e' 0.10 m/s    ZLVIDS -3.01     ZLVIDD -5.88     LA Volume Index 24.0 mL/m2    LA volume 53.70 cm3    Left Atrium Major Axis 5.4 cm    LA WIDTH 3.8 cm    TV resting pulmonary artery pressure 28 mmHg    RV TB RVSP 6 mmHg    Est. RA pres 3 mmHg    Narrative      Left Ventricle: The left ventricle is normal in size. Normal wall   thickness. There is mild concentric hypertrophy. Normal wall motion. There   is normal systolic function with a visually estimated ejection fraction of   60 - 65%.    Left Atrium: Left atrium is mildly dilated.    Right Ventricle: Normal right ventricular cavity size. Systolic   function is normal.    Aortic Valve: There is mild aortic valve sclerosis.    Pulmonary Artery: The estimated pulmonary artery systolic pressure is   28 mmHg.    IVC/SVC: Normal venous pressure at 3 mmHg.

## 2023-10-06 NOTE — PLAN OF CARE
Case Management Assessment     PCP: Cherelle Stover  Pharmacy: [facility] on [address]    Patient Arrived From: home  Existing Help at Home: none    Barriers to Discharge: none    Discharge Plan:    A. Home    B. Home     SW completed brief assessment and discussed discharge planning with patient at her bedside. Patient stated that she lives alone but her son is her support. Patient's son will provide transportation for her to get home when discharge from the hospital.     10/05/23 2065   Discharge Planning   Assessment Type Discharge Planning Brief Assessment   Resource/Environmental Concerns none   Support Systems Children   Equipment Currently Used at Home none   Current Living Arrangements home   Patient/Family Anticipates Transition to home   Patient/Family Anticipated Services at Transition none   DME Needed Upon Discharge  none   Discharge Plan A Home   Discharge Plan B Home

## 2023-10-06 NOTE — CONSULTS
[facility] Surg  Cardiology  Consult Note    Patient Name: Brandi Sahu  MRN: [id number]  Admission Date: 10/5/2023  Hospital Length of Stay: 0 days  Code Status: Full Code   Attending Provider: Skylar Fierro, *   Consulting Provider: Dom Umana MD  Primary Care Physician: Cherelle Stover MD  Principal Problem:Narrow complex tachycardia    Patient information was obtained from patient and ER records.     Inpatient consult to Cardiology  Consult performed by: Dom Umana MD  Consult ordered by: Christiano Orr MD        Subjective:     Chief Complaint:  afib     HPI:   Patient is a pleasant 62-year-old lady.  Came in with irregular heartbeats.  Discovered colonoscopy.  Patient states she is asymptomatic.  Denies any chest pains at rest on exertion, orthopnea, PND.  Paroxysmal AFib and episodes of atrial tachycardia noted on tele monitoring.  Heart rate controlled.  Currently asymptomatic.  Echo did not show any significant wall motion abnormality      Past Medical History:   Diagnosis Date    Anxiety     Arthritis     knee    Asthma     Bursitis, shoulder     Colon polyps     Dental bridge present     upper right    Dental crown present     Depression     Hypertension     Pes planus     Wears glasses     for reading       Past Surgical History:   Procedure Laterality Date    arthroscopy ankle right      bilateral inguinal hernia repair      COLONOSCOPY N/A 01/11/2018    Procedure: COLONOSCOPY;  Surgeon: Christoph Nolasco MD;  Location: [facility] ENDO;  Service: Endoscopy;  Laterality: N/A;    COLONOSCOPY N/A 10/03/2023    Procedure: COLONOSCOPY;  Surgeon: Herbie Tellez MD;  Location: [facility] ENDO;  Service: Endoscopy;  Laterality: N/A;  instructions sent to patient portal. TBou  referral: EDWARDO Hammer NP    left torn rotator cuff repair  09/2014    MYOMECTOMY      rt salphigectomy      SYNOVECTOMY      TRANSFORAMINAL EPIDURAL INJECTION OF STEROID Right 01/18/2022    Procedure: LUMBAR  TRANSFORAMINAL RIGHT L5/S1 DIRECT REFERRAL;  Surgeon: Cherelle Carpenter MD;  Location: [facility] PAIN MGT;  Service: Pain Management;  Laterality: Right;    TRANSFORAMINAL EPIDURAL INJECTION OF STEROID Bilateral 01/12/2023    Procedure: INJECTION, STEROID, EPIDURAL, TRANSFORAMINAL APPROACH, BILATERAL L5-S1 CONTRAST DIRECT REF;  Surgeon: Lorraine Braxton MD;  Location: [facility] PAIN MGT;  Service: Pain Management;  Laterality: Bilateral;    UMBILICAL HERNIA REPAIR         Review of patient's allergies indicates:   Allergen Reactions    Lisinopril Hives, Itching, Rash, Shortness Of Breath and Swelling       No current facility-administered medications on file prior to encounter.     Current Outpatient Medications on File Prior to Encounter   Medication Sig    albuterol (PROVENTIL HFA) 90 mcg/actuation inhaler Inhale 2 puffs into the lungs every 6 (six) hours as needed for Wheezing. Rescue    albuterol-ipratropium (DUO-NEB) 2.5 mg-0.5 mg/3 mL nebulizer solution Take 3 mLs by nebulization every 6 (six) hours as needed for Wheezing or Shortness of Breath (cough).    amLODIPine (NORVASC) 10 MG tablet Take 1 tablet (10 mg total) by mouth once daily.    diclofenac (VOLTAREN) 75 MG EC tablet Take 1 tablet (75 mg total) by mouth 2 (two) times daily as needed.    diphenhydrAMINE (BENADRYL) 25 mg capsule Take 2 capsules (50 mg total) by mouth every 6 (six) hours as needed for Itching or Allergies.    EPINEPHrine (EPIPEN) 0.3 mg/0.3 mL AtIn Inject 0.3 mLs (0.3 mg total) into the muscle as needed.    ergocalciferol (ERGOCALCIFEROL) 50,000 unit Cap Take 1 capsule (50,000 Units total) by mouth every 7 days.    fluticasone propionate (FLONASE) 50 mcg/actuation nasal spray 2 sprays (100 mcg total) by Each Nostril route once daily.    gabapentin (NEURONTIN) 300 MG capsule Take 1 capsule (300 mg total) by mouth 3 (three) times daily.    loratadine (CLARITIN) 10 mg tablet Take 1 tablet (10 mg total) by mouth every morning.     olopatadine (PATANOL) 0.1 % ophthalmic solution Place 1 drop into both eyes 2 (two) times daily.    promethazine-dextromethorphan (PROMETHAZINE-DM) 6.25-15 mg/5 mL Syrp Take 5 mLs by mouth every 6 (six) hours as needed (cough).    spironolactone (ALDACTONE) 50 MG tablet Take 50 mg by mouth 2 (two) times daily.    topiramate (TOPAMAX) 25 MG tablet Take 1 tablet (25 mg total) by mouth 2 (two) times daily.     Family History       Problem Relation (Age of Onset)    Breast cancer Mother, Maternal Aunt    Cancer Mother, Brother    Diabetes Mother    Hypertension Mother          Tobacco Use    Smoking status: Never    Smokeless tobacco: Never   Substance and Sexual Activity    Alcohol use: Yes     Comment: social     Drug use: No    Sexual activity: Not Currently     Birth control/protection: None     Comment: occasional     Review of Systems   Constitutional: Negative.   HENT: Negative.     Eyes: Negative.    Respiratory: Negative.     Endocrine: Negative.    Hematologic/Lymphatic: Negative.    Skin: Negative.    Musculoskeletal: Negative.    Gastrointestinal: Negative.    Genitourinary: Negative.    Neurological: Negative.    Psychiatric/Behavioral: Negative.     Allergic/Immunologic: Negative.      Objective:     Vital Signs (Most Recent):  Temp: 99.9 °F (37.7 °C) (10/06/23 0703)  Pulse: 92 (10/06/23 0703)  Resp: 18 (10/06/23 0703)  BP: 130/70 (10/06/23 0703)  SpO2: 99 % (10/06/23 0815) Vital Signs (24h Range):  Temp:  [98.7 °F (37.1 °C)-99.9 °F (37.7 °C)] 99.9 °F (37.7 °C)  Pulse:  [] 92  Resp:  [13-28] 18  SpO2:  [92 %-100 %] 99 %  BP: (122-198)/(70-99) 130/70     Weight: 125.3 kg (276 lb 3.8 oz)  Body mass index is 47.42 kg/m².    SpO2: 99 %         Intake/Output Summary (Last 24 hours) at 10/6/2023 0979  Last data filed at 10/6/2023 0831  Gross per 24 hour   Intake 0 ml   Output --   Net 0 ml       Lines/Drains/Airways       Peripheral Intravenous Line  Duration                  Peripheral IV - Single  "Lumen 10/05/23 1209 20 G Anterior;Proximal;Right Forearm <1 day                     Physical Exam  Constitutional:       Appearance: Normal appearance. She is well-developed.   HENT:      Head: Normocephalic.   Eyes:      Pupils: Pupils are equal, round, and reactive to light.   Cardiovascular:      Rate and Rhythm: Normal rate. Frequent Extrasystoles are present.  Pulmonary:      Effort: Pulmonary effort is normal.      Breath sounds: Normal breath sounds.   Abdominal:      General: Bowel sounds are normal.      Palpations: Abdomen is soft.      Tenderness: There is no abdominal tenderness.   Musculoskeletal:         General: Normal range of motion.      Cervical back: Normal range of motion and neck supple.   Skin:     General: Skin is warm.   Neurological:      Mental Status: She is alert and oriented to person, place, and time.          Significant Labs: BMP:   Recent Labs   Lab 10/05/23  1232 10/06/23  0640   GLU 99 97    141   K 4.1 4.0    105   CO2 26 27   BUN 6 8   CREATININE 0.8 0.8   CALCIUM 10.1 9.5   , CMP   Recent Labs   Lab 10/05/23  1232 10/06/23  0640    141   K 4.1 4.0    105   CO2 26 27   GLU 99 97   BUN 6 8   CREATININE 0.8 0.8   CALCIUM 10.1 9.5   PROT 8.9* 8.0   ALBUMIN 3.8 3.4*   BILITOT 0.4 0.3   ALKPHOS 116 108   AST 28 31   ALT 30 30   ANIONGAP 11 9   , CBC   Recent Labs   Lab 10/05/23  1232 10/06/23  0640   WBC 6.64 5.47   HGB 12.7 12.3   HCT 42.3 41.7    164   , INR No results for input(s): "INR", "PROTIME" in the last 48 hours., Lipid Panel   Recent Labs   Lab 10/06/23  0640   CHOL 153   HDL 33*   LDLCALC 111.2   TRIG 44   CHOLHDL 21.6   , Troponin No results for input(s): "TROPONINI" in the last 48 hours., and All pertinent lab results from the last 24 hours have been reviewed.    Significant Imaging: Echocardiogram: Transthoracic echo (TTE) complete (Cupid Only):   Results for orders placed or performed during the hospital encounter of 10/05/23   Echo "   Result Value Ref Range    BSA 2.38 m2    LVOT stroke volume 78.38 cm3    LVIDd 4.45 3.5 - 6.0 cm    LV Systolic Volume 44.94 mL    LV Systolic Volume Index 20.1 mL/m2    LVIDs 3.32 2.1 - 4.0 cm    LV Diastolic Volume 90.19 mL    LV Diastolic Volume Index 40.26 mL/m2    IVS 1.16 (A) 0.6 - 1.1 cm    LVOT diameter 2.15 cm    LVOT area 3.6 cm2    FS 25 (A) 28 - 44 %    Left Ventricle Relative Wall Thickness 0.53 cm    Posterior Wall 1.19 (A) 0.6 - 1.1 cm    LV mass 188.89 g    LV Mass Index 84 g/m2    MV Peak E Chirag 0.64 m/s    TDI LATERAL 0.13 m/s    TDI SEPTAL 0.07 m/s    E/E' ratio 6.40 m/s    MV Peak A Chirag 0.62 m/s    TR Max Chirag 2.51 m/s    E/A ratio 1.03     IVRT 83.73 msec    E wave deceleration time 212.92 msec    LV SEPTAL E/E' RATIO 9.14 m/s    LV LATERAL E/E' RATIO 4.92 m/s    LVOT peak chirag 1.17 m/s    Left Ventricular Outflow Tract Mean Velocity 0.84 cm/s    Left Ventricular Outflow Tract Mean Gradient 3.17 mmHg    LA size 3.71 cm    Left Atrium Minor Axis 3.83 cm    RVDD 3.57 cm    TAPSE 2.53 cm    RA Major Axis 4.68 cm    RA Width 3.92 cm    AV mean gradient 5 mmHg    AV peak gradient 9 mmHg    Ao peak chirag 1.51 m/s    Ao VTI 25.10 cm    LVOT peak VTI 21.60 cm    AV valve area 3.12 cm²    AV Velocity Ratio 0.77     AV index (prosthetic) 0.86     KB by Velocity Ratio 2.81 cm²    MV stenosis pressure 1/2 time 61.75 ms    MV valve area p 1/2 method 3.56 cm2    TV peak gradient 1 mmHg    Triscuspid Valve Regurgitation Peak Gradient 25 mmHg    PV PEAK VELOCITY 1.39 m/s    PV peak gradient 8 mmHg    Sinus 2.77 cm    STJ 2.22 cm    IVC diameter 1.94 cm    Mean e' 0.10 m/s    ZLVIDS -3.01     ZLVIDD -5.88     LA Volume Index 24.0 mL/m2    LA volume 53.70 cm3    Left Atrium Major Axis 5.4 cm    LA WIDTH 3.8 cm    TV resting pulmonary artery pressure 28 mmHg    RV TB RVSP 6 mmHg    Est. RA pres 3 mmHg    Narrative      Left Ventricle: The left ventricle is normal in size. Normal wall   thickness. There is mild  concentric hypertrophy. Normal wall motion. There   is normal systolic function with a visually estimated ejection fraction of   60 - 65%.    Left Atrium: Left atrium is mildly dilated.    Right Ventricle: Normal right ventricular cavity size. Systolic   function is normal.    Aortic Valve: There is mild aortic valve sclerosis.    Pulmonary Artery: The estimated pulmonary artery systolic pressure is   28 mmHg.    IVC/SVC: Normal venous pressure at 3 mmHg.       Assessment and Plan:     * Narrow complex tachycardia  Paroxysms of AFib and atrial tachycardia.. recommend xarelto 20 mg qd    Follow-up in Cardiology Clinic.    toprol xl.      Morbid obesity with BMI of 40.0-44.9, adult        Hypertension            VTE Risk Mitigation (From admission, onward)         Ordered     enoxaparin injection 120 mg  Every 12 hours         10/05/23 9809                Thank you for your consult. I will follow-up with patient. Please contact us if you have any additional questions.    Dom Umana MD  Cardiology   [facility] [facility] S[facility]

## 2023-10-06 NOTE — HPI
Patient is a pleasant 62-year-old lady.  Came in with irregular heartbeats.  Discovered colonoscopy.  Patient states she is asymptomatic.  Denies any chest pains at rest on exertion, orthopnea, PND.  Paroxysmal AFib and episodes of atrial tachycardia noted on tele monitoring.  Heart rate controlled.  Currently asymptomatic.  Echo did not show any significant wall motion abnormality

## 2023-10-06 NOTE — PLAN OF CARE
10/06/23 0958   Final Note   Assessment Type Final Discharge Note   Anticipated Discharge Disposition Home   What phone number can be called within the next 1-3 days to see how you are doing after discharge? [phone]   Hospital Resources/Appts/Education Provided Appointments scheduled and added to AVS   Post-Acute Status   Post-Acute Authorization Other   Other Status No Post-Acute Service Needs   Discharge Delays None known at this time

## 2023-10-06 NOTE — ASSESSMENT & PLAN NOTE
Paroxysms of AFib and atrial tachycardia.. recommend xarelto 20 mg qd    Follow-up in Cardiology Clinic.    toprol xl.

## 2023-10-06 NOTE — DISCHARGE SUMMARY
[facility] Medicine  Discharge Summary      Patient Name: Brandi Sahu  MRN: [id number]  [facility]: [id number]  Patient Class: OP- Observation  Admission Date: 10/5/2023  Hospital Length of Stay: 0 days  Discharge Date and Time:  10/06/2023 11:10 AM  Attending Physician: Skylar Fierro, *   Discharging Provider: Mohit Fishman PA-C  Primary Care Provider: Cherelle Stover MD    Primary Care Team: MOHIT FISHMAN    HPI:   Brandi Sahu is a 62 y.o. with a pmh of HTN, asthma, anxiety, and depression presents to the hospital with complaints of irregular heartbeat following her PCP visit. Patient states that she went to get a colonoscopy 2 days ago where irregular heart beat was noticed and was told follow up with her PCP. She went to the PCP this morning and the EKG shows irregular heart beat and she brought those EKG's with her during the patient encounter. Her PCP instructed her to come to the ED and get further workup. Patient states that she is symptom free and has no pain at this time. Denies chest pain, shortness of breath, headaches, blurry vision, abdominal pain, n/v/d, any swelling of her extremities, melena, hematemesis, hematochezia, hematuria, or any other associated symptoms. Patient states that she is compliant with her home medications but did not take her medications today. She denies any family history of heart conditions. Patient is not on any blood thinners. She is allergic to lisinopril.     In the ED: Patient is afebrile without leukocytosis, elevated bp on presentation 198/93, RR 15-28, CBC and CMP unremarkable, TSH slightly decreased to 0.398, EKG on 10/3 shows sinus rhythm with PVC's and EKG today shows sinus tachycardia with no PVC's, the EKG's from the PCP office shows atrial fibrillation/atrial flutter. ED discussed case with Dr. Pleitez of Cardiology and recommended patient admission for observation and further workup.     Case discussed with the ED.     Brandi  Fiona Sahu will be placed under observation for further work up for her arrhythmia.       * No surgery found *      Hospital Course:   Brandi Sahu was placed under observation for management of paroxysmal atrial fibrillation and tachycardia.     Throughout her observation stay, Ms. Sahu continue state that she felt well, was without any issues or complaints and only presented due to recommendations from her PCP.  Cardiology was consulted and recommended starting patient on Xarelto 20 mg daily as well as Toprol-XL and having the patient follow up in Cardiology Clinic.  Echocardiogram was ordered which noted normal LV size, wall thickness, normal wall motion and EF of 60-65%.  Left atrium was noted to be mildly dilated with normal right ventricular size and systolic function.  I educated the patient on the importance of adherence to her Xarelto as well as Toprol-XL.  I stressed to her to ensure that she checks her blood pressure prior to administration of her antihypertensive medication regimen and to hold/not take her blood pressure medications if her systolic blood pressure is less than 120. Vitals prior to discharge are as follows:  Afebrile at 99.3° F, HR:  86 bpm, RR: 20, BP: 159/70 with oxygen saturation of 94% on RA.    All findings and plan were explained to the patient. All questions and concerns were answered. Patient verbalized understanding. Patient is in stable condition to d/c home and has been informed to follow up with her PCP within the next 7-10 days to discuss her observation stay and to follow-up with Cardiology. Patient has been educated to return to the ED if she experiences any further palpitations, shortness of breath, chest pain, lightheadness, weakness, or discomfort.       Goals of Care Treatment Preferences:  Code Status: Full Code      Consults:   Consults (From admission, onward)        Status Ordering Provider     Inpatient consult to Social Work  Once        Provider:  (Not yet  assigned)    Completed DOM HAGER     Inpatient consult to Cardiology  Once        Provider:  Benitez Pleitez MD    Completed ALY FARLEY          No new Assessment & Plan notes have been filed under this hospital service since the last note was generated.  Service: Hospital Medicine    Final Active Diagnoses:    Diagnosis Date Noted POA    PRINCIPAL PROBLEM:  Narrow complex tachycardia [I47.19] 10/05/2023 Yes    Morbid obesity with BMI of 40.0-44.9, adult [E66.01, Z68.41] 10/08/2014 Not Applicable    Asthma [J45.909] 12/24/2013 Yes     Chronic    Hypertension [I10] 09/03/2012 Yes      Problems Resolved During this Admission:       Discharged Condition: good    Disposition: Home or Self Care    Follow Up:   Follow-up Information     Dom Hager MD. Schedule an appointment as soon as possible for a visit.    Specialties: Cardiology, Interventional Cardiology  Contact information:  [address]  [address]  [facility] [address]  [phone]                       Patient Instructions:   No discharge procedures on file.    Significant Diagnostic Studies: Labs:   CMP   Recent Labs   Lab 10/05/23  1232 10/06/23  0640    141   K 4.1 4.0    105   CO2 26 27   GLU 99 97   BUN 6 8   CREATININE 0.8 0.8   CALCIUM 10.1 9.5   PROT 8.9* 8.0   ALBUMIN 3.8 3.4*   BILITOT 0.4 0.3   ALKPHOS 116 108   AST 28 31   ALT 30 30   ANIONGAP 11 9     CBC   Recent Labs   Lab 10/05/23  1232 10/06/23  0640   WBC 6.64 5.47   HGB 12.7 12.3   HCT 42.3 41.7    164     Lipid Panel   Lab Results   Component Value Date    CHOL 153 10/06/2023    HDL 33 (L) 10/06/2023    LDLCALC 111.2 10/06/2023    TRIG 44 10/06/2023    CHOLHDL 21.6 10/06/2023     Cardiac Graphics: Echocardiogram:   Transthoracic echo (TTE) complete (Cupid Only):   Results for orders placed or performed during the hospital encounter of 10/05/23   Echo   Result Value Ref Range    BSA 2.38 m2    LVOT stroke volume 78.38 cm3    LVIDd 4.45 3.5 - 6.0 cm     LV Systolic Volume 44.94 mL    LV Systolic Volume Index 20.1 mL/m2    LVIDs 3.32 2.1 - 4.0 cm    LV Diastolic Volume 90.19 mL    LV Diastolic Volume Index 40.26 mL/m2    IVS 1.16 (A) 0.6 - 1.1 cm    LVOT diameter 2.15 cm    LVOT area 3.6 cm2    FS 25 (A) 28 - 44 %    Left Ventricle Relative Wall Thickness 0.53 cm    Posterior Wall 1.19 (A) 0.6 - 1.1 cm    LV mass 188.89 g    LV Mass Index 84 g/m2    MV Peak E Chirag 0.64 m/s    TDI LATERAL 0.13 m/s    TDI SEPTAL 0.07 m/s    E/E' ratio 6.40 m/s    MV Peak A Chirag 0.62 m/s    TR Max Chirag 2.51 m/s    E/A ratio 1.03     IVRT 83.73 msec    E wave deceleration time 212.92 msec    LV SEPTAL E/E' RATIO 9.14 m/s    LV LATERAL E/E' RATIO 4.92 m/s    LVOT peak chirag 1.17 m/s    Left Ventricular Outflow Tract Mean Velocity 0.84 cm/s    Left Ventricular Outflow Tract Mean Gradient 3.17 mmHg    LA size 3.71 cm    Left Atrium Minor Axis 3.83 cm    RVDD 3.57 cm    TAPSE 2.53 cm    RA Major Axis 4.68 cm    RA Width 3.92 cm    AV mean gradient 5 mmHg    AV peak gradient 9 mmHg    Ao peak chirag 1.51 m/s    Ao VTI 25.10 cm    LVOT peak VTI 21.60 cm    AV valve area 3.12 cm²    AV Velocity Ratio 0.77     AV index (prosthetic) 0.86     KB by Velocity Ratio 2.81 cm²    MV stenosis pressure 1/2 time 61.75 ms    MV valve area p 1/2 method 3.56 cm2    TV peak gradient 1 mmHg    Triscuspid Valve Regurgitation Peak Gradient 25 mmHg    PV PEAK VELOCITY 1.39 m/s    PV peak gradient 8 mmHg    Sinus 2.77 cm    STJ 2.22 cm    IVC diameter 1.94 cm    Mean e' 0.10 m/s    ZLVIDS -3.01     ZLVIDD -5.88     LA Volume Index 24.0 mL/m2    LA volume 53.70 cm3    Left Atrium Major Axis 5.4 cm    LA WIDTH 3.8 cm    TV resting pulmonary artery pressure 28 mmHg    RV TB RVSP 6 mmHg    Est. RA pres 3 mmHg    Narrative      Left Ventricle: The left ventricle is normal in size. Normal wall   thickness. There is mild concentric hypertrophy. Normal wall motion. There   is normal systolic function with a visually estimated  ejection fraction of   60 - 65%.    Left Atrium: Left atrium is mildly dilated.    Right Ventricle: Normal right ventricular cavity size. Systolic   function is normal.    Aortic Valve: There is mild aortic valve sclerosis.    Pulmonary Artery: The estimated pulmonary artery systolic pressure is   28 mmHg.    IVC/SVC: Normal venous pressure at 3 mmHg.         Pending Diagnostic Studies:     Procedure Component Value Units Date/Time    EKG 12-lead [[id number]]     Order Status: Sent Lab Status: No result          Medications:  Reconciled Home Medications:      Medication List      START taking these medications    metoprolol succinate 25 MG 24 hr tablet  Commonly known as: TOPROL-XL  Take 1 tablet (25 mg total) by mouth once daily. Hold for SBP (top blood pressure number) <120 and/or HR < 60.     rivaroxaban 20 mg Tab  Commonly known as: XARELTO  Take 1 tablet (20 mg total) by mouth daily with dinner or evening meal.        CONTINUE taking these medications    albuterol 90 mcg/actuation inhaler  Commonly known as: PROVENTIL HFA  Inhale 2 puffs into the lungs every 6 (six) hours as needed for Wheezing. Rescue     albuterol-ipratropium 2.5 mg-0.5 mg/3 mL nebulizer solution  Commonly known as: DUO-NEB  Take 3 mLs by nebulization every 6 (six) hours as needed for Wheezing or Shortness of Breath (cough).     amLODIPine 10 MG tablet  Commonly known as: NORVASC  Take 1 tablet (10 mg total) by mouth once daily.     diclofenac 75 MG EC tablet  Commonly known as: VOLTAREN  Take 1 tablet (75 mg total) by mouth 2 (two) times daily as needed.     diphenhydrAMINE 25 mg capsule  Commonly known as: BENADRYL  Take 2 capsules (50 mg total) by mouth every 6 (six) hours as needed for Itching or Allergies.     EPINEPHrine 0.3 mg/0.3 mL Atin  Commonly known as: EPIPEN  Inject 0.3 mLs (0.3 mg total) into the muscle as needed.     ergocalciferol 50,000 unit Cap  Commonly known as: ERGOCALCIFEROL  Take 1 capsule (50,000 Units total) by  mouth every 7 days.     fluticasone propionate 50 mcg/actuation nasal spray  Commonly known as: FLONASE  2 sprays (100 mcg total) by Each Nostril route once daily.     gabapentin 300 MG capsule  Commonly known as: NEURONTIN  Take 1 capsule (300 mg total) by mouth 3 (three) times daily.     loratadine 10 mg tablet  Commonly known as: CLARITIN  Take 1 tablet (10 mg total) by mouth every morning.     olopatadine 0.1 % ophthalmic solution  Commonly known as: PATANOL  Place 1 drop into both eyes 2 (two) times daily.     promethazine-dextromethorphan 6.25-15 mg/5 mL Syrp  Commonly known as: PROMETHAZINE-DM  Take 5 mLs by mouth every 6 (six) hours as needed (cough).     spironolactone 50 MG tablet  Commonly known as: ALDACTONE  Take 50 mg by mouth 2 (two) times daily.     topiramate 25 MG tablet  Commonly known as: TOPAMAX  Take 1 tablet (25 mg total) by mouth 2 (two) times daily.            Indwelling Lines/Drains at time of discharge:   Lines/Drains/Airways     None                 Time spent on the discharge of patient: 55 minutes      Mohit Fishman PA-C  Department of Hospital Medicine  [facility]  10/06/2023

## 2023-10-06 NOTE — NURSING
Report called to Meme RN, Pt in bed resting, respirations even and unlabored, no acute distress,Pt AAOX4, safety precautions in place. Cardiac diet given to pt. Pt had her xray, pt is still pending echo. Urine at bedside but no orders.

## 2023-10-10 ENCOUNTER — TELEPHONE (OUTPATIENT)
Dept: FAMILY MEDICINE | Facility: CLINIC | Age: 63
End: 2023-10-10
Payer: COMMERCIAL

## 2023-10-10 NOTE — TELEPHONE ENCOUNTER
----- Message from Elen Garcias sent at 10/10/2023 12:21 PM CDT -----  Regarding: Self .[phone]  Type: Patient Call Back     What is the request in detail: Pt is requesting a call back in regards to her appt scheduled for 10/13     Can the clinic reply by [facility]? No     Would the patient rather a call back or a response via [facility]? Call back    Best call back number: .[phone]      Additional Information: Pt is requesting to speak to Sumanth     Thank you.

## 2023-10-16 ENCOUNTER — PATIENT MESSAGE (OUTPATIENT)
Dept: BARIATRICS | Facility: CLINIC | Age: 63
End: 2023-10-16
Payer: COMMERCIAL

## 2023-10-24 ENCOUNTER — OFFICE VISIT (OUTPATIENT)
Dept: CARDIOLOGY | Facility: CLINIC | Age: 63
End: 2023-10-24
Payer: COMMERCIAL

## 2023-10-24 VITALS
HEIGHT: 64 IN | BODY MASS INDEX: 46.48 KG/M2 | RESPIRATION RATE: 18 BRPM | HEART RATE: 81 BPM | DIASTOLIC BLOOD PRESSURE: 78 MMHG | SYSTOLIC BLOOD PRESSURE: 134 MMHG | WEIGHT: 272.25 LBS | OXYGEN SATURATION: 98 %

## 2023-10-24 DIAGNOSIS — R00.0 TACHYCARDIA: ICD-10-CM

## 2023-10-24 DIAGNOSIS — R07.9 CHEST PAIN, UNSPECIFIED TYPE: ICD-10-CM

## 2023-10-24 DIAGNOSIS — Z78.9 PATIENT UNABLE TO EXERCISE: Primary | ICD-10-CM

## 2023-10-24 PROCEDURE — 3078F PR MOST RECENT DIASTOLIC BLOOD PRESSURE < 80 MM HG: ICD-10-PCS | Mod: CPTII,S$GLB,, | Performed by: INTERNAL MEDICINE

## 2023-10-24 PROCEDURE — 99214 PR OFFICE/OUTPT VISIT, EST, LEVL IV, 30-39 MIN: ICD-10-PCS | Mod: 25,S$GLB,, | Performed by: INTERNAL MEDICINE

## 2023-10-24 PROCEDURE — 93000 EKG 12-LEAD: ICD-10-PCS | Mod: S$GLB,,, | Performed by: INTERNAL MEDICINE

## 2023-10-24 PROCEDURE — 99214 OFFICE O/P EST MOD 30 MIN: CPT | Mod: 25,S$GLB,, | Performed by: INTERNAL MEDICINE

## 2023-10-24 PROCEDURE — 3008F BODY MASS INDEX DOCD: CPT | Mod: CPTII,S$GLB,, | Performed by: INTERNAL MEDICINE

## 2023-10-24 PROCEDURE — 3075F SYST BP GE 130 - 139MM HG: CPT | Mod: CPTII,S$GLB,, | Performed by: INTERNAL MEDICINE

## 2023-10-24 PROCEDURE — 3008F PR BODY MASS INDEX (BMI) DOCUMENTED: ICD-10-PCS | Mod: CPTII,S$GLB,, | Performed by: INTERNAL MEDICINE

## 2023-10-24 PROCEDURE — 3078F DIAST BP <80 MM HG: CPT | Mod: CPTII,S$GLB,, | Performed by: INTERNAL MEDICINE

## 2023-10-24 PROCEDURE — 93000 ELECTROCARDIOGRAM COMPLETE: CPT | Mod: S$GLB,,, | Performed by: INTERNAL MEDICINE

## 2023-10-24 PROCEDURE — 3075F PR MOST RECENT SYSTOLIC BLOOD PRESS GE 130-139MM HG: ICD-10-PCS | Mod: CPTII,S$GLB,, | Performed by: INTERNAL MEDICINE

## 2023-10-24 PROCEDURE — 99999 PR PBB SHADOW E&M-EST. PATIENT-LVL V: CPT | Mod: PBBFAC,,, | Performed by: INTERNAL MEDICINE

## 2023-10-24 PROCEDURE — 99999 PR PBB SHADOW E&M-EST. PATIENT-LVL V: ICD-10-PCS | Mod: PBBFAC,,, | Performed by: INTERNAL MEDICINE

## 2023-10-24 NOTE — PROGRESS NOTES
CARDIOVASCULAR CONSULTATION    REASON FOR CONSULT:   Brandi Sahu is a 63 y.o. female who presents for   Chief Complaint   Patient presents with    Consult          HISTORY OF PRESENT ILLNESS:     Patient is a pleasant 62-year-old lady.  Here for follow-up after recent hospital stay.  During the hospital was sent from colonoscopy because of paroxysmal AFib/irregular heartbeats.  On tele monitoring noted to have paroxysmal AFib and episodes of atrial tachycardia.  Heart rate controlled.  Asymptomatic.  Echo did not show any significant wall motion abnormality.  Patient denies any chest pains at rest on exertion.  Because of paroxysmal AFib she was initiated on Xarelto 20 mg daily and Toprol-XL.  Here for follow-up after that.  Denies orthopnea, PND, swelling of feet      PAST MEDICAL HISTORY:     Past Medical History:   Diagnosis Date    Anxiety     Arthritis     knee    Asthma     Bursitis, shoulder     Colon polyps     Dental bridge present     upper right    Dental crown present     Depression     Hypertension     Pes planus     Wears glasses     for reading       PAST SURGICAL HISTORY:     Past Surgical History:   Procedure Laterality Date    arthroscopy ankle right      bilateral inguinal hernia repair      COLONOSCOPY N/A 01/11/2018    Procedure: COLONOSCOPY;  Surgeon: Christoph Nolasco MD;  Location: [facility] ENDO;  Service: Endoscopy;  Laterality: N/A;    COLONOSCOPY N/A 10/03/2023    Procedure: COLONOSCOPY;  Surgeon: Herbie Tellez MD;  Location: [facility] ENDO;  Service: Endoscopy;  Laterality: N/A;  instructions sent to patient portal. TBou  referral: EDWARDO Hammer NP    left torn rotator cuff repair  09/2014    MYOMECTOMY      rt salphigectomy      SYNOVECTOMY      TRANSFORAMINAL EPIDURAL INJECTION OF STEROID Right 01/18/2022    Procedure: LUMBAR TRANSFORAMINAL RIGHT L5/S1 DIRECT REFERRAL;  Surgeon: Cherelle Carpenter MD;  Location: [facility]IN [facility]GT;  Service: Pain Management;  Laterality: Right;    TRANSFORAMINAL  EPIDURAL INJECTION OF STEROID Bilateral 01/12/2023    Procedure: INJECTION, STEROID, EPIDURAL, TRANSFORAMINAL APPROACH, BILATERAL L5-S1 CONTRAST DIRECT REF;  Surgeon: Lorraine Braxton MD;  Location: [facility];  Service: Pain Management;  Laterality: Bilateral;    UMBILICAL HERNIA REPAIR         ALLERGIES AND MEDICATION:     Review of patient's allergies indicates:   Allergen Reactions    Lisinopril Hives, Itching, Rash, Shortness Of Breath and Swelling        Medication List            Accurate as of October 24, 2023 11:59 PM. If you have any questions, ask your nurse or doctor.                CONTINUE taking these medications      albuterol 90 mcg/actuation inhaler  Commonly known as: PROVENTIL HFA  Inhale 2 puffs into the lungs every 6 (six) hours as needed for Wheezing. Rescue     albuterol-ipratropium 2.5 mg-0.5 mg/3 mL nebulizer solution  Commonly known as: DUO-NEB  Take 3 mLs by nebulization every 6 (six) hours as needed for Wheezing or Shortness of Breath (cough).     amLODIPine 10 MG tablet  Commonly known as: NORVASC  Take 1 tablet (10 mg total) by mouth once daily.     diclofenac 75 MG EC tablet  Commonly known as: VOLTAREN  Take 1 tablet (75 mg total) by mouth 2 (two) times daily as needed.     diphenhydrAMINE 25 mg capsule  Commonly known as: BENADRYL  Take 2 capsules (50 mg total) by mouth every 6 (six) hours as needed for Itching or Allergies.     EPINEPHrine 0.3 mg/0.3 mL Atin  Commonly known as: EPIPEN  Inject 0.3 mLs (0.3 mg total) into the muscle as needed.     ergocalciferol 50,000 unit Cap  Commonly known as: ERGOCALCIFEROL  Take 1 capsule (50,000 Units total) by mouth every 7 days.     fluticasone propionate 50 mcg/actuation nasal spray  Commonly known as: FLONASE  2 sprays (100 mcg total) by Each Nostril route once daily.     gabapentin 300 MG capsule  Commonly known as: NEURONTIN  Take 1 capsule (300 mg total) by mouth 3 (three) times daily.     loratadine 10 mg tablet  Commonly known as:  CLARITIN  Take 1 tablet (10 mg total) by mouth every morning.     metoprolol succinate 25 MG 24 hr tablet  Commonly known as: TOPROL-XL  Take 1 tablet (25 mg total) by mouth once daily. Hold for SBP (top blood pressure number) <120 and/or HR < 60.     olopatadine 0.1 % ophthalmic solution  Commonly known as: PATANOL  Place 1 drop into both eyes 2 (two) times daily.     promethazine-dextromethorphan 6.25-15 mg/5 mL Syrp  Commonly known as: PROMETHAZINE-DM  Take 5 mLs by mouth every 6 (six) hours as needed (cough).     spironolactone 50 MG tablet  Commonly known as: ALDACTONE     topiramate 25 MG tablet  Commonly known as: TOPAMAX  Take 1 tablet (25 mg total) by mouth 2 (two) times daily.     XARELTO 20 mg Tab  Generic drug: rivaroxaban  Take 1 tablet (20 mg total) by mouth daily with dinner or evening meal.              SOCIAL HISTORY:     Social History     Socioeconomic History    Marital status:    Tobacco Use    Smoking status: Never    Smokeless tobacco: Never   Substance and Sexual Activity    Alcohol use: Yes     Comment: social     Drug use: No    Sexual activity: Not Currently     Birth control/protection: None     Comment: occasional       FAMILY HISTORY:     Family History   Problem Relation Age of Onset    Cancer Mother         breast    Diabetes Mother     Hypertension Mother     Breast cancer Mother     Cancer Brother         liver cancer    Breast cancer Maternal Aunt        REVIEW OF SYSTEMS:   Review of Systems   Constitutional: Negative.   HENT: Negative.     Eyes: Negative.    Respiratory: Negative.     Endocrine: Negative.    Hematologic/Lymphatic: Negative.    Skin: Negative.    Musculoskeletal: Negative.    Gastrointestinal: Negative.    Genitourinary: Negative.    Neurological: Negative.    Psychiatric/Behavioral: Negative.     Allergic/Immunologic: Negative.        A 10 point review of systems was performed and all the pertinent positives have been mentioned. Rest of review of systems  "was negative.        PHYSICAL EXAM:     Vitals:    10/24/23 1509   BP: 134/78   Pulse: 81   Resp: 18    Body mass index is 46.73 kg/m².  Weight: 123.5 kg (272 lb 4.3 oz)   Height: 5' 4" (162.6 cm)     Physical Exam  Constitutional:       Appearance: Normal appearance. She is well-developed.   HENT:      Head: Normocephalic.   Eyes:      Pupils: Pupils are equal, round, and reactive to light.   Cardiovascular:      Rate and Rhythm: Normal rate and regular rhythm.   Pulmonary:      Effort: Pulmonary effort is normal.      Breath sounds: Normal breath sounds.   Abdominal:      General: Bowel sounds are normal.      Palpations: Abdomen is soft.      Tenderness: There is no abdominal tenderness.   Musculoskeletal:         General: Normal range of motion.      Cervical back: Normal range of motion and neck supple.   Skin:     General: Skin is warm.   Neurological:      Mental Status: She is alert and oriented to person, place, and time.           DATA:     Laboratory:  CBC:  Recent Labs   Lab 08/29/22  1112 10/05/23  1232 10/06/23  0640   WBC 6.17 6.64 5.47   Hemoglobin 12.8 12.7 12.3   Hematocrit 42.4 42.3 41.7   Platelets 181 179 164       CHEMISTRIES:  Recent Labs   Lab 06/25/21  0808 05/11/22  1558 08/29/22  1112 10/05/23  1232 10/06/23  0640   Glucose 98 86 90 99 97   Sodium 141 141 144 140 141   Potassium 3.4 L 4.7 4.5 4.1 4.0   BUN 14 14 13 6 8   Creatinine 0.8 0.7 0.7 0.8 0.8   eGFR if African American >60.0 >60.0  --   --   --    eGFR if non African American >60.0 >60.0  --   --   --    Calcium 9.5 10.3 9.8 10.1 9.5       CARDIAC BIOMARKERS:        COAGS:        LIPIDS/LFTS:  Recent Labs   Lab 06/25/21  0808 05/11/22  1558 08/29/22  1112 10/05/23  1232 10/06/23  0640   Cholesterol 177  --  161  --  153   Triglycerides 81  --  48  --  44   HDL 55  --  55  --  33 L   LDL Cholesterol 105.8  --  96.4  --  111.2   Non-HDL Cholesterol 122  --  106  --  120   AST 21   < > 20 28 31   ALT 35   < > 21 30 30    < > = values " in this interval not displayed.       Hemoglobin A1C   Date Value Ref Range Status   08/29/2022 5.4 4.0 - 5.6 % Final     Comment:     ADA Screening Guidelines:  5.7-6.4%  Consistent with prediabetes  >or=6.5%  Consistent with diabetes    High levels of fetal hemoglobin interfere with the HbA1C  assay. Heterozygous hemoglobin variants (HbS, HgC, etc)do  not significantly interfere with this assay.   However, presence of multiple variants may affect accuracy.     06/25/2021 5.6 4.0 - 5.6 % Final     Comment:     ADA Screening Guidelines:  5.7-6.4%  Consistent with prediabetes  >or=6.5%  Consistent with diabetes    High levels of fetal hemoglobin interfere with the HbA1C  assay. Heterozygous hemoglobin variants (HbS, HgC, etc)do  not significantly interfere with this assay.   However, presence of multiple variants may affect accuracy.     06/23/2020 5.5 4.0 - 5.6 % Final     Comment:     ADA Screening Guidelines:  5.7-6.4%  Consistent with prediabetes  >or=6.5%  Consistent with diabetes  High levels of fetal hemoglobin interfere with the HbA1C  assay. Heterozygous hemoglobin variants (HbS, HgC, etc)do  not significantly interfere with this assay.   However, presence of multiple variants may affect accuracy.         TSH  Recent Labs   Lab 08/29/22  1112 10/05/23  1232 11/21/23  1432   TSH 1.214 0.398 L 0.797       The 10-year ASCVD risk score (Lillian LUIS, et al., 2019) is: 5.9%    Values used to calculate the score:      Age: 63 years      Sex: Female      Is Non- : Yes      Diabetic: No      Tobacco smoker: No      Systolic Blood Pressure: 118 mmHg      Is BP treated: Yes      HDL Cholesterol: 33 mg/dL      Total Cholesterol: 153 mg/dL       BNP    Lab Results   Component Value Date/Time    BNP 11 05/11/2022 03:58 PM            ECHO    Results for orders placed during the hospital encounter of 10/05/23    Echo    Interpretation Summary    Left Ventricle: The left ventricle is normal in size.  Normal wall thickness. There is mild concentric hypertrophy. Normal wall motion. There is normal systolic function with a visually estimated ejection fraction of 60 - 65%.    Left Atrium: Left atrium is mildly dilated.    Right Ventricle: Normal right ventricular cavity size. Systolic function is normal.    Aortic Valve: There is mild aortic valve sclerosis.    Pulmonary Artery: The estimated pulmonary artery systolic pressure is 28 mmHg.    IVC/SVC: Normal venous pressure at 3 mmHg.          CATH    No results found for this or any previous visit.        ASSESSMENT AND PLAN     Patient Active Problem List   Diagnosis    Hypertension    Depression    Peroneal tendinitis    Knee pain, left    Asthma    Complete rupture of rotator cuff    Morbid obesity with BMI of 40.0-44.9, adult    Dermatitis    Supraspinatus tendon tear    Allergic rhinitis    Patellofemoral pain syndrome of left knee    Chronic pain of left knee    Weakness of left lower extremity    Gait difficulty    Decreased range of motion of left knee    Primary osteoarthritis of left knee    Primary osteoarthritis of right knee    Narrow complex tachycardia         Orders Placed This Encounter   Procedures    Ambulatory referral/consult to Sleep Disorders    Nuclear Stress - Cardiology Interpreted    Cardiac event monitor    IN OFFICE EKG 12-LEAD (to [place])             Thank you very much for involving me in the care of your patient.  Please do not hesitate to contact me if there are any questions.      Dom Umana MD, FACC, [facility]  Interventional Cardiologist, [facility].           This note was dictated with the help of speech recognition software.  There might be un-intended errors and/or substitutions.

## 2023-10-31 ENCOUNTER — OFFICE VISIT (OUTPATIENT)
Dept: BARIATRICS | Facility: CLINIC | Age: 63
End: 2023-10-31
Payer: COMMERCIAL

## 2023-10-31 VITALS
WEIGHT: 272.88 LBS | HEART RATE: 82 BPM | BODY MASS INDEX: 46.59 KG/M2 | SYSTOLIC BLOOD PRESSURE: 132 MMHG | OXYGEN SATURATION: 98 % | DIASTOLIC BLOOD PRESSURE: 80 MMHG | HEIGHT: 64 IN

## 2023-10-31 DIAGNOSIS — E66.01 CLASS 3 SEVERE OBESITY DUE TO EXCESS CALORIES WITH SERIOUS COMORBIDITY AND BODY MASS INDEX (BMI) OF 45.0 TO 49.9 IN ADULT: Primary | ICD-10-CM

## 2023-10-31 PROCEDURE — 3075F SYST BP GE 130 - 139MM HG: CPT | Mod: CPTII,S$GLB,, | Performed by: INTERNAL MEDICINE

## 2023-10-31 PROCEDURE — 99999 PR PBB SHADOW E&M-EST. PATIENT-LVL V: CPT | Mod: PBBFAC,,, | Performed by: INTERNAL MEDICINE

## 2023-10-31 PROCEDURE — 3079F PR MOST RECENT DIASTOLIC BLOOD PRESSURE 80-89 MM HG: ICD-10-PCS | Mod: CPTII,S$GLB,, | Performed by: INTERNAL MEDICINE

## 2023-10-31 PROCEDURE — 1159F MED LIST DOCD IN RCRD: CPT | Mod: CPTII,S$GLB,, | Performed by: INTERNAL MEDICINE

## 2023-10-31 PROCEDURE — 3008F PR BODY MASS INDEX (BMI) DOCUMENTED: ICD-10-PCS | Mod: CPTII,S$GLB,, | Performed by: INTERNAL MEDICINE

## 2023-10-31 PROCEDURE — 3079F DIAST BP 80-89 MM HG: CPT | Mod: CPTII,S$GLB,, | Performed by: INTERNAL MEDICINE

## 2023-10-31 PROCEDURE — 99214 PR OFFICE/OUTPT VISIT, EST, LEVL IV, 30-39 MIN: ICD-10-PCS | Mod: S$GLB,,, | Performed by: INTERNAL MEDICINE

## 2023-10-31 PROCEDURE — 3008F BODY MASS INDEX DOCD: CPT | Mod: CPTII,S$GLB,, | Performed by: INTERNAL MEDICINE

## 2023-10-31 PROCEDURE — 1160F RVW MEDS BY RX/DR IN RCRD: CPT | Mod: CPTII,S$GLB,, | Performed by: INTERNAL MEDICINE

## 2023-10-31 PROCEDURE — 1160F PR REVIEW ALL MEDS BY PRESCRIBER/CLIN PHARMACIST DOCUMENTED: ICD-10-PCS | Mod: CPTII,S$GLB,, | Performed by: INTERNAL MEDICINE

## 2023-10-31 PROCEDURE — 99999 PR PBB SHADOW E&M-EST. PATIENT-LVL V: ICD-10-PCS | Mod: PBBFAC,,, | Performed by: INTERNAL MEDICINE

## 2023-10-31 PROCEDURE — 3075F PR MOST RECENT SYSTOLIC BLOOD PRESS GE 130-139MM HG: ICD-10-PCS | Mod: CPTII,S$GLB,, | Performed by: INTERNAL MEDICINE

## 2023-10-31 PROCEDURE — 99214 OFFICE O/P EST MOD 30 MIN: CPT | Mod: S$GLB,,, | Performed by: INTERNAL MEDICINE

## 2023-10-31 PROCEDURE — 1159F PR MEDICATION LIST DOCUMENTED IN MEDICAL RECORD: ICD-10-PCS | Mod: CPTII,S$GLB,, | Performed by: INTERNAL MEDICINE

## 2023-10-31 RX ORDER — DIETHYLPROPION HYDROCHLORIDE 75 MG/1
75 TABLET, EXTENDED RELEASE ORAL DAILY
Qty: 30 TABLET | Refills: 2 | Status: SHIPPED | OUTPATIENT
Start: 2023-10-31

## 2023-10-31 NOTE — PATIENT INSTRUCTIONS
PLEASE ARRIVE 15-20 min EARLY FOR YOUR APPOINTMENTS. This allows us to perform all of the services for your appointment in a timely fashion. Arriving late for your appointment not only decreases the time available for the doctor to see you, it also leads to delays for every other patient scheduled after you. Patients arriving after their appointment time may be asked to wait until all patients that arrived on time are seen, and/or there is another available slot for you to be worked in. When this is not possible, you may be asked to reschedule.   Thank you for your consideration in this matter.       Patient warned of common side effects of diethylpropion including anxiety, insomnia, palpitations and increased blood pressure. It was also explained that it is for short-term usage along with diet and exercise, and that stopping the medication without making lifestyle changes will result in regain of weight. Patient states understanding.    Weight loss medications are controlled substances.  They require routine follow up. Prescription or pills that are lost or destroyed will not be replaced.         Try to keep about 2/3 calorie intake during the daylight hours, and no more than 1/3 of calorie intake after dark.       Increase low impact activity as tolerated.  Avoid high impact activity, very heavy lifting or other exercise regimens that may cause discomfort.       1000 elvin pb meal planner, meal ideas and seated exercises given previously.    Helpful tips to survive the holidays:  Dont save yourself for the meal: Make sure you continue to eat high protein small meals every 3-4 hours to ensure to do not become over-hungry. Avoid temptation by not showing up to a holiday party or gathering hungry.   Plan ahead. Bring a dish to a party if you know there may not be an appropriate option.   Choose sugar-free drinks: Stick to water or other sugar-free beverages and make sure you are getting 6-8 cups of fluid each day  (but not with meals!). Avoid alcohol, carbonated beverages, and high-fat/high-sugar beverages like hot chocolate and eggnog. Try sugar-free hot cocoa made with skim milk or water, or sugar-free spiced tea to add some holiday flair to your beverage (see sugar-free mulled cider recipe below)  Take your time: Eat mindfully. Dont graze on food throughout the day. Sit down to enjoy your small meals. Chew slowly and thoroughly. Cut your food into small pieces before eating.  Listen to your body: Stop eating as soon as you feel full. Do not feel pressured to try certain (or all) foods or to eat all of the food on your plate. Listen to your hunger cues.   REMEMBER: Make your holidays about spending time with family and friends instead of focusing gatherings around food.  Keep up your exercise routine: Make sure you continue to get regular exercise throughout the holiday season. Encourage friends and family to be active by taking a walk together after a meal, to look at holiday lights, or to window-shop.    Good Holiday Meal Options:  Roasted Turkey, NO skin. Use low sodium broth instead of gravy.   Stuffed Bell Peppers made WITHOUT bread crumbs or Rice. Try using parmesan cheese instead  Gumbo, NO rice. Try picking out mostly the meat/seafood and vegetables with little broth.   Green Bean Casserole made with 98% fat free cream of mushroom soup and crushed almonds/pecans instead of fried onions  Side salad w/ low fat dressing. Try a different kind of salad maybe use Kale or spinach.   Roasted non-starchy vegetables like brussel sprouts, broccoli, green beans, zucchini, butternut squash, cauliflower  Cauliflower Mash (steam or roast cauliflower, puree w/ low fat cheese, dash of fat free milk and 2-3 sprays of I cant believe its not butter spray. Add garlic powder and black pepper to season). Use Low sodium broth instead of gravy.   Try Loaded Cauliflower Mash (Make cauliflower like above cauliflower mash. Top with diced  turkey medrano, ¼ cup low fat cheddar cheese and bake @ 350* F for 5-10 minutes, until cheese is melted. Top with minced chives, black pepper and garlic to taste).   Homemade cranberry sauce using Splenda or another alternative sweetener. Boil fresh cranberries and add splenda to taste. Boil until cranberries break open and then simmer until it reaches the consistency you want (less time for more watery sauce and simmer for longer to create a thicker sauce).   Deviled eggs: make using low fat heart, mustard, DILL relish (not sweet relish).   Vegetable tray w/ Greek yogurt Ranch Dip. Mix 1 packet of [place] ranch dip mix w/ 16 oz low fat plain greek yogurt.     Good Holiday Dessert Options:  High protein Pumpkin Cheesecake (see recipe below)  Pumpkin Whip (see recipe below)  Quest Apple Pie or Cinnamon Roll flavored protein bar (warm in microwave for 10-15 seconds)  Eggnog Protein shake (see recipe below)  Fresh fruit w/ low fat cheese  Sugar-free Jello Parfaits. Layer Red and Green sugar-free jello in cups and top w/ 2 tbsp Sugar-free cool-whip    Pumpkin Cheesecake    8 ounces fat free cream cheese, softened   2 scoops of vanilla protein powder (<4 g sugar per serving)   ¼ tsp Fine salt   2 eggs, at room temperature   1/3 cup fat free sour cream  1/3 cup fat free half and half  1 15 -ounce can pure pumpkin puree   1 tablespoon pumpkin pie spice, plus more for dusting   Unsalted nuts, crushed  *Add splenda to taste    Directions     1. Preheat the oven to 300 degrees F. Line 18 muffin cups with paper liners. Sprinkle 1 tsp crushed unsalted nuts at the bottom of each of muffin cup liner.     2. In a large bowl, beat the cream cheese, vanilla protein powder and 1/4 teaspoon fine salt on medium-high speed until smooth and creamy, 2 to 3 minutes. Scrape down the sides, reduce speed to low and beat in the eggs, 1 at a time, until combined. Beat in 1/3 cup fat free sour cream and fat free half and half. Stir in the  pumpkin puree and pumpkin pie spice until smooth. Divide evenly among cookie-lined paper cups, filling almost all the way to the top.     3. Bake until the filling is just set, 40 to 45 minutes. A sharp knife inserted into the center will come out moist, but clean. Cool completely in tins on a wire rack. Refrigerate until cold, 4 hours, or overnight. Top with a dusting of pumpkin pie spice.    Recipe altered from the following recipe: http://www.Dayak.LoLo/recipes/food-network-linda/mini-pumpkin-cheesecakes-recipe.print.html?oc=linkback    Pumpkin Whip    Box of sugar-free vanilla pudding  Can of pumpkin puree  Pumpkin Pie spice (sprinkle to taste)  ½ cup of sugar-free Cool Whip    Directions:  Make sugar-free pudding according to package directions using fat free or 1% milk. Stir in pumpkin and cool whip. Add pumpkin pie spice to taste.     Egg Nog Protein shake    8 oz skim or 1% milk  1 scoop vanilla protein powder  1 tbsp sugar-free vanilla pudding mix  ½ tsp butter flavor extract  ½ tsp rum extract  ½ tsp cinnamon     Shake together or blend with ice and serve.     Sugar-Free Mulled Cider    3 oz diet cran-apple juice  6 oz water  1 packet sugar-free apple cider mix  ½ tsp apple pie spice  ½ tsp butter flavor extract  1 tbsp Sugar-free Syrup    Mix together. Warm if needed and serve w/ orange wedge and cinnamon stick.

## 2023-10-31 NOTE — PROGRESS NOTES
"Subjective     Patient ID: Brandi Sahu is a 63 y.o. female.    Chief Complaint: Follow-up    CC:  Pt here today for follow-up. Has lost +0.4 lbs (+3.5 lbs muscle. -5.1 lbs fat).   Was to start 1000 elvin pb meal planner, seated exercises and started topiramate 25 mg BID. She stopped taking it after 2.5 weeks. She did message about  some symptoms she was having that did not seem to be related to topiramate. States she has had some throwing up water.     Does feel like her appetite is down.   New BMR: 1592    New PBF: 54.3%       BMR:1538    PBF:  56.3%  History of medication for loss: phentermine on and off last filled 9/5/23, and in August  from [facility].   checked today           Review of Systems       Objective   /80 (BP Location: Left arm, Patient Position: Sitting, BP Method: Large (Manual))   Pulse 82   Ht 5' 4" (1.626 m)   Wt 123.8 kg (272 lb 14.4 oz)   LMP 07/18/2012   SpO2 98%   BMI 46.84 kg/m²     Physical Exam  Vitals reviewed.   Constitutional:       General: She is not in acute distress.     Appearance: She is well-developed.      Comments: With severe obesity     HENT:      Head: Normocephalic and atraumatic.   Eyes:      General: No scleral icterus.     Pupils: Pupils are equal, round, and reactive to light.   Cardiovascular:      Rate and Rhythm: Normal rate.   Pulmonary:      Effort: Pulmonary effort is normal. No respiratory distress.   Musculoskeletal:         General: Normal range of motion.      Right lower leg: Edema present.      Left lower leg: Edema present.   Skin:     General: Skin is warm and dry.      Findings: No erythema.   Neurological:      Mental Status: She is alert and oriented to person, place, and time.      Cranial Nerves: No cranial nerve deficit.      Comments: + antalgic gate   Psychiatric:         Behavior: Behavior normal.         Judgment: Judgment normal.            Assessment and Plan     1. Class 3 severe obesity due to excess calories with " serious comorbidity and body mass index (BMI) of 45.0 to 49.9 in adult  -     diethylpropion (TENUATE) 75 mg SR tablet; Take 1 tablet (75 mg total) by mouth once daily.  Dispense: 30 tablet; Refill: 2          1. Class 3 severe obesity due to excess calories with serious comorbidity and body mass index (BMI) of 40.0 to 44.9 in adult        Patient warned of common side effects of diethylpropion including anxiety, insomnia, palpitations and increased blood pressure. It was also explained that it is for short-term usage along with diet and exercise, and that stopping the medication without making lifestyle changes will result in regain of weight. Patient states understanding.    Weight loss medications are controlled substances.  They require routine follow up. Prescription or pills that are lost or destroyed will not be replaced.       Try to keep about 2/3 calorie intake during the daylight hours, and no more than 1/3 of calorie intake after dark.       Increase low impact activity as tolerated.  Avoid high impact activity, very heavy lifting or other exercise regimens that may cause discomfort.       1000 elvin pb meal planner, meal ideas and seated exercises given previously     Holiday tips given.              No follow-ups on file.

## 2023-11-02 ENCOUNTER — CLINICAL SUPPORT (OUTPATIENT)
Dept: CARDIOLOGY | Facility: HOSPITAL | Age: 63
End: 2023-11-02
Attending: INTERNAL MEDICINE
Payer: COMMERCIAL

## 2023-11-02 DIAGNOSIS — R07.9 CHEST PAIN, UNSPECIFIED TYPE: ICD-10-CM

## 2023-11-02 DIAGNOSIS — R00.0 TACHYCARDIA: ICD-10-CM

## 2023-11-02 DIAGNOSIS — Z78.9 PATIENT UNABLE TO EXERCISE: ICD-10-CM

## 2023-11-02 PROCEDURE — 93270 REMOTE 30 DAY ECG REV/REPORT: CPT

## 2023-11-02 PROCEDURE — 93271 ECG/MONITORING AND ANALYSIS: CPT

## 2023-11-02 PROCEDURE — 93272 ECG/REVIEW INTERPRET ONLY: CPT | Mod: ,,, | Performed by: INTERNAL MEDICINE

## 2023-11-02 PROCEDURE — 93272 CARDIAC EVENT MONITOR (CUPID ONLY): ICD-10-PCS | Mod: ,,, | Performed by: INTERNAL MEDICINE

## 2023-11-03 ENCOUNTER — TELEPHONE (OUTPATIENT)
Dept: BARIATRICS | Facility: CLINIC | Age: 63
End: 2023-11-03
Payer: COMMERCIAL

## 2023-11-03 NOTE — TELEPHONE ENCOUNTER
PA was completed and APPROVED for the medication diethylpropion for the dates October 2, 2023 to January 30, 2024.

## 2023-11-14 ENCOUNTER — HOSPITAL ENCOUNTER (OUTPATIENT)
Dept: CARDIOLOGY | Facility: HOSPITAL | Age: 63
Discharge: HOME | End: 2023-11-14
Attending: INTERNAL MEDICINE
Payer: COMMERCIAL

## 2023-11-14 ENCOUNTER — HOSPITAL ENCOUNTER (OUTPATIENT)
Dept: RADIOLOGY | Facility: HOSPITAL | Age: 63
Discharge: HOME | End: 2023-11-14
Attending: INTERNAL MEDICINE
Payer: COMMERCIAL

## 2023-11-14 DIAGNOSIS — Z78.9 PATIENT UNABLE TO EXERCISE: ICD-10-CM

## 2023-11-14 DIAGNOSIS — R07.9 CHEST PAIN, UNSPECIFIED TYPE: ICD-10-CM

## 2023-11-14 DIAGNOSIS — R00.0 TACHYCARDIA: ICD-10-CM

## 2023-11-14 LAB
CV STRESS BASE HR: 71 BPM
DIASTOLIC BLOOD PRESSURE: 90 MMHG
NUC STRESS DIASTOLIC VOLUME INDEX: 105
NUC STRESS EJECTION FRACTION: 51 %
NUC STRESS SYSTOLIC VOLUME INDEX: 51
OHS CV CPX 85 PERCENT MAX PREDICTED HEART RATE MALE: 135
OHS CV CPX MAX PREDICTED HEART RATE: 159
OHS CV CPX PATIENT IS FEMALE: 1
OHS CV CPX PATIENT IS MALE: 0
OHS CV CPX PEAK DIASTOLIC BLOOD PRESSURE: 90 MMHG
OHS CV CPX PEAK HEAR RATE: 100 BPM
OHS CV CPX PEAK RATE PRESSURE PRODUCT: NORMAL
OHS CV CPX PEAK SYSTOLIC BLOOD PRESSURE: 172 MMHG
OHS CV CPX PERCENT MAX PREDICTED HEART RATE ACHIEVED: 66
OHS CV CPX RATE PRESSURE PRODUCT PRESENTING: NORMAL
SYSTOLIC BLOOD PRESSURE: 180 MMHG

## 2023-11-14 PROCEDURE — 78452 HT MUSCLE IMAGE SPECT MULT: CPT

## 2023-11-14 PROCEDURE — 78452 HT MUSCLE IMAGE SPECT MULT: CPT | Mod: 26,,, | Performed by: INTERNAL MEDICINE

## 2023-11-14 PROCEDURE — 93016 NUCLEAR STRESS - CARDIOLOGY INTERPRETED (CUPID ONLY): ICD-10-PCS | Mod: ,,, | Performed by: INTERNAL MEDICINE

## 2023-11-14 PROCEDURE — 93018 CV STRESS TEST I&R ONLY: CPT | Mod: ,,, | Performed by: INTERNAL MEDICINE

## 2023-11-14 PROCEDURE — 93018 NUCLEAR STRESS - CARDIOLOGY INTERPRETED (CUPID ONLY): ICD-10-PCS | Mod: ,,, | Performed by: INTERNAL MEDICINE

## 2023-11-14 PROCEDURE — 93016 CV STRESS TEST SUPVJ ONLY: CPT | Mod: ,,, | Performed by: INTERNAL MEDICINE

## 2023-11-14 PROCEDURE — 93017 CV STRESS TEST TRACING ONLY: CPT

## 2023-11-14 PROCEDURE — 78452 NUCLEAR STRESS - CARDIOLOGY INTERPRETED (CUPID ONLY): ICD-10-PCS | Mod: 26,,, | Performed by: INTERNAL MEDICINE

## 2023-11-14 RX ORDER — REGADENOSON 0.08 MG/ML
0.4 INJECTION, SOLUTION INTRAVENOUS ONCE
Status: DISCONTINUED | OUTPATIENT
Start: 2023-11-14 | End: 2023-11-15 | Stop reason: HOSPADM

## 2023-11-21 ENCOUNTER — LAB VISIT (OUTPATIENT)
Dept: LAB | Facility: HOSPITAL | Age: 63
End: 2023-11-21
Attending: FAMILY MEDICINE
Payer: COMMERCIAL

## 2023-11-21 ENCOUNTER — OFFICE VISIT (OUTPATIENT)
Dept: FAMILY MEDICINE | Facility: CLINIC | Age: 63
End: 2023-11-21
Payer: COMMERCIAL

## 2023-11-21 VITALS
WEIGHT: 277.13 LBS | OXYGEN SATURATION: 95 % | SYSTOLIC BLOOD PRESSURE: 118 MMHG | HEIGHT: 64 IN | BODY MASS INDEX: 47.31 KG/M2 | HEART RATE: 91 BPM | DIASTOLIC BLOOD PRESSURE: 74 MMHG

## 2023-11-21 DIAGNOSIS — R79.89 LOW TSH LEVEL: ICD-10-CM

## 2023-11-21 DIAGNOSIS — J45.20 MILD INTERMITTENT ASTHMA, UNSPECIFIED WHETHER COMPLICATED: Chronic | ICD-10-CM

## 2023-11-21 DIAGNOSIS — I47.19 NARROW COMPLEX TACHYCARDIA: ICD-10-CM

## 2023-11-21 DIAGNOSIS — K21.9 GASTROESOPHAGEAL REFLUX DISEASE, UNSPECIFIED WHETHER ESOPHAGITIS PRESENT: ICD-10-CM

## 2023-11-21 DIAGNOSIS — I10 ESSENTIAL HYPERTENSION: ICD-10-CM

## 2023-11-21 DIAGNOSIS — Z00.00 ROUTINE GENERAL MEDICAL EXAMINATION AT A HEALTH CARE FACILITY: Primary | ICD-10-CM

## 2023-11-21 LAB — TSH SERPL DL<=0.005 MIU/L-ACNC: 0.8 UIU/ML (ref 0.4–4)

## 2023-11-21 PROCEDURE — 99396 PR PREVENTIVE VISIT,EST,40-64: ICD-10-PCS | Mod: S$GLB,,, | Performed by: FAMILY MEDICINE

## 2023-11-21 PROCEDURE — 99396 PREV VISIT EST AGE 40-64: CPT | Mod: S$GLB,,, | Performed by: FAMILY MEDICINE

## 2023-11-21 PROCEDURE — 99999 PR PBB SHADOW E&M-EST. PATIENT-LVL III: CPT | Mod: PBBFAC,,, | Performed by: FAMILY MEDICINE

## 2023-11-21 PROCEDURE — 3008F BODY MASS INDEX DOCD: CPT | Mod: CPTII,S$GLB,, | Performed by: FAMILY MEDICINE

## 2023-11-21 PROCEDURE — 36415 COLL VENOUS BLD VENIPUNCTURE: CPT | Mod: PN | Performed by: FAMILY MEDICINE

## 2023-11-21 PROCEDURE — 99999 PR PBB SHADOW E&M-EST. PATIENT-LVL III: ICD-10-PCS | Mod: PBBFAC,,, | Performed by: FAMILY MEDICINE

## 2023-11-21 PROCEDURE — 84443 ASSAY THYROID STIM HORMONE: CPT | Performed by: FAMILY MEDICINE

## 2023-11-21 PROCEDURE — 3074F PR MOST RECENT SYSTOLIC BLOOD PRESSURE < 130 MM HG: ICD-10-PCS | Mod: CPTII,S$GLB,, | Performed by: FAMILY MEDICINE

## 2023-11-21 PROCEDURE — 3078F PR MOST RECENT DIASTOLIC BLOOD PRESSURE < 80 MM HG: ICD-10-PCS | Mod: CPTII,S$GLB,, | Performed by: FAMILY MEDICINE

## 2023-11-21 PROCEDURE — 3078F DIAST BP <80 MM HG: CPT | Mod: CPTII,S$GLB,, | Performed by: FAMILY MEDICINE

## 2023-11-21 PROCEDURE — 3074F SYST BP LT 130 MM HG: CPT | Mod: CPTII,S$GLB,, | Performed by: FAMILY MEDICINE

## 2023-11-21 PROCEDURE — 3008F PR BODY MASS INDEX (BMI) DOCUMENTED: ICD-10-PCS | Mod: CPTII,S$GLB,, | Performed by: FAMILY MEDICINE

## 2023-11-21 RX ORDER — DICLOFENAC SODIUM 75 MG/1
75 TABLET, DELAYED RELEASE ORAL 2 TIMES DAILY PRN
COMMUNITY
Start: 2023-11-13

## 2023-11-21 RX ORDER — TOPIRAMATE 25 MG/1
25 TABLET ORAL 2 TIMES DAILY
COMMUNITY
Start: 2023-11-01

## 2023-11-21 RX ORDER — ALBUTEROL SULFATE 90 UG/1
2 AEROSOL, METERED RESPIRATORY (INHALATION) EVERY 6 HOURS PRN
Qty: 18 G | Refills: 5 | Status: SHIPPED | OUTPATIENT
Start: 2023-11-21 | End: 2024-11-20

## 2023-11-21 RX ORDER — FAMOTIDINE 20 MG/1
20 TABLET, FILM COATED ORAL 2 TIMES DAILY PRN
Qty: 60 TABLET | Refills: 5 | Status: SHIPPED | OUTPATIENT
Start: 2023-11-21 | End: 2024-11-20

## 2023-11-21 RX ORDER — AMLODIPINE BESYLATE 10 MG/1
10 TABLET ORAL DAILY
Qty: 30 TABLET | Refills: 6 | Status: SHIPPED | OUTPATIENT
Start: 2023-11-21

## 2023-11-21 RX ORDER — FLUTICASONE PROPIONATE AND SALMETEROL 100; 50 UG/1; UG/1
1 POWDER RESPIRATORY (INHALATION) 2 TIMES DAILY
Qty: 60 EACH | Refills: 2 | Status: SHIPPED | OUTPATIENT
Start: 2023-11-21 | End: 2024-01-19 | Stop reason: SDUPTHER

## 2023-11-21 NOTE — PROGRESS NOTES
Chief Complaint   Patient presents with    Annual Exam       HPI  Brandi Sahu is a 63 y.o. female with multiple medical diagnoses as listed in the medical history and problem list that presents for annual exam     She has had a cough for two mos, has been using inhaler and neb tx, non productive, no fever or chills  Being evaluated with cardiology for irregular heart beats that occurred during a colonoscopy, she does not have any CP or SOB    Reviewed lab results; cholesterol    The 10-year CVD risk score (Kathyino, et al., 2008) is: 7.7%    Values used to calculate the score:      Age: 63 years      Sex: Female      Diabetic: No      Tobacco smoker: No      Systolic Blood Pressure: 118 mmHg      Is BP treated: Yes      HDL Cholesterol: 33 mg/dL      Total Cholesterol: 153 mg/dL        ALLERGIES AND MEDICATIONS: updated and reviewed.  Review of patient's allergies indicates:   Allergen Reactions    Lisinopril Hives, Itching, Rash, Shortness Of Breath and Swelling     Medication List with Changes/Refills   New Medications    FAMOTIDINE (PEPCID) 20 MG TABLET    Take 1 tablet (20 mg total) by mouth 2 (two) times daily as needed for Heartburn.    FLUTICASONE-SALMETEROL DISKUS INHALER 100-50 MCG    Inhale 1 puff into the lungs 2 (two) times daily. Controller   Current Medications    ALBUTEROL-IPRATROPIUM (DUO-NEB) 2.5 MG-0.5 MG/3 ML NEBULIZER SOLUTION    Take 3 mLs by nebulization every 6 (six) hours as needed for Wheezing or Shortness of Breath (cough).    DICLOFENAC (VOLTAREN) 75 MG EC TABLET    Take 75 mg by mouth 2 (two) times daily as needed.    DIETHYLPROPION (TENUATE) 75 MG SR TABLET    Take 1 tablet (75 mg total) by mouth once daily.    DIPHENHYDRAMINE (BENADRYL) 25 MG CAPSULE    Take 2 capsules (50 mg total) by mouth every 6 (six) hours as needed for Itching or Allergies.    EPINEPHRINE (EPIPEN) 0.3 MG/0.3 ML ATIN    Inject 0.3 mLs (0.3 mg total) into the muscle as needed.    ERGOCALCIFEROL  (ERGOCALCIFEROL) 50,000 UNIT CAP    Take 1 capsule (50,000 Units total) by mouth every 7 days.    FLUTICASONE PROPIONATE (FLONASE) 50 MCG/ACTUATION NASAL SPRAY    2 sprays (100 mcg total) by Each Nostril route once daily.    LORATADINE (CLARITIN) 10 MG TABLET    Take 1 tablet (10 mg total) by mouth every morning.    METOPROLOL SUCCINATE (TOPROL-XL) 25 MG 24 HR TABLET    Take 1 tablet (25 mg total) by mouth once daily. Hold for SBP (top blood pressure number) <120 and/or HR < 60.    OLOPATADINE (PATANOL) 0.1 % OPHTHALMIC SOLUTION    Place 1 drop into both eyes 2 (two) times daily.    SPIRONOLACTONE (ALDACTONE) 50 MG TABLET    Take 50 mg by mouth 2 (two) times daily.    TOPIRAMATE (TOPAMAX) 25 MG TABLET    Take 25 mg by mouth 2 (two) times daily.   Changed and/or Refilled Medications    Modified Medication Previous Medication    ALBUTEROL (PROVENTIL HFA) 90 MCG/ACTUATION INHALER albuterol (PROVENTIL HFA) 90 mcg/actuation inhaler       Inhale 2 puffs into the lungs every 6 (six) hours as needed for Wheezing. Rescue    Inhale 2 puffs into the lungs every 6 (six) hours as needed for Wheezing. Rescue    AMLODIPINE (NORVASC) 10 MG TABLET amLODIPine (NORVASC) 10 MG tablet       Take 1 tablet (10 mg total) by mouth once daily.    Take 1 tablet (10 mg total) by mouth once daily.    RIVAROXABAN (XARELTO) 20 MG TAB rivaroxaban (XARELTO) 20 mg Tab       Take 1 tablet (20 mg total) by mouth daily with dinner or evening meal.    Take 1 tablet (20 mg total) by mouth daily with dinner or evening meal.   Discontinued Medications    DICLOFENAC (VOLTAREN) 75 MG EC TABLET    Take 1 tablet (75 mg total) by mouth 2 (two) times daily as needed.    GABAPENTIN (NEURONTIN) 300 MG CAPSULE    Take 1 capsule (300 mg total) by mouth 3 (three) times daily.    PROMETHAZINE-DEXTROMETHORPHAN (PROMETHAZINE-DM) 6.25-15 MG/5 ML SYRP    Take 5 mLs by mouth every 6 (six) hours as needed (cough).    TOPIRAMATE (TOPAMAX) 25 MG TABLET    Take 1 tablet (25 mg  "total) by mouth 2 (two) times daily.       ROS  Review of Systems   Constitutional:  Negative for chills, diaphoresis, fatigue, fever and unexpected weight change.   HENT:  Negative for rhinorrhea, sinus pressure, sore throat and tinnitus.    Eyes:  Negative for photophobia and visual disturbance.   Respiratory:  Positive for cough. Negative for shortness of breath and wheezing.    Cardiovascular:  Negative for chest pain and palpitations.   Gastrointestinal:  Negative for abdominal pain, blood in stool, constipation, diarrhea, nausea and vomiting.   Genitourinary:  Negative for dysuria, flank pain, frequency, hematuria and vaginal discharge.   Musculoskeletal:  Negative for arthralgias and joint swelling.   Skin:  Negative for rash.   Neurological:  Negative for speech difficulty, weakness, light-headedness and headaches.   Psychiatric/Behavioral:  Negative for behavioral problems and dysphoric mood.        Physical Exam  Vitals:    11/21/23 1356   BP: 118/74   BP Location: Left forearm   Patient Position: Sitting   BP Method: Large (Manual)   Pulse: 91   SpO2: 95%   Weight: 125.7 kg (277 lb 1.9 oz)   Height: 5' 4" (1.626 m)    Body mass index is 47.57 kg/m².  Weight: 125.7 kg (277 lb 1.9 oz)   Height: 5' 4" (162.6 cm)     Physical Exam  Vitals reviewed.   Constitutional:       General: She is not in acute distress.     Appearance: She is well-developed.   HENT:      Head: Normocephalic and atraumatic.      Right Ear: Tympanic membrane normal.      Left Ear: Tympanic membrane normal.      Mouth/Throat:      Pharynx: No oropharyngeal exudate.   Neck:      Thyroid: No thyromegaly.   Cardiovascular:      Rate and Rhythm: Normal rate and regular rhythm.      Heart sounds: No murmur heard.     No friction rub. No gallop.   Pulmonary:      Effort: Pulmonary effort is normal. No respiratory distress.      Breath sounds: Normal breath sounds. No wheezing or rales.   Abdominal:      General: Bowel sounds are normal. There " is no distension.      Palpations: Abdomen is soft. There is no mass.      Tenderness: There is no abdominal tenderness. There is no guarding or rebound.   Musculoskeletal:      Cervical back: Neck supple.   Lymphadenopathy:      Cervical: No cervical adenopathy.   Skin:     General: Skin is warm and dry.      Findings: No rash.   Neurological:      General: No focal deficit present.      Mental Status: She is alert. Mental status is at baseline.   Psychiatric:         Mood and Affect: Mood normal.         Thought Content: Thought content normal.         Health Maintenance         Date Due Completion Date    Pneumococcal Vaccines (Age 0-64) (2 - PCV) 07/19/2022 7/19/2021    RSV Vaccine (Age 60+ and Pregnant patients) (1 - 1-dose 60+ series) Never done ---    Influenza Vaccine (1) Never done ---    COVID-19 Vaccine (4 - 2023-24 season) 09/01/2023 11/29/2021    Mammogram 08/24/2024 8/24/2023    Hemoglobin A1c (Diabetic Prevention Screening) 08/29/2025 8/29/2022    TETANUS VACCINE 05/19/2026 5/19/2016    Cervical Cancer Screening 07/19/2026 7/19/2021    Colorectal Cancer Screening 10/03/2028 10/3/2023    Lipid Panel 10/06/2028 10/6/2023            Health maintenance reviewed and addressed as ordered      ASSESSMENT/PLAN       1. Routine general medical examination at a health care facility  discussed healthy lifestyle modification with exercise and healthy diet, reviewed age appropriate screening and healthy maintenance      2. Essential hypertension  Continue current regimen    - amLODIPine (NORVASC) 10 MG tablet; Take 1 tablet (10 mg total) by mouth once daily.  Dispense: 30 tablet; Refill: 6    3. Narrow complex tachycardia  Continue current regimen  F/u with cardiology  - rivaroxaban (XARELTO) 20 mg Tab; Take 1 tablet (20 mg total) by mouth daily with dinner or evening meal.  Dispense: 30 tablet; Refill: 2    4. Low TSH level  recheck  - TSH; Future    5. Mild intermittent asthma, unspecified whether  complicated  Add advair to help with asthma sx  - albuterol (PROVENTIL HFA) 90 mcg/actuation inhaler; Inhale 2 puffs into the lungs every 6 (six) hours as needed for Wheezing. Rescue  Dispense: 18 g; Refill: 5  - fluticasone-salmeterol diskus inhaler 100-50 mcg; Inhale 1 puff into the lungs 2 (two) times daily. Controller  Dispense: 60 each; Refill: 2      7. Gastroesophageal reflux disease, unspecified whether esophagitis present  - famotidine (PEPCID) 20 MG tablet; Take 1 tablet (20 mg total) by mouth 2 (two) times daily as needed for Heartburn.  Dispense: 60 tablet; Refill: 5        Cherelle Stover MD  11/21/2023 2:04 PM        Follow up in about 1 year (around 11/21/2024) for annual exam.    Orders Placed This Encounter   Procedures    TSH

## 2023-12-27 ENCOUNTER — OFFICE VISIT (OUTPATIENT)
Dept: CARDIOLOGY | Facility: CLINIC | Age: 63
End: 2023-12-27
Payer: COMMERCIAL

## 2023-12-27 VITALS
OXYGEN SATURATION: 100 % | DIASTOLIC BLOOD PRESSURE: 72 MMHG | RESPIRATION RATE: 15 BRPM | HEIGHT: 64 IN | BODY MASS INDEX: 46.67 KG/M2 | WEIGHT: 273.38 LBS | HEART RATE: 81 BPM | SYSTOLIC BLOOD PRESSURE: 139 MMHG

## 2023-12-27 DIAGNOSIS — R00.0 TACHYCARDIA: Primary | ICD-10-CM

## 2023-12-27 DIAGNOSIS — Z78.9 PATIENT UNABLE TO EXERCISE: ICD-10-CM

## 2023-12-27 DIAGNOSIS — R07.9 CHEST PAIN, UNSPECIFIED TYPE: ICD-10-CM

## 2023-12-27 DIAGNOSIS — I10 PRIMARY HYPERTENSION: ICD-10-CM

## 2023-12-27 PROCEDURE — 3008F BODY MASS INDEX DOCD: CPT | Mod: CPTII,S$GLB,, | Performed by: INTERNAL MEDICINE

## 2023-12-27 PROCEDURE — 99999 PR PBB SHADOW E&M-EST. PATIENT-LVL III: CPT | Mod: PBBFAC,,, | Performed by: INTERNAL MEDICINE

## 2023-12-27 PROCEDURE — 3078F PR MOST RECENT DIASTOLIC BLOOD PRESSURE < 80 MM HG: ICD-10-PCS | Mod: CPTII,S$GLB,, | Performed by: INTERNAL MEDICINE

## 2023-12-27 PROCEDURE — 99214 PR OFFICE/OUTPT VISIT, EST, LEVL IV, 30-39 MIN: ICD-10-PCS | Mod: S$GLB,,, | Performed by: INTERNAL MEDICINE

## 2023-12-27 PROCEDURE — 3075F PR MOST RECENT SYSTOLIC BLOOD PRESS GE 130-139MM HG: ICD-10-PCS | Mod: CPTII,S$GLB,, | Performed by: INTERNAL MEDICINE

## 2023-12-27 PROCEDURE — 99214 OFFICE O/P EST MOD 30 MIN: CPT | Mod: S$GLB,,, | Performed by: INTERNAL MEDICINE

## 2023-12-27 PROCEDURE — 3075F SYST BP GE 130 - 139MM HG: CPT | Mod: CPTII,S$GLB,, | Performed by: INTERNAL MEDICINE

## 2023-12-27 PROCEDURE — 3078F DIAST BP <80 MM HG: CPT | Mod: CPTII,S$GLB,, | Performed by: INTERNAL MEDICINE

## 2023-12-27 PROCEDURE — 3008F PR BODY MASS INDEX (BMI) DOCUMENTED: ICD-10-PCS | Mod: CPTII,S$GLB,, | Performed by: INTERNAL MEDICINE

## 2023-12-27 PROCEDURE — 99999 PR PBB SHADOW E&M-EST. PATIENT-LVL III: ICD-10-PCS | Mod: PBBFAC,,, | Performed by: INTERNAL MEDICINE

## 2023-12-27 NOTE — PROGRESS NOTES
CARDIOVASCULAR CONSULTATION    REASON FOR CONSULT:   Brandi Sahu is a 63 y.o. female who presents for   No chief complaint on file.         HISTORY OF PRESENT ILLNESS:     Patient is a pleasant 62-year-old lady.  Here for follow-up after recent hospital stay.  During the hospital was sent from colonoscopy because of paroxysmal AFib/irregular heartbeats.  On tele monitoring noted to have paroxysmal AFib and episodes of atrial tachycardia.  Heart rate controlled.  Asymptomatic.  Echo did not show any significant wall motion abnormality.  Patient denies any chest pains at rest on exertion.  Because of paroxysmal AFib she was initiated on Xarelto 20 mg daily and Toprol-XL.  Here for follow-up after that.  Denies orthopnea, PND, swelling of feet    Notes from December 23:  Patient here for follow-up.  Doing fine.  Denies any chest pains at rest on exertion, orthopnea, PND      Results for orders placed during the hospital encounter of 10/05/23    Echo    Interpretation Summary    Left Ventricle: The left ventricle is normal in size. Normal wall thickness. There is mild concentric hypertrophy. Normal wall motion. There is normal systolic function with a visually estimated ejection fraction of 60 - 65%.    Left Atrium: Left atrium is mildly dilated.    Right Ventricle: Normal right ventricular cavity size. Systolic function is normal.    Aortic Valve: There is mild aortic valve sclerosis.    Pulmonary Artery: The estimated pulmonary artery systolic pressure is 28 mmHg.    IVC/SVC: Normal venous pressure at 3 mmHg.      Results for orders placed during the hospital encounter of 11/14/23    Nuclear Stress - Cardiology Interpreted    Interpretation Summary    Normal myocardial perfusion scan. There is no evidence of myocardial ischemia or infarction.    The gated perfusion images showed an ejection fraction of 51% post stress.    There is normal wall motion post stress.        There were 2 patient triggered events over  the 21 day monitoring period (no sx specified). Corresponding strips show sinus rhythm 94 bpm with frequent PACs and PVCs, and up to 3 beats nonsustained AT.       PAST MEDICAL HISTORY:     Past Medical History:   Diagnosis Date    Anxiety     Arthritis     knee    Asthma     Bursitis, shoulder     Colon polyps     Dental bridge present     upper right    Dental crown present     Depression     Hypertension     Pes planus     Wears glasses     for reading       PAST SURGICAL HISTORY:     Past Surgical History:   Procedure Laterality Date    arthroscopy ankle right      bilateral inguinal hernia repair      COLONOSCOPY N/A 01/11/2018    Procedure: COLONOSCOPY;  Surgeon: Christoph Nolasco MD;  Location: [facility] ENDO;  Service: Endoscopy;  Laterality: N/A;    COLONOSCOPY N/A 10/03/2023    Procedure: COLONOSCOPY;  Surgeon: Herbie Tellez MD;  Location: [facility] ENDO;  Service: Endoscopy;  Laterality: N/A;  instructions sent to patient portal. TBou  referral: EDWARDO Hammer NP    left torn rotator cuff repair  09/2014    MYOMECTOMY      rt salphigectomy      SYNOVECTOMY      TRANSFORAMINAL EPIDURAL INJECTION OF STEROID Right 01/18/2022    Procedure: LUMBAR TRANSFORAMINAL RIGHT L5/S1 DIRECT REFERRAL;  Surgeon: Cherelle Carpenter MD;  Location: [facility] PAIN MGT;  Service: Pain Management;  Laterality: Right;    TRANSFORAMINAL EPIDURAL INJECTION OF STEROID Bilateral 01/12/2023    Procedure: INJECTION, STEROID, EPIDURAL, TRANSFORAMINAL APPROACH, BILATERAL L5-S1 CONTRAST DIRECT REF;  Surgeon: Lorraine Braxton MD;  Location: [facility] PAIN MGT;  Service: Pain Management;  Laterality: Bilateral;    UMBILICAL HERNIA REPAIR         ALLERGIES AND MEDICATION:     Review of patient's allergies indicates:   Allergen Reactions    Lisinopril Hives, Itching, Rash, Shortness Of Breath and Swelling        Medication List            Accurate as of December 27, 2023  2:37 PM. If you have any questions, ask your nurse or doctor.                CONTINUE taking these  medications      albuterol 90 mcg/actuation inhaler  Commonly known as: PROVENTIL HFA  Inhale 2 puffs into the lungs every 6 (six) hours as needed for Wheezing. Rescue     albuterol-ipratropium 2.5 mg-0.5 mg/3 mL nebulizer solution  Commonly known as: DUO-NEB  Take 3 mLs by nebulization every 6 (six) hours as needed for Wheezing or Shortness of Breath (cough).     amLODIPine 10 MG tablet  Commonly known as: NORVASC  Take 1 tablet (10 mg total) by mouth once daily.     diclofenac 75 MG EC tablet  Commonly known as: VOLTAREN     diethylpropion 75 mg SR tablet  Commonly known as: TENUATE  Take 1 tablet (75 mg total) by mouth once daily.     diphenhydrAMINE 25 mg capsule  Commonly known as: BENADRYL  Take 2 capsules (50 mg total) by mouth every 6 (six) hours as needed for Itching or Allergies.     EPINEPHrine 0.3 mg/0.3 mL Atin  Commonly known as: EPIPEN  Inject 0.3 mLs (0.3 mg total) into the muscle as needed.     ergocalciferol 50,000 unit Cap  Commonly known as: ERGOCALCIFEROL  Take 1 capsule (50,000 Units total) by mouth every 7 days.     famotidine 20 MG tablet  Commonly known as: PEPCID  Take 1 tablet (20 mg total) by mouth 2 (two) times daily as needed for Heartburn.     fluticasone propionate 50 mcg/actuation nasal spray  Commonly known as: FLONASE  2 sprays (100 mcg total) by Each Nostril route once daily.     fluticasone-salmeterol 100-50 mcg/dose 100-50 mcg/dose diskus inhaler  Commonly known as: ADVAIR DISKUS  Inhale 1 puff into the lungs 2 (two) times daily. Controller     loratadine 10 mg tablet  Commonly known as: CLARITIN  Take 1 tablet (10 mg total) by mouth every morning.     metoprolol succinate 25 MG 24 hr tablet  Commonly known as: TOPROL-XL  Take 1 tablet (25 mg total) by mouth once daily. Hold for SBP (top blood pressure number) <120 and/or HR < 60.     olopatadine 0.1 % ophthalmic solution  Commonly known as: PATANOL  Place 1 drop into both eyes 2 (two) times daily.     rivaroxaban 20 mg  "Tab  Commonly known as: XARELTO  Take 1 tablet (20 mg total) by mouth daily with dinner or evening meal.     spironolactone 50 MG tablet  Commonly known as: ALDACTONE     topiramate 25 MG tablet  Commonly known as: TOPAMAX              SOCIAL HISTORY:     Social History     Socioeconomic History    Marital status:    Tobacco Use    Smoking status: Never    Smokeless tobacco: Never   Substance and Sexual Activity    Alcohol use: Yes     Comment: social     Drug use: No    Sexual activity: Not Currently     Birth control/protection: None     Comment: occasional       FAMILY HISTORY:     Family History   Problem Relation Age of Onset    Cancer Mother         breast    Diabetes Mother     Hypertension Mother     Breast cancer Mother     Cancer Brother         liver cancer    Breast cancer Maternal Aunt        REVIEW OF SYSTEMS:   Review of Systems   Constitutional: Negative.   HENT: Negative.     Eyes: Negative.    Respiratory: Negative.     Endocrine: Negative.    Hematologic/Lymphatic: Negative.    Skin: Negative.    Musculoskeletal: Negative.    Gastrointestinal: Negative.    Genitourinary: Negative.    Neurological: Negative.    Psychiatric/Behavioral: Negative.     Allergic/Immunologic: Negative.        A 10 point review of systems was performed and all the pertinent positives have been mentioned. Rest of review of systems was negative.        PHYSICAL EXAM:     Vitals:    12/27/23 1407   BP: 139/72   Pulse: 81   Resp: 15    Body mass index is 46.92 kg/m².  Weight: 124 kg (273 lb 5.9 oz)   Height: 5' 4" (162.6 cm)     Physical Exam  Constitutional:       Appearance: Normal appearance. She is well-developed.   HENT:      Head: Normocephalic.   Eyes:      Pupils: Pupils are equal, round, and reactive to light.   Cardiovascular:      Rate and Rhythm: Normal rate and regular rhythm.   Pulmonary:      Effort: Pulmonary effort is normal.      Breath sounds: Normal breath sounds.   Abdominal:      General: Bowel " sounds are normal.      Palpations: Abdomen is soft.      Tenderness: There is no abdominal tenderness.   Musculoskeletal:         General: Normal range of motion.      Cervical back: Normal range of motion and neck supple.   Skin:     General: Skin is warm.   Neurological:      Mental Status: She is alert and oriented to person, place, and time.           DATA:     Laboratory:  CBC:  Recent Labs   Lab 08/29/22  1112 10/05/23  1232 10/06/23  0640   WBC 6.17 6.64 5.47   Hemoglobin 12.8 12.7 12.3   Hematocrit 42.4 42.3 41.7   Platelets 181 179 164         CHEMISTRIES:  Recent Labs   Lab 06/25/21  0808 05/11/22  1558 08/29/22  1112 10/05/23  1232 10/06/23  0640   Glucose 98 86 90 99 97   Sodium 141 141 144 140 141   Potassium 3.4 L 4.7 4.5 4.1 4.0   BUN 14 14 13 6 8   Creatinine 0.8 0.7 0.7 0.8 0.8   eGFR if African American >60.0 >60.0  --   --   --    eGFR if non African American >60.0 >60.0  --   --   --    Calcium 9.5 10.3 9.8 10.1 9.5         CARDIAC BIOMARKERS:        COAGS:        LIPIDS/LFTS:  Recent Labs   Lab 06/25/21  0808 05/11/22  1558 08/29/22  1112 10/05/23  1232 10/06/23  0640   Cholesterol 177  --  161  --  153   Triglycerides 81  --  48  --  44   HDL 55  --  55  --  33 L   LDL Cholesterol 105.8  --  96.4  --  111.2   Non-HDL Cholesterol 122  --  106  --  120   AST 21   < > 20 28 31   ALT 35   < > 21 30 30    < > = values in this interval not displayed.         Hemoglobin A1C   Date Value Ref Range Status   08/29/2022 5.4 4.0 - 5.6 % Final     Comment:     ADA Screening Guidelines:  5.7-6.4%  Consistent with prediabetes  >or=6.5%  Consistent with diabetes    High levels of fetal hemoglobin interfere with the HbA1C  assay. Heterozygous hemoglobin variants (HbS, HgC, etc)do  not significantly interfere with this assay.   However, presence of multiple variants may affect accuracy.     06/25/2021 5.6 4.0 - 5.6 % Final     Comment:     ADA Screening Guidelines:  5.7-6.4%  Consistent with  prediabetes  >or=6.5%  Consistent with diabetes    High levels of fetal hemoglobin interfere with the HbA1C  assay. Heterozygous hemoglobin variants (HbS, HgC, etc)do  not significantly interfere with this assay.   However, presence of multiple variants may affect accuracy.     06/23/2020 5.5 4.0 - 5.6 % Final     Comment:     ADA Screening Guidelines:  5.7-6.4%  Consistent with prediabetes  >or=6.5%  Consistent with diabetes  High levels of fetal hemoglobin interfere with the HbA1C  assay. Heterozygous hemoglobin variants (HbS, HgC, etc)do  not significantly interfere with this assay.   However, presence of multiple variants may affect accuracy.         TSH  Recent Labs   Lab 08/29/22  1112 10/05/23  1232 11/21/23  1432   TSH 1.214 0.398 L 0.797         The 10-year ASCVD risk score (Lillian LUIS, et al., 2019) is: 9.2%    Values used to calculate the score:      Age: 63 years      Sex: Female      Is Non- : Yes      Diabetic: No      Tobacco smoker: No      Systolic Blood Pressure: 139 mmHg      Is BP treated: Yes      HDL Cholesterol: 33 mg/dL      Total Cholesterol: 153 mg/dL       BNP    Lab Results   Component Value Date/Time    BNP 11 05/11/2022 03:58 PM            ECHO    Results for orders placed during the hospital encounter of 10/05/23    Echo    Interpretation Summary    Left Ventricle: The left ventricle is normal in size. Normal wall thickness. There is mild concentric hypertrophy. Normal wall motion. There is normal systolic function with a visually estimated ejection fraction of 60 - 65%.    Left Atrium: Left atrium is mildly dilated.    Right Ventricle: Normal right ventricular cavity size. Systolic function is normal.    Aortic Valve: There is mild aortic valve sclerosis.    Pulmonary Artery: The estimated pulmonary artery systolic pressure is 28 mmHg.    IVC/SVC: Normal venous pressure at 3 mmHg.          CATH    No results found for this or any previous  visit.        ASSESSMENT AND PLAN     Patient Active Problem List   Diagnosis    Hypertension    Depression    Peroneal tendinitis    Knee pain, left    Asthma    Complete rupture of rotator cuff    Morbid obesity with BMI of 40.0-44.9, adult    Dermatitis    Supraspinatus tendon tear    Allergic rhinitis    Patellofemoral pain syndrome of left knee    Chronic pain of left knee    Weakness of left lower extremity    Gait difficulty    Decreased range of motion of left knee    Primary osteoarthritis of left knee    Primary osteoarthritis of right knee    Narrow complex tachycardia         No orders of the defined types were placed in this encounter.    Stress test did not show any significant ischemia.  Echo showed normal left ventricle systolic function.  No AFib noted on event monitor     Paroxysmal AFib: Continue Xarelto     Follow-up in 6 months        Thank you very much for involving me in the care of your patient.  Please do not hesitate to contact me if there are any questions.      Dom Umana MD, FACC, [facility]  Interventional Cardiologist, [facility].           This note was dictated with the help of speech recognition software.  There might be un-intended errors and/or substitutions.

## 2024-01-19 ENCOUNTER — TELEPHONE (OUTPATIENT)
Dept: CARDIOLOGY | Facility: CLINIC | Age: 64
End: 2024-01-19
Payer: COMMERCIAL

## 2024-01-19 DIAGNOSIS — J45.20 MILD INTERMITTENT ASTHMA, UNSPECIFIED WHETHER COMPLICATED: Chronic | ICD-10-CM

## 2024-01-19 DIAGNOSIS — E55.9 VITAMIN D DEFICIENCY: ICD-10-CM

## 2024-01-19 NOTE — TELEPHONE ENCOUNTER
----- Message from Keke Lindsey sent at 1/19/2024  4:07 PM CST -----  Type: Patient Call Back    Who called:pt     What is the request in detail:pt requesting to get an extended time of absence from work. Call pt     Can the clinic reply by [facility]?    Would the patient rather a call back or a response via [facility]? call    Best call back number:[phone] (home)       Additional Information:

## 2024-01-19 NOTE — TELEPHONE ENCOUNTER
----- Message from Keke Lindsey sent at 1/19/2024  4:09 PM CST -----  Type: RX Refill Request    Who Called: pt     Have you contacted your pharmacy:  Refill or New Rx:fluticasone propionate (FLONASE) 50 mcg/actuation nasal spray    ergocalciferol (ERGOCALCIFEROL) 50,000 unit Cap      RX Name and Strength:    How is the patient currently taking it? (ex. 1XDay):    Is this a 30 day or 90 day RX:    Preferred Pharmacy with phone number:  [id number]  [place] - [address] [facility]  [address] [facility]  [place] [address]  Phone: [phone] Fax: [phone]        Local or Mail Order:    Ordering Provider:    Would the patient rather a call back or a response via [facility]? call    Best Call Back Number:[phone] (home)       Additional Information:

## 2024-01-19 NOTE — TELEPHONE ENCOUNTER
No care due was identified.  [facility] Embedded Care Due Messages. Reference number: [id number].   1/19/2024 4:33:35 PM CST

## 2024-01-22 ENCOUNTER — TELEPHONE (OUTPATIENT)
Dept: CARDIOLOGY | Facility: CLINIC | Age: 64
End: 2024-01-22
Payer: COMMERCIAL

## 2024-01-22 RX ORDER — FLUTICASONE PROPIONATE AND SALMETEROL 100; 50 UG/1; UG/1
1 POWDER RESPIRATORY (INHALATION) 2 TIMES DAILY
Qty: 60 EACH | Refills: 2 | Status: SHIPPED | OUTPATIENT
Start: 2024-01-22 | End: 2024-05-08

## 2024-01-22 RX ORDER — ERGOCALCIFEROL 1.25 MG/1
50000 CAPSULE ORAL
Qty: 12 CAPSULE | Refills: 3 | Status: SHIPPED | OUTPATIENT
Start: 2024-01-22

## 2024-01-22 NOTE — TELEPHONE ENCOUNTER
I called patient and left message for patient to call back and schedule an appointment to see Dr. Umana.

## 2024-01-22 NOTE — TELEPHONE ENCOUNTER
----- Message from Skylar Kraft MA sent at 1/19/2024  4:46 PM CST -----  This patient will need an appointment to come in and see the provider.   ----- Message -----  From: Nancy Smiley MA  Sent: 1/19/2024   4:27 PM CST  To: Skylar Kraft MA    Does Dr. Umana do extended time on letter stating that she can only do limited walking at work. Patient states that she got that letter on 10/24/2023. Patient states she needs that extended because she is still experiencing pain and shortness of breath. Please advise  ----- Message -----  From: Keke Lindsey  Sent: 1/19/2024   4:08 PM CST  To: Lyndsay Fernandes Staff    Type: Patient Call Back    Who called:pt     What is the request in detail:pt requesting to get an extended time of absence from work. Call pt     Can the clinic reply by [facility]?    Would the patient rather a call back or a response via [facility]? call    Best call back number:[phone] (home)       Additional Information:

## 2024-02-06 ENCOUNTER — HOSPITAL ENCOUNTER (OUTPATIENT)
Dept: RADIOLOGY | Facility: HOSPITAL | Age: 64
Discharge: HOME | End: 2024-02-06
Attending: ORTHOPAEDIC SURGERY
Payer: COMMERCIAL

## 2024-02-06 ENCOUNTER — OFFICE VISIT (OUTPATIENT)
Dept: ORTHOPEDICS | Facility: CLINIC | Age: 64
End: 2024-02-06
Payer: COMMERCIAL

## 2024-02-06 DIAGNOSIS — M17.12 PRIMARY OSTEOARTHRITIS OF LEFT KNEE: Primary | ICD-10-CM

## 2024-02-06 DIAGNOSIS — M17.12 PRIMARY OSTEOARTHRITIS OF LEFT KNEE: ICD-10-CM

## 2024-02-06 PROCEDURE — 1160F RVW MEDS BY RX/DR IN RCRD: CPT | Mod: CPTII,S$GLB,, | Performed by: ORTHOPAEDIC SURGERY

## 2024-02-06 PROCEDURE — 73564 X-RAY EXAM KNEE 4 OR MORE: CPT | Mod: 26,LT,, | Performed by: RADIOLOGY

## 2024-02-06 PROCEDURE — 1159F MED LIST DOCD IN RCRD: CPT | Mod: CPTII,S$GLB,, | Performed by: ORTHOPAEDIC SURGERY

## 2024-02-06 PROCEDURE — 99999 PR PBB SHADOW E&M-EST. PATIENT-LVL III: CPT | Mod: PBBFAC,,, | Performed by: ORTHOPAEDIC SURGERY

## 2024-02-06 PROCEDURE — 73562 X-RAY EXAM OF KNEE 3: CPT | Mod: 26,59,RT, | Performed by: RADIOLOGY

## 2024-02-06 PROCEDURE — 73562 X-RAY EXAM OF KNEE 3: CPT | Mod: TC,RT

## 2024-02-06 PROCEDURE — 99213 OFFICE O/P EST LOW 20 MIN: CPT | Mod: S$GLB,,, | Performed by: ORTHOPAEDIC SURGERY

## 2024-02-07 NOTE — PROGRESS NOTES
Subjective:      Patient ID: Brandi Sahu is a 63 y.o. female.    Chief Complaint:   Pain of the Left Knee    HPI  She returns for evaluation of bilateral knee pain as well as left sided hip pain that has been present for several years. She says her left knee is worse than her right. She has tried physical therapy without much relief. She is currently taking diclofenac that gives her mild relief. 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 She says being on her feet and walking exacerbates her pain. She has never had CSI in either knee. She currently ambulates with a cane.  When we saw her before a few months ago, we tried to send her to pain management for their interventions, but she developed some heart problems which preempted that.      Objective:        Ortho/SPM Exam  Current BMI is 46.9.  There is is a mild varus deformity, which is partially passively correctable.  Active range of motion is 5-115 degrees.  Anterior crepitus with active extension.  Patellar mobility is limited.  Boggy synovitis without effusion.  Medial joint line tenderness predominates.  No instability to varus/valgus/Lachman's stressing.  No pain in the groin with flexion and internal rotation of the hip which is not limited.  Skin intact.  Distal neurovascular intact.  Flip test negative.          IMAGING:  Previous x-rays showedXR bilateral knee: mild joint space narrowing worse on the medial compartments and slightly worse on the left side, KL2.   XR L hip: well maintained joint space, normal alignment, no signs of fracture or dislocation  Assessment:   DJD, left knee      Plan:   She does not have bone-on-bone arthritis on x-ray, and her BMI is over 45.  We will refer her to Dr. Sabillon for consultation and a comprehensive plan to address her arthritis pain nonsurgically for now.  We did give her a target weight for possible future joint replacement.      The patient's pathophysiology was explained in detail with  reference to x-rays, models, other visual aids as appropriate.  Treatment options were discussed in detail.  Questions were invited and answered to the patient's satisfaction.      Wesly Beard MD  Orthopedic Surgery

## 2024-02-08 ENCOUNTER — OFFICE VISIT (OUTPATIENT)
Dept: PULMONOLOGY | Facility: CLINIC | Age: 64
End: 2024-02-08
Payer: COMMERCIAL

## 2024-02-08 VITALS
SYSTOLIC BLOOD PRESSURE: 153 MMHG | WEIGHT: 272.5 LBS | DIASTOLIC BLOOD PRESSURE: 83 MMHG | OXYGEN SATURATION: 97 % | HEIGHT: 64 IN | HEART RATE: 99 BPM | BODY MASS INDEX: 46.52 KG/M2

## 2024-02-08 DIAGNOSIS — G47.26 SHIFT WORK SLEEP DISORDER: ICD-10-CM

## 2024-02-08 DIAGNOSIS — R00.0 TACHYCARDIA: ICD-10-CM

## 2024-02-08 DIAGNOSIS — E66.01 CLASS 3 SEVERE OBESITY DUE TO EXCESS CALORIES WITH SERIOUS COMORBIDITY AND BODY MASS INDEX (BMI) OF 45.0 TO 49.9 IN ADULT: ICD-10-CM

## 2024-02-08 DIAGNOSIS — G47.19 EXCESSIVE DAYTIME SLEEPINESS: ICD-10-CM

## 2024-02-08 DIAGNOSIS — R06.83 SNORING: Primary | ICD-10-CM

## 2024-02-08 PROCEDURE — 1160F RVW MEDS BY RX/DR IN RCRD: CPT | Mod: CPTII,S$GLB,, | Performed by: INTERNAL MEDICINE

## 2024-02-08 PROCEDURE — 99204 OFFICE O/P NEW MOD 45 MIN: CPT | Mod: S$GLB,,, | Performed by: INTERNAL MEDICINE

## 2024-02-08 PROCEDURE — 3008F BODY MASS INDEX DOCD: CPT | Mod: CPTII,S$GLB,, | Performed by: INTERNAL MEDICINE

## 2024-02-08 PROCEDURE — 99999 PR PBB SHADOW E&M-EST. PATIENT-LVL V: CPT | Mod: PBBFAC,,, | Performed by: INTERNAL MEDICINE

## 2024-02-08 PROCEDURE — 3079F DIAST BP 80-89 MM HG: CPT | Mod: CPTII,S$GLB,, | Performed by: INTERNAL MEDICINE

## 2024-02-08 PROCEDURE — 1159F MED LIST DOCD IN RCRD: CPT | Mod: CPTII,S$GLB,, | Performed by: INTERNAL MEDICINE

## 2024-02-08 PROCEDURE — 3077F SYST BP >= 140 MM HG: CPT | Mod: CPTII,S$GLB,, | Performed by: INTERNAL MEDICINE

## 2024-02-08 NOTE — PROGRESS NOTES
Subjective:   Patient ID: Brandi Sahu is a 63 y.o. female    Chief Complaint:   Chief Complaint   Patient presents with    Apnea       Referred by Dr. Dom Umana    HPI  Brandi Sahu is a 63 y.o. female who was referred by Dr. Dom Umana for a sleep evaluation for possible sleep apnea The patient  has a past medical history of Anxiety, Arthritis, Asthma, Bursitis, shoulder, Colon polyps, Dental bridge present, Dental crown present, Depression, Hypertension, Pes planus, and Wears glasses.   The patient was diagnosed with paroxsymal atrial fibrillation recently.    The patient reports diurnal fatigue.  The patient reports witnessed snoring noticed by others. There is no witnessed apnea - lives by herself. She has  awoken from a snore.  Snoring is usually of moderate intensity.  When she wakes up from sleep, she does  feel un refreshed.     She miles shift work. She works nights. 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 7 nights straight, 2 off, then 8 nights then 4 off.    When working overnight; sleep time is 8:00 AM to 2:00  PM and back to sleep 6:00 - 8:00 PM.  When working overtime, BT 3:30 PM - 8:30 PM. Uses black out curtains.    There is also reported dry mouth and grinding of teeth when they wake up.      Patient does  drink  caffeinated beverages daily.    Weight: Patient reports gaining weight over the last several years.    Wt Readings from Last 150 Encounters:   02/08/24 123.6 kg (272 lb 7.8 oz)   12/27/23 124 kg (273 lb 5.9 oz)   11/21/23 125.7 kg (277 lb 1.9 oz)   10/31/23 123.8 kg (272 lb 14.4 oz)   10/24/23 123.5 kg (272 lb 4.3 oz)   10/05/23 125.3 kg (276 lb 3.8 oz)   10/05/23 123.8 kg (272 lb 14.9 oz)   09/27/23 123.6 kg (272 lb 8 oz)   08/02/23 127 kg (280 lb)   07/26/23 130.7 kg (288 lb 4 oz)   07/13/23 129.3 kg (285 lb)   07/10/23 129.6 kg (285 lb 11.5 oz)   03/29/23 129.7 kg (286 lb)   01/18/23 130 kg (286 lb 9.6 oz)   01/12/23 122.5 kg (270 lb)   12/20/22 123.5 kg (272 lb 4.3 oz)   11/14/22  128.7 kg (283 lb 11.7 oz)   10/31/22 127.4 kg (280 lb 13.9 oz)   09/29/22 127.3 kg (280 lb 8.6 oz)   08/29/22 126.9 kg (279 lb 12.2 oz)   05/28/22 125.6 kg (277 lb)   05/18/22 125.8 kg (277 lb 5.4 oz)   05/11/22 125.8 kg (277 lb 5.4 oz)   03/23/22 124.3 kg (274 lb 0.5 oz)   03/16/22 125.9 kg (277 lb 9 oz)   02/23/22 117.9 kg (260 lb)   02/22/22 122.5 kg (270 lb 1 oz)   01/18/22 122.5 kg (270 lb)   01/04/22 122.6 kg (270 lb 4.5 oz)   11/29/21 122.5 kg (270 lb 1 oz)   11/16/21 118.2 kg (260 lb 9.3 oz)   11/13/21 115 kg (253 lb 8.5 oz)   11/10/21 114.7 kg (252 lb 13.9 oz)   11/08/21 114.7 kg (252 lb 13.9 oz)   10/28/21 114.7 kg (252 lb 12.1 oz)   07/19/21 114.9 kg (253 lb 6.7 oz)   07/06/21 113.7 kg (250 lb 10.6 oz)   07/04/21 115.7 kg (255 lb)   06/25/21 115.9 kg (255 lb 8.2 oz)   06/25/21 115.5 kg (254 lb 10.1 oz)   06/20/21 113.4 kg (250 lb)   06/03/21 117.2 kg (258 lb 6.1 oz)   08/06/20 115.2 kg (253 lb 13.8 oz)   06/19/20 114.7 kg (252 lb 12.1 oz)   08/01/19 116.1 kg (256 lb)   07/08/19 116.6 kg (256 lb 15.1 oz)   02/19/19 119.7 kg (264 lb)   02/11/19 119.8 kg (264 lb 1.8 oz)   02/06/19 121.1 kg (267 lb)   01/21/19 121.1 kg (267 lb)   01/09/19 121.2 kg (267 lb 3.2 oz)   08/21/18 124.3 kg (274 lb)   07/03/18 116.6 kg (257 lb)   06/26/18 121.3 kg (267 lb 6.7 oz)   06/07/18 123.2 kg (271 lb 9.7 oz)   06/06/18 122.9 kg (271 lb)   05/16/18 117.9 kg (260 lb)   04/25/18 117.9 kg (260 lb)   04/11/18 117.9 kg (260 lb)   03/23/18 117.9 kg (260 lb)   01/11/18 121.6 kg (268 lb)   11/29/17 122 kg (268 lb 15.4 oz)   09/14/17 120.8 kg (266 lb 3.3 oz)   12/23/16 125.4 kg (276 lb 5.6 oz)   11/23/16 124 kg (273 lb 5.9 oz)   10/31/16 121.2 kg (267 lb 4.9 oz)   10/07/16 123.9 kg (273 lb 0.6 oz)   07/28/16 122.7 kg (270 lb 8.1 oz)   05/26/16 123.5 kg (272 lb 4.3 oz)   05/19/16 122.8 kg (270 lb 11.6 oz)   02/23/16 120.7 kg (266 lb 1.5 oz)   02/02/16 120.8 kg (266 lb 3.3 oz)   10/30/15 118.2 kg (260 lb 11.1 oz)   08/31/15 122 kg (269  lb)   08/07/15 122 kg (269 lb)   07/29/15 118.4 kg (261 lb)   07/17/15 118.8 kg (262 lb)   04/24/15 113.4 kg (250 lb)   03/13/15 113.4 kg (250 lb)   03/02/15 113.4 kg (250 lb)   02/02/15 115.2 kg (254 lb)   11/29/14 116.1 kg (256 lb)   11/13/14 115 kg (253 lb 8 oz)   10/16/14 114.3 kg (252 lb)   10/08/14 114.3 kg (252 lb)   06/23/14 112.9 kg (249 lb)   06/19/14 112.9 kg (249 lb)   03/31/14 108.8 kg (239 lb 12.8 oz)   03/11/14 110.5 kg (243 lb 9.6 oz)   02/12/14 111 kg (244 lb 12.8 oz)   12/24/13 114.3 kg (252 lb)   10/14/13 116 kg (255 lb 12.8 oz)   09/26/13 113.2 kg (249 lb 9.6 oz)   02/14/13 78.6 kg (173 lb 4.8 oz)   12/14/12 103.4 kg (228 lb)   11/16/12 108.2 kg (238 lb 8 oz)   10/01/12 104.8 kg (231 lb)   09/04/12 108.2 kg (238 lb 8 oz)   05/16/12 104.5 kg (230 lb 7.9 oz)         Prior sleep evaluation: none    Sleep medication taken: none.    Other sleep remedies: none      Patient provided ESS:    Cosby Sleepiness Scale TOTAL   (validated sleepiness questionnaire with a higher score indicating greater sleepiness; range 0-24)      2/8/2024     9:20 AM   EPWORTH SLEEPINESS SCALE   Sitting and reading 3   Watching TV 3   Sitting, inactive in a public place (e.g. a theatre or a meeting) 2   As a passenger in a car for an hour without a break 2   Lying down to rest in the afternoon when circumstances permit 3   Sitting and talking to someone 1   Sitting quietly after a lunch without alcohol 1   In a car, while stopped for a few minutes in traffic 0   Total score 15         STOP BANG questionnaire:    Snoring present: y  Tiredness present:y  Obstruction (apneas/choking episodes): y  Pressure (HTN): y    BMI greater than 35 kg/m2: y  Age greater than 50 years old: y  Neck circumference > than 17 inches if male or > than 16 inches if female : y  Gender being male: n    Total STOP BANG score = 7/8  Low risk KISHAN: 0-2, Intermediate risk KISHAN: 3-4, High risk KISHAN: 5+        Most Recent Vital Signs:    The patient's body  mass index is 46.77 kg/m².    Wt Readings from Last 5 Encounters:   02/08/24 123.6 kg (272 lb 7.8 oz)   12/27/23 124 kg (273 lb 5.9 oz)   11/21/23 125.7 kg (277 lb 1.9 oz)   10/31/23 123.8 kg (272 lb 14.4 oz)   10/24/23 123.5 kg (272 lb 4.3 oz)     BMI Readings from Last 5 Encounters:   02/08/24 46.77 kg/m²   12/27/23 46.92 kg/m²   11/21/23 47.57 kg/m²   10/31/23 46.84 kg/m²   10/24/23 46.73 kg/m²     Pulse Readings from Last 3 Encounters:   02/08/24 99   12/27/23 81   11/21/23 91         Current Outpatient Medications:     albuterol (PROVENTIL HFA) 90 mcg/actuation inhaler, Inhale 2 puffs into the lungs every 6 (six) hours as needed for Wheezing. Rescue, Disp: 18 g, Rfl: 5    albuterol-ipratropium (DUO-NEB) 2.5 mg-0.5 mg/3 mL nebulizer solution, Take 3 mLs by nebulization every 6 (six) hours as needed for Wheezing or Shortness of Breath (cough)., Disp: 120 each, Rfl: 0    amLODIPine (NORVASC) 10 MG tablet, Take 1 tablet (10 mg total) by mouth once daily., Disp: 30 tablet, Rfl: 6    diclofenac (VOLTAREN) 75 MG EC tablet, Take 75 mg by mouth 2 (two) times daily as needed., Disp: , Rfl:     diethylpropion (TENUATE) 75 mg SR tablet, Take 1 tablet (75 mg total) by mouth once daily., Disp: 30 tablet, Rfl: 2    diphenhydrAMINE (BENADRYL) 25 mg capsule, Take 2 capsules (50 mg total) by mouth every 6 (six) hours as needed for Itching or Allergies., Disp: 30 capsule, Rfl: 0    EPINEPHrine (EPIPEN) 0.3 mg/0.3 mL AtIn, Inject 0.3 mLs (0.3 mg total) into the muscle as needed., Disp: 2 each, Rfl: 0    ergocalciferol (ERGOCALCIFEROL) 50,000 unit Cap, Take 1 capsule (50,000 Units total) by mouth every 7 days., Disp: 12 capsule, Rfl: 3    famotidine (PEPCID) 20 MG tablet, Take 1 tablet (20 mg total) by mouth 2 (two) times daily as needed for Heartburn., Disp: 60 tablet, Rfl: 5    fluticasone propionate (FLONASE) 50 mcg/actuation nasal spray, 2 sprays (100 mcg total) by Each Nostril route once daily., Disp: 16 g, Rfl: 0     fluticasone-salmeterol diskus inhaler 100-50 mcg, Inhale 1 puff into the lungs 2 (two) times daily. Controller, Disp: 60 each, Rfl: 2    loratadine (CLARITIN) 10 mg tablet, Take 1 tablet (10 mg total) by mouth every morning., Disp: 60 tablet, Rfl: 0    metoprolol succinate (TOPROL-XL) 25 MG 24 hr tablet, Take 1 tablet (25 mg total) by mouth once daily. Hold for SBP (top blood pressure number) <120 and/or HR < 60., Disp: 30 tablet, Rfl: 11    olopatadine (PATANOL) 0.1 % ophthalmic solution, Place 1 drop into both eyes 2 (two) times daily., Disp: 5 mL, Rfl: 0    rivaroxaban (XARELTO) 20 mg Tab, Take 1 tablet (20 mg total) by mouth daily with dinner or evening meal., Disp: 30 tablet, Rfl: 2    spironolactone (ALDACTONE) 50 MG tablet, Take 50 mg by mouth 2 (two) times daily., Disp: , Rfl:     topiramate (TOPAMAX) 25 MG tablet, Take 25 mg by mouth 2 (two) times daily., Disp: , Rfl:        Objective:   Vitals reviewed   Physical Exam  Vitals reviewed.   Constitutional:       Appearance: Normal appearance. She is obese.   HENT:      Head: Normocephalic.      Mouth/Throat:      Comments: Mallampati iii  Pulmonary:      Effort: Pulmonary effort is normal.   Neurological:      General: No focal deficit present.      Mental Status: She is alert and oriented to person, place, and time.   Psychiatric:         Mood and Affect: Mood normal.         Behavior: Behavior normal.         Assessment:     Problem List Items Addressed This Visit    None  Visit Diagnoses       Snoring    -  Primary    Excessive daytime sleepiness        Class 3 severe obesity due to excess calories with serious comorbidity and body mass index (BMI) of 45.0 to 49.9 in adult        Tachycardia        Shift work sleep disorder                  Plan:       1. Snoring  2. Excessive daytime sleepiness  3. Class 3 severe obesity due to excess calories with serious comorbidity and body mass index (BMI) of 45.0 to 49.9 in adult  4. Tachycardia  Comorbid  hypertension    Could likely be secondary to sleep disordered breathing of obstructive origin.  Reviewed STOP BANG and ESS.    Patient has been informed about the pathophysiology and prognosis associated with KISHAN and CSA and has been advised to undergo a baseline Polysomnography (PSG) study for further evaluation of his sleep disorder breathing.    Patient understood and agreed to undergo for a PSG study. Will order Home Sleep Study and proceed with further studies or prescription for CPAP as appropriate.    Patient has been advised to loose weight through a diet and exercise plan.  The patient has a disturbance of sleep with clinically relevant potential for adverse behavioral, cardiovascular, and metabolic outcomes.  Untreated, the disturbance of sleep can have significant effect on mental health, clinical health and survival.     5. Shift work sleep disorder  - recommend consistent bedtimes  - can use eye mask if needed    Will follow-up patient with results of PSG

## 2024-03-07 ENCOUNTER — OFFICE VISIT (OUTPATIENT)
Dept: SPORTS MEDICINE | Facility: CLINIC | Age: 64
End: 2024-03-07
Payer: COMMERCIAL

## 2024-03-07 VITALS
BODY MASS INDEX: 47.1 KG/M2 | SYSTOLIC BLOOD PRESSURE: 156 MMHG | DIASTOLIC BLOOD PRESSURE: 87 MMHG | WEIGHT: 275.88 LBS | HEIGHT: 64 IN | HEART RATE: 89 BPM

## 2024-03-07 DIAGNOSIS — M17.12 PRIMARY OSTEOARTHRITIS OF LEFT KNEE: Primary | ICD-10-CM

## 2024-03-07 PROCEDURE — 99999 PR PBB SHADOW E&M-EST. PATIENT-LVL IV: CPT | Mod: PBBFAC,,, | Performed by: STUDENT IN AN ORGANIZED HEALTH CARE EDUCATION/TRAINING PROGRAM

## 2024-03-07 PROCEDURE — 99214 OFFICE O/P EST MOD 30 MIN: CPT | Mod: S$GLB,,, | Performed by: STUDENT IN AN ORGANIZED HEALTH CARE EDUCATION/TRAINING PROGRAM

## 2024-03-07 PROCEDURE — 3079F DIAST BP 80-89 MM HG: CPT | Mod: CPTII,S$GLB,, | Performed by: STUDENT IN AN ORGANIZED HEALTH CARE EDUCATION/TRAINING PROGRAM

## 2024-03-07 PROCEDURE — 3008F BODY MASS INDEX DOCD: CPT | Mod: CPTII,S$GLB,, | Performed by: STUDENT IN AN ORGANIZED HEALTH CARE EDUCATION/TRAINING PROGRAM

## 2024-03-07 PROCEDURE — 3077F SYST BP >= 140 MM HG: CPT | Mod: CPTII,S$GLB,, | Performed by: STUDENT IN AN ORGANIZED HEALTH CARE EDUCATION/TRAINING PROGRAM

## 2024-03-07 PROCEDURE — 1160F RVW MEDS BY RX/DR IN RCRD: CPT | Mod: CPTII,S$GLB,, | Performed by: STUDENT IN AN ORGANIZED HEALTH CARE EDUCATION/TRAINING PROGRAM

## 2024-03-07 PROCEDURE — 1159F MED LIST DOCD IN RCRD: CPT | Mod: CPTII,S$GLB,, | Performed by: STUDENT IN AN ORGANIZED HEALTH CARE EDUCATION/TRAINING PROGRAM

## 2024-03-07 PROCEDURE — 1125F AMNT PAIN NOTED PAIN PRSNT: CPT | Mod: CPTII,S$GLB,, | Performed by: STUDENT IN AN ORGANIZED HEALTH CARE EDUCATION/TRAINING PROGRAM

## 2024-03-07 NOTE — PROGRESS NOTES
CC: left knee pain    63 y.o. Female presents today for evaluation of her left knee pain. Pt was referred by Dr. Beard. Pt reports pain is 7/10 today. Pt localizes pain to anterior, anteromedial and anterolateral knee. Pt denies mechanical symptoms. Pt denies numbness/tingling.     Attempted treatments: fPT (mild relief), CSI (depo-medrol) with Pain Mgmt (no relief)  Pain score: 7/10  History of trauma/injury: none since last visit with Dr. Beard  Affecting ADLs: yes      REVIEW OF SYSTEMS:   Constitution: Patient denies fever or chills.  Eyes: Patient denies eye pain or vision changes.  HEENT: Patient denies ear pain, sore throat, or nasal discharge.  CVS: Patient denies chest pain.  Lungs: Patient denies shortness of breath or cough.  Skin: Patient denies skin rash or itching.    Musculoskeletal: Patient denies recent falls. See HPI.  Psych: Patient denies any current anxiety or nervousness.    PAST MEDICAL HISTORY:   Past Medical History:   Diagnosis Date    Anxiety     Arthritis     knee    Asthma     Bursitis, shoulder     Colon polyps     Dental bridge present     upper right    Dental crown present     Depression     Hypertension     Pes planus     Wears glasses     for reading       MEDICATIONS:     Current Outpatient Medications:     albuterol (PROVENTIL HFA) 90 mcg/actuation inhaler, Inhale 2 puffs into the lungs every 6 (six) hours as needed for Wheezing. Rescue, Disp: 18 g, Rfl: 5    albuterol-ipratropium (DUO-NEB) 2.5 mg-0.5 mg/3 mL nebulizer solution, Take 3 mLs by nebulization every 6 (six) hours as needed for Wheezing or Shortness of Breath (cough)., Disp: 120 each, Rfl: 0    amLODIPine (NORVASC) 10 MG tablet, Take 1 tablet (10 mg total) by mouth once daily., Disp: 30 tablet, Rfl: 6    diclofenac (VOLTAREN) 75 MG EC tablet, Take 75 mg by mouth 2 (two) times daily as needed., Disp: , Rfl:     diethylpropion (TENUATE) 75 mg SR tablet, Take 1 tablet (75 mg total) by mouth once daily., Disp: 30 tablet,  "Rfl: 2    diphenhydrAMINE (BENADRYL) 25 mg capsule, Take 2 capsules (50 mg total) by mouth every 6 (six) hours as needed for Itching or Allergies., Disp: 30 capsule, Rfl: 0    EPINEPHrine (EPIPEN) 0.3 mg/0.3 mL AtIn, Inject 0.3 mLs (0.3 mg total) into the muscle as needed., Disp: 2 each, Rfl: 0    ergocalciferol (ERGOCALCIFEROL) 50,000 unit Cap, Take 1 capsule (50,000 Units total) by mouth every 7 days., Disp: 12 capsule, Rfl: 3    famotidine (PEPCID) 20 MG tablet, Take 1 tablet (20 mg total) by mouth 2 (two) times daily as needed for Heartburn., Disp: 60 tablet, Rfl: 5    fluticasone propionate (FLONASE) 50 mcg/actuation nasal spray, 2 sprays (100 mcg total) by Each Nostril route once daily., Disp: 16 g, Rfl: 0    fluticasone-salmeterol diskus inhaler 100-50 mcg, Inhale 1 puff into the lungs 2 (two) times daily. Controller, Disp: 60 each, Rfl: 2    loratadine (CLARITIN) 10 mg tablet, Take 1 tablet (10 mg total) by mouth every morning., Disp: 60 tablet, Rfl: 0    metoprolol succinate (TOPROL-XL) 25 MG 24 hr tablet, Take 1 tablet (25 mg total) by mouth once daily. Hold for SBP (top blood pressure number) <120 and/or HR < 60., Disp: 30 tablet, Rfl: 11    olopatadine (PATANOL) 0.1 % ophthalmic solution, Place 1 drop into both eyes 2 (two) times daily., Disp: 5 mL, Rfl: 0    spironolactone (ALDACTONE) 50 MG tablet, Take 50 mg by mouth 2 (two) times daily., Disp: , Rfl:     topiramate (TOPAMAX) 25 MG tablet, Take 25 mg by mouth 2 (two) times daily., Disp: , Rfl:     ALLERGIES:   Review of patient's allergies indicates:   Allergen Reactions    Lisinopril Hives, Itching, Rash, Shortness Of Breath and Swelling        PHYSICAL EXAMINATION:  BP (!) 156/87   Pulse 89   Ht 5' 4" (1.626 m)   Wt 125.1 kg (275 lb 14.5 oz)   LMP 07/18/2012   BMI 47.36 kg/m²   Vitals signs and nursing note have been reviewed.    General: In no acute distress, well developed, well nourished, no diaphoresis  Eyes: EOM full and smooth, no eye " redness or discharge  HENT: normocephalic and atraumatic, neck supple, trachea midline, no nasal discharge  Cardiovascular: no LE edema  Lungs: respirations non-labored, no conversational dyspnea   Neuro: AAOx3, CN2-12 grossly intact  Skin: No rashes, warm and dry  Psychiatric: cooperative, pleasant, mood and affect appropriate for age    ASSESSMENT:      ICD-10-CM ICD-9-CM   1. Primary osteoarthritis of left knee  M17.12 715.16   2. BMI 45.0-49.9, adult  Z68.42 V85.42         PLAN:    Patient with Kellgren-Shivam Grade 2 or higher osteoarthritic changes to the knee(s) (please see previous note with description of xray images).  Prior to hyaluronic injections, patient had functional limitations with decreased range of motion and persistent pain.  Patient has tried aerobic and resistance training and weight reduction without improvement of symptoms.  Patient failed to improve with the use of NSAIDs and Acetaminophen over the last 12 months.  Patient did not receive more than 8 weeks of relief from corticosteroid injection.      Future planning includes - Left knee Euflexxa    All questions were answered to the best of my ability and all concerns were addressed at this time.    Follow up for above, or sooner if needed.      This note is dictated using the M*Modal Fluency Direct word recognition program. There are word recognition mistakes that are occasionally missed on review.

## 2024-03-11 ENCOUNTER — TELEPHONE (OUTPATIENT)
Dept: SLEEP MEDICINE | Facility: HOSPITAL | Age: 64
End: 2024-03-11
Payer: COMMERCIAL

## 2024-03-27 ENCOUNTER — TELEPHONE (OUTPATIENT)
Dept: ORTHOPEDICS | Facility: CLINIC | Age: 64
End: 2024-03-27
Payer: COMMERCIAL

## 2024-03-27 ENCOUNTER — TELEPHONE (OUTPATIENT)
Dept: SPORTS MEDICINE | Facility: CLINIC | Age: 64
End: 2024-03-27
Payer: COMMERCIAL

## 2024-03-27 NOTE — TELEPHONE ENCOUNTER
Called and spoke to patient.  Patient's insurance denied her Euflexxa injections due to not being a covered benefit.  Patient is contact [facility] pricing office to see how much it would be for self pay.  Patient will contact office to let us know how she would like to proceed with these appointments.

## 2024-03-28 ENCOUNTER — OFFICE VISIT (OUTPATIENT)
Dept: SPORTS MEDICINE | Facility: CLINIC | Age: 64
End: 2024-03-28

## 2024-03-28 VITALS
WEIGHT: 281.75 LBS | BODY MASS INDEX: 48.1 KG/M2 | SYSTOLIC BLOOD PRESSURE: 151 MMHG | HEIGHT: 64 IN | HEART RATE: 76 BPM | DIASTOLIC BLOOD PRESSURE: 74 MMHG

## 2024-03-28 DIAGNOSIS — M17.12 PRIMARY OSTEOARTHRITIS OF LEFT KNEE: Primary | ICD-10-CM

## 2024-03-28 PROCEDURE — 99499 UNLISTED E&M SERVICE: CPT | Mod: SP,S$GLB,, | Performed by: STUDENT IN AN ORGANIZED HEALTH CARE EDUCATION/TRAINING PROGRAM

## 2024-03-28 PROCEDURE — 99999 PR PBB SHADOW E&M-EST. PATIENT-LVL IV: CPT | Mod: PBBFAC,SP,, | Performed by: STUDENT IN AN ORGANIZED HEALTH CARE EDUCATION/TRAINING PROGRAM

## 2024-03-28 PROCEDURE — 20611 DRAIN/INJ JOINT/BURSA W/US: CPT | Mod: SP,LT,S$GLB, | Performed by: STUDENT IN AN ORGANIZED HEALTH CARE EDUCATION/TRAINING PROGRAM

## 2024-03-28 NOTE — PROCEDURES
Large Joint Aspiration/Injection: L knee    Date/Time: 3/28/2024 8:45 AM    Performed by: Frannie Sabillon MD  Authorized by: Frannie Sabillon MD    Consent Done?:  Yes (Verbal)  Indications:  Arthritis and pain  Site marked: the procedure site was marked    Timeout: prior to procedure the correct patient, procedure, and site was verified    Prep: patient was prepped and draped in usual sterile fashion      Local anesthesia used?: Yes    Anesthesia:  Local infiltration  Local anesthetic:  Co-phenylcaine spray    Details:  Needle Size:  22 G  Ultrasonic Guidance for needle placement?: Yes (Ultrasound guidance used to avoid neurovascular injury and/or to improve accuracy given body habitus.)    Images are saved and documented.  Approach: Superolateral.  Location:  Knee  Site:  L knee  Medications:  20 mg sodium hyaluronate (EUFLEXXA) 10 mg/mL(mw 2.4 -3.6 million)  Medications comment:  Ropivacaine 0.2% 2mL  Patient tolerance:  Patient tolerated the procedure well with no immediate complications     TECHNIQUE: Real time ultrasound examination of the left knee was performed with Klypper Edge 2, 9-L MHz linear probe(s). Ultrasound guidance was used for needle localization. Images were saved and stored for documentation. Dynamic visualization of the needle was continuous throughout the procedures and maintained in good position.

## 2024-03-28 NOTE — PROGRESS NOTES
CC: left knee pain    63 y.o. Female presents today for her 1st Euflexxa injection of her left knee.     Attempted treatments: none since last visit  Pain score: 8/10  History of trauma/injury: none since last visit  Affecting ADLs: yes      REVIEW OF SYSTEMS:   Constitution: Patient denies fever or chills.  Eyes: Patient denies eye pain or vision changes.  HEENT: Patient denies ear pain, sore throat, or nasal discharge.  CVS: Patient denies chest pain.  Lungs: Patient denies shortness of breath or cough.  Skin: Patient denies skin rash or itching.    Musculoskeletal: Patient denies recent falls. See HPI.  Psych: Patient denies any current anxiety or nervousness.    PAST MEDICAL HISTORY:   Past Medical History:   Diagnosis Date    Anxiety     Arthritis     knee    Asthma     Bursitis, shoulder     Colon polyps     Dental bridge present     upper right    Dental crown present     Depression     Hypertension     Pes planus     Wears glasses     for reading       MEDICATIONS:     Current Outpatient Medications:     albuterol (PROVENTIL HFA) 90 mcg/actuation inhaler, Inhale 2 puffs into the lungs every 6 (six) hours as needed for Wheezing. Rescue, Disp: 18 g, Rfl: 5    albuterol-ipratropium (DUO-NEB) 2.5 mg-0.5 mg/3 mL nebulizer solution, Take 3 mLs by nebulization every 6 (six) hours as needed for Wheezing or Shortness of Breath (cough)., Disp: 120 each, Rfl: 0    amLODIPine (NORVASC) 10 MG tablet, Take 1 tablet (10 mg total) by mouth once daily., Disp: 30 tablet, Rfl: 6    diclofenac (VOLTAREN) 75 MG EC tablet, Take 75 mg by mouth 2 (two) times daily as needed., Disp: , Rfl:     diethylpropion (TENUATE) 75 mg SR tablet, Take 1 tablet (75 mg total) by mouth once daily., Disp: 30 tablet, Rfl: 2    diphenhydrAMINE (BENADRYL) 25 mg capsule, Take 2 capsules (50 mg total) by mouth every 6 (six) hours as needed for Itching or Allergies., Disp: 30 capsule, Rfl: 0    ergocalciferol (ERGOCALCIFEROL) 50,000 unit Cap, Take 1  "capsule (50,000 Units total) by mouth every 7 days., Disp: 12 capsule, Rfl: 3    famotidine (PEPCID) 20 MG tablet, Take 1 tablet (20 mg total) by mouth 2 (two) times daily as needed for Heartburn., Disp: 60 tablet, Rfl: 5    fluticasone propionate (FLONASE) 50 mcg/actuation nasal spray, 2 sprays (100 mcg total) by Each Nostril route once daily., Disp: 16 g, Rfl: 0    fluticasone-salmeterol diskus inhaler 100-50 mcg, Inhale 1 puff into the lungs 2 (two) times daily. Controller, Disp: 60 each, Rfl: 2    loratadine (CLARITIN) 10 mg tablet, Take 1 tablet (10 mg total) by mouth every morning., Disp: 60 tablet, Rfl: 0    metoprolol succinate (TOPROL-XL) 25 MG 24 hr tablet, Take 1 tablet (25 mg total) by mouth once daily. Hold for SBP (top blood pressure number) <120 and/or HR < 60., Disp: 30 tablet, Rfl: 11    olopatadine (PATANOL) 0.1 % ophthalmic solution, Place 1 drop into both eyes 2 (two) times daily., Disp: 5 mL, Rfl: 0    spironolactone (ALDACTONE) 50 MG tablet, Take 50 mg by mouth 2 (two) times daily., Disp: , Rfl:     topiramate (TOPAMAX) 25 MG tablet, Take 25 mg by mouth 2 (two) times daily., Disp: , Rfl:     EPINEPHrine (EPIPEN) 0.3 mg/0.3 mL AtIn, Inject 0.3 mLs (0.3 mg total) into the muscle as needed., Disp: 2 each, Rfl: 0    ALLERGIES:   Review of patient's allergies indicates:   Allergen Reactions    Lisinopril Hives, Itching, Rash, Shortness Of Breath and Swelling        PHYSICAL EXAMINATION:  BP (!) 151/74   Pulse 76   Ht 5' 4" (1.626 m)   Wt 127.8 kg (281 lb 12 oz)   LMP 07/18/2012   BMI 48.36 kg/m²   There are no signs of infection at the injection site, including no rubor, calor, or skin lesions.  Gen: NAD.  Psych: Affect & judgment nl.  Neuro: Grossly CNI. BAILEY.  HEENT: -Trach dev. -Eye d/c. -Rhinorrhea.  CV: Color nl. -E/C/C. WWPx4.  Pulm: -Dyspnea. -Cough.  Lymph: -Edema.  Int: -Rash/lesion noted. Skin is warm and dry    Diagnostic Extremity - [facility]-Sports Ultrasound was recommended due to left " knee(s) evaluation.    FOCUSED: examination.     TECHNIQUE: Real time ultrasound examination of the left knee was performed with SonoSite Edge 2, 9-L MHz linear probe.     FINDINGS: The images are of adequate diagnostic quality with identification of all echogenic structures made except for the vascular structures unless otherwise noted. There is no sonographic evidence of periosteal abnormalities, soft tissue edema, musculotendinous or nerve irregularities. The rest of the sonographic examination was unremarkable.    Ultrasound images were directly reviewed with the patient and then a treatment plan was discussed.     Images were stored in the patients medical record.     IMPRESSION: No effusion to be aspirated.       ASSESSMENT:      ICD-10-CM ICD-9-CM   1. Primary osteoarthritis of left knee  M17.12 715.16   2. BMI 45.0-49.9, adult  Z68.42 V85.42         PLAN:  Ultrasound-guided injection of the left knee with Euflexxa performed at visit today.    Future planning includes - Continue exercise program    Risks and benefits were discussed with patient prior to receiving injection.  Depending on injection type, risks include the possibility of infection, pain, disruptions in blood pressure and blood sugar, and cosmetic deformity at site of injection.    All questions were answered to the best of my ability and all concerns were addressed at this time.    Follow up in 1 week(s) for above, or sooner if needed.      This note is dictated using the M*Modal Fluency Direct word recognition program. There are word recognition mistakes that are occasionally missed on review.

## 2024-03-28 NOTE — PATIENT INSTRUCTIONS
You had a hyaluronic acid (gel) injection today. There are two medicines that were injected into your knee, a numbing medicine (local anesthetic) and the hyaluronic acid. The numbing medication component usually takes effect within a few minutes and wears off after a few hours, after which your pain may return. The gel medication typically takes effect in 4-6 weeks, although some people have benefits sooner.    There are risks with this procedure.  Any time the skin is punctured with a needle, there is a small risk of infection. With these injections that risk is less than 1 and 14,000. Less than 1% of people experience of pain flare with the hyaluronic acid injection (pseudoseptic reaction)  which usually goes away in 2-3 days or may require further intervention.    
English

## 2024-04-04 ENCOUNTER — OFFICE VISIT (OUTPATIENT)
Dept: SPORTS MEDICINE | Facility: CLINIC | Age: 64
End: 2024-04-04

## 2024-04-04 VITALS
HEART RATE: 93 BPM | DIASTOLIC BLOOD PRESSURE: 86 MMHG | SYSTOLIC BLOOD PRESSURE: 162 MMHG | BODY MASS INDEX: 48.1 KG/M2 | HEIGHT: 64 IN | WEIGHT: 281.75 LBS

## 2024-04-04 DIAGNOSIS — R03.0 ELEVATED BLOOD PRESSURE READING: ICD-10-CM

## 2024-04-04 DIAGNOSIS — M17.12 PRIMARY OSTEOARTHRITIS OF LEFT KNEE: Primary | ICD-10-CM

## 2024-04-04 PROCEDURE — 99499 UNLISTED E&M SERVICE: CPT | Mod: SP,S$GLB,, | Performed by: STUDENT IN AN ORGANIZED HEALTH CARE EDUCATION/TRAINING PROGRAM

## 2024-04-04 PROCEDURE — 99999 PR PBB SHADOW E&M-EST. PATIENT-LVL IV: CPT | Mod: PBBFAC,SP,, | Performed by: STUDENT IN AN ORGANIZED HEALTH CARE EDUCATION/TRAINING PROGRAM

## 2024-04-04 PROCEDURE — 20611 DRAIN/INJ JOINT/BURSA W/US: CPT | Mod: SP,LT,S$GLB, | Performed by: STUDENT IN AN ORGANIZED HEALTH CARE EDUCATION/TRAINING PROGRAM

## 2024-04-04 RX ORDER — KETOROLAC TROMETHAMINE 30 MG/ML
30 INJECTION, SOLUTION INTRAMUSCULAR; INTRAVENOUS
Status: DISCONTINUED | OUTPATIENT
Start: 2024-04-04 | End: 2024-04-04 | Stop reason: HOSPADM

## 2024-04-04 RX ADMIN — KETOROLAC TROMETHAMINE 30 MG: 30 INJECTION, SOLUTION INTRAMUSCULAR; INTRAVENOUS at 08:04

## 2024-04-04 NOTE — PROCEDURES
Large Joint Aspiration/Injection: L knee    Date/Time: 4/4/2024 8:00 AM    Performed by: Frannie Sabillon MD  Authorized by: Frannie Sabillon MD    Consent Done?:  Yes (Verbal)  Indications:  Arthritis and pain  Site marked: the procedure site was marked    Timeout: prior to procedure the correct patient, procedure, and site was verified    Prep: patient was prepped and draped in usual sterile fashion      Local anesthesia used?: Yes    Anesthesia:  Local infiltration  Local anesthetic:  Co-phenylcaine spray    Details:  Needle Size:  22 G  Ultrasonic Guidance for needle placement?: Yes (Ultrasound guidance used to avoid neurovascular injury and/or to improve accuracy given body habitus.)    Images are saved and documented.  Approach: Superolateral.  Location:  Knee  Site:  L knee  Medications:  20 mg sodium hyaluronate (EUFLEXXA) 10 mg/mL(mw 2.4 -3.6 million); 30 mg ketorolac 30 mg/mL (1 mL)  Medications comment:  Ropivacaine 0.2% 2mL  Patient tolerance:  Patient tolerated the procedure well with no immediate complications     TECHNIQUE: Real time ultrasound examination of the left knee was performed with SonMi-Pay Edge 2, 9-L MHz linear probe(s). Ultrasound guidance was used for needle localization. Images were saved and stored for documentation. Dynamic visualization of the needle was continuous throughout the procedures and maintained in good position.

## 2024-04-04 NOTE — PROGRESS NOTES
CC: left knee pain    63 y.o. Female presents today for her 2nd Euflexxa injection of her left knee.     Attempted treatments: none since last visit  Pain score: 8-9/10  History of trauma/injury: none since last visit  Affecting ADLs: yes     REVIEW OF SYSTEMS:   Constitution: Patient denies fever or chills.  Eyes: Patient denies eye pain or vision changes.  HEENT: Patient denies ear pain, sore throat, or nasal discharge.  CVS: Patient denies chest pain.  Lungs: Patient denies shortness of breath or cough.  Skin: Patient denies skin rash or itching.    Musculoskeletal: Patient denies recent falls. See HPI.  Psych: Patient denies any current anxiety or nervousness.    PAST MEDICAL HISTORY:   Past Medical History:   Diagnosis Date    Anxiety     Arthritis     knee    Asthma     Bursitis, shoulder     Colon polyps     Dental bridge present     upper right    Dental crown present     Depression     Hypertension     Pes planus     Wears glasses     for reading       MEDICATIONS:     Current Outpatient Medications:     albuterol (PROVENTIL HFA) 90 mcg/actuation inhaler, Inhale 2 puffs into the lungs every 6 (six) hours as needed for Wheezing. Rescue, Disp: 18 g, Rfl: 5    albuterol-ipratropium (DUO-NEB) 2.5 mg-0.5 mg/3 mL nebulizer solution, Take 3 mLs by nebulization every 6 (six) hours as needed for Wheezing or Shortness of Breath (cough)., Disp: 120 each, Rfl: 0    amLODIPine (NORVASC) 10 MG tablet, Take 1 tablet (10 mg total) by mouth once daily., Disp: 30 tablet, Rfl: 6    diclofenac (VOLTAREN) 75 MG EC tablet, Take 75 mg by mouth 2 (two) times daily as needed., Disp: , Rfl:     diethylpropion (TENUATE) 75 mg SR tablet, Take 1 tablet (75 mg total) by mouth once daily., Disp: 30 tablet, Rfl: 2    diphenhydrAMINE (BENADRYL) 25 mg capsule, Take 2 capsules (50 mg total) by mouth every 6 (six) hours as needed for Itching or Allergies., Disp: 30 capsule, Rfl: 0    ergocalciferol (ERGOCALCIFEROL) 50,000 unit Cap, Take 1  "capsule (50,000 Units total) by mouth every 7 days., Disp: 12 capsule, Rfl: 3    famotidine (PEPCID) 20 MG tablet, Take 1 tablet (20 mg total) by mouth 2 (two) times daily as needed for Heartburn., Disp: 60 tablet, Rfl: 5    fluticasone propionate (FLONASE) 50 mcg/actuation nasal spray, 2 sprays (100 mcg total) by Each Nostril route once daily., Disp: 16 g, Rfl: 0    fluticasone-salmeterol diskus inhaler 100-50 mcg, Inhale 1 puff into the lungs 2 (two) times daily. Controller, Disp: 60 each, Rfl: 2    loratadine (CLARITIN) 10 mg tablet, Take 1 tablet (10 mg total) by mouth every morning., Disp: 60 tablet, Rfl: 0    metoprolol succinate (TOPROL-XL) 25 MG 24 hr tablet, Take 1 tablet (25 mg total) by mouth once daily. Hold for SBP (top blood pressure number) <120 and/or HR < 60., Disp: 30 tablet, Rfl: 11    olopatadine (PATANOL) 0.1 % ophthalmic solution, Place 1 drop into both eyes 2 (two) times daily., Disp: 5 mL, Rfl: 0    spironolactone (ALDACTONE) 50 MG tablet, Take 50 mg by mouth 2 (two) times daily., Disp: , Rfl:     topiramate (TOPAMAX) 25 MG tablet, Take 25 mg by mouth 2 (two) times daily., Disp: , Rfl:     EPINEPHrine (EPIPEN) 0.3 mg/0.3 mL AtIn, Inject 0.3 mLs (0.3 mg total) into the muscle as needed., Disp: 2 each, Rfl: 0    ALLERGIES:   Review of patient's allergies indicates:   Allergen Reactions    Lisinopril Hives, Itching, Rash, Shortness Of Breath and Swelling        PHYSICAL EXAMINATION:  BP (!) 162/86   Pulse 93   Ht 5' 4" (1.626 m)   Wt 127.8 kg (281 lb 12 oz)   LMP 07/18/2012   BMI 48.36 kg/m²   There are no signs of infection at the injection site, including no rubor, calor, or skin lesions.  Gen: NAD.  Psych: Affect & judgment nl.  Neuro: Grossly CNI. BAILEY.  HEENT: -Trach dev. -Eye d/c. -Rhinorrhea.  CV: Color nl. -E/C/C. WWPx4.  Pulm: -Dyspnea. -Cough.  Lymph: -Edema.  Int: -Rash/lesion noted. Skin is warm and dry    ASSESSMENT:      ICD-10-CM ICD-9-CM   1. Primary osteoarthritis of left " knee  M17.12 715.16   2. BMI 45.0-49.9, adult  Z68.42 V85.42   3. Elevated blood pressure reading  R03.0 796.2         PLAN:  Ultrasound-guided injection of the left knee with Euflexxa and ketorolac for more immediate pain relief performed at visit today.    Future planning includes - Continue exercise program    Risks and benefits were discussed with patient prior to receiving injection.  Depending on injection type, risks include the possibility of infection, pain, disruptions in blood pressure and blood sugar, and cosmetic deformity at site of injection.    All questions were answered to the best of my ability and all concerns were addressed at this time.    Follow up in 1 week(s) for above, or sooner if needed.      This note is dictated using the M*Modal Fluency Direct word recognition program. There are word recognition mistakes that are occasionally missed on review.

## 2024-04-11 ENCOUNTER — OFFICE VISIT (OUTPATIENT)
Dept: SPORTS MEDICINE | Facility: CLINIC | Age: 64
End: 2024-04-11

## 2024-04-11 VITALS
HEIGHT: 64 IN | HEART RATE: 94 BPM | SYSTOLIC BLOOD PRESSURE: 150 MMHG | DIASTOLIC BLOOD PRESSURE: 87 MMHG | WEIGHT: 281.94 LBS | BODY MASS INDEX: 48.14 KG/M2

## 2024-04-11 DIAGNOSIS — M17.12 PRIMARY OSTEOARTHRITIS OF LEFT KNEE: Primary | ICD-10-CM

## 2024-04-11 PROCEDURE — 99499 UNLISTED E&M SERVICE: CPT | Mod: SP,S$GLB,, | Performed by: STUDENT IN AN ORGANIZED HEALTH CARE EDUCATION/TRAINING PROGRAM

## 2024-04-11 PROCEDURE — 99999 PR PBB SHADOW E&M-EST. PATIENT-LVL IV: CPT | Mod: PBBFAC,SP,, | Performed by: STUDENT IN AN ORGANIZED HEALTH CARE EDUCATION/TRAINING PROGRAM

## 2024-04-11 PROCEDURE — 20611 DRAIN/INJ JOINT/BURSA W/US: CPT | Mod: SP,LT,S$GLB, | Performed by: STUDENT IN AN ORGANIZED HEALTH CARE EDUCATION/TRAINING PROGRAM

## 2024-04-11 NOTE — PROCEDURES
Large Joint Aspiration/Injection: L knee    Date/Time: 4/11/2024 3:30 PM    Performed by: Frannie Sabillon MD  Authorized by: Frannie Sabillon MD    Consent Done?:  Yes (Verbal)  Indications:  Arthritis and pain  Site marked: the procedure site was marked    Timeout: prior to procedure the correct patient, procedure, and site was verified    Prep: patient was prepped and draped in usual sterile fashion      Local anesthesia used?: Yes    Anesthesia:  Local infiltration  Local anesthetic:  Co-phenylcaine spray    Details:  Needle Size:  22 G  Ultrasonic Guidance for needle placement?: Yes (Ultrasound guidance used to avoid neurovascular injury and/or to improve accuracy given body habitus.)    Images are saved and documented.  Approach: Superolateral.  Location:  Knee  Site:  L knee  Medications:  20 mg sodium hyaluronate (EUFLEXXA) 10 mg/mL(mw 2.4 -3.6 million)  Medications comment:  Ropivacaine 0.2% 2mL  Patient tolerance:  Patient tolerated the procedure well with no immediate complications     TECHNIQUE: Real time ultrasound examination of the left knee was performed with [employer] Edge 2, 9-L MHz linear probe(s). Ultrasound guidance was used for needle localization. Images were saved and stored for documentation. Dynamic visualization of the needle was continuous throughout the procedures and maintained in good position.

## 2024-04-11 NOTE — PROGRESS NOTES
CC: left knee pain    63 y.o. Female presents today for her 3rd Euflexxa injection of her left knee.     Attempted treatments: none since last visit  Pain score: 6-7/10  History of trauma/injury: none since last visit  Affecting ADLs: yes      REVIEW OF SYSTEMS:   Constitution: Patient denies fever or chills.  Eyes: Patient denies eye pain or vision changes.  HEENT: Patient denies ear pain, sore throat, or nasal discharge.  CVS: Patient denies chest pain.  Lungs: Patient denies shortness of breath. Pt reports cough.  Skin: Patient denies skin rash or itching.    Musculoskeletal: Patient denies recent falls. See HPI.  Psych: Patient denies any current anxiety or nervousness.    PAST MEDICAL HISTORY:   Past Medical History:   Diagnosis Date    Anxiety     Arthritis     knee    Asthma     Bursitis, shoulder     Colon polyps     Dental bridge present     upper right    Dental crown present     Depression     Hypertension     Pes planus     Wears glasses     for reading       MEDICATIONS:     Current Outpatient Medications:     albuterol (PROVENTIL HFA) 90 mcg/actuation inhaler, Inhale 2 puffs into the lungs every 6 (six) hours as needed for Wheezing. Rescue, Disp: 18 g, Rfl: 5    albuterol-ipratropium (DUO-NEB) 2.5 mg-0.5 mg/3 mL nebulizer solution, Take 3 mLs by nebulization every 6 (six) hours as needed for Wheezing or Shortness of Breath (cough)., Disp: 120 each, Rfl: 0    amLODIPine (NORVASC) 10 MG tablet, Take 1 tablet (10 mg total) by mouth once daily., Disp: 30 tablet, Rfl: 6    diclofenac (VOLTAREN) 75 MG EC tablet, Take 75 mg by mouth 2 (two) times daily as needed., Disp: , Rfl:     diethylpropion (TENUATE) 75 mg SR tablet, Take 1 tablet (75 mg total) by mouth once daily., Disp: 30 tablet, Rfl: 2    diphenhydrAMINE (BENADRYL) 25 mg capsule, Take 2 capsules (50 mg total) by mouth every 6 (six) hours as needed for Itching or Allergies., Disp: 30 capsule, Rfl: 0    ergocalciferol (ERGOCALCIFEROL) 50,000 unit Cap,  "Take 1 capsule (50,000 Units total) by mouth every 7 days., Disp: 12 capsule, Rfl: 3    famotidine (PEPCID) 20 MG tablet, Take 1 tablet (20 mg total) by mouth 2 (two) times daily as needed for Heartburn., Disp: 60 tablet, Rfl: 5    fluticasone propionate (FLONASE) 50 mcg/actuation nasal spray, 2 sprays (100 mcg total) by Each Nostril route once daily., Disp: 16 g, Rfl: 0    fluticasone-salmeterol diskus inhaler 100-50 mcg, Inhale 1 puff into the lungs 2 (two) times daily. Controller, Disp: 60 each, Rfl: 2    loratadine (CLARITIN) 10 mg tablet, Take 1 tablet (10 mg total) by mouth every morning., Disp: 60 tablet, Rfl: 0    metoprolol succinate (TOPROL-XL) 25 MG 24 hr tablet, Take 1 tablet (25 mg total) by mouth once daily. Hold for SBP (top blood pressure number) <120 and/or HR < 60., Disp: 30 tablet, Rfl: 11    olopatadine (PATANOL) 0.1 % ophthalmic solution, Place 1 drop into both eyes 2 (two) times daily., Disp: 5 mL, Rfl: 0    spironolactone (ALDACTONE) 50 MG tablet, Take 50 mg by mouth 2 (two) times daily., Disp: , Rfl:     topiramate (TOPAMAX) 25 MG tablet, Take 25 mg by mouth 2 (two) times daily., Disp: , Rfl:     EPINEPHrine (EPIPEN) 0.3 mg/0.3 mL AtIn, Inject 0.3 mLs (0.3 mg total) into the muscle as needed., Disp: 2 each, Rfl: 0    ALLERGIES:   Review of patient's allergies indicates:   Allergen Reactions    Lisinopril Hives, Itching, Rash, Shortness Of Breath and Swelling        PHYSICAL EXAMINATION:  BP (!) 150/87   Pulse 94   Ht 5' 4" (1.626 m)   Wt 127.9 kg (281 lb 14.8 oz)   LMP 07/18/2012   BMI 48.39 kg/m²   There are no signs of infection at the injection site, including no rubor, calor, or skin lesions.  Gen: NAD.  Psych: Affect & judgment nl.  Neuro: Grossly CNI. BAILEY.  HEENT: -Trach dev. -Eye d/c. -Rhinorrhea.  CV: Color nl. -E/C/C. WWPx4.  Pulm: -Dyspnea. -Cough.  Lymph: -Edema.  Int: -Rash/lesion noted. Skin is warm and dry    ASSESSMENT:      ICD-10-CM ICD-9-CM   1. Primary osteoarthritis " of left knee  M17.12 715.16   2. BMI 45.0-49.9, adult  Z68.42 V85.42         PLAN:  Ultrasound-guided injection of the left knee with Euflexxa performed at visit today.    Future planning includes - Continue exercise program    Risks and benefits were discussed with patient prior to receiving injection.  Depending on injection type, risks include the possibility of infection, pain, disruptions in blood pressure and blood sugar, and cosmetic deformity at site of injection.    All questions were answered to the best of my ability and all concerns were addressed at this time.    Follow up in 6-8 week(s) for above, or sooner if needed.      This note is dictated using the M*Modal Fluency Direct word recognition program. There are word recognition mistakes that are occasionally missed on review.

## 2024-04-22 DIAGNOSIS — G89.29 CHRONIC PAIN OF LEFT KNEE: Primary | ICD-10-CM

## 2024-04-22 DIAGNOSIS — M25.562 CHRONIC PAIN OF LEFT KNEE: Primary | ICD-10-CM

## 2024-04-22 NOTE — TELEPHONE ENCOUNTER
Care Due:                  Date            Visit Type   Department     Provider  --------------------------------------------------------------------------------                                [facility] FAM[facility]                              PRIMARY      MEDICINE/  Last Visit: 11-      CARE ([facility])   INTERNAL MED   Cherelle Stover  Next Visit: None Scheduled  None         None Found                                                            Last  Test          Frequency    Reason                     Performed    Due Date  --------------------------------------------------------------------------------    Vitamin D...  12 months..  ergocalciferol...........  08- 08-    [facility] Embedded Care Due Messages. Reference number: [id number].   4/22/2024 1:39:52 PM CDT

## 2024-04-23 RX ORDER — DICLOFENAC SODIUM 75 MG/1
75 TABLET, DELAYED RELEASE ORAL 2 TIMES DAILY PRN
Qty: 60 TABLET | Refills: 0 | Status: SHIPPED | OUTPATIENT
Start: 2024-04-23 | End: 2024-05-21

## 2024-05-08 ENCOUNTER — OFFICE VISIT (OUTPATIENT)
Dept: BARIATRICS | Facility: CLINIC | Age: 64
End: 2024-05-08
Payer: COMMERCIAL

## 2024-05-08 VITALS
WEIGHT: 279.69 LBS | OXYGEN SATURATION: 98 % | BODY MASS INDEX: 48.01 KG/M2 | DIASTOLIC BLOOD PRESSURE: 88 MMHG | SYSTOLIC BLOOD PRESSURE: 160 MMHG | HEART RATE: 100 BPM

## 2024-05-08 DIAGNOSIS — E66.01 CLASS 3 SEVERE OBESITY DUE TO EXCESS CALORIES WITH SERIOUS COMORBIDITY AND BODY MASS INDEX (BMI) OF 45.0 TO 49.9 IN ADULT: Primary | ICD-10-CM

## 2024-05-08 PROCEDURE — 3008F BODY MASS INDEX DOCD: CPT | Mod: CPTII,S$GLB,, | Performed by: INTERNAL MEDICINE

## 2024-05-08 PROCEDURE — 3079F DIAST BP 80-89 MM HG: CPT | Mod: CPTII,S$GLB,, | Performed by: INTERNAL MEDICINE

## 2024-05-08 PROCEDURE — 99999 PR PBB SHADOW E&M-EST. PATIENT-LVL IV: CPT | Mod: PBBFAC,,, | Performed by: INTERNAL MEDICINE

## 2024-05-08 PROCEDURE — 1160F RVW MEDS BY RX/DR IN RCRD: CPT | Mod: CPTII,S$GLB,, | Performed by: INTERNAL MEDICINE

## 2024-05-08 PROCEDURE — 99214 OFFICE O/P EST MOD 30 MIN: CPT | Mod: S$GLB,,, | Performed by: INTERNAL MEDICINE

## 2024-05-08 PROCEDURE — 1159F MED LIST DOCD IN RCRD: CPT | Mod: CPTII,S$GLB,, | Performed by: INTERNAL MEDICINE

## 2024-05-08 PROCEDURE — 3077F SYST BP >= 140 MM HG: CPT | Mod: CPTII,S$GLB,, | Performed by: INTERNAL MEDICINE

## 2024-05-08 RX ORDER — TIRZEPATIDE 5 MG/.5ML
5 INJECTION, SOLUTION SUBCUTANEOUS
Qty: 4 PEN | Refills: 3 | Status: SHIPPED | OUTPATIENT
Start: 2024-06-08

## 2024-05-08 RX ORDER — TIRZEPATIDE 2.5 MG/.5ML
2.5 INJECTION, SOLUTION SUBCUTANEOUS
Qty: 4 PEN | Refills: 0 | Status: SHIPPED | OUTPATIENT
Start: 2024-05-08

## 2024-05-08 NOTE — PROGRESS NOTES
Subjective     Patient ID: Brandi Sahu is a 63 y.o. female.    Chief Complaint: Follow-up    CC:  Pt here today for follow-up. Last seen Oct 2023. Has gained 7.2 lbs (+3.5 lbs muscle. +0.6 lbs fat).   Was to start 1000 elvin pb meal planner, seated exercises and started diethylpropion last filled Jan 2024. She denies SE. She did not find much decrease in her appetite. She is eating less at night. Eating eggs, salad, tacos, more veg and less starch.   Has done some walking.   New BMR: 1592    New PBF: 54.3%       BMR:1538    PBF:  56.3%  History of medication for loss: phentermine on and off last filled 9/5/23, and in August  from [facility].   checked today  topiramate 25 mg BID. She stopped taking it after 2.5 weeks. She did message about  some symptoms she was having that did not seem to be related to topiramate.           Review of Systems       Objective   BP (!) 160/88   Pulse 100   LMP 07/18/2012   SpO2 98%     Physical Exam  Vitals reviewed.   Constitutional:       General: She is not in acute distress.     Appearance: She is well-developed.      Comments: With severe obesity     HENT:      Head: Normocephalic and atraumatic.   Eyes:      General: No scleral icterus.     Pupils: Pupils are equal, round, and reactive to light.   Cardiovascular:      Rate and Rhythm: Normal rate.   Pulmonary:      Effort: Pulmonary effort is normal. No respiratory distress.   Musculoskeletal:         General: Normal range of motion.      Right lower leg: Edema present.      Left lower leg: Edema present.   Skin:     General: Skin is warm and dry.      Findings: No erythema.   Neurological:      Mental Status: She is alert and oriented to person, place, and time.      Cranial Nerves: No cranial nerve deficit.      Comments: + antalgic gate   Psychiatric:         Behavior: Behavior normal.         Judgment: Judgment normal.            Assessment and Plan     1. Class 3 severe obesity due to excess calories with serious  comorbidity and body mass index (BMI) of 45.0 to 49.9 in adult  -     tirzepatide, weight loss, (ZEPBOUND) 2.5 mg/0.5 mL PnIj; Inject 2.5 mg into the skin every 7 days.  Dispense: 4 Pen; Refill: 0  -     tirzepatide, weight loss, (ZEPBOUND) 5 mg/0.5 mL PnIj; Inject 5 mg into the skin every 7 days.  Dispense: 4 Pen; Refill: 3              Start Zepbound 2.5 mg once a week  x 4 weeks, then 5 mg weekly. Rx sent in predated        Decrease portions as soon as you start Zepbound. Avoid fried, rich or greasy foods.      Some nausea in the first 2 weeks is not unusual.     If you get pain across the upper abdomen and around to your back, please call the office.       [email or web address] to sign up for coupon card.         Try to keep about 2/3 calorie intake during the daylight hours, and no more than 1/3 of calorie intake after dark.       Increase low impact activity as tolerated.  Avoid high impact activity, very heavy lifting or other exercise regimens that may cause discomfort.       1000 elvin pb meal planner, meal ideas and seated exercises given previously    Spring recipes given.              No follow-ups on file.

## 2024-05-08 NOTE — PATIENT INSTRUCTIONS
"PLEASE ARRIVE 15-20 min EARLY FOR YOUR APPOINTMENTS. This allows us to perform all of the services for your appointment in a timely fashion. Arriving late for your appointment not only decreases the time available for the doctor to see you, it also leads to delays for every other patient scheduled after you. Patients arriving after their appointment time may be asked to wait until all patients that arrived on time are seen, and/or there is another available slot for you to be worked in. When this is not possible, you may be asked to reschedule.   Thank you for your consideration in this matter.         Start Zepbound 2.5 mg once a week  x 4 weeks, then 5 mg weekly. Rx sent in predated        Decrease portions as soon as you start Zepbound. Avoid fried, rich or greasy foods.      Some nausea in the first 2 weeks is not unusual.     If you get pain across the upper abdomen and around to your back, please call the office.       [email or web address] to sign up for coupon card.         Try to keep about 2/3 calorie intake during the daylight hours, and no more than 1/3 of calorie intake after dark.       Increase low impact activity as tolerated.  Avoid high impact activity, very heavy lifting or other exercise regimens that may cause discomfort.       1000 elvin pb meal planner, meal ideas and seated exercises given previously      Spring Recipes:    Sallie Shake:     Ingredients  ½ cup low fat cottage cheese  ¼ cup vanilla protein powder  1/8 tsp mint extract  2-3 packets of stevia or artificial sweetener of choice  5-10 ice cubes (more or less depending on how thick you would like it)  4 oz of water  2-3 drops of green food coloring OR a small handful of spinach to make it green    Put all ingredients in  and  until desired consistency.      "Unstuffed" Cabbage Rolls:    Ingredients:  1 1/2 to 2 pounds lean ground beef or turkey  1 large onion, chopped  1 clove garlic, " minced  1 small cabbage, chopped  2 cans (14.5 ounces each) diced tomatoes  1 can (8 ounces) tomato sauce  ¼ - ½ cup water depending on desired consistency  1 teaspoon ground black pepper, salt to taste    Spray large skillet with nonstick cookspray. Heat pan on medium heat. Sauté the onions until tender, and then add the ground beef (or turkey) and cook until the meat is browned.    Add the garlic, cook an additional minute before adding the remaining ingredients. Cover, reduce the heat and simmer about 25 minutes (or until the cabbage is  fork tender)        Not so Mandujano's Pie:    Ingredients  2 (or more) pounds of lean ground beef, or turkey  2 heads of cauliflower or 3 bags of frozen cauliflower, chopped  1 bag of frozen mixed veggies          (NO Corn, Peas or Potatoes!)  1-2 onions  1 egg  1 teaspoon each of basil, garlic powder, pepper, oregano and a little cayenne  4-5 sprays of I cant believe its not butter spray  4 ounces of fat free cream cheese (optional)  Low fat Cheese to top (optional)    Roast cauliflower in oven on 350* F for about 15-20 minutes, until browned and fork tender. (begin arevalo meat while cauliflower is cooking). Place cooked cauliflower in . Add 4 ounces of fat free cream cheese, 4-5 sprays of I cant believe its not butter SPRAY, and spices and puree until creamy.     While cauliflower is roasting, brown meat in large skillet and season to taste. Set aside. Sauté diced onion in skillet until somewhat soft. Set aside with meat. Pour mixed veggies in the skillet to heat on low heat.     Mix the meat, onions, mixed veggies, raw egg and any additional seasonings and put in bottom of 9x13 baking dish. Spread mashed cauliflower mixture over it until smooth.    Bake at 350 for approximately 30 minutes. Add cheese and bake 5 additional minutes (optional). Serve warm (or reheat later).    Crock Pot Balsamic Pork Tenderloin:    Ingredients:  1 tsp dried thyme  1 tsp ground  pepper  ½ tsp salt  3 tbsp dried minced onion  2 tsp dried basil  ¼ cup low sodium broth  ½ cup balsamic vinegar  2 cloves garlic, minced  2 lbs Pork Tenderloin    Mix first 5 ingredients together. Rub pork tenderloin with dry seasoning mixture.  In a large sauté pan, heat ¼ cup balsamic vinegar and garlic on medium heat. Add pork to sauté cabrera and sear, arevalo all sides. Add low sodium broth, 1 tbsp at a time to keep tenderloin from sticking or use nonstick cookspray.     Transfer meat to crock pot. Pour remaining balsamic vinegar into sauté pan and continue  to stir for a few minutes to deglaze the pan. Pour juices over the top of the tenderloin in crockpot. Cook on high for 3 hours or until meat thermometer says 170*. Let rest 5-10 minutes and then slice.       Side Dish: Steamed carrots w/ garlic and josephine    Ingredients:  2 garlic cloves, minced  1 lb baby carrots  5-6 sprays of I cant believe its not butter SPRAY  1 tsp minced peeled fresh josephine  1 tbsp chopped fresh cilantro  ½ tsp grated lime rind  1 tbsp fresh lime juice   ¼ tsp salt    Prepare garlic; let stand 10 minutes. Steam carrots, covered in pot, about 10 minutes or until tender.     In a nonstick skillet over medium heat, spray pan w/ I cant believe its not butter SPRAY. Add garlic and josephine and cook 1 minute, stirring constantly. Remove from  heat; stir in carrots, cilantro and remaining ingredients.         Side Dish: Green Bean Almondine    Ingredients:  1 pound fresh green beans, trimmed  1 tbsp isha vinegar  1 ½ tsp water  1 tsp Munir Mustard  ¼ tsp salt  ¼ tsp freshly ground black pepper  1 tbsp sliced almonds, toasted    Cook green beans in boiling water for 4 minutes or until crisp-tender. Drain and plunge beans into ice water. Drain well. Pat beans dry w/ paper towel.     Combine vinegar, water, mustard, salt and pepper in a medium bowl. Add beans to vinegar mixture; toss to coat well. Sprinkle with toasted almonds.      How to  Cook the Perfect Hard Boiled Egg:    Steam in water: Fill a large pot with 1 inch of water. Place steamer insert inside, cover, and bring to a boil over high heat. Add eggs to steamer basket, cover, and continue cooking 12 minute. If serving cold, immediately place eggs in a bowl of ice water and allow to cool for at least 15 minutes before peeling under cool running water. Store in the refrigerator for up to 5 days.    OR    Purchase Dash Go Rapid Egg Boiler: available @ [employer], [employer], [employer], [employer] for ~$15-$20      Classic Egg Salad Recipe:    Ingredients:  8 hard cooked eggs, peeled and coarsely chopped  4-6 tbsp of low fat or fat free heart  2 tbsp celery, chopped  2 tsp tracey mustard  Dash of cayenne pepper (for spice)  Black pepper, salt to taste    In a medium bowl, combine heart, celery, mustard and cayenne pepper with chopped eggs. Season w/ pepper and salt. Serve over Lettuce leaves.     Get Creative! Add chopped turkey medrano and tomato and serve on lettuce leaves for an egg-cellent BLT egg salad or mix lean diced ham, low fat cheddar and tomatoes for  egg salad    Tuna Deviled Eggs:    Ingredients:  12 hard boiled eggs  1 can of tuna packed in water  1 rib celery, diced  ¼ cup low fat heart  1 tsp mustard  Garlic powder, black pepper to taste  Dash of paprika (for finish)    Cut eggs in half lengthwise. Remove yolk and mash in bowl. In separate bowl, mix tuna, celery, low fat heart, mustard and seasonings.  Stir in egg yolks until blended. Stuff eggs and sprinkle dash of paprika      Medrano Jalapeno Deviled Eggs:    Ingredients:  12 hard boiled eggs, peeled  ½ cup low fat heart  1 ½ tsp rice vinegar  ¾ tsp ground mustard  ½ packet splenda  2 jalapenos, seeded and chopped, 6 pieces turkey medrano, cooked crisp and crumbled  Dash of paprika (for finish)    Cut eggs in half lengthwise. Remove yolk and mash in bowl. Add heart, vinegar, spices and Splenda until well combined. Mix in jalapenos  and medrano. Stuff eggs and sprinkle w/ dash of paprika      Sugar-Free High Protein Brownie Recipe:    Ingredients:  2 cups of pureed zucchini  4 oz fat free cream cheese  1 large egg  2 scoops chocolate protein powder  1 tbsp pure vanilla extract  1/3 cup unsweetened cocoa powder    ¼ tsp salt  2 tbsp chopped nuts  *8-9 packets of splenda or artificial sweetener of choice    Preheat oven to 350* F.     Line an 8x8 pan w/ parchment paper or spray with nonstick cookspray.     In a medium bowl, blend the fat free cream cheese with egg until creamy. Add chocolate protein powder and cocoa powder until creamy. Add vanilla extract. Stir in pureed zucchini and salt.     The batter will be thick, but not dry. If batter seems dry, add 1-2 tbsp water. Fold in Nuts. Pour batter in prepared pan.     Bake for 30 minutes. Allow to cool completely. Cut into squares and chill to semi-set.     *best if eaten within 2 days but may last 3-4 days in fridge.         Lemon Whip:    Ingredients:  Small box of sugar-free vanilla instant pudding mix  1 small packet of crystal light lemonade mix  2 cups skim milk or fat free fairlife milk  Sugar-free, fat free cool whip     Make sugar-free pudding using 2 cups skim milk according to package. Stir in 1 small packet of crystal light lemonade mix.  Fold in a dollop of sugar-free, fat free cool whip and stir to combine.     Home-made Sugar-free Pudding Pops:    Ingredients:  1 small pkg of sugar-free instant pudding mix (flavor of choice!)  2 cups fat free milk     Beat ingredients with whisk for 2 minutes. Pour into 6 paper or plastic cups or into a popsicle mold. Insert wooden pop stick in center of each cup.     Freeze for 5 hours or until firm. Peel off paper or pull out of popsicle mold.     Variations: add sugar-free syrups to change up the flavor, such as sugar-free chocolate pudding + sugar free raspberry syrup = chocolate raspberry fudgesicle.

## 2024-05-10 ENCOUNTER — TELEPHONE (OUTPATIENT)
Dept: BARIATRICS | Facility: CLINIC | Age: 64
End: 2024-05-10
Payer: COMMERCIAL

## 2024-05-10 NOTE — TELEPHONE ENCOUNTER
----- Message from Amy Almonte sent at 5/10/2024  2:50 PM CDT -----  Regarding: PA/refill  Contact: [phone]  Pt stated the pharmacy updated her that the PA was denied. Pt would like a call to discuss. If she can still get it or what will happens. Pls call to discuss. Pt is asking for an update on famotidine (PEPCID) 20 MG tablet. Pt stated she told the nurse she wanted a refill but provider did not send.   tirzepatide, weight loss, (ZEPBOUND) 2.5 mg/0.5 mL PnIj    ..  [facility] [address] [place] [address] [place] - [address]  [address]  [place] [address]  Phone: [phone] Fax: [phone]

## 2024-05-10 NOTE — TELEPHONE ENCOUNTER
Contacted the patient. Patient was informed that her medication was approved. She will call pharmacy to get medication.

## 2024-05-20 DIAGNOSIS — G89.29 CHRONIC PAIN OF LEFT KNEE: ICD-10-CM

## 2024-05-20 DIAGNOSIS — M25.562 CHRONIC PAIN OF LEFT KNEE: ICD-10-CM

## 2024-05-20 NOTE — TELEPHONE ENCOUNTER
No care due was identified.  [facility] Embedded Care Due Messages. Reference number: [id number].   5/20/2024 11:29:49 AM CDT  
Refill Routing Note   Medication(s) are not appropriate for processing by [facility] for the following reason(s):        Outside of protocol    [facility] action(s):  Route             Appointments  past 12m or future 3m with PCP    Date Provider   Last Visit   11/21/2023 Cherelle Stover MD   Next Visit   Visit date not found Cherelle Stover MD   ED visits in past 90 days: 0        Note composed:3:00 PM 05/20/2024                          
coffee

## 2024-05-21 RX ORDER — DICLOFENAC SODIUM 75 MG/1
75 TABLET, DELAYED RELEASE ORAL 2 TIMES DAILY PRN
Qty: 60 TABLET | Refills: 0 | Status: SHIPPED | OUTPATIENT
Start: 2024-05-21

## 2024-06-08 ENCOUNTER — OFFICE VISIT (OUTPATIENT)
Dept: SPORTS MEDICINE | Facility: CLINIC | Age: 64
End: 2024-06-08
Payer: COMMERCIAL

## 2024-06-08 VITALS
WEIGHT: 277.75 LBS | DIASTOLIC BLOOD PRESSURE: 85 MMHG | HEIGHT: 64 IN | HEART RATE: 85 BPM | SYSTOLIC BLOOD PRESSURE: 186 MMHG | BODY MASS INDEX: 47.42 KG/M2

## 2024-06-08 DIAGNOSIS — R03.0 ELEVATED BLOOD PRESSURE READING: ICD-10-CM

## 2024-06-08 DIAGNOSIS — M25.562 CHRONIC PAIN OF LEFT KNEE: Primary | ICD-10-CM

## 2024-06-08 DIAGNOSIS — G89.29 CHRONIC PAIN OF LEFT KNEE: Primary | ICD-10-CM

## 2024-06-08 DIAGNOSIS — M17.12 PRIMARY OSTEOARTHRITIS OF LEFT KNEE: ICD-10-CM

## 2024-06-08 PROCEDURE — 3008F BODY MASS INDEX DOCD: CPT | Mod: CPTII,S$GLB,, | Performed by: STUDENT IN AN ORGANIZED HEALTH CARE EDUCATION/TRAINING PROGRAM

## 2024-06-08 PROCEDURE — 1159F MED LIST DOCD IN RCRD: CPT | Mod: CPTII,S$GLB,, | Performed by: STUDENT IN AN ORGANIZED HEALTH CARE EDUCATION/TRAINING PROGRAM

## 2024-06-08 PROCEDURE — 99214 OFFICE O/P EST MOD 30 MIN: CPT | Mod: S$GLB,,, | Performed by: STUDENT IN AN ORGANIZED HEALTH CARE EDUCATION/TRAINING PROGRAM

## 2024-06-08 PROCEDURE — 3077F SYST BP >= 140 MM HG: CPT | Mod: CPTII,S$GLB,, | Performed by: STUDENT IN AN ORGANIZED HEALTH CARE EDUCATION/TRAINING PROGRAM

## 2024-06-08 PROCEDURE — 1125F AMNT PAIN NOTED PAIN PRSNT: CPT | Mod: CPTII,S$GLB,, | Performed by: STUDENT IN AN ORGANIZED HEALTH CARE EDUCATION/TRAINING PROGRAM

## 2024-06-08 PROCEDURE — 3079F DIAST BP 80-89 MM HG: CPT | Mod: CPTII,S$GLB,, | Performed by: STUDENT IN AN ORGANIZED HEALTH CARE EDUCATION/TRAINING PROGRAM

## 2024-06-08 PROCEDURE — 99999 PR PBB SHADOW E&M-EST. PATIENT-LVL IV: CPT | Mod: PBBFAC,,, | Performed by: STUDENT IN AN ORGANIZED HEALTH CARE EDUCATION/TRAINING PROGRAM

## 2024-06-08 PROCEDURE — 1160F RVW MEDS BY RX/DR IN RCRD: CPT | Mod: CPTII,S$GLB,, | Performed by: STUDENT IN AN ORGANIZED HEALTH CARE EDUCATION/TRAINING PROGRAM

## 2024-06-08 NOTE — PROGRESS NOTES
CC: left knee pain    63 y.o. Female presents today for follow up evaluation of her left knee pain following Euflexxa series. Pt reports no relief with injections. Pt reports pain is 7/10 today. Pt denies mechanical symptoms. Pt denies numbness/tingling.      Attempted treatments: Euflexxa, excedrin (every day), tylenol (every day)  Pain score: 7/10  History of trauma/injury: none since last visit  Affecting ADLs: yes      REVIEW OF SYSTEMS:   Constitution: Patient denies fever or chills.  Eyes: Patient denies eye pain or vision changes.  HEENT: Patient denies ear pain, sore throat, or nasal discharge.  CVS: Patient denies chest pain.  Lungs: Patient denies shortness of breath or cough.  Skin: Patient denies skin rash or itching.    Musculoskeletal: Patient denies recent falls. See HPI.  Psych: Patient reports anxiety.    PAST MEDICAL HISTORY:   Past Medical History:   Diagnosis Date    Anxiety     Arthritis     knee    Asthma     Bursitis, shoulder     Colon polyps     Dental bridge present     upper right    Dental crown present     Depression     Hypertension     Pes planus     Wears glasses     for reading       MEDICATIONS:     Current Outpatient Medications:     albuterol (PROVENTIL HFA) 90 mcg/actuation inhaler, Inhale 2 puffs into the lungs every 6 (six) hours as needed for Wheezing. Rescue, Disp: 18 g, Rfl: 5    albuterol-ipratropium (DUO-NEB) 2.5 mg-0.5 mg/3 mL nebulizer solution, Take 3 mLs by nebulization every 6 (six) hours as needed for Wheezing or Shortness of Breath (cough)., Disp: 120 each, Rfl: 0    amLODIPine (NORVASC) 10 MG tablet, Take 1 tablet (10 mg total) by mouth once daily., Disp: 30 tablet, Rfl: 6    diclofenac (VOLTAREN) 75 MG EC tablet, Take 1 tablet by mouth twice daily as needed, Disp: 60 tablet, Rfl: 0    diphenhydrAMINE (BENADRYL) 25 mg capsule, Take 2 capsules (50 mg total) by mouth every 6 (six) hours as needed for Itching or Allergies., Disp: 30 capsule, Rfl: 0    ergocalciferol  "(ERGOCALCIFEROL) 50,000 unit Cap, Take 1 capsule (50,000 Units total) by mouth every 7 days., Disp: 12 capsule, Rfl: 3    fluticasone propionate (FLONASE) 50 mcg/actuation nasal spray, 2 sprays (100 mcg total) by Each Nostril route once daily., Disp: 16 g, Rfl: 0    loratadine (CLARITIN) 10 mg tablet, Take 1 tablet (10 mg total) by mouth every morning., Disp: 60 tablet, Rfl: 0    metoprolol succinate (TOPROL-XL) 25 MG 24 hr tablet, Take 1 tablet (25 mg total) by mouth once daily. Hold for SBP (top blood pressure number) <120 and/or HR < 60., Disp: 30 tablet, Rfl: 11    olopatadine (PATANOL) 0.1 % ophthalmic solution, Place 1 drop into both eyes 2 (two) times daily., Disp: 5 mL, Rfl: 0    spironolactone (ALDACTONE) 50 MG tablet, Take 50 mg by mouth 2 (two) times daily., Disp: , Rfl:     tirzepatide, weight loss, (ZEPBOUND) 2.5 mg/0.5 mL PnIj, Inject 2.5 mg into the skin every 7 days., Disp: 4 Pen, Rfl: 0    tirzepatide, weight loss, (ZEPBOUND) 5 mg/0.5 mL PnIj, Inject 5 mg into the skin every 7 days., Disp: 4 Pen, Rfl: 3    EPINEPHrine (EPIPEN) 0.3 mg/0.3 mL AtIn, Inject 0.3 mLs (0.3 mg total) into the muscle as needed. (Patient not taking: Reported on 5/8/2024), Disp: 2 each, Rfl: 0    famotidine (PEPCID) 20 MG tablet, Take 1 tablet (20 mg total) by mouth 2 (two) times daily as needed for Heartburn. (Patient not taking: Reported on 5/8/2024), Disp: 60 tablet, Rfl: 5    fluticasone-salmeterol diskus inhaler 100-50 mcg, Inhale 1 puff into the lungs 2 (two) times daily. Controller, Disp: 60 each, Rfl: 2    topiramate (TOPAMAX) 25 MG tablet, Take 25 mg by mouth 2 (two) times daily. (Patient not taking: Reported on 5/8/2024), Disp: , Rfl:     ALLERGIES:   Review of patient's allergies indicates:   Allergen Reactions    Lisinopril Hives, Itching, Rash, Shortness Of Breath and Swelling        PHYSICAL EXAMINATION:  BP (!) 186/85   Pulse 85   Ht 5' 4" (1.626 m)   Wt 126 kg (277 lb 12.5 oz)   LMP 07/18/2012   BMI 47.68 " kg/m²   Vitals signs and nursing note have been reviewed.    General: In no acute distress, well developed, well nourished, no diaphoresis  Eyes: EOM full and smooth, no eye redness or discharge  HENT: normocephalic and atraumatic, neck supple, trachea midline, no nasal discharge  Cardiovascular: no LE edema  Lungs: respirations non-labored, no conversational dyspnea   Neuro: AAOx3, CN2-12 grossly intact  Skin: No rashes, warm and dry  Psychiatric: cooperative, pleasant, mood and affect appropriate for age  MSK:  antalgic gait    ASSESSMENT:      ICD-10-CM ICD-9-CM   1. Primary osteoarthritis of left knee  M17.12 715.16   2. BMI 45.0-49.9, adult  Z68.42 V85.42   3. Elevated blood pressure reading  R03.0 796.2         PLAN:  Patient has tried triamcinolone and dexamethasone with less than 8 weeks worth of relief.  Patient has also tried hyaluronic acid injections, over-the-counter NSAIDs and Tylenol, weight reduction, and exercise with minimal reduction in knee pain.  At this time we will seek prior authorization to try long-acting Zilretta for patient's knee pain.  We will move forward with seeking prior authorization for left knee Zilretta injection    All questions were answered to the best of my ability and all concerns were addressed at this time.    Follow up for above, or sooner if needed.      This note is dictated using the M*Modal Fluency Direct word recognition program. There are word recognition mistakes that are occasionally missed on review.

## 2024-06-08 NOTE — LETTER
[facility]  [address] [place] [address] [address]  [place] [address]  Phone: [phone]  Fax: [phone] June 8, 2024     Patient: Brandi Sahu   YOB: 1960   Date of Visit: 6/8/2024       To Whom It May Concern:    Please allow Conchita limited walking during work activity until her follow-up on 7/13/24.    If you have any questions or concerns, please don't hesitate to contact my office.    Sincerely,        Frannie Sabillon MD

## 2024-06-25 DIAGNOSIS — M25.562 CHRONIC PAIN OF LEFT KNEE: ICD-10-CM

## 2024-06-25 DIAGNOSIS — G89.29 CHRONIC PAIN OF LEFT KNEE: ICD-10-CM

## 2024-06-25 DIAGNOSIS — J45.20 MILD INTERMITTENT ASTHMA, UNSPECIFIED WHETHER COMPLICATED: Chronic | ICD-10-CM

## 2024-06-25 NOTE — TELEPHONE ENCOUNTER
Refill Routing Note   Medication(s) are not appropriate for processing by [facility] for the following reason(s):        No active prescription written by provider; ADVAIR  Outside of protocol; VOLTAREN    OR action(s):  Defer  Route     Requires labs : Yes      Medication Therapy Plan:  AS OF  24      Appointments  past 12m or future 3m with PCP    Date Provider   Last Visit   2023 Cherelle Stover MD   Next Visit   Visit date not found Cherelle Stover MD   ED visits in past 90 days: 0        Note composed:4:03 PM 2024

## 2024-06-25 NOTE — TELEPHONE ENCOUNTER
Care Due:                  Date            Visit Type   Department     Provider  --------------------------------------------------------------------------------                                [facility]                              PRIMARY      MEDICINE/  Last Visit: 11-      CARE ([facility])   INTERNAL MED   Cherelle Stover  Next Visit: None Scheduled  None         None Found                                                            Last  Test          Frequency    Reason                     Performed    Due Date  --------------------------------------------------------------------------------    Vitamin D...  12 months..  ergocalciferol...........  08- 08-    [facility] Embedded Care Due Messages. Reference number: [id number].   6/25/2024 3:13:52 PM CDT

## 2024-06-26 RX ORDER — DICLOFENAC SODIUM 75 MG/1
75 TABLET, DELAYED RELEASE ORAL 2 TIMES DAILY PRN
Qty: 60 TABLET | Refills: 5 | Status: SHIPPED | OUTPATIENT
Start: 2024-06-26

## 2024-06-26 RX ORDER — FLUTICASONE PROPIONATE AND SALMETEROL 100; 50 UG/1; UG/1
1 POWDER RESPIRATORY (INHALATION) 2 TIMES DAILY
Qty: 60 EACH | Refills: 5 | Status: SHIPPED | OUTPATIENT
Start: 2024-06-26 | End: 2024-12-23

## 2024-07-13 ENCOUNTER — OFFICE VISIT (OUTPATIENT)
Dept: SPORTS MEDICINE | Facility: CLINIC | Age: 64
End: 2024-07-13
Payer: COMMERCIAL

## 2024-07-13 VITALS
DIASTOLIC BLOOD PRESSURE: 97 MMHG | BODY MASS INDEX: 45.75 KG/M2 | WEIGHT: 268 LBS | HEIGHT: 64 IN | SYSTOLIC BLOOD PRESSURE: 174 MMHG | HEART RATE: 88 BPM

## 2024-07-13 DIAGNOSIS — G89.29 CHRONIC PAIN OF LEFT KNEE: ICD-10-CM

## 2024-07-13 DIAGNOSIS — M25.562 CHRONIC PAIN OF LEFT KNEE: ICD-10-CM

## 2024-07-13 DIAGNOSIS — M17.12 PRIMARY OSTEOARTHRITIS OF LEFT KNEE: Primary | ICD-10-CM

## 2024-07-13 PROCEDURE — 1125F AMNT PAIN NOTED PAIN PRSNT: CPT | Mod: CPTII,S$GLB,, | Performed by: STUDENT IN AN ORGANIZED HEALTH CARE EDUCATION/TRAINING PROGRAM

## 2024-07-13 PROCEDURE — 20611 DRAIN/INJ JOINT/BURSA W/US: CPT | Mod: LT,S$GLB,, | Performed by: STUDENT IN AN ORGANIZED HEALTH CARE EDUCATION/TRAINING PROGRAM

## 2024-07-13 PROCEDURE — 3008F BODY MASS INDEX DOCD: CPT | Mod: CPTII,S$GLB,, | Performed by: STUDENT IN AN ORGANIZED HEALTH CARE EDUCATION/TRAINING PROGRAM

## 2024-07-13 PROCEDURE — 99999 PR PBB SHADOW E&M-EST. PATIENT-LVL IV: CPT | Mod: PBBFAC,,, | Performed by: STUDENT IN AN ORGANIZED HEALTH CARE EDUCATION/TRAINING PROGRAM

## 2024-07-13 PROCEDURE — 99499 UNLISTED E&M SERVICE: CPT | Mod: S$GLB,,, | Performed by: STUDENT IN AN ORGANIZED HEALTH CARE EDUCATION/TRAINING PROGRAM

## 2024-07-13 NOTE — PROCEDURES
Large Joint Aspiration/Injection: L knee    Date/Time: 7/13/2024 8:45 AM    Performed by: Frannie Sabillon MD  Authorized by: Frannie Sabillon MD    Consent Done?:  Yes (Verbal)  Indications:  Diagnostic evaluation, joint swelling and pain  Site marked: the procedure site was marked    Timeout: prior to procedure the correct patient, procedure, and site was verified    Prep: patient was prepped and draped in usual sterile fashion      Local anesthesia used?: Yes    Anesthesia:  Local infiltration  Local anesthetic:  Co-phenylcaine spray and lidocaine 1% without epinephrine  Anesthetic total (ml):  2      Details:  Needle Size:  18 G and 25 G  Ultrasonic Guidance for needle placement?: Yes (Ultrasound guidance used to avoid neurovascular injury and/or to improve accuracy given body habitus.)    Images are saved and documented.  Approach: Superolateral.  Location:  Knee  Site:  L knee  Medications:  32 mg triamcinolone acetonide 32 mg  Lab: fluid sent for laboratory analysis    Patient tolerance:  Patient tolerated the procedure well with no immediate complications     TECHNIQUE: Real time ultrasound examination of the left knee was performed with [employer] Edge 2, 9-L MHz linear probe(s). Ultrasound guidance was used for needle localization. Images were saved and stored for documentation. Dynamic visualization of the needle was continuous throughout the procedures and maintained in good position.

## 2024-07-13 NOTE — LETTER
[facility]  [address] [place] [address] [address]  [place] [address]  Phone: [phone]  Fax: [phone] July 13, 2024     Patient: Brandi Sahu   YOB: 1960   Date of Visit: 7/13/2024     To Whom It May Concern:     Please allow Conchita limited walking during work activity until her follow-up on 8/29/24.     If you have any questions or concerns, please don't hesitate to contact my office.     Sincerely,          Frannie Sabillon MD

## 2024-07-13 NOTE — PROGRESS NOTES
CC: left knee pain    63 y.o. Female presents today for Zilretta injection of her left knee.     Attempted treatments: none since last visit  Last Zilretta on: none  Pain score: 6-7/10  History of trauma/injury: none since last visit  Affecting ADLs: yes     REVIEW OF SYSTEMS:   Constitution: Patient denies fever or chills.  Eyes: Patient denies eye pain or vision changes.  HEENT: Patient denies ear pain, sore throat, or nasal discharge.  CVS: Patient denies chest pain.  Lungs: Patient denies shortness of breath or cough.  Skin: Patient denies skin rash or itching.    Musculoskeletal: Patient denies recent falls. See HPI.  Psych: Patient denies any current anxiety or nervousness.    PAST MEDICAL HISTORY:   Past Medical History:   Diagnosis Date    Anxiety     Arthritis     knee    Asthma     Bursitis, shoulder     Colon polyps     Dental bridge present     upper right    Dental crown present     Depression     Hypertension     Pes planus     Wears glasses     for reading       MEDICATIONS:     Current Outpatient Medications:     albuterol (PROVENTIL HFA) 90 mcg/actuation inhaler, Inhale 2 puffs into the lungs every 6 (six) hours as needed for Wheezing. Rescue, Disp: 18 g, Rfl: 5    albuterol-ipratropium (DUO-NEB) 2.5 mg-0.5 mg/3 mL nebulizer solution, Take 3 mLs by nebulization every 6 (six) hours as needed for Wheezing or Shortness of Breath (cough)., Disp: 120 each, Rfl: 0    amLODIPine (NORVASC) 10 MG tablet, Take 1 tablet (10 mg total) by mouth once daily., Disp: 30 tablet, Rfl: 6    diclofenac (VOLTAREN) 75 MG EC tablet, Take 1 tablet (75 mg total) by mouth 2 (two) times daily as needed., Disp: 60 tablet, Rfl: 5    diphenhydrAMINE (BENADRYL) 25 mg capsule, Take 2 capsules (50 mg total) by mouth every 6 (six) hours as needed for Itching or Allergies., Disp: 30 capsule, Rfl: 0    ergocalciferol (ERGOCALCIFEROL) 50,000 unit Cap, Take 1 capsule (50,000 Units total) by mouth every 7 days., Disp: 12 capsule, Rfl: 3    " fluticasone propionate (FLONASE) 50 mcg/actuation nasal spray, 2 sprays (100 mcg total) by Each Nostril route once daily., Disp: 16 g, Rfl: 0    fluticasone-salmeterol diskus inhaler 100-50 mcg, Inhale 1 puff into the lungs 2 (two) times daily. Controller, Disp: 60 each, Rfl: 5    loratadine (CLARITIN) 10 mg tablet, Take 1 tablet (10 mg total) by mouth every morning., Disp: 60 tablet, Rfl: 0    metoprolol succinate (TOPROL-XL) 25 MG 24 hr tablet, Take 1 tablet (25 mg total) by mouth once daily. Hold for SBP (top blood pressure number) <120 and/or HR < 60., Disp: 30 tablet, Rfl: 11    olopatadine (PATANOL) 0.1 % ophthalmic solution, Place 1 drop into both eyes 2 (two) times daily., Disp: 5 mL, Rfl: 0    spironolactone (ALDACTONE) 50 MG tablet, Take 50 mg by mouth 2 (two) times daily., Disp: , Rfl:     tirzepatide, weight loss, (ZEPBOUND) 2.5 mg/0.5 mL PnIj, Inject 2.5 mg into the skin every 7 days., Disp: 4 Pen, Rfl: 0    tirzepatide, weight loss, (ZEPBOUND) 5 mg/0.5 mL PnIj, Inject 5 mg into the skin every 7 days., Disp: 4 Pen, Rfl: 3    EPINEPHrine (EPIPEN) 0.3 mg/0.3 mL AtIn, Inject 0.3 mLs (0.3 mg total) into the muscle as needed. (Patient not taking: Reported on 5/8/2024), Disp: 2 each, Rfl: 0    famotidine (PEPCID) 20 MG tablet, Take 1 tablet (20 mg total) by mouth 2 (two) times daily as needed for Heartburn. (Patient not taking: Reported on 5/8/2024), Disp: 60 tablet, Rfl: 5    topiramate (TOPAMAX) 25 MG tablet, Take 25 mg by mouth 2 (two) times daily. (Patient not taking: Reported on 5/8/2024), Disp: , Rfl:     ALLERGIES:   Review of patient's allergies indicates:   Allergen Reactions    Lisinopril Hives, Itching, Rash, Shortness Of Breath and Swelling        PHYSICAL EXAMINATION:  BP (!) 174/97   Pulse 88   Ht 5' 4" (1.626 m)   Wt 121.6 kg (268 lb)   LMP 07/18/2012   BMI 46.00 kg/m²   There are no signs of infection at the injection site, including no rubor, calor, or skin lesions.  Gen: NAD.  Psych: " Affect & judgment nl.  Neuro: Grossly CNI. BAILEY.  HEENT: -Trach dev. -Eye d/c.   CV: Color nl. -E/C/C. WWPx4.  Pulm: -Dyspnea. -Cough.  Lymph: -Edema.  Int: -Rash/lesion noted. Skin is warm and dry    ASSESSMENT:      ICD-10-CM ICD-9-CM   1. Primary osteoarthritis of left knee  M17.12 715.16   2. Chronic pain of left knee  M25.562 719.46    G89.29 338.29   3. BMI 45.0-49.9, adult  Z68.42 V85.42         PLAN:  Ultrasound-guided injection of the left knee with Zilretta performed at visit today.    Future planning includes - Continue exercise program    Risks and benefits were discussed with patient prior to receiving injection.  Depending on injection type, risks include the possibility of infection, pain, disruptions in blood pressure and blood sugar, and cosmetic deformity at site of injection.    All questions were answered to the best of my ability and all concerns were addressed at this time.    Follow up in 6 week(s) for above, or sooner if needed.      This note is dictated using the M*Modal Fluency Direct word recognition program. There are word recognition mistakes that are occasionally missed on review.

## 2024-07-15 DIAGNOSIS — M51.36 DDD (DEGENERATIVE DISC DISEASE), LUMBAR: Primary | ICD-10-CM

## 2024-07-16 ENCOUNTER — OFFICE VISIT (OUTPATIENT)
Dept: ORTHOPEDICS | Facility: CLINIC | Age: 64
End: 2024-07-16
Payer: COMMERCIAL

## 2024-07-16 ENCOUNTER — HOSPITAL ENCOUNTER (OUTPATIENT)
Dept: RADIOLOGY | Facility: HOSPITAL | Age: 64
Discharge: HOME | End: 2024-07-16
Attending: ORTHOPAEDIC SURGERY
Payer: COMMERCIAL

## 2024-07-16 DIAGNOSIS — M43.10 DEGENERATIVE SPONDYLOLISTHESIS: Primary | ICD-10-CM

## 2024-07-16 DIAGNOSIS — M51.36 DDD (DEGENERATIVE DISC DISEASE), LUMBAR: ICD-10-CM

## 2024-07-16 DIAGNOSIS — M51.36 DDD (DEGENERATIVE DISC DISEASE), LUMBAR: Primary | ICD-10-CM

## 2024-07-16 DIAGNOSIS — M47.816 LUMBAR SPONDYLOSIS: ICD-10-CM

## 2024-07-16 PROCEDURE — 72110 X-RAY EXAM L-2 SPINE 4/>VWS: CPT | Mod: 26,,, | Performed by: RADIOLOGY

## 2024-07-16 PROCEDURE — 99214 OFFICE O/P EST MOD 30 MIN: CPT | Mod: S$GLB,,, | Performed by: ORTHOPAEDIC SURGERY

## 2024-07-16 PROCEDURE — 1159F MED LIST DOCD IN RCRD: CPT | Mod: CPTII,S$GLB,, | Performed by: ORTHOPAEDIC SURGERY

## 2024-07-16 PROCEDURE — 1160F RVW MEDS BY RX/DR IN RCRD: CPT | Mod: CPTII,S$GLB,, | Performed by: ORTHOPAEDIC SURGERY

## 2024-07-16 PROCEDURE — 99999 PR PBB SHADOW E&M-EST. PATIENT-LVL III: CPT | Mod: PBBFAC,,, | Performed by: ORTHOPAEDIC SURGERY

## 2024-07-16 PROCEDURE — 72110 X-RAY EXAM L-2 SPINE 4/>VWS: CPT | Mod: TC

## 2024-07-16 NOTE — PROGRESS NOTES
DATE: 7/16/2024  PATIENT: Brandi Sahu    Attending Physician: German Cummings M.D.    HISTORY:  Brandi Sahu is a 63 y.o. female who returns to me today for FU. Patient was last seen on 12/19/22. She continues to have LBP without having any leg pain, but does have pain in BL knees (7/10) which is worse with weight bearing and causing her to need to use a walker to ambulate. Previously she had L5-S1 R TFESI for low back pain w/ RLE radiculopathy and that improved her symptoms. She wants to try another injection.    The patient does not smoke, have DM or endorse IVDU. The patient is not on any blood thinners and does not take chronic narcotics.    PMH/PSH/FamHx/SocHx:  Unchanged from prior visit    ROS:  Positive for LBP and BL knee pain  Denies perineal paresthesias, bowel or bladder incontinence    EXAM:  LMP 07/18/2012     My physical examination was notable for the following findings: no lumbar TTP, motor 5/5 BLE, SILT. Negative straight leg raise bilaterally. Hips and knees relatively painless ROM but does have pain BL knees w weight bearing.     IMAGING:  Today I independently reviewed the following images and my interpretations are as follows:    Lumbar XRs showed L5-S1 DDD with anterolisthesis measuring 7mm. The flexion and extension views demonstrate no instability.    MRI lumbar from 2021 showed L5-S1 DDD with R moderate foraminal stenosis. No central stenosis.    ASSESSMENT/PLAN:  Patient has L5-S1 degen spondy and DDD with R foraminal stenosis. I educated the patient on the importance of core/back strengthening, correct posture, bending/lifting ergonomics, and low-impact aerobic exercises (walking, elliptical, and aquatherapy). Continue meds. I ordered R L5-S1 TFESI. Patient is a candidate for L5-S1 ALIF and PSF if she fails conservative management, but she would need to lose weight. Patient will FU PRN.    German Cummings MD  Orthopaedic Spine Surgeon  Department of Orthopaedic Surgery  [phone]

## 2024-07-16 NOTE — H&P (VIEW-ONLY)
DATE: 7/16/2024  PATIENT: Brandi Sahu    Attending Physician: German Cummings M.D.    HISTORY:  Brandi Sahu is a 63 y.o. female who returns to me today for FU. Patient was last seen on 12/19/22. She continues to have LBP without having any leg pain, but does have pain in BL knees (7/10) which is worse with weight bearing and causing her to need to use a walker to ambulate. Previously she had L5-S1 R TFESI for low back pain w/ RLE radiculopathy and that improved her symptoms. She wants to try another injection.    The patient does not smoke, have DM or endorse IVDU. The patient is not on any blood thinners and does not take chronic narcotics.    PMH/PSH/FamHx/SocHx:  Unchanged from prior visit    ROS:  Positive for LBP and BL knee pain  Denies perineal paresthesias, bowel or bladder incontinence    EXAM:  LMP 07/18/2012     My physical examination was notable for the following findings: no lumbar TTP, motor 5/5 BLE, SILT. Negative straight leg raise bilaterally. Hips and knees relatively painless ROM but does have pain BL knees w weight bearing.     IMAGING:  Today I independently reviewed the following images and my interpretations are as follows:    Lumbar XRs showed L5-S1 DDD with anterolisthesis measuring 7mm. The flexion and extension views demonstrate no instability.    MRI lumbar from 2021 showed L5-S1 DDD with R moderate foraminal stenosis. No central stenosis.    ASSESSMENT/PLAN:  Patient has L5-S1 degen spondy and DDD with R foraminal stenosis. I educated the patient on the importance of core/back strengthening, correct posture, bending/lifting ergonomics, and low-impact aerobic exercises (walking, elliptical, and aquatherapy). Continue meds. I ordered R L5-S1 TFESI. Patient is a candidate for L5-S1 ALIF and PSF if she fails conservative management, but she would need to lose weight. Patient will FU PRN.    German Cummings MD  Orthopaedic Spine Surgeon  Department of Orthopaedic Surgery  [phone]

## 2024-07-18 ENCOUNTER — PATIENT MESSAGE (OUTPATIENT)
Dept: ADMINISTRATIVE | Facility: OTHER | Age: 64
End: 2024-07-18
Payer: COMMERCIAL

## 2024-07-22 ENCOUNTER — PATIENT MESSAGE (OUTPATIENT)
Dept: PAIN MEDICINE | Facility: OTHER | Age: 64
End: 2024-07-22
Payer: COMMERCIAL

## 2024-07-24 ENCOUNTER — HOSPITAL ENCOUNTER (OUTPATIENT)
Facility: OTHER | Age: 64
Discharge: HOME | End: 2024-07-24
Attending: ANESTHESIOLOGY | Admitting: ANESTHESIOLOGY
Payer: COMMERCIAL

## 2024-07-24 VITALS
SYSTOLIC BLOOD PRESSURE: 133 MMHG | BODY MASS INDEX: 42.59 KG/M2 | DIASTOLIC BLOOD PRESSURE: 81 MMHG | RESPIRATION RATE: 16 BRPM | HEIGHT: 66 IN | TEMPERATURE: 99 F | WEIGHT: 265 LBS | OXYGEN SATURATION: 99 % | HEART RATE: 113 BPM

## 2024-07-24 DIAGNOSIS — M54.16 LUMBAR RADICULOPATHY: Primary | ICD-10-CM

## 2024-07-24 DIAGNOSIS — G89.29 CHRONIC PAIN: ICD-10-CM

## 2024-07-24 PROCEDURE — 64483 NJX AA&/STRD TFRM EPI L/S 1: CPT | Mod: RT | Performed by: ANESTHESIOLOGY

## 2024-07-24 PROCEDURE — 63600175 PHARM REV CODE 636 W HCPCS: Performed by: ANESTHESIOLOGY

## 2024-07-24 PROCEDURE — 25000003 PHARM REV CODE 250: Performed by: ANESTHESIOLOGY

## 2024-07-24 PROCEDURE — 64483 NJX AA&/STRD TFRM EPI L/S 1: CPT | Mod: RT,,, | Performed by: ANESTHESIOLOGY

## 2024-07-24 PROCEDURE — 25500020 PHARM REV CODE 255: Performed by: ANESTHESIOLOGY

## 2024-07-24 RX ORDER — MIDAZOLAM HYDROCHLORIDE 1 MG/ML
INJECTION INTRAMUSCULAR; INTRAVENOUS
Status: DISCONTINUED | OUTPATIENT
Start: 2024-07-24 | End: 2024-07-24 | Stop reason: HOSPADM

## 2024-07-24 RX ORDER — DEXAMETHASONE SODIUM PHOSPHATE 10 MG/ML
INJECTION INTRAMUSCULAR; INTRAVENOUS
Status: DISCONTINUED | OUTPATIENT
Start: 2024-07-24 | End: 2024-07-24 | Stop reason: HOSPADM

## 2024-07-24 RX ORDER — LIDOCAINE HYDROCHLORIDE 10 MG/ML
INJECTION, SOLUTION EPIDURAL; INFILTRATION; INTRACAUDAL; PERINEURAL
Status: DISCONTINUED | OUTPATIENT
Start: 2024-07-24 | End: 2024-07-24 | Stop reason: HOSPADM

## 2024-07-24 RX ORDER — LIDOCAINE HYDROCHLORIDE 20 MG/ML
INJECTION, SOLUTION INFILTRATION; PERINEURAL
Status: DISCONTINUED | OUTPATIENT
Start: 2024-07-24 | End: 2024-07-24 | Stop reason: HOSPADM

## 2024-07-24 RX ORDER — FENTANYL CITRATE 50 UG/ML
INJECTION, SOLUTION INTRAMUSCULAR; INTRAVENOUS
Status: DISCONTINUED | OUTPATIENT
Start: 2024-07-24 | End: 2024-07-24 | Stop reason: HOSPADM

## 2024-07-24 RX ORDER — SODIUM CHLORIDE 9 MG/ML
INJECTION, SOLUTION INTRAVENOUS CONTINUOUS
Status: DISCONTINUED | OUTPATIENT
Start: 2024-07-24 | End: 2024-07-24 | Stop reason: HOSPADM

## 2024-07-24 NOTE — DISCHARGE INSTRUCTIONS

## 2024-07-24 NOTE — PROGRESS NOTES
"In pre-procedure, pt noted to have irregular heart rate on radial palpation. Atrial fibrillation detected on 3 lead EKG rate in 70s-120s. MD called to bedside. Pt denies feelings of heart racing or fluttering in chest. Pt does report feeling "a little anxious" prior to procedure. Pt endorses being on xarelto for 3 months last year but  was taken off. Md encouraged pt to follow up with cardiologist and will do procedure today.  "

## 2024-07-24 NOTE — DISCHARGE SUMMARY
Discharge Note  Short Stay      SUMMARY     Admit Date: 7/24/2024    Attending Physician: Cherelle Carpenter MD    Discharge Physician: Cherelle Carpenter MD      Discharge Date: 7/24/2024 11:07 AM    Procedure(s) (LRB):  LUMBAR TRANSFORAMINAL RIGHT L5/S1 DIRECT REFERRAL (Right)    Final Diagnosis: DDD (degenerative disc disease), lumbar [M51.36]    Disposition: Home or self care    Patient Instructions:   Current Discharge Medication List        CONTINUE these medications which have NOT CHANGED    Details   albuterol (PROVENTIL HFA) 90 mcg/actuation inhaler Inhale 2 puffs into the lungs every 6 (six) hours as needed for Wheezing. Rescue  Qty: 18 g, Refills: 5    Comments: Generic  Associated Diagnoses: Mild intermittent asthma, unspecified whether complicated      albuterol-ipratropium (DUO-NEB) 2.5 mg-0.5 mg/3 mL nebulizer solution Take 3 mLs by nebulization every 6 (six) hours as needed for Wheezing or Shortness of Breath (cough).  Qty: 120 each, Refills: 0      amLODIPine (NORVASC) 10 MG tablet Take 1 tablet (10 mg total) by mouth once daily.  Qty: 30 tablet, Refills: 6    Comments: .  Associated Diagnoses: Essential hypertension      diclofenac (VOLTAREN) 75 MG EC tablet Take 1 tablet (75 mg total) by mouth 2 (two) times daily as needed.  Qty: 60 tablet, Refills: 5    Associated Diagnoses: Chronic pain of left knee      diphenhydrAMINE (BENADRYL) 25 mg capsule Take 2 capsules (50 mg total) by mouth every 6 (six) hours as needed for Itching or Allergies.  Qty: 30 capsule, Refills: 0      EPINEPHrine (EPIPEN) 0.3 mg/0.3 mL AtIn Inject 0.3 mLs (0.3 mg total) into the muscle as needed.  Qty: 2 each, Refills: 0      ergocalciferol (ERGOCALCIFEROL) 50,000 unit Cap Take 1 capsule (50,000 Units total) by mouth every 7 days.  Qty: 12 capsule, Refills: 3    Associated Diagnoses: Vitamin D deficiency      famotidine (PEPCID) 20 MG tablet Take 1 tablet (20 mg total) by mouth 2 (two) times daily as needed for  Heartburn.  Qty: 60 tablet, Refills: 5    Associated Diagnoses: Gastroesophageal reflux disease, unspecified whether esophagitis present      fluticasone propionate (FLONASE) 50 mcg/actuation nasal spray 2 sprays (100 mcg total) by Each Nostril route once daily.  Qty: 16 g, Refills: 0      fluticasone-salmeterol diskus inhaler 100-50 mcg Inhale 1 puff into the lungs 2 (two) times daily. Controller  Qty: 60 each, Refills: 5    Associated Diagnoses: Mild intermittent asthma, unspecified whether complicated      loratadine (CLARITIN) 10 mg tablet Take 1 tablet (10 mg total) by mouth every morning.  Qty: 60 tablet, Refills: 0      metoprolol succinate (TOPROL-XL) 25 MG 24 hr tablet Take 1 tablet (25 mg total) by mouth once daily. Hold for SBP (top blood pressure number) <120 and/or HR < 60.  Qty: 30 tablet, Refills: 11    Comments: .      olopatadine (PATANOL) 0.1 % ophthalmic solution Place 1 drop into both eyes 2 (two) times daily.  Qty: 5 mL, Refills: 0      spironolactone (ALDACTONE) 50 MG tablet Take 50 mg by mouth 2 (two) times daily.      tirzepatide, weight loss, (ZEPBOUND) 2.5 mg/0.5 mL PnIj Inject 2.5 mg into the skin every 7 days.  Qty: 4 Pen, Refills: 0    Associated Diagnoses: Class 3 severe obesity due to excess calories with serious comorbidity and body mass index (BMI) of 45.0 to 49.9 in adult      tirzepatide, weight loss, (ZEPBOUND) 5 mg/0.5 mL PnIj Inject 5 mg into the skin every 7 days.  Qty: 4 Pen, Refills: 3    Associated Diagnoses: Class 3 severe obesity due to excess calories with serious comorbidity and body mass index (BMI) of 45.0 to 49.9 in adult      topiramate (TOPAMAX) 25 MG tablet Take 25 mg by mouth 2 (two) times daily.                 Discharge Diagnosis: DDD (degenerative disc disease), lumbar [M51.36]  Condition on Discharge: Stable with no complications to procedure   Diet on Discharge: Same as before.  Activity: as per instruction sheet.  Discharge to: Home with a responsible  adult.  Follow up: 2-4 weeks       Please call my office or pager at [phone] if experienced any weakness or loss of sensation, fever > 101.5, pain uncontrolled with oral medications, persistent nausea/vomiting/or diarrhea, redness or drainage from the incisions, or any other worrisome concerns. If physician on call was not reached or could not communicate with our office for any reason please go to the nearest emergency department     Erick Zavaleta M.D.  PGY-5  Interventional Pain Management Fellow  [facility]  Pager: [phone]

## 2024-07-24 NOTE — OP NOTE
Lumbar Transforaminal Epidural Steroid Injection under Fluoroscopic Guidance    The procedure, risks, benefits, and options were discussed with the patient. There are no contraindications to the procedure. The patent expressed understanding and agreed to the procedure. Informed written consent was obtained prior to the start of the procedure and can be found in the patient's chart.    PATIENT NAME: Brandi Sahu   MRN: [id number]     DATE OF PROCEDURE: 07/24/2024    PROCEDURE:  Right  L5/S1 Lumbar Transforaminal Epidural Steroid Injection under Fluoroscopic Guidance    PRE-OP DIAGNOSIS: DDD (degenerative disc disease), lumbar [M51.36] Lumbar radiculopathy [M54.16]    POST-OP DIAGNOSIS: Same    PHYSICIAN: Cherelle Carpenter MD    ASSISTANTS: MD Antelmo Fariassjoanne Pain Fellow     MEDICATIONS INJECTED: Preservative-free Decadron 10mg with 5cc of Lidocaine 1% MPF     LOCAL ANESTHETIC INJECTED: Xylocaine 2%     SEDATION: Versed 2mg and Fentanyl 50mcg                                                                                                                                                                                     Conscious sedation ordered by M.SARITA. Patient re-evaluation prior to administration of conscious sedation. No changes noted in patient's status from initial evaluation. The patient's vital signs were monitored by RN and patient remained hemodynamically stable throughout the procedure.    Event Time In   Sedation Start 1105   Sedation End 1112     ESTIMATED BLOOD LOSS: None    COMPLICATIONS: None    TECHNIQUE: Time-out was performed to identify the patient and procedure to be performed. With the patient laying in a prone position, the surgical area was prepped and draped in the usual sterile fashion using ChloraPrep and a fenestrated drape.The levels were determined under fluoroscopy guidance. Skin anesthesia was achieved by injecting Lidocaine 2% over the injection sites. The transforaminal spaces were  then approached with a 22 gauge, 5 inch spinal quinke needle that was introduced under fluoroscopic guidance in the AP and Lateral views. Once the needle tip was in the area of the transforaminal space, and there was no blood, CSF or paraesthesias, contrast dye Omnipaque (300mg/mL) was injected to confirm placement and there was no vascular runoff. Fluoroscopic imaging in the AP and lateral views revealed a clear outline of the spinal nerve with proximal spread of agent through the neural foramen into the epidural space. 3 mL of the medication mixture listed above was injected slowly at each site. Displacement of the radio opaque contrast after injection of the medication confirmed that the medication went into the area of the transforaminal spaces. The needles were removed and bleeding was nil. A sterile dressing was applied. No specimens collected. The patient tolerated the procedure well.     The patient was monitored after the procedure in the recovery area. They were given post-procedure and discharge instructions to follow at home. The patient was discharged in a stable condition.    Erick Zavaleta MD    I reviewed and edited the fellow's note. I conducted my own interview and physical examination. I agree with the findings. I was present and supervising all critical portions of the procedure.

## 2024-08-01 ENCOUNTER — PATIENT OUTREACH (OUTPATIENT)
Dept: ADMINISTRATIVE | Facility: HOSPITAL | Age: 64
End: 2024-08-01
Payer: COMMERCIAL

## 2024-08-01 DIAGNOSIS — Z12.31 BREAST CANCER SCREENING BY MAMMOGRAM: Primary | ICD-10-CM

## 2024-08-01 NOTE — PROGRESS NOTES
Annual exam scheduled for 11/01/24. Mammogram scheduled for 08/27/24. Gap report updated. Immunization's updated/triggered.

## 2024-09-06 DIAGNOSIS — K21.9 GASTROESOPHAGEAL REFLUX DISEASE, UNSPECIFIED WHETHER ESOPHAGITIS PRESENT: ICD-10-CM

## 2024-09-06 NOTE — TELEPHONE ENCOUNTER
Care Due:                  Date            Visit Type   Department     Provider  --------------------------------------------------------------------------------                                [facility]                              PRIMARY      MEDICINE/  Last Visit: 11-      CARE ([facility])   INTERNAL ROMEL Stover                              [facility]      MEDICINE/  Next Visit: 11-      CARE ([facility])   INTERNAL ROMEL Garcia  Test          Frequency    Reason                     Performed    Due Date  --------------------------------------------------------------------------------    CBC.........  12 months..  diclofenac...............  10-   09-    CMP.........  12 months..  diclofenac,                10-   09-                             ergocalciferol,                             famotidine...............    Vitamin D...  12 months..  ergocalciferol...........  Not Found    Overdue    [employer] Embedded Care Due Messages. Reference number: [id number].   9/06/2024 3:05:55 PM CDT

## 2024-09-07 NOTE — TELEPHONE ENCOUNTER
Refill Routing Note   Medication(s) are not appropriate for processing by [facility] for the following reason(s):        Outside of protocol  Pepcid is prescribed for as needed use    [facility] action(s):  Route     Requires labs : Yes             Appointments  past 12m or future 3m with PCP    Date Provider   Last Visit   11/21/2023 Cherelle Stover MD   Next Visit   11/1/2024 Cherelle Stover MD   ED visits in past 90 days: 0        Note composed:10:28 AM 09/07/2024

## 2024-09-09 RX ORDER — FAMOTIDINE 20 MG/1
20 TABLET, FILM COATED ORAL 2 TIMES DAILY PRN
Qty: 180 TABLET | Refills: 0 | Status: SHIPPED | OUTPATIENT
Start: 2024-09-09

## 2024-09-12 ENCOUNTER — OFFICE VISIT (OUTPATIENT)
Dept: BARIATRICS | Facility: CLINIC | Age: 64
End: 2024-09-12
Payer: COMMERCIAL

## 2024-09-12 DIAGNOSIS — E66.01 CLASS 3 SEVERE OBESITY DUE TO EXCESS CALORIES WITH SERIOUS COMORBIDITY AND BODY MASS INDEX (BMI) OF 40.0 TO 44.9 IN ADULT: Primary | ICD-10-CM

## 2024-09-12 PROCEDURE — 1160F RVW MEDS BY RX/DR IN RCRD: CPT | Mod: CPTII,95,, | Performed by: INTERNAL MEDICINE

## 2024-09-12 PROCEDURE — 99212 OFFICE O/P EST SF 10 MIN: CPT | Mod: 95,,, | Performed by: INTERNAL MEDICINE

## 2024-09-12 PROCEDURE — 1159F MED LIST DOCD IN RCRD: CPT | Mod: CPTII,95,, | Performed by: INTERNAL MEDICINE

## 2024-09-12 RX ORDER — TIRZEPATIDE 10 MG/.5ML
10 INJECTION, SOLUTION SUBCUTANEOUS
Qty: 4 PEN | Refills: 3 | Status: SHIPPED | OUTPATIENT
Start: 2024-10-12

## 2024-09-12 RX ORDER — TIRZEPATIDE 7.5 MG/.5ML
7.5 INJECTION, SOLUTION SUBCUTANEOUS
Qty: 4 PEN | Refills: 0 | Status: SHIPPED | OUTPATIENT
Start: 2024-09-12

## 2024-09-12 NOTE — PROGRESS NOTES
Subjective     Patient ID: Brandi Sahu is a 63 y.o. female.    Chief Complaint: No chief complaint on file.    The patient location is: in parked car.  Virtual visit today 2/2 Erlin [place].     The chief complaint leading to consultation is: follow- up    Visit type: audiovisual    Face to Face time with patient: 5 min  11 minutes of total time spent on the encounter, which includes face to face time and non-face to face time preparing to see the patient (eg, review of tests), Obtaining and/or reviewing separately obtained history, Documenting clinical information in the electronic or other health record, Independently interpreting results (not separately reported) and communicating results to the patient/family/caregiver, or Care coordination (not separately reported).         Each patient to whom he or she provides medical services by telemedicine is:  (1) informed of the relationship between the physician and patient and the respective role of any other health care provider with respect to management of the patient; and (2) notified that he or she may decline to receive medical services by telemedicine and may withdraw from such care at any time.    Notes:      CC:. Has gained 7.2 lbs (+3.5 lbs muscle. +0.6 lbs fat).   Was to start 1000 elvin pb meal planner, seated exercises and started Zepbound, currently on 5 mg. Denies SE. APpetite is variable.   She is eating less at night. Eating eggs, chicken, fish, salad, tacos, more veg and less starch.   Has done some walking 3 times a day x 2 blocks. Having less back on knee pain.       Prev 279#. Current home weight# pt unsure. 268# last month.     Inbody deferred 2/2 Virtual visit today 2/2 Erlin [place].     New BMR: 1592    New PBF: 54.3%       BMR:1538    PBF:  56.3%  History of medication for loss: phentermine on and off last filled 9/5/23, and in August  from [facility].   checked today  topiramate 25 mg BID. She stopped taking it after 2.5  weeks. She did message about  some symptoms she was having that did not seem to be related to topiramate. diethylpropion last filled Jan 2024. She denies SE. She did not find much decrease in her appetite.          Review of Systems       Objective   LMP 07/18/2012     Physical Exam  Vitals reviewed.   Constitutional:       General: She is not in acute distress.     Appearance: She is well-developed.      Comments: With severe obesity     HENT:      Head: Normocephalic and atraumatic.   Eyes:      General: No scleral icterus.     Pupils: Pupils are equal, round, and reactive to light.   Pulmonary:      Effort: Pulmonary effort is normal. No respiratory distress.   Neurological:      Mental Status: She is alert and oriented to person, place, and time.      Comments: + antalgic gate   Psychiatric:         Behavior: Behavior normal.         Judgment: Judgment normal.            Assessment and Plan     1. Class 3 severe obesity due to excess calories with serious comorbidity and body mass index (BMI) of 40.0 to 44.9 in adult  -     tirzepatide, weight loss, (ZEPBOUND) 7.5 mg/0.5 mL PnIj; Inject 7.5 mg into the skin every 7 days.  Dispense: 4 Pen; Refill: 0  -     tirzepatide, weight loss, (ZEPBOUND) 10 mg/0.5 mL PnIj; Inject 10 mg into the skin every 7 days.  Dispense: 4 Pen; Refill: 3            Diagnoses and all orders for this visit:    Class 3 severe obesity due to excess calories with serious comorbidity and body mass index (BMI) of 40.0 to 44.9 in adult  -     tirzepatide, weight loss, (ZEPBOUND) 7.5 mg/0.5 mL PnIj; Inject 7.5 mg into the skin every 7 days.  -     tirzepatide, weight loss, (ZEPBOUND) 10 mg/0.5 mL PnIj; Inject 10 mg into the skin every 7 days.          Start Zepbound 7.5 mg once a week  x 4 weeks, then 10 mg weekly.         Decrease portions as soon as you start Zepbound. Avoid fried, rich or greasy foods.      Some nausea in the first 2 weeks is not unusual.     If you get pain across the upper  abdomen and around to your back, please call the office.       [email or web address] to sign up for coupon card.         Try to keep about 2/3 calorie intake during the daylight hours, and no more than 1/3 of calorie intake after dark.       Increase low impact activity as tolerated.  Avoid high impact activity, very heavy lifting or other exercise regimens that may cause discomfort.       1000 elvin pb meal planner, meal ideas and seated exercises given previously    Hurricane tips given.              No follow-ups on file.

## 2024-09-12 NOTE — PATIENT INSTRUCTIONS
Start Zepbound 7.5 mg once a week  x 4 weeks, then 10 mg weekly.         Decrease portions as soon as you start Zepbound. Avoid fried, rich or greasy foods.      Some nausea in the first 2 weeks is not unusual.     If you get pain across the upper abdomen and around to your back, please call the office.       [email or web address] to sign up for coupon card.         Try to keep about 2/3 calorie intake during the daylight hours, and no more than 1/3 of calorie intake after dark.       Increase low impact activity as tolerated.  Avoid high impact activity, very heavy lifting or other exercise regimens that may cause discomfort.       1000 elvin pb meal planner, meal ideas and seated exercises given previously      Tips for preparing for hurricane season:    If you are on weight loss medications, please take your medication with you in case of evacuation. Injectable medications should be transported with an ice pack if unopened or room temperature if you have started to use them.   Hurricane supplies do not necessarily need to be junk food or high in carbs or sugar. Shelf stable and healthy choices to include in your supplies could include:  Canned/packets of tuna or chicken  Apples, oranges, banana, pears  Beef or turkey jerky  Sugar free pudding  Pickle, olives, dill relish (mix with the tuna or chicken!)  Low sodium or no salt added canned vegetables  If you get bread, get lite bread (40 calories a slice)  0 sugar sports drinks  Water  String cheese will be okay for a few days without refrigeration or in an ice chest.   Peanut or other nut butter.   Parmesan crisps  Pork skins  Protein shakes (20-30g protein, less than 5 g sugar)  Protein bars (15 or more g protein, less than 5 g sugar)  Don't forget disposable forks, spoons, plates in your supplies  If evacuated, manage stress by taking walks, reading or meditating.   If eating out make better choices when possible.   Salads with a lean  protein and limited dressing, cheese or other toppings  Grilled chicken sandwich or burger without the bun.   Skip the fries!  Order water or unsweetened tea instead of soda  Grocery stores with a deli, salad/food bar can be a good quick and affordable option to replace fast food

## 2024-09-13 ENCOUNTER — HOSPITAL ENCOUNTER (OUTPATIENT)
Dept: RADIOLOGY | Facility: HOSPITAL | Age: 64
Discharge: HOME | End: 2024-09-13
Attending: FAMILY MEDICINE
Payer: COMMERCIAL

## 2024-09-13 VITALS — BODY MASS INDEX: 42.59 KG/M2 | WEIGHT: 265 LBS | HEIGHT: 66 IN

## 2024-09-13 DIAGNOSIS — Z12.31 BREAST CANCER SCREENING BY MAMMOGRAM: ICD-10-CM

## 2024-09-13 PROCEDURE — 77067 SCR MAMMO BI INCL CAD: CPT | Mod: 26,,, | Performed by: RADIOLOGY

## 2024-09-13 PROCEDURE — 77063 BREAST TOMOSYNTHESIS BI: CPT | Mod: 26,,, | Performed by: RADIOLOGY

## 2024-09-13 PROCEDURE — 77063 BREAST TOMOSYNTHESIS BI: CPT | Mod: TC

## 2024-09-16 ENCOUNTER — TELEPHONE (OUTPATIENT)
Dept: RADIOLOGY | Facility: HOSPITAL | Age: 64
End: 2024-09-16
Payer: COMMERCIAL

## 2024-09-17 ENCOUNTER — TELEPHONE (OUTPATIENT)
Dept: RADIOLOGY | Facility: HOSPITAL | Age: 64
End: 2024-09-17
Payer: COMMERCIAL

## 2024-09-17 NOTE — TELEPHONE ENCOUNTER
Called patient in reference to her breast imaging and scheduling a abnormal mammogram follow up. No answer. Voice mail box full, unable to leave a message.

## 2024-09-20 ENCOUNTER — TELEPHONE (OUTPATIENT)
Dept: RADIOLOGY | Facility: HOSPITAL | Age: 64
End: 2024-09-20
Payer: COMMERCIAL

## 2024-09-20 NOTE — TELEPHONE ENCOUNTER
Spoke with patient and explained mammogram findings.Patient expressed understanding of results. Patient scheduled abnormal mammogram follow up appointment at [facility] on 9/23/2024.

## 2024-09-23 ENCOUNTER — HOSPITAL ENCOUNTER (OUTPATIENT)
Dept: RADIOLOGY | Facility: HOSPITAL | Age: 64
Discharge: HOME | End: 2024-09-23
Attending: FAMILY MEDICINE
Payer: COMMERCIAL

## 2024-09-23 DIAGNOSIS — R92.8 ABNORMAL FINDING ON BREAST IMAGING: ICD-10-CM

## 2024-09-23 PROCEDURE — 77065 DX MAMMO INCL CAD UNI: CPT | Mod: 26,RT,, | Performed by: RADIOLOGY

## 2024-09-23 PROCEDURE — 77065 DX MAMMO INCL CAD UNI: CPT | Mod: TC,RT

## 2024-09-23 PROCEDURE — 77061 BREAST TOMOSYNTHESIS UNI: CPT | Mod: 26,RT,, | Performed by: RADIOLOGY

## 2024-09-23 PROCEDURE — 77061 BREAST TOMOSYNTHESIS UNI: CPT | Mod: TC,RT

## 2024-09-24 ENCOUNTER — TELEPHONE (OUTPATIENT)
Dept: FAMILY MEDICINE | Facility: CLINIC | Age: 64
End: 2024-09-24
Payer: COMMERCIAL

## 2024-09-24 ENCOUNTER — PATIENT OUTREACH (OUTPATIENT)
Dept: ADMINISTRATIVE | Facility: HOSPITAL | Age: 64
End: 2024-09-24
Payer: COMMERCIAL

## 2024-09-24 DIAGNOSIS — Z00.00 ROUTINE GENERAL MEDICAL EXAMINATION AT A HEALTH CARE FACILITY: Primary | ICD-10-CM

## 2024-09-24 NOTE — TELEPHONE ENCOUNTER
----- Message from Issac Sparks MA sent at 9/24/2024 12:57 PM CDT -----  Regarding: Labs  Hi Dr. Stover    Pt is coming in for annual exam on 11/01/24. Are there any labs you would like to order before the visit?    Ana María

## 2024-10-08 ENCOUNTER — OFFICE VISIT (OUTPATIENT)
Dept: CARDIOLOGY | Facility: CLINIC | Age: 64
End: 2024-10-08
Payer: COMMERCIAL

## 2024-10-08 VITALS
SYSTOLIC BLOOD PRESSURE: 142 MMHG | BODY MASS INDEX: 42.32 KG/M2 | OXYGEN SATURATION: 97 % | HEART RATE: 96 BPM | DIASTOLIC BLOOD PRESSURE: 88 MMHG | HEIGHT: 66 IN | WEIGHT: 263.31 LBS | RESPIRATION RATE: 15 BRPM

## 2024-10-08 DIAGNOSIS — I47.19 NARROW COMPLEX TACHYCARDIA: ICD-10-CM

## 2024-10-08 DIAGNOSIS — E66.01 MORBID OBESITY: ICD-10-CM

## 2024-10-08 DIAGNOSIS — I48.92 ATRIAL FLUTTER, UNSPECIFIED TYPE: ICD-10-CM

## 2024-10-08 DIAGNOSIS — R00.0 TACHYCARDIA: Primary | ICD-10-CM

## 2024-10-08 DIAGNOSIS — I10 PRIMARY HYPERTENSION: ICD-10-CM

## 2024-10-08 DIAGNOSIS — I48.0 PAROXYSMAL ATRIAL FIBRILLATION: ICD-10-CM

## 2024-10-08 PROCEDURE — 3008F BODY MASS INDEX DOCD: CPT | Mod: CPTII,S$GLB,, | Performed by: INTERNAL MEDICINE

## 2024-10-08 PROCEDURE — 3077F SYST BP >= 140 MM HG: CPT | Mod: CPTII,S$GLB,, | Performed by: INTERNAL MEDICINE

## 2024-10-08 PROCEDURE — 99215 OFFICE O/P EST HI 40 MIN: CPT | Mod: S$GLB,,, | Performed by: INTERNAL MEDICINE

## 2024-10-08 PROCEDURE — 3079F DIAST BP 80-89 MM HG: CPT | Mod: CPTII,S$GLB,, | Performed by: INTERNAL MEDICINE

## 2024-10-08 PROCEDURE — 1159F MED LIST DOCD IN RCRD: CPT | Mod: CPTII,S$GLB,, | Performed by: INTERNAL MEDICINE

## 2024-10-08 PROCEDURE — 99999 PR PBB SHADOW E&M-EST. PATIENT-LVL IV: CPT | Mod: PBBFAC,,, | Performed by: INTERNAL MEDICINE

## 2024-10-08 RX ORDER — METOPROLOL SUCCINATE 50 MG/1
50 TABLET, EXTENDED RELEASE ORAL DAILY
Qty: 90 TABLET | Refills: 3 | Status: SHIPPED | OUTPATIENT
Start: 2024-10-08

## 2024-10-08 RX ORDER — METOPROLOL SUCCINATE 25 MG/1
25 TABLET, EXTENDED RELEASE ORAL DAILY
Qty: 90 TABLET | Refills: 3 | Status: SHIPPED | OUTPATIENT
Start: 2024-10-08 | End: 2024-10-08

## 2024-10-08 NOTE — PROGRESS NOTES
CARDIOVASCULAR CONSULTATION    REASON FOR CONSULT:   Brandi Sahu is a 63 y.o. female who presents for   Chief Complaint   Patient presents with    Follow-up          HISTORY OF PRESENT ILLNESS:     Patient is a pleasant 62-year-old lady.  Here for follow-up after recent hospital stay.  During the hospital was sent from colonoscopy because of paroxysmal AFib/irregular heartbeats.  On tele monitoring noted to have paroxysmal AFib and episodes of atrial tachycardia.  Heart rate controlled.  Asymptomatic.  Echo did not show any significant wall motion abnormality.  Patient denies any chest pains at rest on exertion.  Because of paroxysmal AFib she was initiated on Xarelto 20 mg daily and Toprol-XL.  Here for follow-up after that.  Denies orthopnea, PND, swelling of feet    Notes from December 23:  Patient here for follow-up.  Doing fine.  Denies any chest pains at rest on exertion, orthopnea, PND      Results for orders placed during the hospital encounter of 10/05/23    Echo    Interpretation Summary    Left Ventricle: The left ventricle is normal in size. Normal wall thickness. There is mild concentric hypertrophy. Normal wall motion. There is normal systolic function with a visually estimated ejection fraction of 60 - 65%.    Left Atrium: Left atrium is mildly dilated.    Right Ventricle: Normal right ventricular cavity size. Systolic function is normal.    Aortic Valve: There is mild aortic valve sclerosis.    Pulmonary Artery: The estimated pulmonary artery systolic pressure is 28 mmHg.    IVC/SVC: Normal venous pressure at 3 mmHg.      Results for orders placed during the hospital encounter of 11/14/23    Nuclear Stress - Cardiology Interpreted    Interpretation Summary    Normal myocardial perfusion scan. There is no evidence of myocardial ischemia or infarction.    The gated perfusion images showed an ejection fraction of 51% post stress.    There is normal wall motion post stress.        There were 2  patient triggered events over the 21 day monitoring period (no sx specified). Corresponding strips show sinus rhythm 94 bpm with frequent PACs and PVCs, and up to 3 beats nonsustained AT.       Notes from October 24: Patient here for follow-up.  Lately has been feeling dyspnea on exertion and shortness of breath also palpitations.  EKG done today shows AFib.  Patient has not been taking her Xarelto.  Denies anginal sounding chest pains orthopnea or PND    PAST MEDICAL HISTORY:     Past Medical History:   Diagnosis Date    Anxiety     Arthritis     knee    Asthma     Bursitis, shoulder     Colon polyps     Dental bridge present     upper right    Dental crown present     Depression     Hypertension     Pes planus     Wears glasses     for reading       PAST SURGICAL HISTORY:     Past Surgical History:   Procedure Laterality Date    arthroscopy ankle right      bilateral inguinal hernia repair      COLONOSCOPY N/A 01/11/2018    Procedure: COLONOSCOPY;  Surgeon: Christoph Nolasco MD;  Location: [facility] ENDO;  Service: Endoscopy;  Laterality: N/A;    COLONOSCOPY N/A 10/03/2023    Procedure: COLONOSCOPY;  Surgeon: Herbie Tellez MD;  Location: [facility] ENDO;  Service: Endoscopy;  Laterality: N/A;  instructions sent to patient portal. TBou  referral: EDWARDO Hammer NP    left torn rotator cuff repair  09/2014    MYOMECTOMY      rt salphigectomy      SYNOVECTOMY      TRANSFORAMINAL EPIDURAL INJECTION OF STEROID Right 01/18/2022    Procedure: LUMBAR TRANSFORAMINAL RIGHT L5/S1 DIRECT REFERRAL;  Surgeon: Cherelle Carpenter MD;  Location: [facility] PAIN MGT;  Service: Pain Management;  Laterality: Right;    TRANSFORAMINAL EPIDURAL INJECTION OF STEROID Bilateral 01/12/2023    Procedure: INJECTION, STEROID, EPIDURAL, TRANSFORAMINAL APPROACH, BILATERAL L5-S1 CONTRAST DIRECT REF;  Surgeon: Lorraine Braxton MD;  Location: [facility] PAIN MGT;  Service: Pain Management;  Laterality: Bilateral;    TRANSFORAMINAL EPIDURAL INJECTION OF STEROID Right 7/24/2024     Procedure: LUMBAR TRANSFORAMINAL RIGHT L5/S1 DIRECT REFERRAL;  Surgeon: Cherelle Carpenter MD;  Location: [facility];  Service: Pain Management;  Laterality: Right;  [phone]    UMBILICAL HERNIA REPAIR         ALLERGIES AND MEDICATION:     Review of patient's allergies indicates:   Allergen Reactions    Lisinopril Hives, Itching, Rash, Shortness Of Breath and Swelling        Medication List            Accurate as of October 8, 2024  2:12 PM. If you have any questions, ask your nurse or doctor.                START taking these medications      rivaroxaban 20 mg Tab  Commonly known as: XARELTO  Take 1 tablet (20 mg total) by mouth once daily.  Started by: Dom Umana MD            CHANGE how you take these medications      metoprolol succinate 50 MG 24 hr tablet  Commonly known as: TOPROL-XL  Take 1 tablet (50 mg total) by mouth once daily.  What changed:   medication strength  how much to take  additional instructions  Changed by: Dom Umana MD            CONTINUE taking these medications      albuterol 90 mcg/actuation inhaler  Commonly known as: PROVENTIL HFA  Inhale 2 puffs into the lungs every 6 (six) hours as needed for Wheezing. Rescue     albuterol-ipratropium 2.5 mg-0.5 mg/3 mL nebulizer solution  Commonly known as: DUO-NEB  Take 3 mLs by nebulization every 6 (six) hours as needed for Wheezing or Shortness of Breath (cough).     amLODIPine 10 MG tablet  Commonly known as: NORVASC  Take 1 tablet (10 mg total) by mouth once daily.     diclofenac 75 MG EC tablet  Commonly known as: VOLTAREN  Take 1 tablet (75 mg total) by mouth 2 (two) times daily as needed.     diphenhydrAMINE 25 mg capsule  Commonly known as: BENADRYL  Take 2 capsules (50 mg total) by mouth every 6 (six) hours as needed for Itching or Allergies.     EPINEPHrine 0.3 mg/0.3 mL Atin  Commonly known as: EPIPEN  Inject 0.3 mLs (0.3 mg total) into the muscle as needed.     ergocalciferol 50,000 unit Cap  Commonly known as:  ERGOCALCIFEROL  Take 1 capsule (50,000 Units total) by mouth every 7 days.     famotidine 20 MG tablet  Commonly known as: PEPCID  Take 1 tablet (20 mg total) by mouth 2 (two) times daily as needed for Heartburn.     fluticasone propionate 50 mcg/actuation nasal spray  Commonly known as: FLONASE  2 sprays (100 mcg total) by Each Nostril route once daily.     fluticasone-salmeterol 100-50 mcg/dose 100-50 mcg/dose diskus inhaler  Commonly known as: ADVAIR DISKUS  Inhale 1 puff into the lungs 2 (two) times daily. Controller     loratadine 10 mg tablet  Commonly known as: CLARITIN  Take 1 tablet (10 mg total) by mouth every morning.     olopatadine 0.1 % ophthalmic solution  Commonly known as: PATANOL  Place 1 drop into both eyes 2 (two) times daily.     spironolactone 50 MG tablet  Commonly known as: ALDACTONE     topiramate 25 MG tablet  Commonly known as: TOPAMAX     * ZEPBOUND 7.5 mg/0.5 mL Pnij  Generic drug: tirzepatide (weight loss)  Inject 7.5 mg into the skin every 7 days.     * ZEPBOUND 10 mg/0.5 mL Pnij  Generic drug: tirzepatide (weight loss)  Inject 10 mg into the skin every 7 days.  Start taking on: October 12, 2024           * This list has 2 medication(s) that are the same as other medications prescribed for you. Read the directions carefully, and ask your doctor or other care provider to review them with you.                   Where to Get Your Medications        These medications were sent to [facility] [id number] [address]      Hours: Mon-Fri, 8a-5:30p Phone: [phone]   metoprolol succinate 50 MG 24 hr tablet  rivaroxaban 20 mg Tab         SOCIAL HISTORY:     Social History     Socioeconomic History    Marital status:    Tobacco Use    Smoking status: Never    Smokeless tobacco: Never   Substance and Sexual Activity    Alcohol use: Yes     Comment: social     Drug use: No    Sexual activity: Not Currently     Birth control/protection: None      "Comment: occasional     Social Drivers of Health     Financial Resource Strain: Low Risk  (7/10/2024)    Overall Financial Resource Strain (CARDIA)     Difficulty of Paying Living Expenses: Not hard at all   Food Insecurity: No Food Insecurity (7/10/2024)    Hunger Vital Sign     Worried About Running Out of Food in the Last Year: Never true     Ran Out of Food in the Last Year: Never true   Physical Activity: Insufficiently Active (7/10/2024)    Exercise Vital Sign     Days of Exercise per Week: 3 days     Minutes of Exercise per Session: 10 min   Stress: No Stress Concern Present (7/10/2024)    [employer] [place] - Occupational Stress Questionnaire     Feeling of Stress : Not at all   Housing Stability: Unknown (7/10/2024)    Housing Stability Vital Sign     Unable to Pay for Housing in the Last Year: No       FAMILY HISTORY:     Family History   Problem Relation Name Age of Onset    Cancer Mother aSndie Sahu         breast    Diabetes Mother Sandie Sahu     Hypertension Mother Sandie Sahu     Breast cancer Mother Sandie Sahu     Cancer Brother Ephaddison Sahu         liver cancer    Breast cancer Maternal Aunt         REVIEW OF SYSTEMS:   Review of Systems   Constitutional: Negative.   HENT: Negative.     Eyes: Negative.    Respiratory: Negative.     Endocrine: Negative.    Hematologic/Lymphatic: Negative.    Skin: Negative.    Musculoskeletal: Negative.    Gastrointestinal: Negative.    Genitourinary: Negative.    Neurological: Negative.    Psychiatric/Behavioral: Negative.     Allergic/Immunologic: Negative.        A 10 point review of systems was performed and all the pertinent positives have been mentioned. Rest of review of systems was negative.        PHYSICAL EXAM:     Vitals:    10/08/24 1314   BP: (!) 142/88   Pulse: 96   Resp: 15    Body mass index is 42.5 kg/m².  Weight: 119.4 kg (263 lb 5.4 oz)   Height: 5' 6" (167.6 cm)     Physical Exam  Constitutional:       Appearance: Normal " appearance. She is well-developed.   HENT:      Head: Normocephalic.   Eyes:      Pupils: Pupils are equal, round, and reactive to light.   Cardiovascular:      Rate and Rhythm: Normal rate and regular rhythm.   Pulmonary:      Effort: Pulmonary effort is normal.      Breath sounds: Normal breath sounds.   Abdominal:      General: Bowel sounds are normal.      Palpations: Abdomen is soft.      Tenderness: There is no abdominal tenderness.   Musculoskeletal:         General: Normal range of motion.      Cervical back: Normal range of motion and neck supple.   Skin:     General: Skin is warm.   Neurological:      Mental Status: She is alert and oriented to person, place, and time.           DATA:     Laboratory:  CBC:  Recent Labs   Lab 08/29/22  1112 10/05/23  1232 10/06/23  0640   WBC 6.17 6.64 5.47   Hemoglobin 12.8 12.7 12.3   Hematocrit 42.4 42.3 41.7   Platelets 181 179 164       CHEMISTRIES:  Recent Labs   Lab 05/11/22  1558 08/29/22  1112 10/05/23  1232 10/06/23  0640   Glucose 86 90 99 97   Sodium 141 144 140 141   Potassium 4.7 4.5 4.1 4.0   BUN 14 13 6 8   Creatinine 0.7 0.7 0.8 0.8   eGFR if  >60.0  --   --   --    eGFR if non  >60.0  --   --   --    Calcium 10.3 9.8 10.1 9.5       CARDIAC BIOMARKERS:        COAGS:        LIPIDS/LFTS:  Recent Labs   Lab 08/29/22  1112 10/05/23  1232 10/06/23  0640   Cholesterol 161  --  153   Triglycerides 48  --  44   HDL 55  --  33 L   LDL Cholesterol 96.4  --  111.2   Non-HDL Cholesterol 106  --  120   AST 20 28 31   ALT 21 30 30       Hemoglobin A1C   Date Value Ref Range Status   08/29/2022 5.4 4.0 - 5.6 % Final     Comment:     ADA Screening Guidelines:  5.7-6.4%  Consistent with prediabetes  >or=6.5%  Consistent with diabetes    High levels of fetal hemoglobin interfere with the HbA1C  assay. Heterozygous hemoglobin variants (HbS, HgC, etc)do  not significantly interfere with this assay.   However, presence of multiple variants may  affect accuracy.     06/25/2021 5.6 4.0 - 5.6 % Final     Comment:     ADA Screening Guidelines:  5.7-6.4%  Consistent with prediabetes  >or=6.5%  Consistent with diabetes    High levels of fetal hemoglobin interfere with the HbA1C  assay. Heterozygous hemoglobin variants (HbS, HgC, etc)do  not significantly interfere with this assay.   However, presence of multiple variants may affect accuracy.     06/23/2020 5.5 4.0 - 5.6 % Final     Comment:     ADA Screening Guidelines:  5.7-6.4%  Consistent with prediabetes  >or=6.5%  Consistent with diabetes  High levels of fetal hemoglobin interfere with the HbA1C  assay. Heterozygous hemoglobin variants (HbS, HgC, etc)do  not significantly interfere with this assay.   However, presence of multiple variants may affect accuracy.         TSH  Recent Labs   Lab 08/29/22  1112 10/05/23  1232 11/21/23  1432   TSH 1.214 0.398 L 0.797       The 10-year ASCVD risk score (Lillian DK, et al., 2019) is: 9.8%    Values used to calculate the score:      Age: 63 years      Sex: Female      Is Non- : Yes      Diabetic: No      Tobacco smoker: No      Systolic Blood Pressure: 142 mmHg      Is BP treated: Yes      HDL Cholesterol: 33 mg/dL      Total Cholesterol: 153 mg/dL       BNP    Lab Results   Component Value Date/Time    BNP 11 05/11/2022 03:58 PM            ECHO    Results for orders placed during the hospital encounter of 10/05/23    Echo    Interpretation Summary    Left Ventricle: The left ventricle is normal in size. Normal wall thickness. There is mild concentric hypertrophy. Normal wall motion. There is normal systolic function with a visually estimated ejection fraction of 60 - 65%.    Left Atrium: Left atrium is mildly dilated.    Right Ventricle: Normal right ventricular cavity size. Systolic function is normal.    Aortic Valve: There is mild aortic valve sclerosis.    Pulmonary Artery: The estimated pulmonary artery systolic pressure is 28 mmHg.     IVC/SVC: Normal venous pressure at 3 mmHg.          CATH    No results found for this or any previous visit.        ASSESSMENT AND PLAN     Patient Active Problem List   Diagnosis    Hypertension    Depression    Peroneal tendinitis    Knee pain, left    Asthma    Complete rupture of rotator cuff    Morbid obesity with BMI of 40.0-44.9, adult    Dermatitis    Supraspinatus tendon tear    Allergic rhinitis    Patellofemoral pain syndrome of left knee    Chronic pain of left knee    Weakness of left lower extremity    Gait difficulty    Decreased range of motion of left knee    Primary osteoarthritis of left knee    Primary osteoarthritis of right knee    Narrow complex tachycardia    Atrial flutter, unspecified type         Orders Placed This Encounter   Procedures    Chlorhexidine (CHG) 2% Wipes    Oxygen PRN    Pulse Oximetry Q4H    IN OFFICE EKG 12-LEAD (to Muse)    Transesophageal echo (MICAH) with possible cardioversion     Stress test did not show any significant ischemia.  Echo showed normal left ventricle systolic function.      AFib, symptomatic.  Currently in AFib.  Symptomatic from it.  Start Xarelto again.  Has not been taking it.  Plan for MICAH and cardioversion next week because she is symptomatic from her AFib.    Patient has atrial fibrillation.  I had a detailed discussion with the patient regarding the etiology, the pathophysiology and treatment for atrial fibrillation. We discussed the increased risk of stroke in atrial fibrillation and the reasoning behind anticoagulation. Based on patient's CHADSVASC score there is a benefit of anticoagulation.    We discussed the risks and benefits of anticoagulation and the risks and benefits of anticoagulation including the decreased risk of stroke as well as the increased risk of bleeding with anticoagulation.      -No absolute contraindications of esophageal stricture, tumor, perforation, laceration,or diverticulum and/or active GI bleed  -The risks, benefits &  alternatives of the procedure were explained to the patient.   -The risks of transesophageal echo include but are not limited to:  Dental trauma, esophageal trauma/perforation, bleeding, laryngospasm/brochospasm, aspiration, sore throat/hoarseness, & dislodgement of the endotracheal tube/nasogastric tube (where applicable).    -The risks of ANES monitored sedation include hypotension, respiratory depression, arrhythmias, bronchospasm, & death.    -Informed consent was obtained. The patient is agreeable to proceed with the procedure and all questions and concerns addressed.    Risks, benefits and alternatives of the MICAH/Cardioversion procedure were discussed with the patient including throat irritation, aspiration, anesthetic complications, esophageal irritation/perforation, skin irritation, arrhythmia, etc.  Patient understands and agrees to proceed.  Consent was placed on the chart.    Increase metoprolol xl to 50 mg daily    Follow-up after MICAH and cardioversion    Hypertension:  As above.  Increase metoprolol    Morbid Obesity: Weight loss has been recommended.      Thank you very much for involving me in the care of your patient.  Please do not hesitate to contact me if there are any questions.      Dom Umana MD, FACC, [facility]  Interventional Cardiologist, [facility].           This note was dictated with the help of speech recognition software.  There might be un-intended errors and/or substitutions.

## 2024-10-08 NOTE — TELEPHONE ENCOUNTER
----- Message from Jeniffer sent at 10/8/2024  3:15 PM CDT -----  .Type: Patient Call Back    Who called: Self     What is the request in detail: Stated the pharmacy didn't receive the request to fill her rivaroxaban (XARELTO) 20 mg Tab. Ask that the prescription be sent to .  [facility] [address]  [address]  [place] [address]  Phone: [phone] Fax: [phone]    Can the clinic reply by [facility]? No     Would the patient rather a call back or a response via [facility]? Call Back     Best call back number: .[phone] (home)       Additional Information:

## 2024-10-08 NOTE — H&P (VIEW-ONLY)
CARDIOVASCULAR CONSULTATION    REASON FOR CONSULT:   Brandi Sahu is a 63 y.o. female who presents for   Chief Complaint   Patient presents with    Follow-up          HISTORY OF PRESENT ILLNESS:     Patient is a pleasant 62-year-old lady.  Here for follow-up after recent hospital stay.  During the hospital was sent from colonoscopy because of paroxysmal AFib/irregular heartbeats.  On tele monitoring noted to have paroxysmal AFib and episodes of atrial tachycardia.  Heart rate controlled.  Asymptomatic.  Echo did not show any significant wall motion abnormality.  Patient denies any chest pains at rest on exertion.  Because of paroxysmal AFib she was initiated on Xarelto 20 mg daily and Toprol-XL.  Here for follow-up after that.  Denies orthopnea, PND, swelling of feet    Notes from December 23:  Patient here for follow-up.  Doing fine.  Denies any chest pains at rest on exertion, orthopnea, PND      Results for orders placed during the hospital encounter of 10/05/23    Echo    Interpretation Summary    Left Ventricle: The left ventricle is normal in size. Normal wall thickness. There is mild concentric hypertrophy. Normal wall motion. There is normal systolic function with a visually estimated ejection fraction of 60 - 65%.    Left Atrium: Left atrium is mildly dilated.    Right Ventricle: Normal right ventricular cavity size. Systolic function is normal.    Aortic Valve: There is mild aortic valve sclerosis.    Pulmonary Artery: The estimated pulmonary artery systolic pressure is 28 mmHg.    IVC/SVC: Normal venous pressure at 3 mmHg.      Results for orders placed during the hospital encounter of 11/14/23    Nuclear Stress - Cardiology Interpreted    Interpretation Summary    Normal myocardial perfusion scan. There is no evidence of myocardial ischemia or infarction.    The gated perfusion images showed an ejection fraction of 51% post stress.    There is normal wall motion post stress.        There were 2  patient triggered events over the 21 day monitoring period (no sx specified). Corresponding strips show sinus rhythm 94 bpm with frequent PACs and PVCs, and up to 3 beats nonsustained AT.       Notes from October 24: Patient here for follow-up.  Lately has been feeling dyspnea on exertion and shortness of breath also palpitations.  EKG done today shows AFib.  Patient has not been taking her Xarelto.  Denies anginal sounding chest pains orthopnea or PND    PAST MEDICAL HISTORY:     Past Medical History:   Diagnosis Date    Anxiety     Arthritis     knee    Asthma     Bursitis, shoulder     Colon polyps     Dental bridge present     upper right    Dental crown present     Depression     Hypertension     Pes planus     Wears glasses     for reading       PAST SURGICAL HISTORY:     Past Surgical History:   Procedure Laterality Date    arthroscopy ankle right      bilateral inguinal hernia repair      COLONOSCOPY N/A 01/11/2018    Procedure: COLONOSCOPY;  Surgeon: Christoph Nolasco MD;  Location: [facility] ENDO;  Service: Endoscopy;  Laterality: N/A;    COLONOSCOPY N/A 10/03/2023    Procedure: COLONOSCOPY;  Surgeon: Herbie Tellez MD;  Location: [facility] ENDO;  Service: Endoscopy;  Laterality: N/A;  instructions sent to patient portal. TBou  referral: EDWARDO Hammer NP    left torn rotator cuff repair  09/2014    MYOMECTOMY      rt salphigectomy      SYNOVECTOMY      TRANSFORAMINAL EPIDURAL INJECTION OF STEROID Right 01/18/2022    Procedure: LUMBAR TRANSFORAMINAL RIGHT L5/S1 DIRECT REFERRAL;  Surgeon: Cherelle Carpenter MD;  Location: [facility], [place], operated by [facility];  Service: Pain Management;  Laterality: Right;    TRANSFORAMINAL EPIDURAL INJECTION OF STEROID Bilateral 01/12/2023    Procedure: INJECTION, STEROID, EPIDURAL, TRANSFORAMINAL APPROACH, BILATERAL L5-S1 CONTRAST DIRECT REF;  Surgeon: Lorraine Braxton MD;  Location: [facility], [place], operated by [facility];  Service: Pain Management;  Laterality: Bilateral;    TRANSFORAMINAL EPIDURAL INJECTION OF STEROID Right 7/24/2024     Procedure: LUMBAR TRANSFORAMINAL RIGHT L5/S1 DIRECT REFERRAL;  Surgeon: Cherelle Carpenter MD;  Location: [facility];  Service: Pain Management;  Laterality: Right;  [phone]    UMBILICAL HERNIA REPAIR         ALLERGIES AND MEDICATION:     Review of patient's allergies indicates:   Allergen Reactions    Lisinopril Hives, Itching, Rash, Shortness Of Breath and Swelling        Medication List            Accurate as of October 8, 2024  2:12 PM. If you have any questions, ask your nurse or doctor.                START taking these medications      rivaroxaban 20 mg Tab  Commonly known as: XARELTO  Take 1 tablet (20 mg total) by mouth once daily.  Started by: Dom Umana MD            CHANGE how you take these medications      metoprolol succinate 50 MG 24 hr tablet  Commonly known as: TOPROL-XL  Take 1 tablet (50 mg total) by mouth once daily.  What changed:   medication strength  how much to take  additional instructions  Changed by: Dom Umana MD            CONTINUE taking these medications      albuterol 90 mcg/actuation inhaler  Commonly known as: PROVENTIL HFA  Inhale 2 puffs into the lungs every 6 (six) hours as needed for Wheezing. Rescue     albuterol-ipratropium 2.5 mg-0.5 mg/3 mL nebulizer solution  Commonly known as: DUO-NEB  Take 3 mLs by nebulization every 6 (six) hours as needed for Wheezing or Shortness of Breath (cough).     amLODIPine 10 MG tablet  Commonly known as: NORVASC  Take 1 tablet (10 mg total) by mouth once daily.     diclofenac 75 MG EC tablet  Commonly known as: VOLTAREN  Take 1 tablet (75 mg total) by mouth 2 (two) times daily as needed.     diphenhydrAMINE 25 mg capsule  Commonly known as: BENADRYL  Take 2 capsules (50 mg total) by mouth every 6 (six) hours as needed for Itching or Allergies.     EPINEPHrine 0.3 mg/0.3 mL Atin  Commonly known as: EPIPEN  Inject 0.3 mLs (0.3 mg total) into the muscle as needed.     ergocalciferol 50,000 unit Cap  Commonly known as:  ERGOCALCIFEROL  Take 1 capsule (50,000 Units total) by mouth every 7 days.     famotidine 20 MG tablet  Commonly known as: PEPCID  Take 1 tablet (20 mg total) by mouth 2 (two) times daily as needed for Heartburn.     fluticasone propionate 50 mcg/actuation nasal spray  Commonly known as: FLONASE  2 sprays (100 mcg total) by Each Nostril route once daily.     fluticasone-salmeterol 100-50 mcg/dose 100-50 mcg/dose diskus inhaler  Commonly known as: ADVAIR DISKUS  Inhale 1 puff into the lungs 2 (two) times daily. Controller     loratadine 10 mg tablet  Commonly known as: CLARITIN  Take 1 tablet (10 mg total) by mouth every morning.     olopatadine 0.1 % ophthalmic solution  Commonly known as: PATANOL  Place 1 drop into both eyes 2 (two) times daily.     spironolactone 50 MG tablet  Commonly known as: ALDACTONE     topiramate 25 MG tablet  Commonly known as: TOPAMAX     * ZEPBOUND 7.5 mg/0.5 mL Pnij  Generic drug: tirzepatide (weight loss)  Inject 7.5 mg into the skin every 7 days.     * ZEPBOUND 10 mg/0.5 mL Pnij  Generic drug: tirzepatide (weight loss)  Inject 10 mg into the skin every 7 days.  Start taking on: October 12, 2024           * This list has 2 medication(s) that are the same as other medications prescribed for you. Read the directions carefully, and ask your doctor or other care provider to review them with you.                   Where to Get Your Medications        These medications were sent to [facility] [id number] [address]      Hours: Mon-Fri, 8a-5:30p Phone: [phone]   metoprolol succinate 50 MG 24 hr tablet  rivaroxaban 20 mg Tab         SOCIAL HISTORY:     Social History     Socioeconomic History    Marital status:    Tobacco Use    Smoking status: Never    Smokeless tobacco: Never   Substance and Sexual Activity    Alcohol use: Yes     Comment: social     Drug use: No    Sexual activity: Not Currently     Birth control/protection: None      "Comment: occasional     Social Drivers of Health     Financial Resource Strain: Low Risk  (7/10/2024)    Overall Financial Resource Strain (CARDIA)     Difficulty of Paying Living Expenses: Not hard at all   Food Insecurity: No Food Insecurity (7/10/2024)    Hunger Vital Sign     Worried About Running Out of Food in the Last Year: Never true     Ran Out of Food in the Last Year: Never true   Physical Activity: Insufficiently Active (7/10/2024)    Exercise Vital Sign     Days of Exercise per Week: 3 days     Minutes of Exercise per Session: 10 min   Stress: No Stress Concern Present (7/10/2024)    [place] citizen [place] [employer] - Occupational Stress Questionnaire     Feeling of Stress : Not at all   Housing Stability: Unknown (7/10/2024)    Housing Stability Vital Sign     Unable to Pay for Housing in the Last Year: No       FAMILY HISTORY:     Family History   Problem Relation Name Age of Onset    Cancer Mother Sandie Sahu         breast    Diabetes Mother Sandie Sahu     Hypertension Mother Sandie Sahu     Breast cancer Mother Sandie Sahu     Cancer Brother Ephaddison Sahu         liver cancer    Breast cancer Maternal Aunt         REVIEW OF SYSTEMS:   Review of Systems   Constitutional: Negative.   HENT: Negative.     Eyes: Negative.    Respiratory: Negative.     Endocrine: Negative.    Hematologic/Lymphatic: Negative.    Skin: Negative.    Musculoskeletal: Negative.    Gastrointestinal: Negative.    Genitourinary: Negative.    Neurological: Negative.    Psychiatric/Behavioral: Negative.     Allergic/Immunologic: Negative.        A 10 point review of systems was performed and all the pertinent positives have been mentioned. Rest of review of systems was negative.        PHYSICAL EXAM:     Vitals:    10/08/24 1314   BP: (!) 142/88   Pulse: 96   Resp: 15    Body mass index is 42.5 kg/m².  Weight: 119.4 kg (263 lb 5.4 oz)   Height: 5' 6" (167.6 cm)     Physical Exam  Constitutional:       Appearance: Normal " appearance. She is well-developed.   HENT:      Head: Normocephalic.   Eyes:      Pupils: Pupils are equal, round, and reactive to light.   Cardiovascular:      Rate and Rhythm: Normal rate and regular rhythm.   Pulmonary:      Effort: Pulmonary effort is normal.      Breath sounds: Normal breath sounds.   Abdominal:      General: Bowel sounds are normal.      Palpations: Abdomen is soft.      Tenderness: There is no abdominal tenderness.   Musculoskeletal:         General: Normal range of motion.      Cervical back: Normal range of motion and neck supple.   Skin:     General: Skin is warm.   Neurological:      Mental Status: She is alert and oriented to person, place, and time.           DATA:     Laboratory:  CBC:  Recent Labs   Lab 08/29/22  1112 10/05/23  1232 10/06/23  0640   WBC 6.17 6.64 5.47   Hemoglobin 12.8 12.7 12.3   Hematocrit 42.4 42.3 41.7   Platelets 181 179 164       CHEMISTRIES:  Recent Labs   Lab 05/11/22  1558 08/29/22  1112 10/05/23  1232 10/06/23  0640   Glucose 86 90 99 97   Sodium 141 144 140 141   Potassium 4.7 4.5 4.1 4.0   BUN 14 13 6 8   Creatinine 0.7 0.7 0.8 0.8   eGFR if  >60.0  --   --   --    eGFR if non  >60.0  --   --   --    Calcium 10.3 9.8 10.1 9.5       CARDIAC BIOMARKERS:        COAGS:        LIPIDS/LFTS:  Recent Labs   Lab 08/29/22  1112 10/05/23  1232 10/06/23  0640   Cholesterol 161  --  153   Triglycerides 48  --  44   HDL 55  --  33 L   LDL Cholesterol 96.4  --  111.2   Non-HDL Cholesterol 106  --  120   AST 20 28 31   ALT 21 30 30       Hemoglobin A1C   Date Value Ref Range Status   08/29/2022 5.4 4.0 - 5.6 % Final     Comment:     ADA Screening Guidelines:  5.7-6.4%  Consistent with prediabetes  >or=6.5%  Consistent with diabetes    High levels of fetal hemoglobin interfere with the HbA1C  assay. Heterozygous hemoglobin variants (HbS, HgC, etc)do  not significantly interfere with this assay.   However, presence of multiple variants may  affect accuracy.     06/25/2021 5.6 4.0 - 5.6 % Final     Comment:     ADA Screening Guidelines:  5.7-6.4%  Consistent with prediabetes  >or=6.5%  Consistent with diabetes    High levels of fetal hemoglobin interfere with the HbA1C  assay. Heterozygous hemoglobin variants (HbS, HgC, etc)do  not significantly interfere with this assay.   However, presence of multiple variants may affect accuracy.     06/23/2020 5.5 4.0 - 5.6 % Final     Comment:     ADA Screening Guidelines:  5.7-6.4%  Consistent with prediabetes  >or=6.5%  Consistent with diabetes  High levels of fetal hemoglobin interfere with the HbA1C  assay. Heterozygous hemoglobin variants (HbS, HgC, etc)do  not significantly interfere with this assay.   However, presence of multiple variants may affect accuracy.         TSH  Recent Labs   Lab 08/29/22  1112 10/05/23  1232 11/21/23  1432   TSH 1.214 0.398 L 0.797       The 10-year ASCVD risk score (Lillian DK, et al., 2019) is: 9.8%    Values used to calculate the score:      Age: 63 years      Sex: Female      Is Non- : Yes      Diabetic: No      Tobacco smoker: No      Systolic Blood Pressure: 142 mmHg      Is BP treated: Yes      HDL Cholesterol: 33 mg/dL      Total Cholesterol: 153 mg/dL       BNP    Lab Results   Component Value Date/Time    BNP 11 05/11/2022 03:58 PM            ECHO    Results for orders placed during the hospital encounter of 10/05/23    Echo    Interpretation Summary    Left Ventricle: The left ventricle is normal in size. Normal wall thickness. There is mild concentric hypertrophy. Normal wall motion. There is normal systolic function with a visually estimated ejection fraction of 60 - 65%.    Left Atrium: Left atrium is mildly dilated.    Right Ventricle: Normal right ventricular cavity size. Systolic function is normal.    Aortic Valve: There is mild aortic valve sclerosis.    Pulmonary Artery: The estimated pulmonary artery systolic pressure is 28 mmHg.     IVC/SVC: Normal venous pressure at 3 mmHg.          CATH    No results found for this or any previous visit.        ASSESSMENT AND PLAN     Patient Active Problem List   Diagnosis    Hypertension    Depression    Peroneal tendinitis    Knee pain, left    Asthma    Complete rupture of rotator cuff    Morbid obesity with BMI of 40.0-44.9, adult    Dermatitis    Supraspinatus tendon tear    Allergic rhinitis    Patellofemoral pain syndrome of left knee    Chronic pain of left knee    Weakness of left lower extremity    Gait difficulty    Decreased range of motion of left knee    Primary osteoarthritis of left knee    Primary osteoarthritis of right knee    Narrow complex tachycardia    Atrial flutter, unspecified type         Orders Placed This Encounter   Procedures    Chlorhexidine (CHG) 2% Wipes    Oxygen PRN    Pulse Oximetry Q4H    IN OFFICE EKG 12-LEAD (to Muse)    Transesophageal echo (MICAH) with possible cardioversion     Stress test did not show any significant ischemia.  Echo showed normal left ventricle systolic function.      AFib, symptomatic.  Currently in AFib.  Symptomatic from it.  Start Xarelto again.  Has not been taking it.  Plan for MICAH and cardioversion next week because she is symptomatic from her AFib.    Patient has atrial fibrillation.  I had a detailed discussion with the patient regarding the etiology, the pathophysiology and treatment for atrial fibrillation. We discussed the increased risk of stroke in atrial fibrillation and the reasoning behind anticoagulation. Based on patient's CHADSVASC score there is a benefit of anticoagulation.    We discussed the risks and benefits of anticoagulation and the risks and benefits of anticoagulation including the decreased risk of stroke as well as the increased risk of bleeding with anticoagulation.      -No absolute contraindications of esophageal stricture, tumor, perforation, laceration,or diverticulum and/or active GI bleed  -The risks, benefits &  alternatives of the procedure were explained to the patient.   -The risks of transesophageal echo include but are not limited to:  Dental trauma, esophageal trauma/perforation, bleeding, laryngospasm/brochospasm, aspiration, sore throat/hoarseness, & dislodgement of the endotracheal tube/nasogastric tube (where applicable).    -The risks of ANES monitored sedation include hypotension, respiratory depression, arrhythmias, bronchospasm, & death.    -Informed consent was obtained. The patient is agreeable to proceed with the procedure and all questions and concerns addressed.    Risks, benefits and alternatives of the MICAH/Cardioversion procedure were discussed with the patient including throat irritation, aspiration, anesthetic complications, esophageal irritation/perforation, skin irritation, arrhythmia, etc.  Patient understands and agrees to proceed.  Consent was placed on the chart.    Increase metoprolol xl to 50 mg daily    Follow-up after MICAH and cardioversion    Hypertension:  As above.  Increase metoprolol    Morbid Obesity: Weight loss has been recommended.      Thank you very much for involving me in the care of your patient.  Please do not hesitate to contact me if there are any questions.      Dom Umana MD, FACC, [facility]  Interventional Cardiologist, [facility].           This note was dictated with the help of speech recognition software.  There might be un-intended errors and/or substitutions.

## 2024-10-11 ENCOUNTER — HOSPITAL ENCOUNTER (OUTPATIENT)
Dept: PREADMISSION TESTING | Facility: HOSPITAL | Age: 64
Discharge: HOME | End: 2024-10-11
Attending: INTERNAL MEDICINE
Payer: COMMERCIAL

## 2024-10-11 VITALS
OXYGEN SATURATION: 100 % | HEIGHT: 66 IN | TEMPERATURE: 99 F | RESPIRATION RATE: 18 BRPM | WEIGHT: 262.56 LBS | DIASTOLIC BLOOD PRESSURE: 86 MMHG | BODY MASS INDEX: 42.2 KG/M2 | HEART RATE: 90 BPM | SYSTOLIC BLOOD PRESSURE: 158 MMHG

## 2024-10-11 DIAGNOSIS — Z01.818 PREOPERATIVE TESTING: Primary | ICD-10-CM

## 2024-10-11 LAB
ALBUMIN SERPL BCP-MCNC: 3.4 G/DL (ref 3.5–5.2)
ALP SERPL-CCNC: 100 U/L (ref 55–135)
ALT SERPL W/O P-5'-P-CCNC: 20 U/L (ref 10–44)
ANION GAP SERPL CALC-SCNC: 7 MMOL/L (ref 8–16)
AST SERPL-CCNC: 20 U/L (ref 10–40)
BASOPHILS # BLD AUTO: 0.05 K/UL (ref 0–0.2)
BASOPHILS NFR BLD: 0.6 % (ref 0–1.9)
BILIRUB SERPL-MCNC: 0.3 MG/DL (ref 0.1–1)
BUN SERPL-MCNC: 13 MG/DL (ref 8–23)
CALCIUM SERPL-MCNC: 9.2 MG/DL (ref 8.7–10.5)
CHLORIDE SERPL-SCNC: 112 MMOL/L (ref 95–110)
CO2 SERPL-SCNC: 25 MMOL/L (ref 23–29)
CREAT SERPL-MCNC: 0.9 MG/DL (ref 0.5–1.4)
DIFFERENTIAL METHOD BLD: ABNORMAL
EOSINOPHIL # BLD AUTO: 0.2 K/UL (ref 0–0.5)
EOSINOPHIL NFR BLD: 2.2 % (ref 0–8)
ERYTHROCYTE [DISTWIDTH] IN BLOOD BY AUTOMATED COUNT: 14.9 % (ref 11.5–14.5)
EST. GFR  (NO RACE VARIABLE): >60 ML/MIN/1.73 M^2
GLUCOSE SERPL-MCNC: 93 MG/DL (ref 70–110)
HCT VFR BLD AUTO: 40.4 % (ref 37–48.5)
HGB BLD-MCNC: 12.3 G/DL (ref 12–16)
IMM GRANULOCYTES # BLD AUTO: 0.03 K/UL (ref 0–0.04)
IMM GRANULOCYTES NFR BLD AUTO: 0.4 % (ref 0–0.5)
LYMPHOCYTES # BLD AUTO: 2.8 K/UL (ref 1–4.8)
LYMPHOCYTES NFR BLD: 32.7 % (ref 18–48)
MCH RBC QN AUTO: 26.6 PG (ref 27–31)
MCHC RBC AUTO-ENTMCNC: 30.4 G/DL (ref 32–36)
MCV RBC AUTO: 87 FL (ref 82–98)
MONOCYTES # BLD AUTO: 0.9 K/UL (ref 0.3–1)
MONOCYTES NFR BLD: 10.3 % (ref 4–15)
NEUTROPHILS # BLD AUTO: 4.6 K/UL (ref 1.8–7.7)
NEUTROPHILS NFR BLD: 53.8 % (ref 38–73)
NRBC BLD-RTO: 0 /100 WBC
PLATELET # BLD AUTO: 179 K/UL (ref 150–450)
PMV BLD AUTO: 12.4 FL (ref 9.2–12.9)
POTASSIUM SERPL-SCNC: 4.2 MMOL/L (ref 3.5–5.1)
PROT SERPL-MCNC: 7 G/DL (ref 6–8.4)
RBC # BLD AUTO: 4.62 M/UL (ref 4–5.4)
SODIUM SERPL-SCNC: 144 MMOL/L (ref 136–145)
WBC # BLD AUTO: 8.45 K/UL (ref 3.9–12.7)

## 2024-10-11 PROCEDURE — 85025 COMPLETE CBC W/AUTO DIFF WBC: CPT | Performed by: INTERNAL MEDICINE

## 2024-10-11 PROCEDURE — 36415 COLL VENOUS BLD VENIPUNCTURE: CPT | Performed by: INTERNAL MEDICINE

## 2024-10-11 PROCEDURE — 80053 COMPREHEN METABOLIC PANEL: CPT | Performed by: INTERNAL MEDICINE

## 2024-10-11 NOTE — DISCHARGE INSTRUCTIONS
YOUR PROCEDURE WILL BE AT [facility] at [address] [place] [address]                                   Enter through the Main Entrance facing [address].               Report to the Same Day Surgery Registration Desk in the hallway.(Just beside the Same Day Surgery Unit)        Your procedure  is scheduled for _10/15/2024______.    Call [phone] between 2pm and 5pm on __10/14/2024_____to find out your arrival time for the day of surgery.    You may have two visitors.  No children under 12 years old.     You will be going to the Same Day Surgery Unit on the 2nd floor of the hospital.    Important instructions:  Do not eat anything after midnight.  You may have plain water, non carbonated.  You may also have Gatorade or Powerade after midnight.    Stop all fluids 2 hours before your surgery.    It is okay to brush your teeth.  Do not have gum, candy or mints.    SEE MEDICATION SHEET.   TAKE MEDICATIONS AS DIRECTED WITH SIPS OF WATER.      Do not take any diabetic medication on the morning of surgery unless instructed to do so by your doctor or pre op nurse.      All GLP-1 weekly diabetic/weight loss medications must not be taken for one week before your surgery, or your surgery could be canceled.    Contact lenses and removable denture work may not be worn during your procedure.    You may wear deodorant only. If you are having breast surgery, do not wear deodorant on the operative side.    Do not wear powder, body lotion, perfume/cologne or make-up.    Do not wear any jewelry or have any metal on your body.    You will be asked to remove any dentures or partials for the procedure.    If you are going home on the same day of surgery, you must arrange for a family member or a friend to drive you home.  Public transportation is prohibited.  You will not be able to drive home if you were given anesthesia or sedation.    Patients who want to have their Post-op  prescriptions filled from our in-house [facility], bring a Credit/Debit Card or cash with you. A co-pay may be required.  The pharmacy closes at 5:30 pm.    Wear loose fitting clothes allowing for bandages.    Please leave money and valuables home.      You may bring your cell phone.    Call the doctor if fever or illness should occur before your surgery.    Call [phone] to contact us here if needed.

## 2024-10-14 ENCOUNTER — TELEPHONE (OUTPATIENT)
Dept: SURGERY | Facility: HOSPITAL | Age: 64
End: 2024-10-14
Payer: COMMERCIAL

## 2024-10-15 ENCOUNTER — HOSPITAL ENCOUNTER (OUTPATIENT)
Dept: CARDIOLOGY | Facility: HOSPITAL | Age: 64
Discharge: HOME | End: 2024-10-15
Attending: INTERNAL MEDICINE | Admitting: INTERNAL MEDICINE
Payer: COMMERCIAL

## 2024-10-15 ENCOUNTER — HOSPITAL ENCOUNTER (OUTPATIENT)
Facility: HOSPITAL | Age: 64
Discharge: HOME | End: 2024-10-15
Attending: INTERNAL MEDICINE | Admitting: INTERNAL MEDICINE
Payer: COMMERCIAL

## 2024-10-15 VITALS
BODY MASS INDEX: 42.43 KG/M2 | OXYGEN SATURATION: 99 % | HEART RATE: 69 BPM | SYSTOLIC BLOOD PRESSURE: 136 MMHG | TEMPERATURE: 99 F | WEIGHT: 262.88 LBS | DIASTOLIC BLOOD PRESSURE: 65 MMHG | RESPIRATION RATE: 18 BRPM

## 2024-10-15 DIAGNOSIS — I47.19 NARROW COMPLEX TACHYCARDIA: ICD-10-CM

## 2024-10-15 DIAGNOSIS — I48.92 ATRIAL FLUTTER, UNSPECIFIED TYPE: ICD-10-CM

## 2024-10-15 DIAGNOSIS — R00.0 TACHYCARDIA: ICD-10-CM

## 2024-10-15 DIAGNOSIS — I48.91 A-FIB: ICD-10-CM

## 2024-10-15 LAB
OHS QRS DURATION: 78 MS
OHS QTC CALCULATION: 437 MS

## 2024-10-15 PROCEDURE — 99499 UNLISTED E&M SERVICE: CPT | Mod: ,,, | Performed by: INTERNAL MEDICINE

## 2024-10-15 PROCEDURE — 93005 ELECTROCARDIOGRAM TRACING: CPT

## 2024-10-15 PROCEDURE — 93010 ELECTROCARDIOGRAM REPORT: CPT | Mod: ,,, | Performed by: INTERNAL MEDICINE

## 2024-10-15 NOTE — PLAN OF CARE
Patient has had an ECG. Dr Umana has reviewed it . The patient is out of Atrilal Fib. She is NSR> Patient can be discharged home. The Procedure, MICAH/Cardioversion is cancelled.

## 2024-10-17 NOTE — INTERVAL H&P NOTE
The patient has been examined and the H&P has been reviewed:    I concur with the findings and changes have been noted since the H&P was written: Patient in sinus rhythm.  Procedure canceled    Anesthesia/Surgery risks, benefits and alternative options discussed and understood by patient/family.          There are no hospital problems to display for this patient.

## 2024-10-17 NOTE — DISCHARGE SUMMARY
[facility] [place] [facility]  [place] Note  Short Stay    Procedure(s) (LRB):  TRANSESOPHAGEAL ECHOCARDIOGRAM WITH POSSIBLE CARDIOVERSION (MICAH W/ POSS CARDIOVERSION) (N/A)      OUTCOME:  patient in sinus rhythm.  Procedure canceled    DISPOSITION: Home or Self Care    FINAL DIAGNOSIS:  <principal problem not specified>    FOLLOWUP: In clinic    DISCHARGE INSTRUCTIONS:  No discharge procedures on file.      Clinical Reference Documents Added to Patient Instructions         Document    ATRIAL FIBRILLATION DISCHARGE INSTRUCTIONS (ENGLISH)    HOW TO CHECK YOUR PULSE (ENGLISH)    HOW TO KEEP TRACK OF YOUR HEART RATE AND BLOOD PRESSURE (ENGLISH)    METOPROLOL, ADULT (ENGLISH)

## 2024-10-18 NOTE — OR NURSING
"CLIENT AWOKE FROM PROCEDURE COUGHING; REQUESTED "INHALER". ANDERSON ROB CRNA OBTAINED RX AND ASSISTED CLIENT WITH TWO PUFFS.     DR. SUSI ZIMMERMAN WAS NOTIFIED OF RESPIRATORY SYMPTOMS BY ANDERSON ROB CRNA AND A DUO NEB WAS ORDERED.     PULSE OX REMAINED 100% ON THE MONITOR. CLIENT WAS ABLE TO CLEAR HER UPPER AIRWAY; C/O FEELING CONGESTION IN "HER CHEST".     O2 @ 2L/NC IN RECOVERY ROOM.   " Dispo update:  CM met with Mr. Curry in room 603 to discuss discharge planning.  He says he has supplemental oxygen at home (2 lpm via NC).  However, he does not have a PCP.  Per note of 10/15, PCP referral already made.  No additional discharge needs voiced or identified.  CM available as needed.

## 2024-10-23 ENCOUNTER — LAB VISIT (OUTPATIENT)
Dept: LAB | Facility: HOSPITAL | Age: 64
End: 2024-10-23
Attending: FAMILY MEDICINE
Payer: COMMERCIAL

## 2024-10-23 DIAGNOSIS — Z00.00 ROUTINE GENERAL MEDICAL EXAMINATION AT A HEALTH CARE FACILITY: ICD-10-CM

## 2024-10-23 LAB
ALBUMIN SERPL BCP-MCNC: 3.5 G/DL (ref 3.5–5.2)
ALP SERPL-CCNC: 89 U/L (ref 40–150)
ALT SERPL W/O P-5'-P-CCNC: 18 U/L (ref 10–44)
ANION GAP SERPL CALC-SCNC: 8 MMOL/L (ref 8–16)
AST SERPL-CCNC: 21 U/L (ref 10–40)
BASOPHILS # BLD AUTO: 0.04 K/UL (ref 0–0.2)
BASOPHILS NFR BLD: 0.6 % (ref 0–1.9)
BILIRUB SERPL-MCNC: 0.3 MG/DL (ref 0.1–1)
BUN SERPL-MCNC: 11 MG/DL (ref 8–23)
CALCIUM SERPL-MCNC: 9.8 MG/DL (ref 8.7–10.5)
CHLORIDE SERPL-SCNC: 108 MMOL/L (ref 95–110)
CHOLEST SERPL-MCNC: 164 MG/DL (ref 120–199)
CHOLEST/HDLC SERPL: 3.4 {RATIO} (ref 2–5)
CO2 SERPL-SCNC: 25 MMOL/L (ref 23–29)
CREAT SERPL-MCNC: 0.7 MG/DL (ref 0.5–1.4)
DIFFERENTIAL METHOD BLD: ABNORMAL
EOSINOPHIL # BLD AUTO: 0.2 K/UL (ref 0–0.5)
EOSINOPHIL NFR BLD: 3 % (ref 0–8)
ERYTHROCYTE [DISTWIDTH] IN BLOOD BY AUTOMATED COUNT: 15.8 % (ref 11.5–14.5)
EST. GFR  (NO RACE VARIABLE): >60 ML/MIN/1.73 M^2
ESTIMATED AVG GLUCOSE: 103 MG/DL (ref 68–131)
GLUCOSE SERPL-MCNC: 78 MG/DL (ref 70–110)
HBA1C MFR BLD: 5.2 % (ref 4–5.6)
HCT VFR BLD AUTO: 43.1 % (ref 37–48.5)
HDLC SERPL-MCNC: 48 MG/DL (ref 40–75)
HDLC SERPL: 29.3 % (ref 20–50)
HGB BLD-MCNC: 12.4 G/DL (ref 12–16)
IMM GRANULOCYTES # BLD AUTO: 0.02 K/UL (ref 0–0.04)
IMM GRANULOCYTES NFR BLD AUTO: 0.3 % (ref 0–0.5)
LDLC SERPL CALC-MCNC: 106.6 MG/DL (ref 63–159)
LYMPHOCYTES # BLD AUTO: 2.6 K/UL (ref 1–4.8)
LYMPHOCYTES NFR BLD: 40.7 % (ref 18–48)
MCH RBC QN AUTO: 26.1 PG (ref 27–31)
MCHC RBC AUTO-ENTMCNC: 28.8 G/DL (ref 32–36)
MCV RBC AUTO: 91 FL (ref 82–98)
MONOCYTES # BLD AUTO: 0.7 K/UL (ref 0.3–1)
MONOCYTES NFR BLD: 11.6 % (ref 4–15)
NEUTROPHILS # BLD AUTO: 2.8 K/UL (ref 1.8–7.7)
NEUTROPHILS NFR BLD: 43.8 % (ref 38–73)
NONHDLC SERPL-MCNC: 116 MG/DL
NRBC BLD-RTO: 0 /100 WBC
PLATELET # BLD AUTO: 195 K/UL (ref 150–450)
PMV BLD AUTO: 13.7 FL (ref 9.2–12.9)
POTASSIUM SERPL-SCNC: 4.2 MMOL/L (ref 3.5–5.1)
PROT SERPL-MCNC: 7.5 G/DL (ref 6–8.4)
RBC # BLD AUTO: 4.76 M/UL (ref 4–5.4)
SODIUM SERPL-SCNC: 141 MMOL/L (ref 136–145)
TRIGL SERPL-MCNC: 47 MG/DL (ref 30–150)
TSH SERPL DL<=0.005 MIU/L-ACNC: 0.66 UIU/ML (ref 0.4–4)
WBC # BLD AUTO: 6.36 K/UL (ref 3.9–12.7)

## 2024-10-23 PROCEDURE — 80061 LIPID PANEL: CPT | Performed by: FAMILY MEDICINE

## 2024-10-23 PROCEDURE — 80053 COMPREHEN METABOLIC PANEL: CPT | Performed by: FAMILY MEDICINE

## 2024-10-23 PROCEDURE — 83036 HEMOGLOBIN GLYCOSYLATED A1C: CPT | Performed by: FAMILY MEDICINE

## 2024-10-23 PROCEDURE — 85025 COMPLETE CBC W/AUTO DIFF WBC: CPT | Performed by: FAMILY MEDICINE

## 2024-10-23 PROCEDURE — 36415 COLL VENOUS BLD VENIPUNCTURE: CPT | Mod: PO | Performed by: FAMILY MEDICINE

## 2024-10-23 PROCEDURE — 84443 ASSAY THYROID STIM HORMONE: CPT | Performed by: FAMILY MEDICINE

## 2024-10-29 ENCOUNTER — OFFICE VISIT (OUTPATIENT)
Dept: CARDIOLOGY | Facility: CLINIC | Age: 64
End: 2024-10-29
Payer: COMMERCIAL

## 2024-10-29 VITALS
RESPIRATION RATE: 15 BRPM | HEIGHT: 66 IN | WEIGHT: 269.19 LBS | BODY MASS INDEX: 43.26 KG/M2 | DIASTOLIC BLOOD PRESSURE: 80 MMHG | HEART RATE: 84 BPM | OXYGEN SATURATION: 100 % | SYSTOLIC BLOOD PRESSURE: 146 MMHG

## 2024-10-29 DIAGNOSIS — E66.01 MORBID OBESITY: ICD-10-CM

## 2024-10-29 DIAGNOSIS — I48.0 PAROXYSMAL ATRIAL FIBRILLATION: ICD-10-CM

## 2024-10-29 DIAGNOSIS — R00.0 TACHYCARDIA: Primary | ICD-10-CM

## 2024-10-29 DIAGNOSIS — R07.9 CHEST PAIN, UNSPECIFIED TYPE: ICD-10-CM

## 2024-10-29 PROCEDURE — 99214 OFFICE O/P EST MOD 30 MIN: CPT | Mod: S$GLB,,, | Performed by: INTERNAL MEDICINE

## 2024-10-29 PROCEDURE — 3044F HG A1C LEVEL LT 7.0%: CPT | Mod: CPTII,S$GLB,, | Performed by: INTERNAL MEDICINE

## 2024-10-29 PROCEDURE — 3077F SYST BP >= 140 MM HG: CPT | Mod: CPTII,S$GLB,, | Performed by: INTERNAL MEDICINE

## 2024-10-29 PROCEDURE — 99999 PR PBB SHADOW E&M-EST. PATIENT-LVL V: CPT | Mod: PBBFAC,,, | Performed by: INTERNAL MEDICINE

## 2024-10-29 PROCEDURE — G2211 COMPLEX E/M VISIT ADD ON: HCPCS | Mod: S$GLB,,, | Performed by: INTERNAL MEDICINE

## 2024-10-29 PROCEDURE — 1159F MED LIST DOCD IN RCRD: CPT | Mod: CPTII,S$GLB,, | Performed by: INTERNAL MEDICINE

## 2024-10-29 PROCEDURE — 3008F BODY MASS INDEX DOCD: CPT | Mod: CPTII,S$GLB,, | Performed by: INTERNAL MEDICINE

## 2024-10-29 PROCEDURE — 3079F DIAST BP 80-89 MM HG: CPT | Mod: CPTII,S$GLB,, | Performed by: INTERNAL MEDICINE

## 2024-10-29 RX ORDER — METOPROLOL TARTRATE 100 MG/1
TABLET ORAL
Qty: 2 TABLET | Refills: 0 | Status: SHIPPED | OUTPATIENT
Start: 2024-10-29 | End: 2024-11-01

## 2024-10-29 RX ORDER — METOPROLOL TARTRATE 50 MG/1
TABLET ORAL
Qty: 2 TABLET | Refills: 0 | Status: SHIPPED | OUTPATIENT
Start: 2024-10-29

## 2024-11-01 ENCOUNTER — OFFICE VISIT (OUTPATIENT)
Dept: FAMILY MEDICINE | Facility: CLINIC | Age: 64
End: 2024-11-01
Payer: COMMERCIAL

## 2024-11-01 VITALS
HEIGHT: 66 IN | HEART RATE: 79 BPM | OXYGEN SATURATION: 97 % | SYSTOLIC BLOOD PRESSURE: 138 MMHG | RESPIRATION RATE: 18 BRPM | DIASTOLIC BLOOD PRESSURE: 80 MMHG | WEIGHT: 270.31 LBS | BODY MASS INDEX: 43.44 KG/M2 | TEMPERATURE: 99 F

## 2024-11-01 DIAGNOSIS — J45.20 MILD INTERMITTENT ASTHMA, UNSPECIFIED WHETHER COMPLICATED: Chronic | ICD-10-CM

## 2024-11-01 DIAGNOSIS — I10 PRIMARY HYPERTENSION: Primary | ICD-10-CM

## 2024-11-01 DIAGNOSIS — I48.0 PAROXYSMAL ATRIAL FIBRILLATION: ICD-10-CM

## 2024-11-01 PROCEDURE — 99999 PR PBB SHADOW E&M-EST. PATIENT-LVL IV: CPT | Mod: PBBFAC,,, | Performed by: FAMILY MEDICINE

## 2024-11-01 RX ORDER — ALBUTEROL SULFATE 90 UG/1
2 INHALANT RESPIRATORY (INHALATION) EVERY 6 HOURS PRN
Qty: 18 G | Refills: 5 | Status: SHIPPED | OUTPATIENT
Start: 2024-11-01 | End: 2025-11-01

## 2024-11-01 RX ORDER — SPIRONOLACTONE 50 MG/1
50 TABLET, FILM COATED ORAL 2 TIMES DAILY
Qty: 180 TABLET | Refills: 1 | Status: SHIPPED | OUTPATIENT
Start: 2024-11-01 | End: 2025-04-30

## 2024-11-01 RX ORDER — FLUTICASONE PROPIONATE AND SALMETEROL 100; 50 UG/1; UG/1
1 POWDER RESPIRATORY (INHALATION) 2 TIMES DAILY
Qty: 60 EACH | Refills: 5 | Status: SHIPPED | OUTPATIENT
Start: 2024-11-01 | End: 2025-04-30

## 2024-12-02 ENCOUNTER — OFFICE VISIT (OUTPATIENT)
Dept: FAMILY MEDICINE | Facility: CLINIC | Age: 64
End: 2024-12-02
Payer: COMMERCIAL

## 2024-12-02 VITALS
BODY MASS INDEX: 43.05 KG/M2 | HEIGHT: 66 IN | RESPIRATION RATE: 17 BRPM | OXYGEN SATURATION: 99 % | SYSTOLIC BLOOD PRESSURE: 126 MMHG | HEART RATE: 81 BPM | DIASTOLIC BLOOD PRESSURE: 82 MMHG | WEIGHT: 267.88 LBS | TEMPERATURE: 99 F

## 2024-12-02 DIAGNOSIS — I48.0 PAROXYSMAL ATRIAL FIBRILLATION: ICD-10-CM

## 2024-12-02 DIAGNOSIS — J45.20 MILD INTERMITTENT ASTHMA, UNSPECIFIED WHETHER COMPLICATED: Chronic | ICD-10-CM

## 2024-12-02 DIAGNOSIS — I10 PRIMARY HYPERTENSION: ICD-10-CM

## 2024-12-02 DIAGNOSIS — Z00.00 ROUTINE GENERAL MEDICAL EXAMINATION AT A HEALTH CARE FACILITY: Primary | ICD-10-CM

## 2024-12-02 PROCEDURE — 1159F MED LIST DOCD IN RCRD: CPT | Mod: CPTII,S$GLB,, | Performed by: FAMILY MEDICINE

## 2024-12-02 PROCEDURE — 3074F SYST BP LT 130 MM HG: CPT | Mod: CPTII,S$GLB,, | Performed by: FAMILY MEDICINE

## 2024-12-02 PROCEDURE — 3079F DIAST BP 80-89 MM HG: CPT | Mod: CPTII,S$GLB,, | Performed by: FAMILY MEDICINE

## 2024-12-02 PROCEDURE — 3044F HG A1C LEVEL LT 7.0%: CPT | Mod: CPTII,S$GLB,, | Performed by: FAMILY MEDICINE

## 2024-12-02 PROCEDURE — 99396 PREV VISIT EST AGE 40-64: CPT | Mod: S$GLB,,, | Performed by: FAMILY MEDICINE

## 2024-12-02 PROCEDURE — 3008F BODY MASS INDEX DOCD: CPT | Mod: CPTII,S$GLB,, | Performed by: FAMILY MEDICINE

## 2024-12-02 PROCEDURE — 87624 HPV HI-RISK TYP POOLED RSLT: CPT | Performed by: FAMILY MEDICINE

## 2024-12-02 PROCEDURE — 99999 PR PBB SHADOW E&M-EST. PATIENT-LVL V: CPT | Mod: PBBFAC,,, | Performed by: FAMILY MEDICINE

## 2024-12-02 PROCEDURE — 88175 CYTOPATH C/V AUTO FLUID REDO: CPT | Performed by: FAMILY MEDICINE

## 2024-12-02 RX ORDER — METHYLPREDNISOLONE 4 MG/1
TABLET ORAL
Qty: 21 EACH | Refills: 0 | Status: SHIPPED | OUTPATIENT
Start: 2024-12-02 | End: 2024-12-23

## 2024-12-02 RX ORDER — IPRATROPIUM BROMIDE AND ALBUTEROL SULFATE 2.5; .5 MG/3ML; MG/3ML
3 SOLUTION RESPIRATORY (INHALATION) EVERY 6 HOURS PRN
Qty: 120 EACH | Refills: 1 | Status: SHIPPED | OUTPATIENT
Start: 2024-12-02 | End: 2025-12-02

## 2024-12-02 NOTE — PROGRESS NOTES
Chief Complaint   Patient presents with    Annual Exam       HPI  Brandi Sahu is a 64 y.o. female with multiple medical diagnoses as listed in the medical history and problem list that presents for annual exam     Asthma  She has been having a cough with phlegm and wheezing since the weather changed  Does not have any fever or chills    Hyperlipidemia  No fam hx of heart disease  The 10-year CVD risk score (Juan A, et al., 2008) is: 8%    Values used to calculate the score:      Age: 64 years      Sex: Female      Diabetic: No      Tobacco smoker: No      Systolic Blood Pressure: 126 mmHg      Is BP treated: Yes      HDL Cholesterol: 48 mg/dL      Total Cholesterol: 164 mg/dL      ALLERGIES AND MEDICATIONS: updated and reviewed.  Review of patient's allergies indicates:   Allergen Reactions    Lisinopril Hives, Itching, Rash, Shortness Of Breath and Swelling     Medication List with Changes/Refills   New Medications    METHYLPREDNISOLONE (MEDROL DOSEPACK) 4 MG TABLET    use as directed   Current Medications    ALBUTEROL (PROVENTIL HFA) 90 MCG/ACTUATION INHALER    Inhale 2 puffs into the lungs every 6 (six) hours as needed for Wheezing. Rescue    DICLOFENAC (VOLTAREN) 75 MG EC TABLET    Take 1 tablet (75 mg total) by mouth 2 (two) times daily as needed.    DIPHENHYDRAMINE (BENADRYL) 25 MG CAPSULE    Take 2 capsules (50 mg total) by mouth every 6 (six) hours as needed for Itching or Allergies.    ERGOCALCIFEROL (ERGOCALCIFEROL) 50,000 UNIT CAP    Take 1 capsule (50,000 Units total) by mouth every 7 days.    FAMOTIDINE (PEPCID) 20 MG TABLET    Take 1 tablet (20 mg total) by mouth 2 (two) times daily as needed for Heartburn.    FLUTICASONE PROPIONATE (FLONASE) 50 MCG/ACTUATION NASAL SPRAY    2 sprays (100 mcg total) by Each Nostril route once daily.    FLUTICASONE-SALMETEROL DISKUS INHALER 100-50 MCG    Inhale 1 puff into the lungs 2 (two) times daily. Controller    LORATADINE (CLARITIN) 10 MG TABLET    Take 1  tablet (10 mg total) by mouth every morning.    METOPROLOL SUCCINATE (TOPROL-XL) 50 MG 24 HR TABLET    Take 1 tablet (50 mg total) by mouth once daily.    RIVAROXABAN (XARELTO) 20 MG TAB    Take 1 tablet (20 mg total) by mouth once daily.    SPIRONOLACTONE (ALDACTONE) 50 MG TABLET    Take 1 tablet (50 mg total) by mouth 2 (two) times daily.    TIRZEPATIDE, WEIGHT LOSS, (ZEPBOUND) 10 MG/0.5 ML PNIJ    Inject 10 mg into the skin every 7 days.    TOPIRAMATE (TOPAMAX) 25 MG TABLET    Take 25 mg by mouth 2 (two) times daily.   Changed and/or Refilled Medications    Modified Medication Previous Medication    ALBUTEROL-IPRATROPIUM (DUO-NEB) 2.5 MG-0.5 MG/3 ML NEBULIZER SOLUTION albuterol-ipratropium (DUO-NEB) 2.5 mg-0.5 mg/3 mL nebulizer solution       Take 3 mLs by nebulization every 6 (six) hours as needed for Wheezing or Shortness of Breath (cough).    Take 3 mLs by nebulization every 6 (six) hours as needed for Wheezing or Shortness of Breath (cough).   Discontinued Medications    METOPROLOL TARTRATE (LOPRESSOR) 50 MG TABLET    If heart rate >60 bpm:  take 50 mg tablet 1 hour prior to CTA.  Bring second tab with you to the procedure.       ROS  Review of Systems   Constitutional:  Negative for chills, diaphoresis, fatigue, fever and unexpected weight change.   HENT:  Negative for rhinorrhea, sinus pressure, sore throat and tinnitus.    Eyes:  Negative for photophobia and visual disturbance.   Respiratory:  Positive for cough and wheezing. Negative for shortness of breath.    Cardiovascular:  Negative for chest pain and palpitations.   Gastrointestinal:  Negative for abdominal pain, blood in stool, constipation, diarrhea, nausea and vomiting.   Genitourinary:  Negative for dysuria, flank pain, frequency and vaginal discharge.   Musculoskeletal:  Negative for arthralgias and joint swelling.   Skin:  Negative for rash.   Neurological:  Negative for speech difficulty, weakness, light-headedness and headaches.  "  Psychiatric/Behavioral:  Negative for behavioral problems and dysphoric mood.        Physical Exam  Vitals:    12/02/24 1124   BP: 126/82   BP Location: Left arm   Patient Position: Sitting   Pulse: 81   Resp: 17   Temp: 98.5 °F (36.9 °C)   TempSrc: Oral   SpO2: 99%   Weight: 121.5 kg (267 lb 13.7 oz)   Height: 5' 6" (1.676 m)    Body mass index is 43.23 kg/m².  Weight: 121.5 kg (267 lb 13.7 oz)   Height: 5' 6" (167.6 cm)     Physical Exam  Vitals reviewed.   Constitutional:       General: She is not in acute distress.     Appearance: She is well-developed.   HENT:      Head: Normocephalic and atraumatic.      Right Ear: Tympanic membrane normal.      Left Ear: Tympanic membrane normal.      Mouth/Throat:      Pharynx: No oropharyngeal exudate.   Neck:      Thyroid: No thyromegaly.   Cardiovascular:      Rate and Rhythm: Normal rate and regular rhythm.      Heart sounds: No murmur heard.     No friction rub. No gallop.   Pulmonary:      Effort: Pulmonary effort is normal. No respiratory distress.      Breath sounds: Normal breath sounds. No wheezing or rales.   Chest:      Comments: Bilateral breast  inspection negative, no nipple discharge or bleeding, no masses or nodularity palpable  No cervical, clavicular or axillary LAD      Abdominal:      General: Bowel sounds are normal. There is no distension.      Palpations: Abdomen is soft. There is no mass.      Tenderness: There is no abdominal tenderness. There is no guarding or rebound.   Genitourinary:     Comments:   GYN Exam    Medical assistant was present in room and assisted with procedure. Pap smear was performed, specimen collected with cytobrush. No external lesions noted. Normal vaginal mucosa.  Cervix normal appearing cervix without discharge or lesions,no vaginal discharge.    Bimanual exam performed:  no cervical motion tenderness noted, no uterine or ovarian masses palpated.       Musculoskeletal:      Cervical back: Neck supple.   Lymphadenopathy: "      Cervical: No cervical adenopathy.   Skin:     General: Skin is warm and dry.      Findings: No rash.   Neurological:      General: No focal deficit present.      Mental Status: She is alert. Mental status is at baseline.   Psychiatric:         Mood and Affect: Mood normal.         Thought Content: Thought content normal.         Health Maintenance         Date Due Completion Date    Pneumococcal Vaccines (Age 0-64) (2 of 2 - PCV) 07/19/2022 7/19/2021    RSV Vaccine (Age 60+ and Pregnant patients) (1 - Risk 60-74 years 1-dose series) Never done ---    COVID-19 Vaccine (4 - 2024-25 season) 09/01/2024 11/29/2021    Mammogram 09/23/2025 9/23/2024    TETANUS VACCINE 05/19/2026 5/19/2016    Cervical Cancer Screening 07/19/2026 7/19/2021    Hemoglobin A1c (Diabetic Prevention Screening) 10/23/2027 10/23/2024    Colorectal Cancer Screening 10/03/2028 10/3/2023    Lipid Panel 10/23/2029 10/23/2024            Health maintenance reviewed and addressed as ordered      ASSESSMENT/PLAN       1. Routine general medical examination at a health care facility  discussed healthy lifestyle modification with exercise and healthy diet, reviewed age appropriate screening and healthy maintenance  Normal pelvic exam  - Liquid-Based Pap Smear, Screening  - HPV High Risk Genotypes, PCR    2. Primary hypertension  Continue current regimen      3. Paroxysmal atrial fibrillation  On xarelto and toprol XL    4. Mild intermittent asthma, unspecified whether complicated  Flare up with weather change  Notify me if sx worsen  - albuterol-ipratropium (DUO-NEB) 2.5 mg-0.5 mg/3 mL nebulizer solution; Take 3 mLs by nebulization every 6 (six) hours as needed for Wheezing or Shortness of Breath (cough).  Dispense: 120 each; Refill: 1  - methylPREDNISolone (MEDROL DOSEPACK) 4 mg tablet; use as directed  Dispense: 21 each; Refill: 0        Cherelle Stover MD  12/03/2024 11:47 AM        No follow-ups on file.    Orders Placed This Encounter   Procedures     HPV High Risk Genotypes, PCR

## 2024-12-04 LAB
CLINICAL INFO: NORMAL
DATE OF PREVIOUS PAP: NO
DATE PREVIOUS BX: NO
LMP START DATE: NORMAL
SPECIMEN SOURCE CVX/VAG CYTO: NORMAL

## 2025-01-02 DIAGNOSIS — M25.562 CHRONIC PAIN OF LEFT KNEE: ICD-10-CM

## 2025-01-02 DIAGNOSIS — G89.29 CHRONIC PAIN OF LEFT KNEE: ICD-10-CM

## 2025-01-02 RX ORDER — DICLOFENAC SODIUM 75 MG/1
75 TABLET, DELAYED RELEASE ORAL 2 TIMES DAILY PRN
Qty: 60 TABLET | Refills: 0 | Status: SHIPPED | OUTPATIENT
Start: 2025-01-02

## 2025-01-02 NOTE — TELEPHONE ENCOUNTER
Care Due:                  Date            Visit Type   Department     Provider  --------------------------------------------------------------------------------                                [facility]                              PRIMARY      MEDICINE/  Last Visit: 12-      CARE ([facility])   INTERNAL ROMEL Stover                              [facility]                              PRIMARY      MEDICINE/  Next Visit: 12-      CARE ([facility])   UMANG Garcia  Test          Frequency    Reason                     Performed    Due Date  --------------------------------------------------------------------------------    Vitamin D...  12 months..  ergocalciferol...........  Not Found    Overdue    [employer] Embedded Care Due Messages. Reference number: [id number].   1/02/2025 6:54:40 AM CST

## 2025-01-02 NOTE — TELEPHONE ENCOUNTER
Refill Routing Note   Medication(s) are not appropriate for processing by [facility] for the following reason(s):        Outside of protocol    [facility] action(s):  Route     Requires labs : Yes             Appointments  past 12m or future 3m with PCP    Date Provider   Last Visit   12/2/2024 Cherelle Stover MD   Next Visit   Visit date not found Cherelle Stover MD   ED visits in past 90 days: 0        Note composed:12:06 PM 01/02/2025

## 2025-04-14 DIAGNOSIS — G89.29 CHRONIC PAIN OF LEFT KNEE: ICD-10-CM

## 2025-04-14 DIAGNOSIS — I10 PRIMARY HYPERTENSION: ICD-10-CM

## 2025-04-14 DIAGNOSIS — M25.562 CHRONIC PAIN OF LEFT KNEE: ICD-10-CM

## 2025-04-14 NOTE — TELEPHONE ENCOUNTER
No care due was identified.  [facility] Embedded Care Due Messages. Reference number: [id number].   4/14/2025 6:17:23 PM CDT

## 2025-04-14 NOTE — TELEPHONE ENCOUNTER
Care Due:                  Date            Visit Type   Department     Provider  --------------------------------------------------------------------------------                                [facility]                              PRIMARY      MEDICINE /  Last Visit: 12-      CARE ([facility])   INTERNAL ROMEL Stover                              [facility]                              PRIMARY      MEDICINE /  Next Visit: 12-      CARE ([facility])   UMANG Garcia  Test          Frequency    Reason                     Performed    Due Date  --------------------------------------------------------------------------------    Vitamin D...  12 months..  ergocalciferol...........  Not Found    Overdue    [employer] Embedded Care Due Messages. Reference number: [id number].   4/14/2025 6:16:18 PM CDT

## 2025-04-15 RX ORDER — SPIRONOLACTONE 50 MG/1
50 TABLET, FILM COATED ORAL 2 TIMES DAILY
Qty: 180 TABLET | Refills: 1 | Status: SHIPPED | OUTPATIENT
Start: 2025-04-15 | End: 2025-10-12

## 2025-04-15 RX ORDER — DICLOFENAC SODIUM 75 MG/1
75 TABLET, DELAYED RELEASE ORAL 2 TIMES DAILY PRN
Qty: 60 TABLET | Refills: 0 | Status: SHIPPED | OUTPATIENT
Start: 2025-04-15

## 2025-04-15 NOTE — TELEPHONE ENCOUNTER
Refill Routing Note   Medication(s) are not appropriate for processing by [facility] for the following reason(s):        Outside of protocol    [facility] action(s):  Route               Appointments  past 12m or future 3m with PCP    Date Provider   Last Visit   12/2/2024 Cherelle Stover MD   Next Visit   4/14/2025 Cherelle Stover MD   ED visits in past 90 days: 0        Note composed:8:33 AM 04/15/2025

## 2025-04-15 NOTE — TELEPHONE ENCOUNTER
Refill Decision Note   Brandi Sahu  is requesting a refill authorization.  Brief Assessment and Rationale for Refill:  Approve     Medication Therapy Plan:         Comments:     Note composed:8:35 AM 04/15/2025

## 2025-04-29 ENCOUNTER — RESULTS FOLLOW-UP (OUTPATIENT)
Dept: FAMILY MEDICINE | Facility: CLINIC | Age: 65
End: 2025-04-29

## 2025-04-29 ENCOUNTER — OFFICE VISIT (OUTPATIENT)
Dept: FAMILY MEDICINE | Facility: CLINIC | Age: 65
End: 2025-04-29
Payer: COMMERCIAL

## 2025-04-29 ENCOUNTER — APPOINTMENT (OUTPATIENT)
Dept: RADIOLOGY | Facility: HOSPITAL | Age: 65
End: 2025-04-29
Payer: COMMERCIAL

## 2025-04-29 VITALS
DIASTOLIC BLOOD PRESSURE: 88 MMHG | OXYGEN SATURATION: 96 % | HEIGHT: 66 IN | SYSTOLIC BLOOD PRESSURE: 160 MMHG | WEIGHT: 271.81 LBS | BODY MASS INDEX: 43.68 KG/M2 | TEMPERATURE: 99 F | HEART RATE: 95 BPM

## 2025-04-29 DIAGNOSIS — J45.901 ACUTE BRONCHITIS WITH ASTHMA WITH ACUTE EXACERBATION: ICD-10-CM

## 2025-04-29 DIAGNOSIS — J20.9 ACUTE BRONCHITIS WITH ASTHMA WITH ACUTE EXACERBATION: ICD-10-CM

## 2025-04-29 DIAGNOSIS — I10 PRIMARY HYPERTENSION: ICD-10-CM

## 2025-04-29 DIAGNOSIS — R82.998 RED-COLORED URINE: ICD-10-CM

## 2025-04-29 DIAGNOSIS — I48.0 PAROXYSMAL ATRIAL FIBRILLATION: ICD-10-CM

## 2025-04-29 DIAGNOSIS — R09.81 NASAL CONGESTION: ICD-10-CM

## 2025-04-29 DIAGNOSIS — J20.9 ACUTE BRONCHITIS WITH ASTHMA WITH ACUTE EXACERBATION: Primary | ICD-10-CM

## 2025-04-29 DIAGNOSIS — F33.1 MODERATE EPISODE OF RECURRENT MAJOR DEPRESSIVE DISORDER: ICD-10-CM

## 2025-04-29 DIAGNOSIS — J45.901 ACUTE BRONCHITIS WITH ASTHMA WITH ACUTE EXACERBATION: Primary | ICD-10-CM

## 2025-04-29 DIAGNOSIS — E66.01 MORBID OBESITY: ICD-10-CM

## 2025-04-29 PROCEDURE — 99999 PR PBB SHADOW E&M-EST. PATIENT-LVL V: CPT | Mod: PBBFAC,,,

## 2025-04-29 PROCEDURE — 3008F BODY MASS INDEX DOCD: CPT | Mod: CPTII,S$GLB,,

## 2025-04-29 PROCEDURE — 3077F SYST BP >= 140 MM HG: CPT | Mod: CPTII,S$GLB,,

## 2025-04-29 PROCEDURE — 71046 X-RAY EXAM CHEST 2 VIEWS: CPT | Mod: 26,,, | Performed by: RADIOLOGY

## 2025-04-29 PROCEDURE — 99214 OFFICE O/P EST MOD 30 MIN: CPT | Mod: S$GLB,,,

## 2025-04-29 PROCEDURE — 1159F MED LIST DOCD IN RCRD: CPT | Mod: CPTII,S$GLB,,

## 2025-04-29 PROCEDURE — 3079F DIAST BP 80-89 MM HG: CPT | Mod: CPTII,S$GLB,,

## 2025-04-29 PROCEDURE — 71046 X-RAY EXAM CHEST 2 VIEWS: CPT | Mod: TC,FY,PN

## 2025-04-29 PROCEDURE — 0241U SARS-COV2 (COVID) WITH FLU/RSV BY PCR: CPT

## 2025-04-29 RX ORDER — DOXYCYCLINE 100 MG/1
100 CAPSULE ORAL 2 TIMES DAILY
Qty: 14 CAPSULE | Refills: 0 | Status: SHIPPED | OUTPATIENT
Start: 2025-04-29 | End: 2025-05-06

## 2025-04-29 RX ORDER — PROMETHAZINE HYDROCHLORIDE AND DEXTROMETHORPHAN HYDROBROMIDE 6.25; 15 MG/5ML; MG/5ML
5 SYRUP ORAL NIGHTLY PRN
Qty: 118 ML | Refills: 0 | Status: SHIPPED | OUTPATIENT
Start: 2025-04-29

## 2025-04-29 RX ORDER — FLUTICASONE PROPIONATE 50 MCG
2 SPRAY, SUSPENSION (ML) NASAL DAILY
Qty: 16 G | Refills: 0 | Status: SHIPPED | OUTPATIENT
Start: 2025-04-29

## 2025-04-29 NOTE — PROGRESS NOTES
HPI     Brandi Sahu is a 64 y.o. female with multiple medical diagnoses as listed in the medical history and problem list that presents for   Chief Complaint   Patient presents with    Cough     Pt c/o cough and congestion x 3 days, also c/o right ear fullness.       Pt presents to clinic today for dry cough, cough, congestion, runny nose, right ear fullness, sore throat, wheezing. She does have a history of asthma, so she states wheezing is usual for her. She is compliant with fluticasone-salmeterol diskus inhaler. She's not been needing to use her Albuterol and Duo-neb. She took benadryl, zyrtec, and mucinex, which helped. She felt feverish, though wasn't able to check her temperature. She is not up to date with her flu and pneumonia vaccine. 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   She also notes 1x episode red-colored urine last noticed when she was at work yesterday. She denies any dysuria, abdominal pain, flank pain, vaginal discharge.    Assessment & Plan     1. Acute bronchitis with asthma with acute exacerbation  - Given hx of asthma, not vaccinated for penumonia, and rales/mild wheezing on exam, will take chest x-ray and empirically treat w/doxycycline. Advised on utilizing Duo-neb and encouraged use of her breathing treatments at home.   - Pt to follow up with me next week. Advised on Coricidin OTC and continue w/Mucinex.  - Advised patient on drowsy side effects of promethazine and to take nightly PRN and not operate heavy machinery while taking this due to increased risk for falls.    - SARS-Cov2 (COVID) with FLU/RSV by PCR  - X-Ray Chest PA And Lateral; Future  - promethazine-dextromethorphan (PROMETHAZINE-DM) 6.25-15 mg/5 mL Syrp; Take 5 mLs by mouth nightly as needed (cough).  Dispense: 118 mL; Refill: 0  - doxycycline (VIBRAMYCIN) 100 MG Cap; Take 1 capsule (100 mg total) by mouth 2 (two) times daily. for 7 days  Dispense: 14 capsule; Refill: 0    2. Moderate episode of  "recurrent major depressive disorder  - Current stable mood, not on mood stabilizer. Pt to follow up if interest in discussion of initiating medication.    3. Morbid obesity  Behavioral counseling for obesity.  History:  - Patient's BMI: Estimated body mass index is 43.87 kg/m² as calculated from the following:    Height as of this encounter: 5' 6" (1.676 m).    Weight as of this encounter: 123.3 kg (271 lb 13.2 oz).  - Patient reports challenges related to weight management.  - Patient meets criteria for obesity counseling: BMI >= 30 kg/m² or >= 25 kg/m² with associated comorbidities.  - Discussion focused on the benefits of sustained weight loss and its impact on health outcomes.  Plan:  Dietary Guidance: Recommended caloric intake tailored to patient needs, emphasizing nutrient-dense, low-calorie foods. Discussed portion control and meal planning strategies.  Physical Activity: Encouraged moderate physical activity for at least 150 minutes per week, tailored to patients current physical abilities and goals.  Behavioral Strategies: Addressed behaviors contributing to weight gain and set actionable goals, including [food diary, mindful eating, avoiding late-night snacks, etc.]  Motivational Counseling: Discussed the importance of intrinsic and extrinsic motivators in achieving sustained weight management. Reinforced positive behavior changes.  Follow-Up Plan: Patient instructed to return for follow-up for progress evaluation and continued counseling.  Time Spent: A total of 16 minutes was spent in face-to-face behavioral counseling focused on weight loss and obesity management.  Patient Understanding: Patient actively participated in the session and verbalized understanding of the discussed plan.      4. Paroxysmal atrial fibrillation  - Normal rate and regular rhythm, continue w/Xarelto    5. Primary hypertension  - Blood pressure today elevated in clinic.  Elevated likely due to acute bronchitis and coughing " during PE , continue with Metoprolol and spironolactone and f/u with me in 1 week  - Recommend low-sodium diet and compliance with blood pressure medication.  - Keep blood pressure goal <140/90 and f/u with clinic if persistently elevated      6. Nasal congestion  - Treat w/daily antihistamine and daily flonase    - fluticasone propionate (FLONASE) 50 mcg/actuation nasal spray; 2 sprays (100 mcg total) by Each Nostril route once daily.  Dispense: 16 g; Refill: 0    7. Red-colored urine  - No complaints of UTI, though will check UA to ensure no acute infection    - Urinalysis, Reflex to Urine Culture      --------------------------------------------    Health Maintenance         Date Due Completion Date    Pneumococcal Vaccines (Age 50+) (2 of 2 - PCV) 07/19/2022 7/19/2021    RSV Vaccine (Age 60+ and Pregnant patients) (1 - Risk 60-74 years 1-dose series) Never done ---    COVID-19 Vaccine (4 - 2024-25 season) 09/01/2024 11/29/2021    Mammogram 09/23/2025 9/23/2024    TETANUS VACCINE 05/19/2026 5/19/2016    Hemoglobin A1c (Diabetic Prevention Screening) 10/23/2027 10/23/2024    Colorectal Cancer Screening 10/03/2028 10/3/2023    Lipid Panel 10/23/2029 10/23/2024    Cervical Cancer Screening 12/02/2029 12/2/2024            Health maintenance reviewed    Follow Up:  Follow up in about 1 week (around 5/6/2025).    Exam     Review of Systems:  (as noted above)  Review of Systems   Constitutional:  Negative for chills, fatigue and fever.   HENT:  Positive for congestion, ear pain, postnasal drip, rhinorrhea and sore throat. Negative for drooling, sinus pressure and sinus pain.    Eyes:  Negative for visual disturbance.   Respiratory:  Positive for cough and wheezing. Negative for chest tightness.    Cardiovascular:  Negative for chest pain, palpitations and leg swelling.   Genitourinary:  Negative for difficulty urinating, dysuria, hematuria and urgency.   Musculoskeletal:  Positive for myalgias.       Physical  "Exam  Constitutional:       Appearance: Normal appearance. She is not ill-appearing.   HENT:      Head: Normocephalic and atraumatic.      Right Ear: There is impacted cerumen. Tympanic membrane is not retracted or bulging.      Left Ear: A middle ear effusion is present. Tympanic membrane is injected. Tympanic membrane is not retracted or bulging.      Nose:      Right Turbinates: Swollen and pale.      Left Turbinates: Swollen and pale.      Mouth/Throat:      Pharynx: No posterior oropharyngeal erythema.   Eyes:      Extraocular Movements: Extraocular movements intact.      Conjunctiva/sclera: Conjunctivae normal.   Cardiovascular:      Rate and Rhythm: Normal rate and regular rhythm.      Pulses: Normal pulses.      Heart sounds: Normal heart sounds. No murmur heard.     No friction rub. No gallop.   Pulmonary:      Effort: Pulmonary effort is normal. No respiratory distress.      Comments: Mild expiratory wheezing to upper and mid bases - mild rales to bilateral lower bases.  Lymphadenopathy:      Cervical: No cervical adenopathy.   Skin:     General: Skin is warm and dry.      Capillary Refill: Capillary refill takes less than 2 seconds.   Neurological:      General: No focal deficit present.      Mental Status: She is alert and oriented to person, place, and time.   Psychiatric:         Mood and Affect: Mood normal.         Behavior: Behavior normal.       Vitals:    04/29/25 1410 04/29/25 1425   BP: (!) 178/76 (!) 160/88   BP Location:  Left arm   Patient Position:  Sitting   Pulse: 95    Temp: 98.9 °F (37.2 °C)    TempSrc: Oral    SpO2: 96%    Weight: 123.3 kg (271 lb 13.2 oz)    Height: 5' 6" (1.676 m)       Body mass index is 43.87 kg/m².        History     Past Medical History:   Diagnosis Date    Anxiety     Arthritis     knee    Asthma     Bursitis, shoulder     Colon polyps     Dental bridge present     upper right    Dental crown present     Depression     Hypertension     Pes planus     Wears " glasses     for reading       Family History   Problem Relation Name Age of Onset    Cancer Mother Sandie Sahu         breast    Diabetes Mother Sandie Sahu     Hypertension Mother Sandie Horner     Breast cancer Mother Sandie Sahu     Cancer Brother Jhon Sahu         liver cancer    Breast cancer Maternal Aunt         Allergies and Medications: (updated and reviewed)  Review of patient's allergies indicates:   Allergen Reactions    Lisinopril Hives, Itching, Rash, Shortness Of Breath and Swelling     Current Medications[1]    Patient Care Team:  Cherelle Stover MD as PCP - General (Internal Medicine)         - The patient is given an After Visit Summary that lists all medications with directions, allergies, education, orders placed during this encounter and follow-up instructions.      - I have reviewed the patient's medical information including past medical and family history sections including the medications and allergies.      - We discussed the patient's current medications.   This note was generated with the assistance of ambient listening technology. Verbal consent was obtained by the patient and accompanying visitor(s) for the recording of patient appointment to facilitate this note. I attest to having reviewed and edited the generated note for accuracy, though some syntax or spelling errors may persist. Please contact the author of this note for any clarification.          Hermes Lyman NP                      [1]   Current Outpatient Medications   Medication Sig Dispense Refill    albuterol (PROVENTIL HFA) 90 mcg/actuation inhaler Inhale 2 puffs into the lungs every 6 (six) hours as needed for Wheezing. Rescue 18 g 5    albuterol-ipratropium (DUO-NEB) 2.5 mg-0.5 mg/3 mL nebulizer solution Take 3 mLs by nebulization every 6 (six) hours as needed for Wheezing or Shortness of Breath (cough). 120 each 1    diclofenac (VOLTAREN) 75 MG EC tablet Take 1 tablet (75 mg total) by mouth 2 (two) times daily  as needed (knee pain). 60 tablet 0    diphenhydrAMINE (BENADRYL) 25 mg capsule Take 2 capsules (50 mg total) by mouth every 6 (six) hours as needed for Itching or Allergies. 30 capsule 0    ergocalciferol (ERGOCALCIFEROL) 50,000 unit Cap Take 1 capsule (50,000 Units total) by mouth every 7 days. 12 capsule 3    famotidine (PEPCID) 20 MG tablet Take 1 tablet (20 mg total) by mouth 2 (two) times daily as needed for Heartburn. 180 tablet 0    fluticasone-salmeterol diskus inhaler 100-50 mcg Inhale 1 puff into the lungs 2 (two) times daily. Controller 60 each 5    metoprolol succinate (TOPROL-XL) 50 MG 24 hr tablet Take 1 tablet (50 mg total) by mouth once daily. 90 tablet 3    rivaroxaban (XARELTO) 20 mg Tab Take 1 tablet (20 mg total) by mouth once daily. 90 tablet 3    spironolactone (ALDACTONE) 50 MG tablet Take 1 tablet (50 mg total) by mouth 2 (two) times daily. 180 tablet 1    tirzepatide, weight loss, (ZEPBOUND) 10 mg/0.5 mL PnIj Inject 10 mg into the skin every 7 days. 4 Pen 3    topiramate (TOPAMAX) 25 MG tablet Take 25 mg by mouth 2 (two) times daily.      doxycycline (VIBRAMYCIN) 100 MG Cap Take 1 capsule (100 mg total) by mouth 2 (two) times daily. for 7 days 14 capsule 0    fluticasone propionate (FLONASE) 50 mcg/actuation nasal spray 2 sprays (100 mcg total) by Each Nostril route once daily. 16 g 0    loratadine (CLARITIN) 10 mg tablet Take 1 tablet (10 mg total) by mouth every morning. 60 tablet 0    promethazine-dextromethorphan (PROMETHAZINE-DM) 6.25-15 mg/5 mL Syrp Take 5 mLs by mouth nightly as needed (cough). 118 mL 0     No current facility-administered medications for this visit.

## 2025-04-29 NOTE — PATIENT INSTRUCTIONS
- OR     - Coricidin can be taken once in the morning and once in the PM.   - Daily antihistamines, such as Allegra, Zyrtec, or Claritin over-the-counter for nasal congestion   - Promethazine cough syrup to take at night time    - Mucinex helps thin out the mucus and helps you cough it up. You can take once in the morning and once at night time.

## 2025-04-30 LAB
INFLUENZA A BY PCR (OHS): NEGATIVE
INFLUENZA B BY PCR (OHS): NEGATIVE
RSV A 5' UTR RNA NPH QL NAA+PROBE: NEGATIVE
SARS-COV-2 RNA RESP QL NAA+PROBE: NEGATIVE

## 2025-05-06 ENCOUNTER — OFFICE VISIT (OUTPATIENT)
Dept: FAMILY MEDICINE | Facility: CLINIC | Age: 65
End: 2025-05-06
Payer: COMMERCIAL

## 2025-05-06 VITALS
HEART RATE: 76 BPM | SYSTOLIC BLOOD PRESSURE: 130 MMHG | WEIGHT: 277.13 LBS | DIASTOLIC BLOOD PRESSURE: 70 MMHG | BODY MASS INDEX: 44.54 KG/M2 | OXYGEN SATURATION: 97 % | TEMPERATURE: 98 F | HEIGHT: 66 IN

## 2025-05-06 DIAGNOSIS — G89.29 CHRONIC PAIN OF LEFT KNEE: ICD-10-CM

## 2025-05-06 DIAGNOSIS — J45.20 MILD INTERMITTENT ASTHMA, UNSPECIFIED WHETHER COMPLICATED: Chronic | ICD-10-CM

## 2025-05-06 DIAGNOSIS — I10 PRIMARY HYPERTENSION: ICD-10-CM

## 2025-05-06 DIAGNOSIS — R82.998 RED-COLORED URINE: ICD-10-CM

## 2025-05-06 DIAGNOSIS — J30.9 ALLERGIC RHINITIS, UNSPECIFIED SEASONALITY, UNSPECIFIED TRIGGER: ICD-10-CM

## 2025-05-06 DIAGNOSIS — M25.562 CHRONIC PAIN OF LEFT KNEE: ICD-10-CM

## 2025-05-06 DIAGNOSIS — H61.22 IMPACTED CERUMEN OF LEFT EAR: Primary | ICD-10-CM

## 2025-05-06 PROCEDURE — 3078F DIAST BP <80 MM HG: CPT | Mod: CPTII,S$GLB,,

## 2025-05-06 PROCEDURE — 3075F SYST BP GE 130 - 139MM HG: CPT | Mod: CPTII,S$GLB,,

## 2025-05-06 PROCEDURE — 99214 OFFICE O/P EST MOD 30 MIN: CPT | Mod: 25,S$GLB,,

## 2025-05-06 PROCEDURE — 3008F BODY MASS INDEX DOCD: CPT | Mod: CPTII,S$GLB,,

## 2025-05-06 PROCEDURE — 1160F RVW MEDS BY RX/DR IN RCRD: CPT | Mod: CPTII,S$GLB,,

## 2025-05-06 PROCEDURE — 69210 REMOVE IMPACTED EAR WAX UNI: CPT | Mod: S$GLB,,,

## 2025-05-06 PROCEDURE — 1159F MED LIST DOCD IN RCRD: CPT | Mod: CPTII,S$GLB,,

## 2025-05-06 PROCEDURE — 99999 PR PBB SHADOW E&M-EST. PATIENT-LVL IV: CPT | Mod: PBBFAC,,,

## 2025-05-06 RX ORDER — DICLOFENAC SODIUM 100 MG/1
100 TABLET, EXTENDED RELEASE ORAL DAILY
Qty: 30 TABLET | Refills: 0 | Status: SHIPPED | OUTPATIENT
Start: 2025-05-06

## 2025-05-06 RX ORDER — AZELASTINE 1 MG/ML
1 SPRAY, METERED NASAL 2 TIMES DAILY
Qty: 30 ML | Refills: 3 | Status: SHIPPED | OUTPATIENT
Start: 2025-05-06 | End: 2026-05-06

## 2025-05-06 NOTE — PROGRESS NOTES
HPI     Brandi Sahu is a 64 y.o. female with multiple medical diagnoses as listed in the medical history and problem list that presents for   Chief Complaint   Patient presents with    Follow-up       HPI  Pt presents today for f/u after being started on Doxycycline for acute bronchitis w/asthma exacerbation. She completed her abx course, reports improvements with her symptoms overall.Improved ear fullness, though is still present. She is compliant with fluticasone-salmeterol diskus inhaler. She's not been needing to use her Albuterol and Duo-neb. She is not up to date with her flu and pneumonia vaccine.   She is still experiencing her red urine, though color wasn't as deep. Last occurrence was today. Denies dysuria otherwise.     Assessment & Plan     1. Mild intermittent asthma, unspecified whether complicated  - Symptoms significantly improved compared to our last visit, pt to continue w/compliance with her maintenance inhaler and albuterol PRN    2. Primary hypertension  - Blood pressure today controlled in clinic. stable, continue with Spironolactone  - Recommend low-sodium diet and compliance with blood pressure medication.  - Keep blood pressure goal <140/90 and f/u with clinic if persistently elevated    3. Red-colored urine  - Unable to provide urine today, advised patient to provide a new sample as the order will be active in her order.    4. Allergic rhinitis, unspecified seasonality, unspecified trigger  - Pt advised to continue w/daily antihistamine and flonase nasal spray. Added on Astelin nasal spray.    - azelastine (ASTELIN) 137 mcg (0.1 %) nasal spray; 1 spray (137 mcg total) by Nasal route 2 (two) times daily.  Dispense: 30 mL; Refill: 3    5. Chronic pain of left knee  - No improvement with Diclofenac 75 mg BID. She's been taking it BID with no reported side effects, abdominal pain, or bleeding. Pt to do trial of diclofenac 100 mg XR and establish w/ortho for knee pain  - Pt to continue with  RICE therapy     - diclofenac sodium 100 mg 24 hr tablet; Take 100 mg by mouth once daily.  Dispense: 30 tablet; Refill: 0  - Ambulatory referral/consult to Orthopedics; Future    6. Impacted cerumen of left ear  - Successfully removed cerumen to left ear, patient tolerated well. Advised on avoiding q tips    - Ear Cerumen Removal    --------------------------------------------    Health Maintenance         Date Due Completion Date    Pneumococcal Vaccines (Age 50+) (2 of 2 - PCV) 07/19/2022 7/19/2021    RSV Vaccine (Age 60+ and Pregnant patients) (1 - Risk 60-74 years 1-dose series) Never done ---    COVID-19 Vaccine (4 - 2024-25 season) 09/01/2024 11/29/2021    Mammogram 09/23/2025 9/23/2024    TETANUS VACCINE 05/19/2026 5/19/2016    Hemoglobin A1c (Diabetic Prevention Screening) 10/23/2027 10/23/2024    Colorectal Cancer Screening 10/03/2028 10/3/2023    Lipid Panel 10/23/2029 10/23/2024    Cervical Cancer Screening 12/02/2029 12/2/2024            Health maintenance reviewed    Follow Up:  Follow up if symptoms worsen or fail to improve.    Exam     Review of Systems:  (as noted above)  Review of Systems   Constitutional:  Negative for fatigue.   HENT:  Negative for congestion, sore throat and trouble swallowing.    Eyes:  Negative for visual disturbance.   Respiratory:  Negative for cough, chest tightness and shortness of breath.    Cardiovascular:  Negative for chest pain, palpitations and leg swelling.   Gastrointestinal:  Negative for diarrhea, nausea and vomiting.   Musculoskeletal:  Positive for arthralgias. Negative for gait problem and myalgias.   Skin:  Negative for rash.   Neurological:  Negative for dizziness, weakness, light-headedness and headaches.   Psychiatric/Behavioral:  The patient is not nervous/anxious.        Physical Exam  Constitutional:       Appearance: Normal appearance. She is not ill-appearing.   HENT:      Head: Normocephalic and atraumatic.      Right Ear: A middle ear effusion is  "present. Tympanic membrane is injected. Tympanic membrane is not retracted or bulging.      Left Ear: A middle ear effusion is present. Tympanic membrane is injected. Tympanic membrane is not retracted or bulging.      Nose:      Right Turbinates: Swollen and pale.      Left Turbinates: Swollen and pale.      Mouth/Throat:      Pharynx: No posterior oropharyngeal erythema.   Eyes:      Extraocular Movements: Extraocular movements intact.      Conjunctiva/sclera: Conjunctivae normal.   Cardiovascular:      Rate and Rhythm: Normal rate and regular rhythm.      Pulses: Normal pulses.      Heart sounds: Normal heart sounds. No murmur heard.     No friction rub. No gallop.   Pulmonary:      Effort: Pulmonary effort is normal. No respiratory distress.      Breath sounds: Normal breath sounds. No wheezing, rhonchi or rales.   Lymphadenopathy:      Cervical: No cervical adenopathy.   Skin:     General: Skin is warm and dry.      Capillary Refill: Capillary refill takes less than 2 seconds.   Neurological:      General: No focal deficit present.      Mental Status: She is alert and oriented to person, place, and time.   Psychiatric:         Mood and Affect: Mood normal.         Behavior: Behavior normal.       Vitals:    05/06/25 1513   BP: 130/70   Pulse: 76   Temp: 98 °F (36.7 °C)   TempSrc: Oral   SpO2: 97%   Weight: 125.7 kg (277 lb 1.9 oz)   Height: 5' 6" (1.676 m)      Body mass index is 44.73 kg/m².        History     Past Medical History:   Diagnosis Date    Anxiety     Arthritis     knee    Asthma     Bursitis, shoulder     Colon polyps     Dental bridge present     upper right    Dental crown present     Depression     Hypertension     Pes planus     Wears glasses     for reading       Family History   Problem Relation Name Age of Onset    Cancer Mother Sandie Sahu         breast    Diabetes Mother Sandie Sahu     Hypertension Mother Sandie Sahu     Breast cancer Mother Sandie Sahu     Cancer Brother Ephron " Corona         liver cancer    Breast cancer Maternal Aunt         Allergies and Medications: (updated and reviewed)  Review of patient's allergies indicates:   Allergen Reactions    Lisinopril Hives, Itching, Rash, Shortness Of Breath and Swelling     Current Medications[1]    Patient Care Team:  Cherelle Stover MD as PCP - General (Internal Medicine)         - The patient is given an After Visit Summary that lists all medications with directions, allergies, education, orders placed during this encounter and follow-up instructions.      - I have reviewed the patient's medical information including past medical and family history sections including the medications and allergies.      - We discussed the patient's current medications.   This note was generated with the assistance of ambient listening technology. Verbal consent was obtained by the patient and accompanying visitor(s) for the recording of patient appointment to facilitate this note. I attest to having reviewed and edited the generated note for accuracy, though some syntax or spelling errors may persist. Please contact the author of this note for any clarification.          Hermes Lyman NP                      [1]   Current Outpatient Medications   Medication Sig Dispense Refill    albuterol (PROVENTIL HFA) 90 mcg/actuation inhaler Inhale 2 puffs into the lungs every 6 (six) hours as needed for Wheezing. Rescue 18 g 5    albuterol-ipratropium (DUO-NEB) 2.5 mg-0.5 mg/3 mL nebulizer solution Take 3 mLs by nebulization every 6 (six) hours as needed for Wheezing or Shortness of Breath (cough). 120 each 1    diphenhydrAMINE (BENADRYL) 25 mg capsule Take 2 capsules (50 mg total) by mouth every 6 (six) hours as needed for Itching or Allergies. 30 capsule 0    ergocalciferol (ERGOCALCIFEROL) 50,000 unit Cap Take 1 capsule (50,000 Units total) by mouth every 7 days. 12 capsule 3    famotidine (PEPCID) 20 MG tablet Take 1 tablet (20 mg total) by mouth 2 (two)  times daily as needed for Heartburn. 180 tablet 0    fluticasone propionate (FLONASE) 50 mcg/actuation nasal spray 2 sprays (100 mcg total) by Each Nostril route once daily. 16 g 0    metoprolol succinate (TOPROL-XL) 50 MG 24 hr tablet Take 1 tablet (50 mg total) by mouth once daily. 90 tablet 3    promethazine-dextromethorphan (PROMETHAZINE-DM) 6.25-15 mg/5 mL Syrp Take 5 mLs by mouth nightly as needed (cough). 118 mL 0    rivaroxaban (XARELTO) 20 mg Tab Take 1 tablet (20 mg total) by mouth once daily. 90 tablet 3    spironolactone (ALDACTONE) 50 MG tablet Take 1 tablet (50 mg total) by mouth 2 (two) times daily. 180 tablet 1    tirzepatide, weight loss, (ZEPBOUND) 10 mg/0.5 mL PnIj Inject 10 mg into the skin every 7 days. 4 Pen 3    topiramate (TOPAMAX) 25 MG tablet Take 25 mg by mouth 2 (two) times daily.      azelastine (ASTELIN) 137 mcg (0.1 %) nasal spray 1 spray (137 mcg total) by Nasal route 2 (two) times daily. 30 mL 3    diclofenac sodium 100 mg 24 hr tablet Take 100 mg by mouth once daily. 30 tablet 0    fluticasone-salmeterol diskus inhaler 100-50 mcg Inhale 1 puff into the lungs 2 (two) times daily. Controller 60 each 5    loratadine (CLARITIN) 10 mg tablet Take 1 tablet (10 mg total) by mouth every morning. 60 tablet 0     No current facility-administered medications for this visit.

## 2025-05-06 NOTE — PROCEDURES
Ear Cerumen Removal    Date/Time: 5/6/2025 3:00 PM    Performed by: Hermes Lyman FNP  Authorized by: Hermes Lyman FNP    Consent Done?:  Yes (Verbal)  Location details:  Left ear  Procedure type: curette    Cerumen  Removal Results:  Cerumen completely removed  Patient tolerance:  Patient tolerated the procedure well with no immediate complications

## 2025-05-12 DIAGNOSIS — M17.12 PRIMARY OSTEOARTHRITIS OF LEFT KNEE: Primary | ICD-10-CM

## 2025-05-21 ENCOUNTER — OFFICE VISIT (OUTPATIENT)
Dept: FAMILY MEDICINE | Facility: CLINIC | Age: 65
End: 2025-05-21
Payer: COMMERCIAL

## 2025-05-21 VITALS
BODY MASS INDEX: 43.81 KG/M2 | SYSTOLIC BLOOD PRESSURE: 134 MMHG | HEART RATE: 89 BPM | OXYGEN SATURATION: 93 % | DIASTOLIC BLOOD PRESSURE: 64 MMHG | HEIGHT: 66 IN | TEMPERATURE: 98 F | WEIGHT: 272.63 LBS

## 2025-05-21 DIAGNOSIS — I10 PRIMARY HYPERTENSION: ICD-10-CM

## 2025-05-21 DIAGNOSIS — J30.9 ALLERGIC RHINITIS, UNSPECIFIED SEASONALITY, UNSPECIFIED TRIGGER: ICD-10-CM

## 2025-05-21 DIAGNOSIS — F33.1 MODERATE EPISODE OF RECURRENT MAJOR DEPRESSIVE DISORDER: ICD-10-CM

## 2025-05-21 DIAGNOSIS — L03.011 PARONYCHIA OF FINGER OF RIGHT HAND: Primary | ICD-10-CM

## 2025-05-21 DIAGNOSIS — E66.01 MORBID OBESITY WITH BMI OF 40.0-44.9, ADULT: ICD-10-CM

## 2025-05-21 PROCEDURE — 99214 OFFICE O/P EST MOD 30 MIN: CPT | Mod: S$GLB,,, | Performed by: NURSE PRACTITIONER

## 2025-05-21 PROCEDURE — 99999 PR PBB SHADOW E&M-EST. PATIENT-LVL V: CPT | Mod: PBBFAC,,, | Performed by: NURSE PRACTITIONER

## 2025-05-21 PROCEDURE — 1159F MED LIST DOCD IN RCRD: CPT | Mod: CPTII,S$GLB,, | Performed by: NURSE PRACTITIONER

## 2025-05-21 PROCEDURE — 3008F BODY MASS INDEX DOCD: CPT | Mod: CPTII,S$GLB,, | Performed by: NURSE PRACTITIONER

## 2025-05-21 PROCEDURE — 3078F DIAST BP <80 MM HG: CPT | Mod: CPTII,S$GLB,, | Performed by: NURSE PRACTITIONER

## 2025-05-21 PROCEDURE — 3075F SYST BP GE 130 - 139MM HG: CPT | Mod: CPTII,S$GLB,, | Performed by: NURSE PRACTITIONER

## 2025-05-21 RX ORDER — CEPHALEXIN 500 MG/1
500 CAPSULE ORAL EVERY 6 HOURS
Qty: 20 CAPSULE | Refills: 0 | Status: SHIPPED | OUTPATIENT
Start: 2025-05-21 | End: 2025-05-26

## 2025-05-21 RX ORDER — PSEUDOEPH/DM/GUAIFEN/ACETAMIN 30-10-324
EXPECTORANT ORAL 3 TIMES DAILY
Qty: 9 G | Refills: 1 | Status: SHIPPED | OUTPATIENT
Start: 2025-05-21

## 2025-05-21 RX ORDER — LORATADINE 10 MG/1
10 TABLET ORAL EVERY MORNING
Qty: 60 TABLET | Refills: 0 | Status: SHIPPED | OUTPATIENT
Start: 2025-05-21 | End: 2026-05-21

## 2025-05-21 NOTE — PATIENT INSTRUCTIONS
"[facility]--[phone]  Imaging, Xray, CT, MRI, Ultrasound---[phone]  Bariatrics---[phone]  Breast Surgery---[phone]  Case Management---[phone]  Colonoscopy---[phone]  DME---[phone]  Infectious Disease---[phone]  Interventional Radiology---[phone]  Medical Records---[phone]  [facility]---[phone]  Optometry/Ophthalmology---[phone]  [facility]---[phone]  Physical Therapy---[phone]  Psychiatry---[phone] or [phone]  Plastic Surgery---[phone]  Recovery--[phone] option 2, or [phone].  Sleep Study---[phone]  Smoking Cessation---[phone]  Wound Care---[phone]  Referral Desk---[phone]  Patient Education       Weight Loss Diet   About this topic   There are many "trendy" weight loss diets that are popular today. Many of these diets can end up being more harmful than helpful. The healthiest way to lose weight is to burn more calories than you eat.  A weight loss diet should help you have a healthy view of eating. It is NOT healthy to stop eating to try and lose weight. A good diet plan will help you cut down your food intake and make healthy choices.  A healthy weight loss goal is 1 to 2 pounds (0.5 to 1 kg) per week. Reducing calories in your diet, burning calories through exercise, or both can help you lose weight. Combining a healthy diet with regular physical activity can help you get the best results.  To cut calories in your diet you can:  Switch from whole milk to 1% or skim milk.  Switch from regular cheese to low-fat or fat-free cheese.  Use healthier condiment choices:  Fat-free or low-fat sour cream or salad dressings  Spray butter  Diet syrups or jellies over regular  Try frozen yogurt as a dessert rather than eating ice cream.  Skip the chips. Snack on carrots, vegetables, or fruit. If chips are a favorite of yours, try the baked style and watch portion size.  Eat grilled, roasted, boiled, broiled, or " baked meats. Avoid deep-frying. Choose skinless poultry, lean red meat, lean cuts of pork, and fish for good protein sources.  Try flavored no-calorie morrell. Do not drink soda and juices that have many calories.  Choose fruit instead of sweets.  General   Eating smaller meals more often may be helpful. This will keep you from overeating at your next meal. Also, eating meals slowly helps you feel full faster.  If eating 3 meals is a part of your lifestyle, choose more lean proteins and higher fiber foods to fill you up at each meal.  Do not skip meals. Most often if you skip a meal, you eat too much at the next meal.  Eat smaller portions. Use a smaller plate or bowl for meals, and when you are eating out, eat half and take the rest home.  Plan ahead. Plan your meals and grocery list before going to the store. Planning will keep you from getting meals from restaurants.  Do not go to the grocery store hungry. You are more likely to buy snacks that are not good for you.  Portion out snacks. When you are having a snack, instead of grabbing the whole bag, portion a small amount out to give yourself a stopping point.  Drink water before and after your meals to help fill you up without the calories.  When eating starchy foods, choose whole-grain products. These have a lot of fiber which will make you feel full. Fiber also helps lower cholesterol and helps with bowel function.  If you need a helpful start, ask your doctor to send you to a dietitian for weight loss help.         What will the results be?   Losing excess weight will make your whole body healthier. You will have more energy for your daily activities and lower your risk for health problems.  What lifestyle changes are needed?   Stay active. Eating healthy is not always enough to lose weight. Burning calories by exercising is a big part of weight loss.  What foods are good to eat?   The key is to watch your portion sizes. It is best to choose foods that are  lower in fat and calories.  Choose lean meats:  Boneless, skinless chicken breast  Pork loin  90% lean beef  Lean turkey meat  Fresh fish (not fried)  Choose low-fat dairy products:  1% or skim milk  Spray butter or margarine  Low-fat or fat-free cheese  Frozen yogurt or low-calorie ice cream  Choose fresh fruits, vegetables, beans and lentils, and whole wheat products more often.  Choose water to drink more often. Drink diet or no-calorie beverages when you want something other than water. Aim to get your calories from the foods you eat.  Choose smart snacks:  Fruits  Vegetables  Low-fat or nonfat yogurt  Low-fat or no-fat cheese, such as cottage cheese  Unsalted nuts  Hard-boiled egg  Hummus  Guacamole  Natural peanut butter  Popcorn with no butter ? use pepper, garlic, or another spice to taste  Whole grain crackers  What foods should be limited or avoided?   Limit high-fat, high-sodium, and high-calorie foods like:  Fried foods  Processed meats  Whole-fat dairy products  Candy, cookies, chips, pastries  Sausage, medrano, any full-fat meats  Soda, juice  Beer, wine, and mixed drinks (alcohol)  Will there be any other care needed?   What do I do first before trying to lose weight?  Talk to your doctor and dietitian to see if you need to lose weight. Work with them to set your weight loss goals.  If you have a chronic illness, such as high blood sugar or high blood pressure, ask a doctor or dietitian what diet and exercise is right for you.  Ask your doctor about how much you are able to exercise and what type of exercise is good for you.  Helpful tips   Keep a food journal to help keep you on track.  Join a support group.  Tips for burning calories:  If your workplace is near your house, choose to walk or bike to work instead of driving.  Take 20-minute walks each day. Walk around during your lunch break. You will not only burn calories, but will raise your energy for the rest of the day.  Take the stairs over the  elevator.  Join a gym or exercise class with a friend.  Try to exercise 30 minutes a day for overall health. Three 10-minute sessions work too. Aim for 60 to 90 minutes a day to lose weight.  Drink lots of water before, during, and after exercise.  Where can I learn more?   Academy of Nutrition and Dietetics  https://www.eatright.org/health/weight-loss/your-health-and-your-weight/back-to-basics-for-healthy-weight-loss   Centers for Disease Control and Prevention  https://www.cdc.gov/healthyweight/healthy_eating/index.html   Familydoctor.org  https://familydoctor.org/nutrition-weight-loss-need-know-fad-diets/   NHS  https://www.nhs.uk/live-well/healthy-weight/12-tips-to-help-you-lose-weight/?tabname=you-and-your-weight   Last Reviewed Date   2021-06-24  Consumer Information Use and Disclaimer   This information is not specific medical advice and does not replace information you receive from your health care provider. This is only a brief summary of general information. It does NOT include all information about conditions, illnesses, injuries, tests, procedures, treatments, therapies, discharge instructions or life-style choices that may apply to you. You must talk with your health care provider for complete information about your health and treatment options. This information should not be used to decide whether or not to accept your health care providers advice, instructions or recommendations. Only your health care provider has the knowledge and training to provide advice that is right for you.  Copyright   Copyright © 2021 [employer] and its affiliates and/or licensors. All rights reserved.

## 2025-05-21 NOTE — PROGRESS NOTES
HPI     Brandi Sahu is a 64 y.o. female with multiple medical diagnoses as listed in the medical history and problem list that presents for No chief complaint on file.      HPI    Swelling/pain of right middle finger:  Reports new onset swelling to the left side of her right middle finger fingernail.  Symptoms began this week and gradually worsened.  Denies drainage, pus, fever, chills.  She did see a  over 2 weeks ago but is unsure if this contributed.  She does not bite her nails.  Denies recent gardening or injury.  Denies insect bite or cut to location.  Denies similar symptoms in the past.  She has been cleaning the area with soap and water.  Pain described as mild-to-moderate      Assessment & Plan     1. Paronychia of finger of right hand  Signs and symptoms consistent with diagnosis.  Areas mildly tender to palpation.  Associated with mild swelling and redness.  Discussed treatment options.  Patient declined drainage today.  Will treat with warm soaks 3 times a day followed by triple antibiotic ointment application.  Will also cover with cephalexin as below.  Discussed DDx, condition, and treatment.   Education sent to patient portal/included in after visit summary.  ED precautions given.   Notify provider if symptoms do not resolve or increase in severity.   Patient verbalizes understanding and agrees with plan of care.   - neomycin-bacitracin-polymyxin (TRIPLE ANTIBIOTIC) ointment; Apply topically 3 (three) times daily.  Dispense: 9 g; Refill: 1  - cephALEXin (KEFLEX) 500 MG capsule; Take 1 capsule (500 mg total) by mouth every 6 (six) hours. for 5 days  Dispense: 20 capsule; Refill: 0    2. Allergic rhinitis, unspecified seasonality, unspecified trigger  The current medical regimen is effective;  continue present plan and medications.   Refill Claritin  - loratadine (CLARITIN) 10 mg tablet; Take 1 tablet (10 mg total) by mouth every morning.  Dispense: 60 tablet; Refill: 0    3.  Moderate episode of recurrent major depressive disorder  Reports mood is stable. Denies thoughts of self harm.  The current medical regimen is effective;  continue present plan     4. Primary hypertension  BP Readings from Last 3 Encounters:   25 134/64   25 130/70   25 (!) 160/88     -continue current medication regimen  -DASH diet, regular cardiovascular exercises, portion control  -weight loss  -f/u with BP logs in 2 weeks      5. Morbid obesity with BMI of 40.0-44.9, adult  We discussed weight issues and safe, effective ways of losing pounds, includin) diet:  low carbohydrate, low fat diet, stay away from fast food, fried and processed food, use whole grain, lot of fruits and vegetables, use healthy fat such as avocado, nuts and olive oil in reasonable quantity, stay away from sodas. Regular meals with lean proteins.  2) physical activity: ideally 150 min a week, with cardiovascular and resistance activity.  Patient was encouraged to set realistic attainable goals for weight loss, and we will follow up periodically.  Mediterranean Diet recommendations (Adopted from Tonio et al, NEJ, 2018.)  - Eat primarily plant-based foods, such as fruits and vegetables, whole grains, legumes (beans) and nuts  - Limit refined carbohydrates (white pasta, bread, rice).  - Replace butter with healthy fats such as olive oil.  - Use herbs and spices instead of salt to flavor foods.  - Limit red meat and processed meats to no more than a few times a month.  - Avoid sugary sodas, bakery goods, and sweets.  - Eat fish and poultry at least twice a week.  - Ambulatory referral/consult to Bariatric/Obesity Medicine; Future          --------------------------------------------      Health Maintenance:  Health Maintenance         Date Due Completion Date    Pneumococcal Vaccines (Age 50+) (2 of 2 - PCV) 2022    RSV Vaccine (Age 60+ and Pregnant patients) (1 - Risk 60-74 years 1-dose series) Never  "done ---    COVID-19 Vaccine (4 - 2024-25 season) 09/01/2024 11/29/2021    Mammogram 09/23/2025 9/23/2024    TETANUS VACCINE 05/19/2026 5/19/2016    Hemoglobin A1c (Diabetic Prevention Screening) 10/23/2027 10/23/2024    Colorectal Cancer Screening 10/03/2028 10/3/2023    Lipid Panel 10/23/2029 10/23/2024    Cervical Cancer Screening 12/02/2029 12/2/2024            Discussed the importance of overdue vaccines which were offered during this encounter. Patient declined overdue vaccines at this time and Advised patient on the importance of completing overdue health maintenance items    Follow Up:  Follow up in about 2 weeks (around 6/4/2025), or if symptoms worsen or fail to improve.    Exam     Review of Systems:  (as noted above)  Review of Systems   Constitutional:  Negative for fever.   HENT:  Negative for trouble swallowing.    Eyes:  Negative for visual disturbance.   Respiratory:  Negative for chest tightness and shortness of breath.    Cardiovascular:  Negative for chest pain.   Gastrointestinal:  Negative for blood in stool.   Skin:  Positive for color change.       Physical Exam:   Physical Exam  Constitutional:       General: She is not in acute distress.     Appearance: She is obese. She is not ill-appearing or diaphoretic.   HENT:      Head: Normocephalic and atraumatic.   Cardiovascular:      Rate and Rhythm: Normal rate and regular rhythm.   Pulmonary:      Effort: Pulmonary effort is normal. No respiratory distress.   Chest:      Chest wall: No tenderness.   Skin:     Findings: Erythema present.          Neurological:      General: No focal deficit present.      Mental Status: She is alert and oriented to person, place, and time.       Vitals:    05/21/25 0900   BP: 134/64   Patient Position: Sitting   Pulse: 89   Temp: 98.2 °F (36.8 °C)   TempSrc: Oral   SpO2: (!) 93%   Weight: 123.6 kg (272 lb 9.6 oz)   Height: 5' 6" (1.676 m)      Body mass index is 44 kg/m².        History     Past Medical " History:  Past Medical History:   Diagnosis Date    Anxiety     Arthritis     knee    Asthma     Bursitis, shoulder     Colon polyps     Dental bridge present     upper right    Dental crown present     Depression     Hypertension     Pes planus     Wears glasses     for reading       Past Surgical History:  Past Surgical History:   Procedure Laterality Date    arthroscopy ankle right      bilateral inguinal hernia repair      COLONOSCOPY N/A 01/11/2018    Procedure: COLONOSCOPY;  Surgeon: Christoph Nolasco MD;  Location: [facility] ENDO;  Service: Endoscopy;  Laterality: N/A;    COLONOSCOPY N/A 10/03/2023    Procedure: COLONOSCOPY;  Surgeon: Herbie Tellez MD;  Location: [facility] ENDO;  Service: Endoscopy;  Laterality: N/A;  instructions sent to patient portal. TBou  referral: EDWARDO Hammer NP    left torn rotator cuff repair  09/2014    MYOMECTOMY      rt salphigectomy      SYNOVECTOMY      TRANSFORAMINAL EPIDURAL INJECTION OF STEROID Right 01/18/2022    Procedure: LUMBAR TRANSFORAMINAL RIGHT L5/S1 DIRECT REFERRAL;  Surgeon: Cherelle Carpenter MD;  Location: [facility] PAIN MGT;  Service: Pain Management;  Laterality: Right;    TRANSFORAMINAL EPIDURAL INJECTION OF STEROID Bilateral 01/12/2023    Procedure: INJECTION, STEROID, EPIDURAL, TRANSFORAMINAL APPROACH, BILATERAL L5-S1 CONTRAST DIRECT REF;  Surgeon: Lorraine Braxton MD;  Location: [facility] PAIN MGT;  Service: Pain Management;  Laterality: Bilateral;    TRANSFORAMINAL EPIDURAL INJECTION OF STEROID Right 7/24/2024    Procedure: LUMBAR TRANSFORAMINAL RIGHT L5/S1 DIRECT REFERRAL;  Surgeon: Cherelle Carpenter MD;  Location: [facility] PAIN MGT;  Service: Pain Management;  Laterality: Right;  [phone]    UMBILICAL HERNIA REPAIR         Social History:  Social History[1]    Family History:  Family History   Problem Relation Name Age of Onset    Cancer Mother Sandie Sahu         breast    Diabetes Mother Sandie Sahu     Hypertension Mother Sandie Sahu     Breast cancer Mother Sandie Sahu      Cancer Brother Jhon Sahu         liver cancer    Breast cancer Maternal Aunt         Allergies and Medications: (updated and reviewed)  Review of patient's allergies indicates:   Allergen Reactions    Lisinopril Hives, Itching, Rash, Shortness Of Breath and Swelling     Current Medications[2]    Patient Care Team:  Cherelle Stover MD as PCP - General (Internal Medicine)         - The patient is given an After Visit Summary that lists all medications with directions, allergies, education, orders placed during this encounter and follow-up instructions.      - I have reviewed the patient's medical information including past medical, family, and social history sections including the medications and allergies.      - We discussed the patient's current medications.     This note was created by combination of typed  and MModal dictation.  Transcription errors may be present.  If there are any questions, please contact me.                        [1]   Social History  Socioeconomic History    Marital status:    Tobacco Use    Smoking status: Never    Smokeless tobacco: Never   Substance and Sexual Activity    Alcohol use: Yes     Comment: social     Drug use: No    Sexual activity: Not Currently     Birth control/protection: None     Comment: occasional     Social Drivers of Health     Financial Resource Strain: Low Risk  (7/10/2024)    Overall Financial Resource Strain (CARDIA)     Difficulty of Paying Living Expenses: Not hard at all   Food Insecurity: No Food Insecurity (7/10/2024)    Hunger Vital Sign     Worried About Running Out of Food in the Last Year: Never true     Ran Out of Food in the Last Year: Never true   Physical Activity: Insufficiently Active (7/10/2024)    Exercise Vital Sign     Days of Exercise per Week: 3 days     Minutes of Exercise per Session: 10 min   Stress: No Stress Concern Present (7/10/2024)    [employer] [place] [employer] - Occupational Stress Questionnaire      Feeling of Stress : Not at all   Housing Stability: Unknown (7/10/2024)    Housing Stability Vital Sign     Unable to Pay for Housing in the Last Year: No   [2]   Current Outpatient Medications   Medication Sig Dispense Refill    albuterol (PROVENTIL HFA) 90 mcg/actuation inhaler Inhale 2 puffs into the lungs every 6 (six) hours as needed for Wheezing. Rescue 18 g 5    albuterol-ipratropium (DUO-NEB) 2.5 mg-0.5 mg/3 mL nebulizer solution Take 3 mLs by nebulization every 6 (six) hours as needed for Wheezing or Shortness of Breath (cough). 120 each 1    azelastine (ASTELIN) 137 mcg (0.1 %) nasal spray 1 spray (137 mcg total) by Nasal route 2 (two) times daily. 30 mL 3    diclofenac sodium 100 mg 24 hr tablet Take 100 mg by mouth once daily. 30 tablet 0    diphenhydrAMINE (BENADRYL) 25 mg capsule Take 2 capsules (50 mg total) by mouth every 6 (six) hours as needed for Itching or Allergies. 30 capsule 0    ergocalciferol (ERGOCALCIFEROL) 50,000 unit Cap Take 1 capsule (50,000 Units total) by mouth every 7 days. 12 capsule 3    famotidine (PEPCID) 20 MG tablet Take 1 tablet (20 mg total) by mouth 2 (two) times daily as needed for Heartburn. 180 tablet 0    fluticasone propionate (FLONASE) 50 mcg/actuation nasal spray 2 sprays (100 mcg total) by Each Nostril route once daily. 16 g 0    fluticasone-salmeterol diskus inhaler 100-50 mcg Inhale 1 puff into the lungs 2 (two) times daily. Controller 60 each 5    metoprolol succinate (TOPROL-XL) 50 MG 24 hr tablet Take 1 tablet (50 mg total) by mouth once daily. 90 tablet 3    promethazine-dextromethorphan (PROMETHAZINE-DM) 6.25-15 mg/5 mL Syrp Take 5 mLs by mouth nightly as needed (cough). 118 mL 0    rivaroxaban (XARELTO) 20 mg Tab Take 1 tablet (20 mg total) by mouth once daily. 90 tablet 3    spironolactone (ALDACTONE) 50 MG tablet Take 1 tablet (50 mg total) by mouth 2 (two) times daily. 180 tablet 1    tirzepatide, weight loss, (ZEPBOUND) 10 mg/0.5 mL PnIj Inject 10 mg  into the skin every 7 days. 4 Pen 3    topiramate (TOPAMAX) 25 MG tablet Take 25 mg by mouth 2 (two) times daily.      cephALEXin (KEFLEX) 500 MG capsule Take 1 capsule (500 mg total) by mouth every 6 (six) hours. for 5 days 20 capsule 0    loratadine (CLARITIN) 10 mg tablet Take 1 tablet (10 mg total) by mouth every morning. 60 tablet 0    neomycin-bacitracin-polymyxin (TRIPLE ANTIBIOTIC) ointment Apply topically 3 (three) times daily. 9 g 1     No current facility-administered medications for this visit.

## 2025-09-05 DIAGNOSIS — M25.562 CHRONIC PAIN OF LEFT KNEE: ICD-10-CM

## 2025-09-05 DIAGNOSIS — G89.29 CHRONIC PAIN OF LEFT KNEE: ICD-10-CM

## 2025-09-05 RX ORDER — DICLOFENAC SODIUM 75 MG/1
TABLET, DELAYED RELEASE ORAL
Qty: 60 TABLET | Refills: 5 | Status: SHIPPED | OUTPATIENT
Start: 2025-09-05

## (undated) DEVICE — DRESSING LEUKOPLAST FLEX 1X3IN